# Patient Record
Sex: MALE | Race: WHITE | NOT HISPANIC OR LATINO | Employment: OTHER | URBAN - METROPOLITAN AREA
[De-identification: names, ages, dates, MRNs, and addresses within clinical notes are randomized per-mention and may not be internally consistent; named-entity substitution may affect disease eponyms.]

---

## 2017-05-30 RX ORDER — QUETIAPINE FUMARATE 100 MG/1
TABLET, FILM COATED ORAL
Qty: 90 TABLET | Refills: 0 | Status: SHIPPED | OUTPATIENT
Start: 2017-05-30 | End: 2017-07-18 | Stop reason: SDUPTHER

## 2017-06-06 RX ORDER — LITHIUM CARBONATE 150 MG/1
CAPSULE ORAL
Qty: 270 CAPSULE | Refills: 0 | Status: SHIPPED | OUTPATIENT
Start: 2017-06-06 | End: 2017-07-18 | Stop reason: SDUPTHER

## 2017-06-27 ENCOUNTER — OFFICE VISIT (OUTPATIENT)
Dept: FAMILY MEDICINE CLINIC | Facility: CLINIC | Age: 67
End: 2017-06-27

## 2017-06-27 VITALS
BODY MASS INDEX: 26.34 KG/M2 | HEIGHT: 70 IN | SYSTOLIC BLOOD PRESSURE: 170 MMHG | TEMPERATURE: 98.6 F | RESPIRATION RATE: 18 BRPM | WEIGHT: 184 LBS | OXYGEN SATURATION: 94 % | HEART RATE: 70 BPM | DIASTOLIC BLOOD PRESSURE: 80 MMHG

## 2017-06-27 DIAGNOSIS — I10 ESSENTIAL HYPERTENSION: ICD-10-CM

## 2017-06-27 DIAGNOSIS — R06.00 DYSPNEA, UNSPECIFIED TYPE: Primary | ICD-10-CM

## 2017-06-27 DIAGNOSIS — R05.8 PRODUCTIVE COUGH: ICD-10-CM

## 2017-06-27 DIAGNOSIS — R07.9 CHEST PAIN, UNSPECIFIED TYPE: ICD-10-CM

## 2017-06-27 PROCEDURE — 93000 ELECTROCARDIOGRAM COMPLETE: CPT | Performed by: NURSE PRACTITIONER

## 2017-06-27 PROCEDURE — 99214 OFFICE O/P EST MOD 30 MIN: CPT | Performed by: NURSE PRACTITIONER

## 2017-06-27 PROCEDURE — 71020 XR CHEST PA AND LATERAL: CPT | Performed by: NURSE PRACTITIONER

## 2017-06-27 RX ORDER — AMLODIPINE BESYLATE 5 MG/1
5 TABLET ORAL DAILY
Qty: 30 TABLET | Refills: 0 | Status: SHIPPED | OUTPATIENT
Start: 2017-06-27 | End: 2017-07-07 | Stop reason: SDUPTHER

## 2017-06-27 RX ORDER — LEVOFLOXACIN 500 MG/1
500 TABLET, FILM COATED ORAL DAILY
Qty: 10 TABLET | Refills: 0 | Status: SHIPPED | OUTPATIENT
Start: 2017-06-27 | End: 2017-06-28 | Stop reason: ALTCHOICE

## 2017-06-27 NOTE — PROGRESS NOTES
Subjective   Felicita Mike is a 66 y.o. male.     History of Present Illness   Patient presents to office with CC of elevated blood pressure. States he has been having headaches lately. He checked his blood pressure before coming to [facility] and states it was 210/100.  His initial blood pressure in office today was 200/88.  Repeat blood pressure after sitting for several minutes was 170/80.  Patient has COPD and is a current smoker. He reports he is always short of breath, but he has recently started to have a productive cough.  Patient reports some intermittent chest discomfort to both right and left side that is worse when supine.  He denies swelling to lower extremities.  He is UTD with his cardiologist Dr. Anne.    The following portions of the patient's history were reviewed and updated as appropriate: allergies, current medications, past family history, past medical history, past social history, past surgical history and problem list.    Review of Systems   Constitutional: Negative for chills and fever.   HENT: Negative for congestion, sinus pressure and sore throat.    Eyes: Negative for visual disturbance.   Respiratory: Positive for cough, chest tightness and shortness of breath.    Cardiovascular: Positive for chest pain. Negative for palpitations and leg swelling.   Neurological: Positive for headaches. Negative for dizziness and light-headedness.       Objective   Physical Exam   Constitutional: He is oriented to person, place, and time. He appears well-developed and well-nourished.   Neck: Carotid bruit is not present.   Cardiovascular: Normal rate and regular rhythm.    Pulmonary/Chest: Effort normal. He has rhonchi in the right upper field and the left upper field. He has rales in the right lower field and the left lower field.   Neurological: He is oriented to person, place, and time.   Skin: Skin is warm and dry.   Psychiatric: He has a normal mood and affect. His behavior is normal. Judgment and  thought content normal.   Nursing note and vitals reviewed.      Assessment/Plan   Felicita was seen today for headache and medication reaction.    Diagnoses and all orders for this visit:    Dyspnea, unspecified type  -     XR Chest PA & Lateral (In Office)  -     levoFLOXacin (LEVAQUIN) 500 MG tablet; Take 1 tablet by mouth Daily.    Chest pain, unspecified type  -     XR Chest PA & Lateral (In Office)    Essential hypertension  -     amLODIPine (NORVASC) 5 MG tablet; Take 1 tablet by mouth Daily.    Productive cough  -     levoFLOXacin (LEVAQUIN) 500 MG tablet; Take 1 tablet by mouth Daily.             EKG showed NSR.     Chest xray showed cardiomegaly and some possible infiltrate to RLL.  No comparison on file.     Patient given 10 days of levaquin.  Added amlodipine 5 mg. Patient to f/u in 1 week for recheck.

## 2017-06-27 NOTE — PROGRESS NOTES
Procedure     ECG 12 Lead  Date/Time: 6/27/2017 6:15 PM  Performed by: KENYETTA LISA  Authorized by: KENYETTA LISA   Comparison: not compared with previous ECG   Rhythm: sinus rhythm  Rate: normal  Conduction: conduction normal  ST Segments: ST segments normal  T Waves: T waves normal  QRS axis: normal  Other: no other findings  Clinical impression: normal ECG  Comments: P//190 ms   ms  QT/QTc 480/495 ms  HR 65

## 2017-06-28 ENCOUNTER — TELEPHONE (OUTPATIENT)
Dept: FAMILY MEDICINE CLINIC | Facility: CLINIC | Age: 67
End: 2017-06-28

## 2017-06-28 RX ORDER — AMOXICILLIN AND CLAVULANATE POTASSIUM 875; 125 MG/1; MG/1
1 TABLET, FILM COATED ORAL 2 TIMES DAILY
Qty: 20 TABLET | Refills: 0
Start: 2017-06-28 | End: 2017-07-08

## 2017-06-28 NOTE — TELEPHONE ENCOUNTER
[facility] pharmacy called and there is drug interaction with seroquel and fluoxetine and Levaquin. I changed rx to augmentin. they will contact patient.

## 2017-07-07 ENCOUNTER — OFFICE VISIT (OUTPATIENT)
Dept: FAMILY MEDICINE CLINIC | Facility: CLINIC | Age: 67
End: 2017-07-07

## 2017-07-07 VITALS
HEIGHT: 70 IN | SYSTOLIC BLOOD PRESSURE: 158 MMHG | RESPIRATION RATE: 16 BRPM | DIASTOLIC BLOOD PRESSURE: 70 MMHG | HEART RATE: 65 BPM | WEIGHT: 184.5 LBS | OXYGEN SATURATION: 97 % | BODY MASS INDEX: 26.41 KG/M2 | TEMPERATURE: 97.9 F

## 2017-07-07 DIAGNOSIS — I10 ESSENTIAL HYPERTENSION: ICD-10-CM

## 2017-07-07 DIAGNOSIS — E78.2 MIXED HYPERLIPIDEMIA: ICD-10-CM

## 2017-07-07 DIAGNOSIS — I25.10 CORONARY ARTERIOSCLEROSIS IN NATIVE ARTERY: ICD-10-CM

## 2017-07-07 DIAGNOSIS — F17.210 SMOKING GREATER THAN 40 PACK YEARS: Primary | ICD-10-CM

## 2017-07-07 PROCEDURE — 99214 OFFICE O/P EST MOD 30 MIN: CPT | Performed by: NURSE PRACTITIONER

## 2017-07-07 RX ORDER — AMLODIPINE BESYLATE 10 MG/1
10 TABLET ORAL DAILY
Qty: 30 TABLET | Refills: 0 | Status: SHIPPED | OUTPATIENT
Start: 2017-07-07 | End: 2017-07-18 | Stop reason: SDUPTHER

## 2017-07-07 NOTE — PROGRESS NOTES
Subjective   Felicita Mike is a 66 y.o. male.     History of Present Illness   Patient follows up for elevated blood pressure.   He was started on amlodipine at last visit. States he has been taking as prescribed. Blood pressure only mildly improved today. Patient is still smoking.  He is not interested in quitting.  Patient reports he still has headache.  Denies chest pain or shortness of breath.   He finished antibiotic and states his cough has improved.    The following portions of the patient's history were reviewed and updated as appropriate: allergies, current medications, past family history, past medical history, past social history, past surgical history and problem list.    Review of Systems   Constitutional: Negative for fatigue.   Respiratory: Negative for cough and shortness of breath.    Cardiovascular: Negative for chest pain and palpitations.   Skin: Negative for rash.   Neurological: Positive for headaches.   Psychiatric/Behavioral: Negative for dysphoric mood and sleep disturbance. The patient is not nervous/anxious.        Objective   Physical Exam   Constitutional: He is oriented to person, place, and time. He appears well-developed and well-nourished.   Cardiovascular: Normal rate and regular rhythm.    Pulmonary/Chest: Effort normal and breath sounds normal.   Neurological: He is oriented to person, place, and time.   Skin: Skin is warm and dry.   Psychiatric: He has a normal mood and affect. His behavior is normal. Judgment and thought content normal.   Nursing note and vitals reviewed.      Assessment/Plan   Felicita was seen today for headache, uri and hypertension.    Diagnoses and all orders for this visit:    Smoking greater than 40 pack years  -     Duplex Carotid Ultrasound CAR    Essential hypertension  -     amLODIPine (NORVASC) 10 MG tablet; Take 1 tablet by mouth Daily.    Coronary arteriosclerosis in native artery  -     Duplex Carotid Ultrasound CAR    Mixed hyperlipidemia  -      Duplex Carotid Ultrasound CAR

## 2017-07-12 ENCOUNTER — TELEPHONE (OUTPATIENT)
Dept: FAMILY MEDICINE CLINIC | Facility: CLINIC | Age: 67
End: 2017-07-12

## 2017-07-12 ENCOUNTER — TRANSCRIBE ORDERS (OUTPATIENT)
Dept: ADMINISTRATIVE | Facility: HOSPITAL | Age: 67
End: 2017-07-12

## 2017-07-12 DIAGNOSIS — Z13.9 SCREENING: Primary | ICD-10-CM

## 2017-07-12 NOTE — TELEPHONE ENCOUNTER
Pt made aware that he will have to pay for the carotid artery testing himself. He was given the number and the info.

## 2017-07-13 ENCOUNTER — HOSPITAL ENCOUNTER (OUTPATIENT)
Dept: CARDIOLOGY | Facility: HOSPITAL | Age: 67
Discharge: HOME | End: 2017-07-13
Admitting: NURSE PRACTITIONER

## 2017-07-13 ENCOUNTER — TELEPHONE (OUTPATIENT)
Dept: FAMILY MEDICINE CLINIC | Facility: CLINIC | Age: 67
End: 2017-07-13

## 2017-07-13 VITALS
HEART RATE: 79 BPM | HEIGHT: 67 IN | DIASTOLIC BLOOD PRESSURE: 67 MMHG | SYSTOLIC BLOOD PRESSURE: 142 MMHG | WEIGHT: 178 LBS | BODY MASS INDEX: 27.94 KG/M2

## 2017-07-13 DIAGNOSIS — Z13.9 SCREENING: ICD-10-CM

## 2017-07-13 LAB
BH CV VAS BP LEFT ARM: NORMAL MMHG
BH CV VAS BP RIGHT ARM: NORMAL MMHG
BH CV XLRA MEAS LEFT ICA/CCA RATIO: 1.25
BH CV XLRA MEAS LEFT MID CCA PSV: NORMAL CM/SEC
BH CV XLRA MEAS LEFT MID ICA PSV: NORMAL CM/SEC
BH CV XLRA MEAS LEFT PROX ECA PSV: NORMAL CM/SEC
BH CV XLRA MEAS RIGHT ICA/CCA RATIO: 1.44
BH CV XLRA MEAS RIGHT MID CCA PSV: NORMAL CM/SEC
BH CV XLRA MEAS RIGHT MID ICA PSV: NORMAL CM/SEC
BH CV XLRA MEAS RIGHT PROX ECA PSV: NORMAL CM/SEC

## 2017-07-13 PROCEDURE — 93799 UNLISTED CV SVC/PROCEDURE: CPT

## 2017-07-13 NOTE — TELEPHONE ENCOUNTER
Pt was seen on 6/27/17 and 7/7/17 for his BP and headaches. His BP is better but he still has a headache. He is wanting to know what he needs to do.

## 2017-07-17 DIAGNOSIS — E11.9 TYPE 2 DIABETES MELLITUS WITHOUT COMPLICATION (HCC): ICD-10-CM

## 2017-07-17 DIAGNOSIS — D72.829 LEUKOCYTOSIS: ICD-10-CM

## 2017-07-17 DIAGNOSIS — E78.5 HYPERLIPIDEMIA: ICD-10-CM

## 2017-07-17 RX ORDER — ATORVASTATIN CALCIUM 40 MG/1
TABLET, FILM COATED ORAL
Qty: 90 TABLET | Refills: 2 | Status: SHIPPED | OUTPATIENT
Start: 2017-07-17 | End: 2018-01-19 | Stop reason: SDUPTHER

## 2017-07-18 ENCOUNTER — APPOINTMENT (OUTPATIENT)
Dept: CARDIOLOGY | Facility: HOSPITAL | Age: 67
End: 2017-07-18

## 2017-07-18 ENCOUNTER — OFFICE VISIT (OUTPATIENT)
Dept: FAMILY MEDICINE CLINIC | Facility: CLINIC | Age: 67
End: 2017-07-18

## 2017-07-18 VITALS
WEIGHT: 181 LBS | SYSTOLIC BLOOD PRESSURE: 124 MMHG | TEMPERATURE: 97.6 F | BODY MASS INDEX: 28.41 KG/M2 | HEIGHT: 67 IN | OXYGEN SATURATION: 93 % | HEART RATE: 75 BPM | RESPIRATION RATE: 16 BRPM | DIASTOLIC BLOOD PRESSURE: 74 MMHG

## 2017-07-18 DIAGNOSIS — N40.1 BENIGN PROSTATIC HYPERPLASIA WITH LOWER URINARY TRACT SYMPTOMS, UNSPECIFIED MORPHOLOGY: ICD-10-CM

## 2017-07-18 DIAGNOSIS — F31.70 BIPOLAR DISORDER IN PARTIAL REMISSION, MOST RECENT EPISODE UNSPECIFIED TYPE (HCC): ICD-10-CM

## 2017-07-18 DIAGNOSIS — I73.9 PERIPHERAL VASCULAR DISEASE (HCC): ICD-10-CM

## 2017-07-18 DIAGNOSIS — I10 ESSENTIAL HYPERTENSION: Primary | ICD-10-CM

## 2017-07-18 DIAGNOSIS — F41.9 ANXIETY: ICD-10-CM

## 2017-07-18 DIAGNOSIS — E83.52 SERUM CALCIUM ELEVATED: ICD-10-CM

## 2017-07-18 DIAGNOSIS — E55.9 VITAMIN D DEFICIENCY: ICD-10-CM

## 2017-07-18 DIAGNOSIS — K21.9 GASTROESOPHAGEAL REFLUX DISEASE WITHOUT ESOPHAGITIS: ICD-10-CM

## 2017-07-18 DIAGNOSIS — E11.9 TYPE 2 DIABETES MELLITUS WITHOUT COMPLICATION, WITHOUT LONG-TERM CURRENT USE OF INSULIN (HCC): ICD-10-CM

## 2017-07-18 DIAGNOSIS — J43.9 PULMONARY EMPHYSEMA, UNSPECIFIED EMPHYSEMA TYPE (HCC): ICD-10-CM

## 2017-07-18 DIAGNOSIS — G44.52 NEW DAILY PERSISTENT HEADACHE: ICD-10-CM

## 2017-07-18 DIAGNOSIS — I25.10 CORONARY ARTERIOSCLEROSIS IN NATIVE ARTERY: ICD-10-CM

## 2017-07-18 DIAGNOSIS — E78.2 MIXED HYPERLIPIDEMIA: ICD-10-CM

## 2017-07-18 LAB
25(OH)D3+25(OH)D2 SERPL-MCNC: 39.6 NG/ML (ref 30–100)
ALBUMIN SERPL-MCNC: 4.8 G/DL (ref 3.5–5.2)
ALBUMIN/GLOB SERPL: 2 G/DL
ALP SERPL-CCNC: 82 U/L (ref 39–117)
ALT SERPL-CCNC: 14 U/L (ref 1–41)
AST SERPL-CCNC: 12 U/L (ref 1–40)
BASOPHILS # BLD AUTO: 0.04 10*3/MM3 (ref 0–0.2)
BASOPHILS NFR BLD AUTO: 0.3 % (ref 0–1.5)
BILIRUB SERPL-MCNC: 0.7 MG/DL (ref 0.1–1.2)
BUN SERPL-MCNC: 20 MG/DL (ref 8–23)
BUN/CREAT SERPL: 17.9 (ref 7–25)
CALCIUM SERPL-MCNC: 10.7 MG/DL (ref 8.6–10.5)
CHLORIDE SERPL-SCNC: 97 MMOL/L (ref 98–107)
CHOLEST SERPL-MCNC: 137 MG/DL (ref 0–200)
CO2 SERPL-SCNC: 26.1 MMOL/L (ref 22–29)
CREAT SERPL-MCNC: 1.12 MG/DL (ref 0.76–1.27)
EOSINOPHIL # BLD AUTO: 0.55 10*3/MM3 (ref 0–0.7)
EOSINOPHIL NFR BLD AUTO: 4.1 % (ref 0.3–6.2)
ERYTHROCYTE [DISTWIDTH] IN BLOOD BY AUTOMATED COUNT: 13 % (ref 11.5–14.5)
FOLATE SERPL-MCNC: >20 NG/ML (ref 4.78–24.2)
GLOBULIN SER CALC-MCNC: 2.4 GM/DL
GLUCOSE SERPL-MCNC: 201 MG/DL (ref 65–99)
HBA1C MFR BLD: 6.7 % (ref 4.8–5.6)
HCT VFR BLD AUTO: 42.2 % (ref 40.4–52.2)
HDLC SERPL-MCNC: 31 MG/DL (ref 40–60)
HGB BLD-MCNC: 13.2 G/DL (ref 13.7–17.6)
IMM GRANULOCYTES # BLD: 0.04 10*3/MM3 (ref 0–0.03)
IMM GRANULOCYTES NFR BLD: 0.3 % (ref 0–0.5)
LDLC SERPL CALC-MCNC: 65 MG/DL (ref 0–100)
LITHIUM SERPL-SCNC: 0.9 MMOL/L (ref 0.6–1.2)
LYMPHOCYTES # BLD AUTO: 1.67 10*3/MM3 (ref 0.9–4.8)
LYMPHOCYTES NFR BLD AUTO: 12.4 % (ref 19.6–45.3)
MCH RBC QN AUTO: 27.8 PG (ref 27–32.7)
MCHC RBC AUTO-ENTMCNC: 31.3 G/DL (ref 32.6–36.4)
MCV RBC AUTO: 88.8 FL (ref 79.8–96.2)
MONOCYTES # BLD AUTO: 1.34 10*3/MM3 (ref 0.2–1.2)
MONOCYTES NFR BLD AUTO: 9.9 % (ref 5–12)
NEUTROPHILS # BLD AUTO: 9.88 10*3/MM3 (ref 1.9–8.1)
NEUTROPHILS NFR BLD AUTO: 73 % (ref 42.7–76)
PLATELET # BLD AUTO: 201 10*3/MM3 (ref 140–500)
POTASSIUM SERPL-SCNC: 5 MMOL/L (ref 3.5–5.2)
PROT SERPL-MCNC: 7.2 G/DL (ref 6–8.5)
RBC # BLD AUTO: 4.75 10*6/MM3 (ref 4.6–6)
SODIUM SERPL-SCNC: 138 MMOL/L (ref 136–145)
T4 FREE SERPL-MCNC: 1.13 NG/DL (ref 0.93–1.7)
TRIGL SERPL-MCNC: 204 MG/DL (ref 0–150)
TSH SERPL DL<=0.005 MIU/L-ACNC: 2.03 MIU/ML (ref 0.27–4.2)
VIT B12 SERPL-MCNC: >2000 PG/ML (ref 211–946)
VLDLC SERPL CALC-MCNC: 40.8 MG/DL (ref 5–40)
WBC # BLD AUTO: 13.52 10*3/MM3 (ref 4.5–10.7)

## 2017-07-18 PROCEDURE — 99214 OFFICE O/P EST MOD 30 MIN: CPT | Performed by: PHYSICIAN ASSISTANT

## 2017-07-18 RX ORDER — AMLODIPINE BESYLATE 10 MG/1
10 TABLET ORAL DAILY
Qty: 90 TABLET | Refills: 1 | Status: SHIPPED | OUTPATIENT
Start: 2017-07-18 | End: 2018-01-19 | Stop reason: SDUPTHER

## 2017-07-18 RX ORDER — LITHIUM CARBONATE 150 MG/1
CAPSULE ORAL
Qty: 270 CAPSULE | Refills: 3 | Status: SHIPPED | OUTPATIENT
Start: 2017-07-18 | End: 2018-07-20 | Stop reason: SDUPTHER

## 2017-07-18 RX ORDER — QUETIAPINE FUMARATE 100 MG/1
100 TABLET, FILM COATED ORAL NIGHTLY
Qty: 90 TABLET | Refills: 1 | Status: SHIPPED | OUTPATIENT
Start: 2017-07-18 | End: 2018-01-19 | Stop reason: SDUPTHER

## 2017-07-18 NOTE — PATIENT INSTRUCTIONS
Set up CT head and to call you  See neurologist  Low glycemic index diet  Exercise 30 minutes most days of the week  Make sure you get results on any labs or tests we ordered today  We discussed medications and how to take them as prescribed  Sleep 6-8 hours each night if possible  If you have not signed up for [employer], please activate your code ASAP from your After Visit Summary today    LDL goal <100  LDL goal if heart disease <70  HDL goal >60  Triglyceride goal <150  BP goal =<130/80  Fasting glucose <100    Stop smoking  See me every 6 mos  Labs today

## 2017-07-18 NOTE — PROGRESS NOTES
"Subjective   Felicita Mike is a 66 y.o. male.     History of Present Illness   Felicita Mike 66 y.o. male who presents today for routine follow up check and medication refills.  he has a history of   Patient Active Problem List   Diagnosis   • Elevated prostate specific antigen (PSA)   • Coronary arteriosclerosis in native artery   • Bipolar affective disorder   • Chronic obstructive pulmonary disease   • Colon polyp   • Constipation   • Diabetes mellitus   • Gastroesophageal reflux disease   • Fatigue   • Hearing loss   • Hyperlipidemia   • Peripheral vascular disease   • Proteinuria   • Muscle pain   • Low back pain   • Leukocytosis   • Seborrheic eczema   • Sleep apnea   • Tinea corporis   • Tremor   • Hypertension   .  Since the last visit, he has overall felt tired.  He has Hypertenision and is well controlled on medication, DMII and is well controlled on medication, GERD and is well controlled on PPI medication, Hyperlipidemia and is well controlled on medication and CAD and is under care of their Cardiologist for medical management.  he has been compliant with current medications have reviewed them.  The patient denies medication side effects.    Lab Results   Component Value Date    BUN 14 12/28/2016    CREATININE 0.95 12/28/2016    EGFRIFNONA 80 12/28/2016    EGFRIFAFRI 97 12/28/2016    BCR 14.7 12/28/2016    K 5.2 12/28/2016    CO2 25.6 12/28/2016    CALCIUM 10.3 12/28/2016    PROTENTOTREF 6.7 12/06/2016    ALBUMIN 4.70 12/06/2016    LABIL2 2.4 12/06/2016    AST 9 12/06/2016    ALT 13 12/06/2016     Lab Results   Component Value Date    HGBA1C 6.36 (H) 12/06/2016     Just had carotid doppler  Study Impression   Tech Notes: Height 5' 7\" Weight 178 lb. BMI 27.9.    Plaque present on right and left carotid artery (mild plaque).    Carotid Artery Disease and Stroke Screening:   The right vessel has no significant carotid stenosis (less than50%).  The left vessel has no significant carotid stenosis (less " than50%).   Carotid screening recommendations:  annual screening.     Sees urologist 1-2 times a year and has BPH; Rx helps  See DR Anne heart disease    Lab Results   Component Value Date    TSH 0.894 12/06/2016     Need to watch thyroid d/t Lithium  Also watch A1C d/t seroquel  Felicita Mike male 66 y.o. who presents today for follow up of Bipolar Disorder and Anxiety.  He reports medication is working well, patient desires to continue on Rx, and needs refill. Onset of symptoms was approximately several years ago.  He denies current suicidal and homicidal ideation. Risk factors are family history of anxiety and or depression and lifestyle of multiple roles.  Previous treatment includes current Rx.  He complains of the following medication side effects: none.  The patient has previously been in counseling..    Felicita Mike 66 y.o. male presents for evaluation of new daily h/a tension like. Symptoms began about 6 weeks ago. Generally, the headaches last about continous hours and occur continously every day. The headaches are usually poorly described and pounding. The location of the headache pain is bilateral, temporal, parietal and upper face. Recently, the headaches have been increasing in severity. Work attendance or other daily activities are affected by the headaches. Precipitating factors include: BP was up for a while and ? from this also. The headaches are usually not preceded by an aura. Associated symptoms include sometimes dizzy to stand. The patient denies speech difficulties and vision problems. Home treatment has included Excedrin with some improvement. Other history includes: no prior hx . Family history includes headaches of unknown type in sister.  Prior therapies tried include OTC analgesics with relief some help, but still symptoms.    No prior Dx and bp has been great in last few weeks and home readings 120/80    Last MRI brain was 12-2-15 and neg    The following portions of the patient's  history were reviewed and updated as appropriate: allergies, current medications, past family history, past medical history, past social history, past surgical history and problem list.    Review of Systems   Constitutional: Negative for activity change, appetite change and unexpected weight change.   HENT: Negative for nosebleeds and trouble swallowing.    Eyes: Negative for pain and visual disturbance.   Respiratory: Negative for chest tightness, shortness of breath and wheezing.    Cardiovascular: Negative for chest pain and palpitations.   Gastrointestinal: Negative for abdominal pain and blood in stool.   Endocrine: Negative.    Genitourinary: Negative for difficulty urinating and hematuria.   Musculoskeletal: Negative for joint swelling.   Skin: Negative for color change and rash.   Allergic/Immunologic: Negative.    Neurological: Positive for dizziness, tremors and headaches. Negative for syncope and speech difficulty.   Hematological: Negative for adenopathy.   Psychiatric/Behavioral: Negative for agitation and confusion.   All other systems reviewed and are negative.      Objective   Physical Exam   Constitutional: He is oriented to person, place, and time. He appears well-developed and well-nourished. No distress.   HENT:   Head: Normocephalic and atraumatic.   Right Ear: External ear normal.   Left Ear: External ear normal.   Nose: Nose normal.   Mouth/Throat: Oropharynx is clear and moist.   Left pupil stays dilated   Eyes: Conjunctivae and EOM are normal. Right eye exhibits no discharge. Left eye exhibits no discharge. No scleral icterus.   Left pupil damage is chronic   Neck: Normal range of motion. Neck supple. No tracheal deviation present. No thyromegaly present.   Cardiovascular: Normal rate, regular rhythm, normal heart sounds, intact distal pulses and normal pulses.  Exam reveals no gallop and no friction rub.    No murmur heard.  Pulmonary/Chest: Effort normal and breath sounds normal. No  respiratory distress. He has no wheezes. He has no rales.   decreased   Abdominal: Soft.   Musculoskeletal: Normal range of motion.   Tremor in hands    Felicita had a diabetic foot exam performed today.   During the foot exam he had a monofilament test performed.    Neurological Sensory Findings - Unaltered hot/cold right ankle/foot discrimination and unaltered hot/cold left ankle/foot discrimination. Unaltered sharp/dull right ankle/foot discrimination and unaltered sharp/dull left ankle/foot discrimination. No right ankle/foot altered proprioception and no left ankle/foot altered proprioception    Vascular Status -  His exam exhibits right foot vasculature abnormal and no right foot edema. His exam exhibits left foot vasculature abnormal and no left foot edema.   Skin Integrity  -  His right foot skin is not intact.  He hasno right foot ulcer, non-callous right foot, no right foot warmth and no right foot blister.   Felicita's left foot skin is not intact.  He has no left foot ulcer, non-callous left foot, no left foot warmth and no left foot blister..     Lymphadenopathy:     He has no cervical adenopathy.   Neurological: He is alert and oriented to person, place, and time. He has normal reflexes. No cranial nerve deficit (see note on pupil left). He exhibits normal muscle tone. Coordination normal.   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 No rash noted. No erythema. No pallor.   Clubbing toenails and fingernails   Psychiatric: He has a normal mood and affect. His behavior is normal. Judgment and thought content normal.   Nursing note and vitals reviewed.      Assessment/Plan   Felicita was seen today for headache.    Diagnoses and all orders for this visit:    Essential hypertension    Mixed hyperlipidemia    Type 2 diabetes mellitus without complication, without long-term current use of insulin    Gastroesophageal reflux disease without esophagitis    Bipolar disorder in partial remission, most recent  episode unspecified type    Peripheral vascular disease    Pulmonary emphysema, unspecified emphysema type    Coronary arteriosclerosis in native artery    Anxiety    Benign prostatic hyperplasia with lower urinary tract symptoms, unspecified morphology

## 2017-07-24 ENCOUNTER — HOSPITAL ENCOUNTER (OUTPATIENT)
Dept: CT IMAGING | Facility: HOSPITAL | Age: 67
Discharge: HOME | End: 2017-07-24
Admitting: PHYSICIAN ASSISTANT

## 2017-07-24 DIAGNOSIS — G44.52 NEW DAILY PERSISTENT HEADACHE: ICD-10-CM

## 2017-07-24 PROCEDURE — 70450 CT HEAD/BRAIN W/O DYE: CPT

## 2017-07-28 ENCOUNTER — TELEPHONE (OUTPATIENT)
Dept: FAMILY MEDICINE CLINIC | Facility: CLINIC | Age: 67
End: 2017-07-28

## 2017-08-28 ENCOUNTER — OFFICE VISIT (OUTPATIENT)
Dept: NEUROLOGY | Facility: CLINIC | Age: 67
End: 2017-08-28

## 2017-08-28 VITALS
HEIGHT: 67 IN | SYSTOLIC BLOOD PRESSURE: 125 MMHG | BODY MASS INDEX: 29.19 KG/M2 | DIASTOLIC BLOOD PRESSURE: 60 MMHG | WEIGHT: 186 LBS

## 2017-08-28 DIAGNOSIS — R25.1 TREMOR: Primary | ICD-10-CM

## 2017-08-28 PROBLEM — G44.209 TENSION HEADACHE: Status: ACTIVE | Noted: 2017-08-28

## 2017-08-28 PROCEDURE — 99203 OFFICE O/P NEW LOW 30 MIN: CPT | Performed by: PSYCHIATRY & NEUROLOGY

## 2017-08-28 NOTE — PROGRESS NOTES
Subjective:     Patient ID: Felicita Mike is a 66 y.o. male.    History of Present Illness   The patient is a 66-year-old right-handed gentleman who was seen for further evaluation of headache. The patient was seen today in consultation per the request of Alexandrea Jimenez.  Patient began having a headache in June.  Had insidious onset.  Multi location.  Resolved about 2 weeks ago.  He is not had headaches before.  He did have a normal MRI the brain in December 2015.  CAT scan head without contrast was normal 7/24/17.  He states that his blood pressure was highly elevated during the headaches and has now improved.  He is on a statin drug.  The following portions of the patient's history were reviewed and updated as appropriate: allergies, current medications, past family history, past medical history, past social history, past surgical history and problem list.    Family History   Problem Relation Age of Onset   • Cancer Mother    • Diabetes Mother    • Hypertension Mother    • Heart disease Father    • Cancer Brother    • Stroke Brother      Active Ambulatory Problems     Diagnosis Date Noted   • Elevated prostate specific antigen (PSA) 02/25/2016   • Coronary arteriosclerosis in native artery 02/25/2016   • Bipolar affective disorder 02/25/2016   • Chronic obstructive pulmonary disease 02/25/2016   • Colon polyp 02/25/2016   • Constipation 02/25/2016   • Diabetes mellitus 02/25/2016   • Gastroesophageal reflux disease 02/25/2016   • Fatigue 02/25/2016   • Hearing loss 02/25/2016   • Hyperlipidemia 02/25/2016   • Peripheral vascular disease 02/25/2016   • Proteinuria 02/25/2016   • Muscle pain 02/25/2016   • Low back pain 02/25/2016   • Leukocytosis 02/25/2016   • Seborrheic eczema 02/25/2016   • Sleep apnea 02/25/2016   • Tinea corporis 02/25/2016   • Tremor 02/25/2016   • Hypertension 02/25/2016   • Anxiety 07/18/2017   • Benign prostatic hyperplasia with lower urinary tract symptoms 07/18/2017   • Tension headache  08/28/2017     Resolved Ambulatory Problems     Diagnosis Date Noted   • Benign essential hypertension 02/25/2016     Past Medical History:   Diagnosis Date   • Abnormal PSA    • Acute myocardial infarction    • Apnea, sleep    • Atherosclerotic heart disease    • Chronic pain    • Colon polyp    • Constipation    • COPD (chronic obstructive pulmonary disease)    • Coronary artery disease    • Depressed bipolar II disorder    • Diabetes mellitus    • Fatigue    • GERD (gastroesophageal reflux disease)    • Health care maintenance    • Hearing loss    • Hyperlipidemia    • Hypertension    • Leukocytosis    • Proteinuria    • PVD (peripheral vascular disease)    • Sebaceous cyst    • Seborrheic dermatitis    • Sleep apnea    • Tinea corporis    • Tobacco use    • Tremor      Social History     Social History   • Marital status:      Spouse name: N/A   • Number of children: N/A   • Years of education: N/A     Occupational History   • Not on file.     Social History Main Topics   • Smoking status: Current Every Day Smoker     Types: Cigarettes   • Smokeless tobacco: Never Used      Comment: NO CAFFINE USE   • Alcohol use No   • Drug use: No   • Sexual activity: Not on file     Other Topics Concern   • Not on file     Social History Narrative       Current Outpatient Prescriptions:   •  amLODIPine (NORVASC) 10 MG tablet, Take 1 tablet by mouth Daily. For  BP, Disp: 90 tablet, Rfl: 1  •  aspirin 81 MG tablet, Take 1 tablet by mouth 2 (two) times a day., Disp: , Rfl:   •  atorvastatin (LIPITOR) 40 MG tablet, TAKE ONE TABLET BY MOUTH DAILY FOR CHOLESTEROL *STOP PRAVASTATIN, Disp: 90 tablet, Rfl: 2  •  carvedilol (COREG) 25 MG tablet, Take 1 tablet by mouth 2 (Two) Times a Day With Meals. For heart, Disp: 180 tablet, Rfl: 3  •  cholecalciferol (VITAMIN D3) 1000 UNITS tablet, Take 1 tablet by mouth 2 (two) times a day., Disp: , Rfl:   •  Cyanocobalamin (B-12) 1000 MCG capsule, Take by mouth., Disp: , Rfl:   •   FLUoxetine (PROzac) 20 MG capsule, Take 3 capsules by mouth Daily. For stress, Disp: 270 capsule, Rfl: 3  •  lisinopril-hydrochlorothiazide (PRINZIDE,ZESTORETIC) 20-12.5 MG per tablet, Take 1 tablet by mouth Daily. For bp and kidney protection, Disp: 90 tablet, Rfl: 3  •  lithium carbonate 150 MG capsule, One PO in AM and 2 PO daily with food for mood, Disp: 270 capsule, Rfl: 3  •  metFORMIN (GLUCOPHAGE) 1000 MG tablet, One PO BID For DMII, Disp: 180 tablet, Rfl: 3  •  Multiple Vitamins-Minerals (MULTIVITAMIN ADULT PO), Take  by mouth., Disp: , Rfl:   •  omeprazole (priLOSEC) 20 MG capsule, Take 1 capsule by mouth Daily. For GERD, Disp: 90 capsule, Rfl: 3  •  QUEtiapine (SEROquel) 100 MG tablet, Take 1 tablet by mouth Every Night. For sleep, Disp: 90 tablet, Rfl: 1  •  tamsulosin (FLOMAX) 0.4 MG capsule 24 hr capsule, , Disp: , Rfl:     Review of Systems   Constitutional: Negative.    Eyes: Negative.    Respiratory: Negative.    Cardiovascular: Negative.    Gastrointestinal: Negative.    Endocrine: Negative.    Musculoskeletal: Positive for back pain. Negative for arthralgias, gait problem, joint swelling, myalgias, neck pain and neck stiffness.   Skin: Negative.    Allergic/Immunologic: Negative.    Neurological: Positive for headaches. Negative for dizziness, tremors, seizures, syncope, facial asymmetry, speech difficulty, weakness, light-headedness and numbness.   Hematological: Negative.    Psychiatric/Behavioral: Negative.         Objective:    Neurologic Exam  Mental status examination showed normal orientation, memory, and speech.  Attention span and concentration were normal.  Fund of knowledge was normal.  Funduscopic showed no abnormality.  Visual fields were full.  Pupillary reflexes were 5 mm, symmetric, and equally reactive to light.  Eye movements were full and conjugate.  Gag reflex was normal.  Hearing was normal.  Muscles of mastication were normal.  No facial weakness was noted.  Shoulder shrug  strength was normal bilaterally.  Tongue protrudes midline.  There is no drift of outstretched arms.  Deep tendon reflexes are 2+ and symmetric.  No focal weakness or atrophy was noted.  Tone was normal in all extremities.  No cerebellar signs were noted.  He has a mild generalized tremulousness The patient's gait was normal.  No other pathologic reflexes such as Babinski's sign were noted.  No sensory abnormalities were noted.  Physical Exam    Assessment/Plan:     Felicita was seen today for headache.    Diagnoses and all orders for this visit:    Tremor       His headache has resolved.  Etiology is not clear.  Neurological examination is unremarkable.  No reason for further testing.  Reason to place him on a new medication.  I plan to see him as needed in the office. Thank you for allowing me to share in the care of this patient.  Storm Ramírez M.D.

## 2017-10-03 RX ORDER — WHEAT DEXTRIN 3 G/4 G
POWDER IN PACKET (EA) ORAL
Qty: 28 EACH | Refills: 10 | Status: SHIPPED | OUTPATIENT
Start: 2017-10-03 | End: 2019-08-08

## 2017-10-16 RX ORDER — POLYETHYLENE GLYCOL 3350 17 G/17G
POWDER, FOR SOLUTION ORAL
Qty: 90 EACH | Refills: 4 | Status: SHIPPED | OUTPATIENT
Start: 2017-10-16 | End: 2019-08-08

## 2017-11-21 ENCOUNTER — OFFICE VISIT (OUTPATIENT)
Dept: CARDIOLOGY | Facility: CLINIC | Age: 67
End: 2017-11-21

## 2017-11-21 VITALS
DIASTOLIC BLOOD PRESSURE: 62 MMHG | BODY MASS INDEX: 26.74 KG/M2 | HEART RATE: 68 BPM | HEIGHT: 70 IN | SYSTOLIC BLOOD PRESSURE: 120 MMHG | WEIGHT: 186.8 LBS

## 2017-11-21 DIAGNOSIS — J43.9 PULMONARY EMPHYSEMA, UNSPECIFIED EMPHYSEMA TYPE (HCC): ICD-10-CM

## 2017-11-21 DIAGNOSIS — I25.10 CORONARY ARTERIOSCLEROSIS IN NATIVE ARTERY: Primary | ICD-10-CM

## 2017-11-21 DIAGNOSIS — F31.70 BIPOLAR DISORDER IN PARTIAL REMISSION, MOST RECENT EPISODE UNSPECIFIED TYPE (HCC): ICD-10-CM

## 2017-11-21 DIAGNOSIS — Z72.0 TOBACCO ABUSE: ICD-10-CM

## 2017-11-21 DIAGNOSIS — I10 ESSENTIAL HYPERTENSION: ICD-10-CM

## 2017-11-21 DIAGNOSIS — E78.49 OTHER HYPERLIPIDEMIA: ICD-10-CM

## 2017-11-21 PROCEDURE — 93000 ELECTROCARDIOGRAM COMPLETE: CPT | Performed by: INTERNAL MEDICINE

## 2017-11-21 PROCEDURE — 99214 OFFICE O/P EST MOD 30 MIN: CPT | Performed by: INTERNAL MEDICINE

## 2017-11-21 NOTE — PROGRESS NOTES
Date of Office Visit: 2017  Encounter Provider: Suleiman Anne MD  Place of Service: [facility] CARDIOLOGY  Patient Name: Felicita Mike  :1950  [id number]    Chief Complaint   Patient presents with   • Coronary Artery Disease   :     HPI: Felicita Mike is a 67 y.o. male  He is here for followup.  He is a gentleman who 13 years ago we stented his circumflex and RCA.  Sadly, he has continued to smoke and will not stop.  He has pretty advanced COPD.  He has atypical sharp chest pain, but there has been no change.  He has pronounced clubbing on his hands.          Past Medical History:   Diagnosis Date   • Abnormal PSA    • Acute myocardial infarction    • Apnea, sleep    • Atherosclerotic heart disease    • Chronic pain     Chronic low back paoin, MRI ,  L3 compression deformity, L2 degenerative disc disease and L5-S1 degenerative disc disease.   • Colon polyp    • Constipation    • COPD (chronic obstructive pulmonary disease)    • Coronary artery disease    • Depressed bipolar II disorder    • Diabetes mellitus    • Fatigue    • GERD (gastroesophageal reflux disease)    • Health care maintenance    • Hearing loss    • Hyperlipidemia    • Hypertension    • Leukocytosis    • Proteinuria    • PVD (peripheral vascular disease)    • Sebaceous cyst    • Seborrheic dermatitis    • Sleep apnea    • Tinea corporis    • Tobacco use    • Tremor        Past Surgical History:   Procedure Laterality Date   • ANGIOPLASTY      Cath Stent Placement   • COLONOSCOPY  2014    ta polyps   • COLONOSCOPY N/A 2016    Procedure: COLONOSCOPY TO CECUM AND TERMINAL ILEUM WITH HOT POLYPECTOMIES;  Surgeon: Ivette Franco MD;  Location: [facility] ENDOSCOPY;  Service:    • CORONARY STENT PLACEMENT     • EYE SURGERY      Cataract Surgery   • HAND SURGERY         Social History     Social History   • Marital status:      Spouse name: N/A   • Number of children: N/A   • Years of  education: N/A     Occupational History   • Not on file.     Social History Main Topics   • Smoking status: Current Every Day Smoker     Types: Cigarettes   • Smokeless tobacco: Never Used      Comment: NO CAFFINE USE   • Alcohol use No   • Drug use: No   • Sexual activity: Not on file     Other Topics Concern   • Not on file     Social History Narrative       Family History   Problem Relation Age of Onset   • Cancer Mother    • Diabetes Mother    • Hypertension Mother    • Heart disease Father    • Cancer Brother    • Stroke Brother        Review of Systems   Constitution: Negative for decreased appetite, fever, malaise/fatigue and weight loss.   HENT: Negative for nosebleeds.    Eyes: Negative for double vision.   Cardiovascular: Negative for chest pain, claudication, cyanosis, dyspnea on exertion, irregular heartbeat, leg swelling, near-syncope, orthopnea, palpitations, paroxysmal nocturnal dyspnea and syncope.   Respiratory: Negative for cough, hemoptysis and shortness of breath.    Hematologic/Lymphatic: Negative for bleeding problem.   Skin: Negative for rash.   Musculoskeletal: Negative for falls and myalgias.   Gastrointestinal: Negative for hematochezia, jaundice, melena, nausea and vomiting.   Genitourinary: Negative for hematuria.   Neurological: Negative for dizziness and seizures.   Psychiatric/Behavioral: Negative for altered mental status and memory loss.       Allergies   Allergen Reactions   • Clopidogrel          Current Outpatient Prescriptions:   •  amLODIPine (NORVASC) 10 MG tablet, Take 1 tablet by mouth Daily. For  BP, Disp: 90 tablet, Rfl: 1  •  aspirin 81 MG tablet, Take 1 tablet by mouth 2 (two) times a day., Disp: , Rfl:   •  atorvastatin (LIPITOR) 40 MG tablet, TAKE ONE TABLET BY MOUTH DAILY FOR CHOLESTEROL *STOP PRAVASTATIN, Disp: 90 tablet, Rfl: 2  •  carvedilol (COREG) 25 MG tablet, Take 1 tablet by mouth 2 (Two) Times a Day With Meals. For heart, Disp: 180 tablet, Rfl: 3  •   "cholecalciferol (VITAMIN D3) 1000 UNITS tablet, Take 1 tablet by mouth 2 (two) times a day., Disp: , Rfl:   •  Cyanocobalamin (B-12) 1000 MCG capsule, Take by mouth., Disp: , Rfl:   •  FLUoxetine (PROzac) 20 MG capsule, Take 3 capsules by mouth Daily. For stress, Disp: 270 capsule, Rfl: 3  •  lisinopril-hydrochlorothiazide (PRINZIDE,ZESTORETIC) 20-12.5 MG per tablet, Take 1 tablet by mouth Daily. For bp and kidney protection, Disp: 90 tablet, Rfl: 3  •  lithium carbonate 150 MG capsule, One PO in AM and 2 PO daily with food for mood, Disp: 270 capsule, Rfl: 3  •  metFORMIN (GLUCOPHAGE) 1000 MG tablet, One PO BID For DMII, Disp: 180 tablet, Rfl: 3  •  Multiple Vitamins-Minerals (MULTIVITAMIN ADULT PO), Take  by mouth., Disp: , Rfl:   •  omeprazole (priLOSEC) 20 MG capsule, Take 1 capsule by mouth Daily. For GERD, Disp: 90 capsule, Rfl: 3  •  polyethylene glycol (MIRALAX) packet, MIX 1 PACKET (17 GRAMS ) AS DIRECTED AND TAKE BY MOUTH DAILY *CAN REDUCE TO EVERY OTHER DAY OR EVERY 3 DAYS IF STOOLS TOO LOOSE, Disp: 90 each, Rfl: 4  •  QUEtiapine (SEROquel) 100 MG tablet, Take 1 tablet by mouth Every Night. For sleep, Disp: 90 tablet, Rfl: 1  •  tamsulosin (FLOMAX) 0.4 MG capsule 24 hr capsule, , Disp: , Rfl:   •  wheat dextrin (BENEFIBER ON THE GO) powder powder, TAKE PER PACKAGE DIRECTIONS, Disp: 28 each, Rfl: 10     Objective:     Vitals:    11/21/17 1315   Weight: 186 lb 12.8 oz (84.7 kg)   Height: 70\" (177.8 cm)     Body mass index is 26.8 kg/(m^2).    Physical Exam   Constitutional: He is oriented to person, place, and time. He appears well-developed and well-nourished.   HENT:   Head: Normocephalic.   Eyes: No scleral icterus.   Neck: No JVD present. No thyromegaly present.   Cardiovascular: Normal rate, regular rhythm and normal heart sounds.  Exam reveals no gallop and no friction rub.    No murmur heard.  Pulmonary/Chest: Effort normal and breath sounds normal. He has no wheezes. He has no rales.   Abdominal: " Soft. There is no hepatosplenomegaly. There is no tenderness.   Musculoskeletal: Normal range of motion. He exhibits no edema.   Lymphadenopathy:     He has no cervical adenopathy.   Neurological: He is alert and oriented to person, place, and time.   Skin: Skin is warm and dry. No rash noted.   + clubbing   Psychiatric: He has a normal mood and affect.         ECG 12 Lead  Date/Time: 11/21/2017 1:24 PM  Performed by: AGGIE ANNE  Authorized by: AGGIE ANNE   Comparison: compared with previous ECG   Similar to previous ECG  Rhythm: sinus rhythm             Assessment:       Diagnosis Plan   1. Coronary arteriosclerosis in native artery     2. Other hyperlipidemia     3. Essential hypertension     4. Pulmonary emphysema, unspecified emphysema type     5. Tobacco abuse            Plan:       He is a particularly sad case is super addicted to tobacco.  He doesn't seem to have any changes or have any manifest cardiac issues now but something will happen in with him.  He is on good medical therapy M come see Na in a year see me in 2    Coronary Artery Disease  Assessment  • The patient has no angina    Plan  • Lifestyle modifications discussed include adhering to a heart healthy diet and avoidance of tobacco products    Subjective - Objective  • There has been a previous stent procedure using IBAN  • Current antiplatelet therapy includes aspirin 81 mg        As always, it has been a pleasure to participate in your patient's care.      Sincerely,       Aggie Anne MD

## 2017-12-12 ENCOUNTER — TELEPHONE (OUTPATIENT)
Dept: FAMILY MEDICINE CLINIC | Facility: CLINIC | Age: 67
End: 2017-12-12

## 2017-12-12 DIAGNOSIS — I10 ESSENTIAL HYPERTENSION: ICD-10-CM

## 2017-12-12 RX ORDER — CARVEDILOL 25 MG/1
25 TABLET ORAL 2 TIMES DAILY WITH MEALS
Qty: 60 TABLET | Refills: 0 | Status: SHIPPED | OUTPATIENT
Start: 2017-12-12 | End: 2018-01-08 | Stop reason: SDUPTHER

## 2017-12-12 NOTE — TELEPHONE ENCOUNTER
Pt is out of this medication and has changed pharmacies.  He has an appointment scheduled in January

## 2017-12-27 RX ORDER — FLUOXETINE HYDROCHLORIDE 20 MG/1
CAPSULE ORAL
Qty: 270 CAPSULE | Refills: 0 | Status: SHIPPED | OUTPATIENT
Start: 2017-12-27 | End: 2018-01-19 | Stop reason: SDUPTHER

## 2017-12-27 RX ORDER — LISINOPRIL AND HYDROCHLOROTHIAZIDE 20; 12.5 MG/1; MG/1
TABLET ORAL
Qty: 30 TABLET | Refills: 0 | Status: SHIPPED | OUTPATIENT
Start: 2017-12-27 | End: 2018-01-19 | Stop reason: SDUPTHER

## 2018-01-08 DIAGNOSIS — I10 ESSENTIAL HYPERTENSION: ICD-10-CM

## 2018-01-08 RX ORDER — CARVEDILOL 25 MG/1
TABLET ORAL
Qty: 60 TABLET | Refills: 0 | Status: SHIPPED | OUTPATIENT
Start: 2018-01-08 | End: 2018-01-19 | Stop reason: SDUPTHER

## 2018-01-19 ENCOUNTER — OFFICE VISIT (OUTPATIENT)
Dept: FAMILY MEDICINE CLINIC | Facility: CLINIC | Age: 68
End: 2018-01-19

## 2018-01-19 VITALS
TEMPERATURE: 98.4 F | HEART RATE: 89 BPM | DIASTOLIC BLOOD PRESSURE: 60 MMHG | BODY MASS INDEX: 26.05 KG/M2 | WEIGHT: 182 LBS | HEIGHT: 70 IN | SYSTOLIC BLOOD PRESSURE: 114 MMHG | OXYGEN SATURATION: 96 % | RESPIRATION RATE: 16 BRPM

## 2018-01-19 DIAGNOSIS — D64.9 LOW HEMOGLOBIN: ICD-10-CM

## 2018-01-19 DIAGNOSIS — K21.9 GASTROESOPHAGEAL REFLUX DISEASE WITHOUT ESOPHAGITIS: ICD-10-CM

## 2018-01-19 DIAGNOSIS — I10 ESSENTIAL HYPERTENSION: ICD-10-CM

## 2018-01-19 DIAGNOSIS — F31.70 BIPOLAR DISORDER IN PARTIAL REMISSION, MOST RECENT EPISODE UNSPECIFIED TYPE (HCC): ICD-10-CM

## 2018-01-19 DIAGNOSIS — E83.52 SERUM CALCIUM ELEVATED: ICD-10-CM

## 2018-01-19 DIAGNOSIS — Z72.0 TOBACCO ABUSE: ICD-10-CM

## 2018-01-19 DIAGNOSIS — J43.9 PULMONARY EMPHYSEMA, UNSPECIFIED EMPHYSEMA TYPE (HCC): ICD-10-CM

## 2018-01-19 DIAGNOSIS — D72.829 LEUKOCYTOSIS, UNSPECIFIED TYPE: ICD-10-CM

## 2018-01-19 DIAGNOSIS — E11.9 TYPE 2 DIABETES MELLITUS WITHOUT COMPLICATION, WITHOUT LONG-TERM CURRENT USE OF INSULIN (HCC): Primary | ICD-10-CM

## 2018-01-19 DIAGNOSIS — E78.49 OTHER HYPERLIPIDEMIA: ICD-10-CM

## 2018-01-19 DIAGNOSIS — F41.9 ANXIETY: ICD-10-CM

## 2018-01-19 DIAGNOSIS — E78.2 MIXED HYPERLIPIDEMIA: ICD-10-CM

## 2018-01-19 PROCEDURE — G0009 ADMIN PNEUMOCOCCAL VACCINE: HCPCS | Performed by: PHYSICIAN ASSISTANT

## 2018-01-19 PROCEDURE — 90670 PCV13 VACCINE IM: CPT | Performed by: PHYSICIAN ASSISTANT

## 2018-01-19 PROCEDURE — 99214 OFFICE O/P EST MOD 30 MIN: CPT | Performed by: PHYSICIAN ASSISTANT

## 2018-01-19 RX ORDER — ALBUTEROL SULFATE 90 UG/1
2 AEROSOL, METERED RESPIRATORY (INHALATION) EVERY 4 HOURS PRN
Qty: 3 INHALER | Refills: 3 | Status: SHIPPED | OUTPATIENT
Start: 2018-01-19 | End: 2019-01-23 | Stop reason: SDUPTHER

## 2018-01-19 RX ORDER — ATORVASTATIN CALCIUM 40 MG/1
40 TABLET, FILM COATED ORAL DAILY
Qty: 90 TABLET | Refills: 3 | Status: SHIPPED | OUTPATIENT
Start: 2018-01-19 | End: 2019-01-21 | Stop reason: SDUPTHER

## 2018-01-19 RX ORDER — CARVEDILOL 25 MG/1
25 TABLET ORAL 2 TIMES DAILY WITH MEALS
Qty: 180 TABLET | Refills: 3 | Status: SHIPPED | OUTPATIENT
Start: 2018-01-19 | End: 2018-07-20

## 2018-01-19 RX ORDER — FLUOXETINE HYDROCHLORIDE 20 MG/1
CAPSULE ORAL
Qty: 270 CAPSULE | Refills: 1 | Status: SHIPPED | OUTPATIENT
Start: 2018-01-19 | End: 2018-07-17 | Stop reason: SDUPTHER

## 2018-01-19 RX ORDER — QUETIAPINE FUMARATE 100 MG/1
100 TABLET, FILM COATED ORAL NIGHTLY
Qty: 90 TABLET | Refills: 1 | Status: SHIPPED | OUTPATIENT
Start: 2018-01-19 | End: 2018-07-20 | Stop reason: SDUPTHER

## 2018-01-19 RX ORDER — OMEPRAZOLE 20 MG/1
20 CAPSULE, DELAYED RELEASE ORAL DAILY
Qty: 90 CAPSULE | Refills: 3 | Status: SHIPPED | OUTPATIENT
Start: 2018-01-19 | End: 2018-07-20

## 2018-01-19 RX ORDER — AMLODIPINE BESYLATE 10 MG/1
10 TABLET ORAL DAILY
Qty: 90 TABLET | Refills: 1 | Status: SHIPPED | OUTPATIENT
Start: 2018-01-19 | End: 2018-07-17 | Stop reason: SDUPTHER

## 2018-01-19 RX ORDER — LISINOPRIL AND HYDROCHLOROTHIAZIDE 20; 12.5 MG/1; MG/1
1 TABLET ORAL DAILY
Qty: 90 TABLET | Refills: 1 | Status: SHIPPED | OUTPATIENT
Start: 2018-01-19 | End: 2018-02-25

## 2018-01-19 NOTE — PROGRESS NOTES
Subjective   Felicita Mike is a 67 y.o. male.     History of Present Illness   Felicita Mike 67 y.o. male who presents today for routine follow up check and medication refills.  he has a history of   Patient Active Problem List   Diagnosis   • Elevated prostate specific antigen (PSA)   • Coronary arteriosclerosis in native artery   • Bipolar affective disorder   • Chronic obstructive pulmonary disease   • Colon polyp   • Constipation   • Diabetes mellitus   • Gastroesophageal reflux disease   • Fatigue   • Hearing loss   • Hyperlipidemia   • Peripheral vascular disease   • Proteinuria   • Muscle pain   • Low back pain   • Leukocytosis   • Seborrheic eczema   • Sleep apnea   • Tinea corporis   • Tremor   • Hypertension   • Anxiety   • Benign prostatic hyperplasia with lower urinary tract symptoms   • Tension headache   • Tobacco abuse   .  Since the last visit, he has overall felt tired.  He has Hypertenision and is well controlled on medication, DMII and is well controlled on medication, GERD and is well controlled on PPI medication, Hyperlipidemia and is well controlled on medication and CAD and is under care of their Cardiologist for medical management.  he has been compliant with current medications have reviewed them.  The patient denies medication side effects.  I will also update Ca+ and get PTH and ionized Ca+  Sees Urologist  Sees GI    Results for orders placed or performed in visit on 07/18/17   Comprehensive metabolic panel   Result Value Ref Range    Glucose 201 (H) 65 - 99 mg/dL    BUN 20 8 - 23 mg/dL    Creatinine 1.12 0.76 - 1.27 mg/dL    eGFR Non African Am 66 >60 mL/min/1.73    eGFR African Am 80 >60 mL/min/1.73    BUN/Creatinine Ratio 17.9 7.0 - 25.0    Sodium 138 136 - 145 mmol/L    Potassium 5.0 3.5 - 5.2 mmol/L    Chloride 97 (L) 98 - 107 mmol/L    Total CO2 26.1 22.0 - 29.0 mmol/L    Calcium 10.7 (H) 8.6 - 10.5 mg/dL    Total Protein 7.2 6.0 - 8.5 g/dL    Albumin 4.80 3.50 - 5.20 g/dL     Globulin 2.4 gm/dL    A/G Ratio 2.0 g/dL    Total Bilirubin 0.7 0.1 - 1.2 mg/dL    Alkaline Phosphatase 82 39 - 117 U/L    AST (SGOT) 12 1 - 40 U/L    ALT (SGPT) 14 1 - 41 U/L   Lipid panel   Result Value Ref Range    Total Cholesterol 137 0 - 200 mg/dL    Triglycerides 204 (H) 0 - 150 mg/dL    HDL Cholesterol 31 (L) 40 - 60 mg/dL    VLDL Cholesterol 40.8 (H) 5 - 40 mg/dL    LDL Cholesterol  65 0 - 100 mg/dL   TSH   Result Value Ref Range    TSH 2.030 0.270 - 4.200 mIU/mL   T4, Free   Result Value Ref Range    Free T4 1.13 0.93 - 1.70 ng/dL   Vitamin B12   Result Value Ref Range    Vitamin B-12 >2000 (H) 211 - 946 pg/mL   Folate   Result Value Ref Range    Folate >20.00 4.78 - 24.20 ng/mL   Vitamin D 25 Hydroxy   Result Value Ref Range    25 Hydroxy, Vitamin D 39.6 30.0 - 100.0 ng/mL   Lithium Level   Result Value Ref Range    Lithium 0.9 0.6 - 1.2 mmol/L   Hemoglobin A1c   Result Value Ref Range    Hemoglobin A1C 6.70 (H) 4.80 - 5.60 %   CBC and Differential   Result Value Ref Range    WBC 13.52 (H) 4.50 - 10.70 10*3/mm3    RBC 4.75 4.60 - 6.00 10*6/mm3    Hemoglobin 13.2 (L) 13.7 - 17.6 g/dL    Hematocrit 42.2 40.4 - 52.2 %    MCV 88.8 79.8 - 96.2 fL    MCH 27.8 27.0 - 32.7 pg    MCHC 31.3 (L) 32.6 - 36.4 g/dL    RDW 13.0 11.5 - 14.5 %    Platelets 201 140 - 500 10*3/mm3    Neutrophil Rel % 73.0 42.7 - 76.0 %    Lymphocyte Rel % 12.4 (L) 19.6 - 45.3 %    Monocyte Rel % 9.9 5.0 - 12.0 %    Eosinophil Rel % 4.1 0.3 - 6.2 %    Basophil Rel % 0.3 0.0 - 1.5 %    Neutrophils Absolute 9.88 (H) 1.90 - 8.10 10*3/mm3    Lymphocytes Absolute 1.67 0.90 - 4.80 10*3/mm3    Monocytes Absolute 1.34 (H) 0.20 - 1.20 10*3/mm3    Eosinophils Absolute 0.55 0.00 - 0.70 10*3/mm3    Basophils Absolute 0.04 0.00 - 0.20 10*3/mm3    Immature Granulocyte Rel % 0.3 0.0 - 0.5 %    Immature Grans Absolute 0.04 (H) 0.00 - 0.03 10*3/mm3       Felicita Mike male 66 y.o. who presents today for follow up of Bipolar Disorder and Anxiety.  He reports  medication is working well, patient desires to continue on Rx, and needs refill. Onset of symptoms was approximately several years ago.  He denies current suicidal and homicidal ideation. Risk factors are family history of anxiety and or depression and lifestyle of multiple roles.  Previous treatment includes current Rx.  He complains of the following medication side effects: none.  The patient has previously been in counseling..     The following portions of the patient's history were reviewed and updated as appropriate: allergies, current medications, past family history, past medical history, past social history, past surgical history and problem list.    Review of Systems   Constitutional: Negative for activity change, appetite change and unexpected weight change.   HENT: Negative for nosebleeds and trouble swallowing.    Eyes: Negative for pain and visual disturbance.   Respiratory: Negative for chest tightness, shortness of breath and wheezing.    Cardiovascular: Negative for chest pain and palpitations.   Gastrointestinal: Negative for abdominal pain and blood in stool.   Endocrine: Negative.    Genitourinary: Negative for difficulty urinating and hematuria.   Musculoskeletal: Positive for back pain. Negative for joint swelling.   Skin: Negative for color change and rash.   Allergic/Immunologic: Negative.    Neurological: Negative for syncope and speech difficulty.   Hematological: Negative for adenopathy.   Psychiatric/Behavioral: Negative for agitation and confusion.   All other systems reviewed and are negative.      Objective   Physical Exam   Constitutional: He is oriented to person, place, and time. He appears well-developed and well-nourished. No distress.   HENT:   Head: Normocephalic and atraumatic.   Right Ear: External ear normal.   Left Ear: External ear normal.   Nose: Nose normal.   Mouth/Throat: Oropharynx is clear and moist.   Left pupil stays dilated   Eyes: Conjunctivae and EOM are normal.  Right eye exhibits no discharge. Left eye exhibits no discharge. No scleral icterus.   Left pupil damage is chronic   Neck: Normal range of motion. Neck supple. No tracheal deviation present. No thyromegaly present.   Cardiovascular: Normal rate, regular rhythm, normal heart sounds, intact distal pulses and normal pulses.  Exam reveals no gallop and no friction rub.    No murmur heard.  Fingernails clubbing   Pulmonary/Chest: No respiratory distress. He has no wheezes. He has no rales.   decreased   Abdominal: Soft.   Musculoskeletal: Normal range of motion.   Tremor in hands      During the foot exam he had a monofilament test performed.    Neurological Sensory Findings - Unaltered hot/cold right ankle/foot discrimination and unaltered hot/cold left ankle/foot discrimination. Unaltered sharp/dull right ankle/foot discrimination and unaltered sharp/dull left ankle/foot discrimination. No right ankle/foot altered proprioception and no left ankle/foot altered proprioception    Vascular Status -  His exam exhibits right foot vasculature abnormal and no right foot edema. His exam exhibits left foot vasculature abnormal and no left foot edema.   Skin Integrity  -  His right foot skin is not intact.  He hasno right foot ulcer, non-callous right foot, no right foot warmth and no right foot blister.   Felicita's left foot skin is not intact.  He has no left foot ulcer, non-callous left foot, no left foot warmth and no left foot blister..  Lymphadenopathy:     He has no cervical adenopathy.   Neurological: He is alert and oriented to person, place, and time. He has normal reflexes. No cranial nerve deficit (see note on pupil left). He exhibits normal muscle tone. Coordination normal.   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 No rash noted. No erythema. No pallor.   Clubbing toenails and fingernails   Psychiatric: He has a normal mood and affect. His behavior is normal. Judgment and thought content normal.    Nursing note and vitals reviewed.      Assessment/Plan   Felicita was seen today for hypertension.    Diagnoses and all orders for this visit:    Type 2 diabetes mellitus without complication, without long-term current use of insulin  -     atorvastatin (LIPITOR) 40 MG tablet; Take 1 tablet by mouth Daily. For cholesterol    Essential hypertension  -     carvedilol (COREG) 25 MG tablet; Take 1 tablet by mouth 2 (Two) Times a Day With Meals. For heart  -     amLODIPine (NORVASC) 10 MG tablet; Take 1 tablet by mouth Daily. For  BP    Other hyperlipidemia    Pulmonary emphysema, unspecified emphysema type    Anxiety    Tobacco abuse    Gastroesophageal reflux disease without esophagitis    Leukocytosis, unspecified type  -     atorvastatin (LIPITOR) 40 MG tablet; Take 1 tablet by mouth Daily. For cholesterol    Mixed hyperlipidemia  -     atorvastatin (LIPITOR) 40 MG tablet; Take 1 tablet by mouth Daily. For cholesterol    Bipolar disorder in partial remission, most recent episode unspecified type    Other orders  -     QUEtiapine (SEROquel) 100 MG tablet; Take 1 tablet by mouth Every Night. For sleep  -     omeprazole (priLOSEC) 20 MG capsule; Take 1 capsule by mouth Daily. For GERD  -     metFORMIN (GLUCOPHAGE) 1000 MG tablet; One PO BID For DMII  -     lisinopril-hydrochlorothiazide (PRINZIDE,ZESTORETIC) 20-12.5 MG per tablet; Take 1 tablet by mouth Daily. For BP  -     FLUoxetine (PROzac) 20 MG capsule; 3 PO daily anxiety and depression

## 2018-01-19 NOTE — PATIENT INSTRUCTIONS
Low glycemic index diet  Exercise 30 minutes most days of the week  Make sure you get results on any labs or tests we ordered today  We discussed medications and how to take them as prescribed  Sleep 6-8 hours each night if possible  If you have not signed up for [employer], please activate your code ASAP from your After Visit Summary today    LDL goal <100  LDL goal if heart disease <70  HDL goal >60  Triglyceride goal <150  BP goal =<130/80  Fasting glucose <100

## 2018-01-20 LAB
ALBUMIN SERPL-MCNC: 4.8 G/DL (ref 3.5–5.2)
ALBUMIN/GLOB SERPL: 1.8 G/DL
ALP SERPL-CCNC: 86 U/L (ref 39–117)
ALT SERPL-CCNC: 13 U/L (ref 1–41)
AST SERPL-CCNC: 12 U/L (ref 1–40)
BASOPHILS # BLD AUTO: 0.01 10*3/MM3 (ref 0–0.2)
BASOPHILS NFR BLD AUTO: 0.1 % (ref 0–1.5)
BILIRUB SERPL-MCNC: 0.7 MG/DL (ref 0.1–1.2)
BUN SERPL-MCNC: 15 MG/DL (ref 8–23)
BUN/CREAT SERPL: 14.7 (ref 7–25)
CA-I SERPL ISE-MCNC: 5.4 MG/DL (ref 4.5–5.6)
CALCIUM SERPL-MCNC: 10.1 MG/DL (ref 8.6–10.5)
CHLORIDE SERPL-SCNC: 102 MMOL/L (ref 98–107)
CO2 SERPL-SCNC: 26.5 MMOL/L (ref 22–29)
CREAT SERPL-MCNC: 1.02 MG/DL (ref 0.76–1.27)
EOSINOPHIL # BLD AUTO: 0.26 10*3/MM3 (ref 0–0.7)
EOSINOPHIL NFR BLD AUTO: 2 % (ref 0.3–6.2)
ERYTHROCYTE [DISTWIDTH] IN BLOOD BY AUTOMATED COUNT: 13.3 % (ref 11.5–14.5)
FERRITIN SERPL-MCNC: 30.39 NG/ML (ref 30–400)
GLOBULIN SER CALC-MCNC: 2.6 GM/DL
GLUCOSE SERPL-MCNC: 153 MG/DL (ref 65–99)
HBA1C MFR BLD: 7 % (ref 4.8–5.6)
HCT VFR BLD AUTO: 39.1 % (ref 40.4–52.2)
HGB BLD-MCNC: 12.4 G/DL (ref 13.7–17.6)
IMM GRANULOCYTES # BLD: 0.04 10*3/MM3 (ref 0–0.03)
IMM GRANULOCYTES NFR BLD: 0.3 % (ref 0–0.5)
LITHIUM SERPL-SCNC: 0.8 MMOL/L (ref 0.6–1.2)
LYMPHOCYTES # BLD AUTO: 1.87 10*3/MM3 (ref 0.9–4.8)
LYMPHOCYTES NFR BLD AUTO: 14.1 % (ref 19.6–45.3)
MCH RBC QN AUTO: 27.7 PG (ref 27–32.7)
MCHC RBC AUTO-ENTMCNC: 31.7 G/DL (ref 32.6–36.4)
MCV RBC AUTO: 87.5 FL (ref 79.8–96.2)
MICROALBUMIN UR-MCNC: 47.7 UG/ML
MONOCYTES # BLD AUTO: 0.95 10*3/MM3 (ref 0.2–1.2)
MONOCYTES NFR BLD AUTO: 7.1 % (ref 5–12)
NEUTROPHILS # BLD AUTO: 10.16 10*3/MM3 (ref 1.9–8.1)
NEUTROPHILS NFR BLD AUTO: 76.4 % (ref 42.7–76)
PLATELET # BLD AUTO: 242 10*3/MM3 (ref 140–500)
POTASSIUM SERPL-SCNC: 4.7 MMOL/L (ref 3.5–5.2)
PROT SERPL-MCNC: 7.4 G/DL (ref 6–8.5)
PTH-INTACT SERPL-MCNC: 29 PG/ML (ref 15–65)
RBC # BLD AUTO: 4.47 10*6/MM3 (ref 4.6–6)
SODIUM SERPL-SCNC: 142 MMOL/L (ref 136–145)
T4 FREE SERPL-MCNC: 1.15 NG/DL (ref 0.93–1.7)
TSH SERPL DL<=0.005 MIU/L-ACNC: 1.21 MIU/ML (ref 0.27–4.2)
WBC # BLD AUTO: 13.29 10*3/MM3 (ref 4.5–10.7)

## 2018-02-25 DIAGNOSIS — I10 ESSENTIAL HYPERTENSION: ICD-10-CM

## 2018-02-25 RX ORDER — CARVEDILOL 25 MG/1
TABLET ORAL
Qty: 60 TABLET | Refills: 5 | Status: SHIPPED | OUTPATIENT
Start: 2018-02-25 | End: 2018-07-20 | Stop reason: SDUPTHER

## 2018-02-25 RX ORDER — LISINOPRIL AND HYDROCHLOROTHIAZIDE 20; 12.5 MG/1; MG/1
TABLET ORAL
Qty: 30 TABLET | Refills: 5 | Status: SHIPPED | OUTPATIENT
Start: 2018-02-25 | End: 2018-07-20 | Stop reason: SDUPTHER

## 2018-02-28 ENCOUNTER — TELEPHONE (OUTPATIENT)
Dept: FAMILY MEDICINE CLINIC | Facility: CLINIC | Age: 68
End: 2018-02-28

## 2018-02-28 NOTE — TELEPHONE ENCOUNTER
I just did level and no change in Rx;  Yes, it is fine       Lithium level   Order: [id number]   Status:  Final result   Visible to patient:  No (Not Released) Dx:  Type 2 diabetes mellitus without comp...   Notes Recorded by Alexandrea Sellers LPN on 1/30/2018 at 4:26 PM  I talked with pt  ------    Notes Recorded by Alexandrea Brink MA on 1/29/2018 at 9:01 AM  Left message for pt to call back regarding lab results  ------    Notes Recorded by Alexandrea Jimenez PA-C on 1/21/2018 at 11:59 AM  Right at goal for A1C and did go up;  WBC and neutrophils up and he had recent boil;  Ordering CBC for one month----may need to print order and send to him;    Still microalbumin +; Other labs are in range.'      Ref Range & Units 1mo ago     Lithium 0.6 - 1.2 mmol/L 0.8   Resulting Agency  [facility]   Narrative   Performed at:  01 -

## 2018-02-28 NOTE — TELEPHONE ENCOUNTER
Pt wants to make sure it is okay to take lisinopril-hctz with the lithium. The pharm let him know that it could increase the lithium levels. Please advise

## 2018-07-17 DIAGNOSIS — I10 ESSENTIAL HYPERTENSION: ICD-10-CM

## 2018-07-17 RX ORDER — AMLODIPINE BESYLATE 10 MG/1
TABLET ORAL
Qty: 90 TABLET | Refills: 0 | Status: SHIPPED | OUTPATIENT
Start: 2018-07-17 | End: 2018-07-20 | Stop reason: SDUPTHER

## 2018-07-17 RX ORDER — FLUOXETINE HYDROCHLORIDE 20 MG/1
CAPSULE ORAL
Qty: 270 CAPSULE | Refills: 0 | Status: SHIPPED | OUTPATIENT
Start: 2018-07-17 | End: 2018-07-20 | Stop reason: SDUPTHER

## 2018-07-20 ENCOUNTER — OFFICE VISIT (OUTPATIENT)
Dept: FAMILY MEDICINE CLINIC | Facility: CLINIC | Age: 68
End: 2018-07-20

## 2018-07-20 VITALS
BODY MASS INDEX: 26.05 KG/M2 | SYSTOLIC BLOOD PRESSURE: 110 MMHG | HEIGHT: 70 IN | WEIGHT: 182 LBS | HEART RATE: 67 BPM | DIASTOLIC BLOOD PRESSURE: 60 MMHG | TEMPERATURE: 98.1 F | OXYGEN SATURATION: 96 % | RESPIRATION RATE: 16 BRPM

## 2018-07-20 DIAGNOSIS — E83.52 SERUM CALCIUM ELEVATED: ICD-10-CM

## 2018-07-20 DIAGNOSIS — K21.9 GASTROESOPHAGEAL REFLUX DISEASE WITHOUT ESOPHAGITIS: ICD-10-CM

## 2018-07-20 DIAGNOSIS — E11.9 TYPE 2 DIABETES MELLITUS WITHOUT COMPLICATION, WITHOUT LONG-TERM CURRENT USE OF INSULIN (HCC): Primary | ICD-10-CM

## 2018-07-20 DIAGNOSIS — F31.70 BIPOLAR DISORDER IN PARTIAL REMISSION, MOST RECENT EPISODE UNSPECIFIED TYPE (HCC): ICD-10-CM

## 2018-07-20 DIAGNOSIS — F41.9 ANXIETY: ICD-10-CM

## 2018-07-20 DIAGNOSIS — I25.10 CORONARY ARTERIOSCLEROSIS IN NATIVE ARTERY: ICD-10-CM

## 2018-07-20 DIAGNOSIS — D72.829 LEUKOCYTOSIS, UNSPECIFIED TYPE: ICD-10-CM

## 2018-07-20 DIAGNOSIS — D72.828 OTHER ELEVATED WHITE BLOOD CELL (WBC) COUNT: ICD-10-CM

## 2018-07-20 DIAGNOSIS — Z72.0 TOBACCO ABUSE: ICD-10-CM

## 2018-07-20 DIAGNOSIS — I10 ESSENTIAL HYPERTENSION: ICD-10-CM

## 2018-07-20 DIAGNOSIS — E78.49 OTHER HYPERLIPIDEMIA: ICD-10-CM

## 2018-07-20 DIAGNOSIS — J43.9 PULMONARY EMPHYSEMA, UNSPECIFIED EMPHYSEMA TYPE (HCC): ICD-10-CM

## 2018-07-20 PROCEDURE — 99214 OFFICE O/P EST MOD 30 MIN: CPT | Performed by: PHYSICIAN ASSISTANT

## 2018-07-20 RX ORDER — CARVEDILOL 25 MG/1
25 TABLET ORAL 2 TIMES DAILY WITH MEALS
Qty: 180 TABLET | Refills: 3 | Status: SHIPPED | OUTPATIENT
Start: 2018-07-20 | End: 2019-07-23 | Stop reason: SDUPTHER

## 2018-07-20 RX ORDER — QUETIAPINE FUMARATE 100 MG/1
100 TABLET, FILM COATED ORAL NIGHTLY
Qty: 90 TABLET | Refills: 1 | Status: SHIPPED | OUTPATIENT
Start: 2018-07-20 | End: 2019-01-23 | Stop reason: SDUPTHER

## 2018-07-20 RX ORDER — ESOMEPRAZOLE MAGNESIUM 40 MG/1
40 CAPSULE, DELAYED RELEASE ORAL DAILY
Qty: 90 CAPSULE | Refills: 3 | Status: SHIPPED | OUTPATIENT
Start: 2018-07-20 | End: 2019-01-23

## 2018-07-20 RX ORDER — LITHIUM CARBONATE 150 MG/1
CAPSULE ORAL
Qty: 270 CAPSULE | Refills: 3 | Status: SHIPPED | OUTPATIENT
Start: 2018-07-20 | End: 2019-07-23 | Stop reason: SDUPTHER

## 2018-07-20 RX ORDER — FLUOXETINE HYDROCHLORIDE 20 MG/1
CAPSULE ORAL
Qty: 270 CAPSULE | Refills: 3 | Status: SHIPPED | OUTPATIENT
Start: 2018-07-20 | End: 2019-07-23 | Stop reason: SDUPTHER

## 2018-07-20 RX ORDER — LISINOPRIL AND HYDROCHLOROTHIAZIDE 20; 12.5 MG/1; MG/1
1 TABLET ORAL DAILY
Qty: 90 TABLET | Refills: 3 | Status: SHIPPED | OUTPATIENT
Start: 2018-07-20 | End: 2019-07-23 | Stop reason: SDUPTHER

## 2018-07-20 RX ORDER — AMLODIPINE BESYLATE 10 MG/1
10 TABLET ORAL DAILY
Qty: 90 TABLET | Refills: 1 | Status: SHIPPED | OUTPATIENT
Start: 2018-07-20 | End: 2019-01-23 | Stop reason: SDUPTHER

## 2018-07-20 NOTE — PATIENT INSTRUCTIONS
Food Choices for Gastroesophageal Reflux Disease, Adult  When you have gastroesophageal reflux disease (GERD), the foods you eat and your eating habits are very important. Choosing the right foods can help ease the discomfort of GERD. Consider working with a diet and nutrition specialist (dietitian) to help you make healthy food choices.  What general guidelines should I follow?  Eating plan  · Choose healthy foods low in fat, such as fruits, vegetables, whole grains, low-fat dairy products, and lean meat, fish, and poultry.  · Eat frequent, small meals instead of three large meals each day. Eat your meals slowly, in a relaxed setting. Avoid bending over or lying down until 2-3 hours after eating.  · Limit high-fat foods such as fatty meats or fried foods.  · Limit your intake of oils, butter, and shortening to less than 8 teaspoons each day.  · Avoid the following:  ? Foods that cause symptoms. These may be different for different people. Keep a food diary to keep track of foods that cause symptoms.  ? Alcohol.  ? Drinking large amounts of liquid with meals.  ? Eating meals during the 2-3 hours before bed.  · Cook foods using methods other than frying. This may include baking, grilling, or broiling.  Lifestyle    · Maintain a healthy weight. Ask your health care provider what weight is healthy for you. If you need to lose weight, work with your health care provider to do so safely.  · Exercise for at least 30 minutes on 5 or more days each week, or as told by your health care provider.  · Avoid wearing clothes that fit tightly around your waist and chest.  · Do not use any products that contain nicotine or tobacco, such as cigarettes and e-cigarettes. If you need help quitting, ask your health care provider.  · Sleep with the head of your bed raised. Use a wedge under the mattress or blocks under the bed frame to raise the head of the bed.  What foods are not recommended?  The items listed may not be a complete  list. Talk with your dietitian about what dietary choices are best for you.  Grains  Pastries or quick breads with added fat. French toast.  Vegetables  Deep fried vegetables. French fries. Any vegetables prepared with added fat. Any vegetables that cause symptoms. For some people this may include tomatoes and tomato products, chili peppers, onions and garlic, and horseradish.  Fruits  Any fruits prepared with added fat. Any fruits that cause symptoms. For some people this may include citrus fruits, such as oranges, grapefruit, pineapple, and kendall.  Meats and other protein foods  High-fat meats, such as fatty beef or pork, hot dogs, ribs, ham, sausage, salami and chaidez. Fried meat or protein, including fried fish and fried chicken. Nuts and nut butters.  Dairy  Whole milk and chocolate milk. Sour cream. Cream. Ice cream. Cream cheese. Milk shakes.  Beverages  Coffee and tea, with or without caffeine. Carbonated beverages. Sodas. Energy drinks. Fruit juice made with acidic fruits (such as orange or grapefruit). Tomato juice. Alcoholic drinks.  Fats and oils  Butter. Margarine. Shortening. Ghee.  Sweets and desserts  Chocolate and cocoa. Donuts.  Seasoning and other foods  Pepper. Peppermint and spearmint. Any condiments, herbs, or seasonings that cause symptoms. For some people, this may include cardoso, hot sauce, or vinegar-based salad dressings.  Summary  · When you have gastroesophageal reflux disease (GERD), food and lifestyle choices are very important to help ease the discomfort of GERD.  · Eat frequent, small meals instead of three large meals each day. Eat your meals slowly, in a relaxed setting. Avoid bending over or lying down until 2-3 hours after eating.  · Limit high-fat foods such as fatty meat or fried foods.  This information is not intended to replace advice given to you by your health care provider. Make sure you discuss any questions you have with your health care provider.  Document Released:  12/18/2006 Document Revised: 12/19/2017 Document Reviewed: 12/19/2017  [employer]vier Interactive Patient Education © 2018 [employer]

## 2018-07-20 NOTE — PROGRESS NOTES
Subjective   Felicita Mike is a 67 y.o. male.     History of Present Illness     Felicita Mike 67 y.o. male who presents today for routine follow up check and medication refills.  he has a history of   Patient Active Problem List   Diagnosis   • Elevated prostate specific antigen (PSA)   • Coronary arteriosclerosis in native artery   • Bipolar affective disorder (CMS/HCC)   • Chronic obstructive pulmonary disease (CMS/HCC)   • Colon polyp   • Constipation   • Diabetes mellitus (CMS/HCC)   • Gastroesophageal reflux disease   • Fatigue   • Hearing loss   • Hyperlipidemia   • Peripheral vascular disease (CMS/HCC)   • Proteinuria   • Muscle pain   • Low back pain   • Leukocytosis   • Seborrheic eczema   • Sleep apnea   • Tinea corporis   • Tremor   • Hypertension   • Anxiety   • Benign prostatic hyperplasia with lower urinary tract symptoms   • Tension headache   • Tobacco abuse   .  Since the last visit, he has overall felt fairly well.  He has Hypertenision and is well controlled on medication, DMII and is well controlled on medication, Hyperlipidemia and here to discuss treatment, CAD and is under care of their Cardiologist for medical management and he is having GERD a few times a week on his Omeprazole; I will change Rx and refer GI if not help.  he has been compliant with current medications have reviewed them.  The patient denies medication side effects.    Only inhaler is Albuterol and declines any other Rx and tried them    Consider Hep A vaccine and Shingrix  Just saw urologist and on Flomax    I still need to get f/u CBC and was up last labs from a boil  Still smoking    Results for orders placed or performed in visit on 01/19/18   Comprehensive Metabolic Panel   Result Value Ref Range    Glucose 153 (H) 65 - 99 mg/dL    BUN 15 8 - 23 mg/dL    Creatinine 1.02 0.76 - 1.27 mg/dL    eGFR Non African Am 73 >60 mL/min/1.73    eGFR African Am 89 >60 mL/min/1.73    BUN/Creatinine Ratio 14.7 7.0 - 25.0    Sodium 142  136 - 145 mmol/L    Potassium 4.7 3.5 - 5.2 mmol/L    Chloride 102 98 - 107 mmol/L    Total CO2 26.5 22.0 - 29.0 mmol/L    Calcium 10.1 8.6 - 10.5 mg/dL    Total Protein 7.4 6.0 - 8.5 g/dL    Albumin 4.80 3.50 - 5.20 g/dL    Globulin 2.6 gm/dL    A/G Ratio 1.8 g/dL    Total Bilirubin 0.7 0.1 - 1.2 mg/dL    Alkaline Phosphatase 86 39 - 117 U/L    AST (SGOT) 12 1 - 40 U/L    ALT (SGPT) 13 1 - 41 U/L   Hemoglobin A1c   Result Value Ref Range    Hemoglobin A1C 7.00 (H) 4.80 - 5.60 %   T4, Free   Result Value Ref Range    Free T4 1.15 0.93 - 1.70 ng/dL   TSH   Result Value Ref Range    TSH 1.210 0.270 - 4.200 mIU/mL   MicroAlbumin, Urine, Random - Urine, Clean Catch   Result Value Ref Range    Microalbumin, Urine 47.7 Not Estab. ug/mL   Calcium, Ionized   Result Value Ref Range    Ionized Calcium 5.4 4.5 - 5.6 mg/dL   PTH, Intact   Result Value Ref Range    PTH, Intact 29 15 - 65 pg/mL   Lithium level   Result Value Ref Range    Lithium 0.8 0.6 - 1.2 mmol/L   Ferritin   Result Value Ref Range    Ferritin 30.39 30.00 - 400.00 ng/mL   CBC & Differential   Result Value Ref Range    WBC 13.29 (H) 4.50 - 10.70 10*3/mm3    RBC 4.47 (L) 4.60 - 6.00 10*6/mm3    Hemoglobin 12.4 (L) 13.7 - 17.6 g/dL    Hematocrit 39.1 (L) 40.4 - 52.2 %    MCV 87.5 79.8 - 96.2 fL    MCH 27.7 27.0 - 32.7 pg    MCHC 31.7 (L) 32.6 - 36.4 g/dL    RDW 13.3 11.5 - 14.5 %    Platelets 242 140 - 500 10*3/mm3    Neutrophil Rel % 76.4 (H) 42.7 - 76.0 %    Lymphocyte Rel % 14.1 (L) 19.6 - 45.3 %    Monocyte Rel % 7.1 5.0 - 12.0 %    Eosinophil Rel % 2.0 0.3 - 6.2 %    Basophil Rel % 0.1 0.0 - 1.5 %    Neutrophils Absolute 10.16 (H) 1.90 - 8.10 10*3/mm3    Lymphocytes Absolute 1.87 0.90 - 4.80 10*3/mm3    Monocytes Absolute 0.95 0.20 - 1.20 10*3/mm3    Eosinophils Absolute 0.26 0.00 - 0.70 10*3/mm3    Basophils Absolute 0.01 0.00 - 0.20 10*3/mm3    Immature Granulocyte Rel % 0.3 0.0 - 0.5 %    Immature Grans Absolute 0.04 (H) 0.00 - 0.03 10*3/mm3   I will get  labs today to f/u on CBC and update CMP with A1C; needs lipids        He sees urologist for prostate  Has been to pulm for COPD; hx sleep apnea  Has been to Dr Franco for GI  Sees Dr Lew once a year for CAD  Has been to neuro for h/a's and MRI 12-3-15 brain  Less h/a;s    Felicita Mike male 66 y.o. who presents today for follow up of Bipolar Disorder and Anxiety.  He reports medication is working well, patient desires to continue on Rx, and needs refill. Onset of symptoms was approximately several years ago.  He denies current suicidal and homicidal ideation. Risk factors are family history of anxiety and or depression and lifestyle of multiple roles.  Previous treatment includes current Rx.  He complains of the following medication side effects: none.  The patient has previously been in counseling..     The following portions of the patient's history were reviewed and updated as appropriate: allergies, current medications, past family history, past medical history, past social history, past surgical history and problem list.    Review of Systems   Constitutional: Negative for activity change, appetite change and unexpected weight change.   HENT: Negative for nosebleeds and trouble swallowing.    Eyes: Negative for pain and visual disturbance.   Respiratory: Negative for chest tightness, shortness of breath and wheezing.    Cardiovascular: Negative for chest pain and palpitations.   Gastrointestinal: Negative for abdominal pain and blood in stool.   Endocrine: Negative.    Genitourinary: Negative for difficulty urinating and hematuria.   Musculoskeletal: Negative for joint swelling.   Skin: Negative for color change and rash.   Allergic/Immunologic: Negative.    Neurological: Positive for dizziness, tremors and headaches. Negative for syncope and speech difficulty.   Hematological: Negative for adenopathy.   Psychiatric/Behavioral: Negative for agitation and confusion.   All other systems reviewed and are  negative.      Objective   Physical Exam   Constitutional: He is oriented to person, place, and time. He appears well-developed and well-nourished. No distress.   HENT:   Head: Normocephalic and atraumatic.   Right Ear: External ear normal.   Left Ear: External ear normal.   Nose: Nose normal.   Mouth/Throat: Oropharynx is clear and moist.   Left pupil stays dilated   Eyes: Conjunctivae and EOM are normal. Right eye exhibits no discharge. Left eye exhibits no discharge. No scleral icterus.   Left pupil damage is chronic   Neck: Normal range of motion. Neck supple. No tracheal deviation present. No thyromegaly present.   Cardiovascular: Normal rate, regular rhythm, normal heart sounds, intact distal pulses and normal pulses.  Exam reveals no gallop and no friction rub.    No murmur heard.  Fingernails clubbing   Pulmonary/Chest: Effort normal. No respiratory distress. He has wheezes. He has no rales.   decreased   Abdominal: Soft. There is no tenderness.   Musculoskeletal: Normal range of motion.   Tremor in hands      During the foot exam he had a monofilament test performed.    Neurological Sensory Findings - Unaltered hot/cold right ankle/foot discrimination and unaltered hot/cold left ankle/foot discrimination. Unaltered sharp/dull right ankle/foot discrimination and unaltered sharp/dull left ankle/foot discrimination. No right ankle/foot altered proprioception and no left ankle/foot altered proprioception  Vascular Status -  His right foot exhibits normal foot vasculature  and no edema. His left foot exhibits normal foot vasculature  and no edema.  Skin Integrity  -  His right foot skin is intact.  He has no right foot ulcer, non-callous right foot, no right foot warmth and no right foot blister.His left foot skin is intact. He has no left foot ulcer, non-callous left foot, no left foot warmth and no left foot blister..  Lymphadenopathy:     He has no cervical adenopathy.   Neurological: He is alert and oriented  to person, place, and time. He has normal reflexes. No cranial nerve deficit (see note on pupil left). He exhibits normal muscle tone. Coordination normal.   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 No rash noted. No erythema. No pallor.   Clubbing toenails and fingernails   Psychiatric: He has a normal mood and affect. His behavior is normal. Judgment and thought content normal.   Nursing note and vitals reviewed.      Assessment/Plan   Felicita was seen today for hypertension.    Diagnoses and all orders for this visit:    Type 2 diabetes mellitus without complication, without long-term current use of insulin (CMS/HCC)    Other hyperlipidemia    Other elevated white blood cell (WBC) count    Tobacco abuse    Anxiety    Bipolar disorder in partial remission, most recent episode unspecified type (CMS/HCC)    Pulmonary emphysema, unspecified emphysema type (CMS/HCC)    Coronary arteriosclerosis in native artery    Gastroesophageal reflux disease without esophagitis    Essential hypertension

## 2018-07-21 LAB
ALBUMIN SERPL-MCNC: 4.9 G/DL (ref 3.5–5.2)
ALBUMIN/GLOB SERPL: 2.6 G/DL
ALP SERPL-CCNC: 82 U/L (ref 39–117)
ALT SERPL-CCNC: 15 U/L (ref 1–41)
AST SERPL-CCNC: 12 U/L (ref 1–40)
BASOPHILS # BLD AUTO: 0.04 10*3/MM3 (ref 0–0.2)
BASOPHILS NFR BLD AUTO: 0.3 % (ref 0–1.5)
BILIRUB SERPL-MCNC: 0.5 MG/DL (ref 0.1–1.2)
BUN SERPL-MCNC: 18 MG/DL (ref 8–23)
BUN/CREAT SERPL: 17.8 (ref 7–25)
CALCIUM SERPL-MCNC: 10.8 MG/DL (ref 8.6–10.5)
CHLORIDE SERPL-SCNC: 102 MMOL/L (ref 98–107)
CHOLEST SERPL-MCNC: 130 MG/DL (ref 0–200)
CO2 SERPL-SCNC: 24 MMOL/L (ref 22–29)
CREAT SERPL-MCNC: 1.01 MG/DL (ref 0.76–1.27)
EOSINOPHIL # BLD AUTO: 0.29 10*3/MM3 (ref 0–0.7)
EOSINOPHIL NFR BLD AUTO: 2.1 % (ref 0.3–6.2)
ERYTHROCYTE [DISTWIDTH] IN BLOOD BY AUTOMATED COUNT: 13.2 % (ref 11.5–14.5)
GLOBULIN SER CALC-MCNC: 1.9 GM/DL
GLUCOSE SERPL-MCNC: 168 MG/DL (ref 65–99)
HBA1C MFR BLD: 6.97 % (ref 4.8–5.6)
HCT VFR BLD AUTO: 40.3 % (ref 40.4–52.2)
HDLC SERPL-MCNC: 33 MG/DL (ref 40–60)
HGB BLD-MCNC: 12.8 G/DL (ref 13.7–17.6)
IMM GRANULOCYTES # BLD: 0.05 10*3/MM3 (ref 0–0.03)
IMM GRANULOCYTES NFR BLD: 0.4 % (ref 0–0.5)
LDLC SERPL CALC-MCNC: 66 MG/DL (ref 0–100)
LYMPHOCYTES # BLD AUTO: 1.87 10*3/MM3 (ref 0.9–4.8)
LYMPHOCYTES NFR BLD AUTO: 13.8 % (ref 19.6–45.3)
MCH RBC QN AUTO: 28.3 PG (ref 27–32.7)
MCHC RBC AUTO-ENTMCNC: 31.8 G/DL (ref 32.6–36.4)
MCV RBC AUTO: 89.2 FL (ref 79.8–96.2)
MONOCYTES # BLD AUTO: 1.08 10*3/MM3 (ref 0.2–1.2)
MONOCYTES NFR BLD AUTO: 8 % (ref 5–12)
NEUTROPHILS # BLD AUTO: 10.23 10*3/MM3 (ref 1.9–8.1)
NEUTROPHILS NFR BLD AUTO: 75.8 % (ref 42.7–76)
NRBC BLD AUTO-RTO: 0 /100 WBC (ref 0–0)
PLATELET # BLD AUTO: 217 10*3/MM3 (ref 140–500)
POTASSIUM SERPL-SCNC: 5.2 MMOL/L (ref 3.5–5.2)
PROT SERPL-MCNC: 6.8 G/DL (ref 6–8.5)
RBC # BLD AUTO: 4.52 10*6/MM3 (ref 4.6–6)
SODIUM SERPL-SCNC: 139 MMOL/L (ref 136–145)
TRIGL SERPL-MCNC: 157 MG/DL (ref 0–150)
VLDLC SERPL CALC-MCNC: 31.4 MG/DL (ref 5–40)
WBC # BLD AUTO: 13.51 10*3/MM3 (ref 4.5–10.7)

## 2018-07-24 LAB
FERRITIN SERPL-MCNC: 37.86 NG/ML (ref 30–400)
Lab: NORMAL
SPECIMEN STATUS: NORMAL
WRITTEN AUTHORIZATION: NORMAL

## 2018-08-21 ENCOUNTER — LAB (OUTPATIENT)
Dept: LAB | Facility: HOSPITAL | Age: 68
End: 2018-08-21

## 2018-08-21 ENCOUNTER — CONSULT (OUTPATIENT)
Dept: ONCOLOGY | Facility: CLINIC | Age: 68
End: 2018-08-21

## 2018-08-21 VITALS
TEMPERATURE: 98 F | DIASTOLIC BLOOD PRESSURE: 60 MMHG | RESPIRATION RATE: 20 BRPM | WEIGHT: 183 LBS | OXYGEN SATURATION: 95 % | SYSTOLIC BLOOD PRESSURE: 114 MMHG | BODY MASS INDEX: 27.74 KG/M2 | HEART RATE: 75 BPM | HEIGHT: 68 IN

## 2018-08-21 DIAGNOSIS — D64.9 NORMOCYTIC ANEMIA: ICD-10-CM

## 2018-08-21 DIAGNOSIS — D72.820 LYMPHOCYTOSIS: Primary | ICD-10-CM

## 2018-08-21 DIAGNOSIS — D72.828 OTHER ELEVATED WHITE BLOOD CELL (WBC) COUNT: Primary | ICD-10-CM

## 2018-08-21 LAB
BASOPHILS # BLD AUTO: 0.08 10*3/MM3 (ref 0–0.1)
BASOPHILS NFR BLD AUTO: 0.5 % (ref 0–1.1)
CRP SERPL-MCNC: 0.41 MG/DL (ref 0–0.5)
DEPRECATED RDW RBC AUTO: 42.1 FL (ref 37–49)
EOSINOPHIL # BLD AUTO: 0.94 10*3/MM3 (ref 0–0.36)
EOSINOPHIL NFR BLD AUTO: 6.4 % (ref 1–5)
ERYTHROCYTE [DISTWIDTH] IN BLOOD BY AUTOMATED COUNT: 13.1 % (ref 11.7–14.5)
ERYTHROCYTE [SEDIMENTATION RATE] IN BLOOD: 12 MM/HR (ref 0–20)
FOLATE SERPL-MCNC: >20 NG/ML (ref 4.78–24.2)
HCT VFR BLD AUTO: 37.3 % (ref 40–49)
HGB BLD-MCNC: 12 G/DL (ref 13.5–16.5)
IMM GRANULOCYTES # BLD: 0.12 10*3/MM3 (ref 0–0.03)
IMM GRANULOCYTES NFR BLD: 0.8 % (ref 0–0.5)
LYMPHOCYTES # BLD AUTO: 2.07 10*3/MM3 (ref 1–3.5)
LYMPHOCYTES NFR BLD AUTO: 14.1 % (ref 20–49)
MCH RBC QN AUTO: 28.3 PG (ref 27–33)
MCHC RBC AUTO-ENTMCNC: 32.2 G/DL (ref 32–35)
MCV RBC AUTO: 88 FL (ref 83–97)
MONOCYTES # BLD AUTO: 1.02 10*3/MM3 (ref 0.25–0.8)
MONOCYTES NFR BLD AUTO: 6.9 % (ref 4–12)
NEUTROPHILS # BLD AUTO: 10.49 10*3/MM3 (ref 1.5–7)
NEUTROPHILS NFR BLD AUTO: 71.3 % (ref 39–75)
NRBC BLD MANUAL-RTO: 0 /100 WBC (ref 0–0)
PLATELET # BLD AUTO: 196 10*3/MM3 (ref 150–375)
PMV BLD AUTO: 11.6 FL (ref 8.9–12.1)
RBC # BLD AUTO: 4.24 10*6/MM3 (ref 4.3–5.5)
VIT B12 BLD-MCNC: >2000 PG/ML (ref 211–946)
WBC NRBC COR # BLD: 14.72 10*3/MM3 (ref 4–10)

## 2018-08-21 PROCEDURE — 83540 ASSAY OF IRON: CPT | Performed by: INTERNAL MEDICINE

## 2018-08-21 PROCEDURE — 99204 OFFICE O/P NEW MOD 45 MIN: CPT | Performed by: INTERNAL MEDICINE

## 2018-08-21 PROCEDURE — 36415 COLL VENOUS BLD VENIPUNCTURE: CPT | Performed by: INTERNAL MEDICINE

## 2018-08-21 PROCEDURE — 80053 COMPREHEN METABOLIC PANEL: CPT | Performed by: INTERNAL MEDICINE

## 2018-08-21 PROCEDURE — 82728 ASSAY OF FERRITIN: CPT | Performed by: INTERNAL MEDICINE

## 2018-08-21 PROCEDURE — 84466 ASSAY OF TRANSFERRIN: CPT | Performed by: INTERNAL MEDICINE

## 2018-08-21 PROCEDURE — 82607 VITAMIN B-12: CPT | Performed by: INTERNAL MEDICINE

## 2018-08-21 PROCEDURE — 86140 C-REACTIVE PROTEIN: CPT | Performed by: INTERNAL MEDICINE

## 2018-08-21 PROCEDURE — 85652 RBC SED RATE AUTOMATED: CPT | Performed by: INTERNAL MEDICINE

## 2018-08-21 PROCEDURE — 83615 LACTATE (LD) (LDH) ENZYME: CPT | Performed by: INTERNAL MEDICINE

## 2018-08-21 PROCEDURE — 36416 COLLJ CAPILLARY BLOOD SPEC: CPT | Performed by: INTERNAL MEDICINE

## 2018-08-21 PROCEDURE — 85025 COMPLETE CBC W/AUTO DIFF WBC: CPT | Performed by: INTERNAL MEDICINE

## 2018-08-21 PROCEDURE — 82746 ASSAY OF FOLIC ACID SERUM: CPT | Performed by: INTERNAL MEDICINE

## 2018-08-21 NOTE — PROGRESS NOTES
.     REASON FOR CONSULTATION:  Leukocytosis and normocytic anemia  Provide an opinion on any further workup or treatment                             REQUESTING PHYSICIAN: Alexandrea Jimenez PA-C    RECORDS OBTAINED:  Records of the patients history including those obtained from the referring provider were reviewed and summarized in detail.    HISTORY OF PRESENT ILLNESS:  The patient is a 67 y.o. year old male who is here for an initial visit with the above-mentioned history.  The patient has past medical history again for coronary artery disease status post stent placement in 2004 diabetes, hypertension, and COPD.  He has a long-standing history of smoking and continues to smoke 1 pack a day for the past 50 years.  He notes that he has been told in the past that he has had an elevated white count.  He has not experienced any recent significant infections.  In reviewing laboratory studies available in the [facility] system dating back to 5/4/16, the patient at that time had a WBC of 14.67 with differential of 75 segs, 14 lymphs with hemoglobin of 14.2, platelet count 272,000.  In the interval, the patient's white count has fluctuated between the 11,000 and 15,000 range.  Hemoglobin has been in the 12-13 range with normal MCV.  Platelet count has been normal all along.  Additional labs on 7/18/17 showed a folate greater than 20, B12 greater than 2000.  On 1/19/18, ferritin was 30.39 and on 7/20/18 ferritin was 37.86.  He has had a borderline hypercalcemia with a calcium level of 10.7-10.8 range with normal albumin at 4.8 with your thyroid hormone 1/19/18 at 29.  Due to his persistent leukocytosis and borderline normocytic anemia he was referred to our office for further evaluation.    Today, the patient has no new complaints.  He has multiple chronic issues, mainly related to his underlying COPD area he has significant dyspnea on exertion which limits his level of activity severely.  He has baseline wheezing.  He has  tried multiple inhalers with no benefit.  He does note some chronic back pain.  He does have some intermittent chronic bilateral lower extremity edema.  He does note some nocturia.  He has some mild chronic arthritic symptoms.  He reports a normal appetite, denies any weight loss.  He does have some reflux symptoms.  Again, he has not experienced any recent infectious issues.        Past Medical History:   Diagnosis Date   • Abnormal PSA    • Acute myocardial infarction    • Apnea, sleep    • Atherosclerotic heart disease    • Chronic pain     Chronic low back paoin, MRI 12/11,  L3 compression deformity, L2 degenerative disc disease and L5-S1 degenerative disc disease.   • Colon polyp    • Constipation    • COPD (chronic obstructive pulmonary disease) (CMS/[facility])    • Coronary artery disease    • Depressed bipolar II disorder (CMS/[facility])    • Diabetes mellitus (CMS/[facility])    • Fatigue    • GERD (gastroesophageal reflux disease)    • Health care maintenance    • Hearing loss    • History of back pain    • Hypercholesterolemia    • Hyperlipidemia    • Hypertension    • Leukocytosis    • Osteoarthritis    • Proteinuria    • PVD (peripheral vascular disease) (CMS/[facility])    • Sebaceous cyst    • Seborrheic dermatitis    • Sleep apnea    • Tinea corporis    • Tobacco use    • Tremor      Past Surgical History:   Procedure Laterality Date   • ANGIOPLASTY      Cath Stent Placement   • COLONOSCOPY  01/22/2014    ta polyps   • COLONOSCOPY N/A 11/29/2016    Procedure: COLONOSCOPY TO CECUM AND TERMINAL ILEUM WITH HOT POLYPECTOMIES;  Surgeon: Ivette Franco MD;  Location: [facility] ENDOSCOPY;  Service:    • CORONARY STENT PLACEMENT     • EYE SURGERY      Cataract Surgery   • HAND SURGERY           MEDICATIONS    Current Outpatient Prescriptions:   •  albuterol (PROVENTIL HFA;VENTOLIN HFA) 108 (90 Base) MCG/ACT inhaler, Inhale 2 puffs Every 4 (Four) Hours As Needed for Wheezing or Shortness of Air., Disp: 3 inhaler, Rfl: 3  •  amLODIPine  (NORVASC) 10 MG tablet, Take 1 tablet by mouth Daily. for blood pressure, Disp: 90 tablet, Rfl: 1  •  aspirin 81 MG tablet, Take 1 tablet by mouth 2 (two) times a day., Disp: , Rfl:   •  atorvastatin (LIPITOR) 40 MG tablet, Take 1 tablet by mouth Daily. For cholesterol, Disp: 90 tablet, Rfl: 3  •  carvedilol (COREG) 25 MG tablet, Take 1 tablet by mouth 2 (Two) Times a Day With Meals. For heart, Disp: 180 tablet, Rfl: 3  •  cholecalciferol (VITAMIN D3) 1000 UNITS tablet, Take 1 tablet by mouth 2 (two) times a day., Disp: , Rfl:   •  Cyanocobalamin (B-12) 1000 MCG capsule, Take by mouth., Disp: , Rfl:   •  esomeprazole (nexIUM) 40 MG capsule, Take 1 capsule by mouth Daily. For GERD; this replaces Omeprazole, Disp: 90 capsule, Rfl: 3  •  FLUoxetine (PROzac) 20 MG capsule, 3 PO daily for anxiety and depression, Disp: 270 capsule, Rfl: 3  •  lisinopril-hydrochlorothiazide (PRINZIDE,ZESTORETIC) 20-12.5 MG per tablet, Take 1 tablet by mouth Daily. For BP and renal protection, Disp: 90 tablet, Rfl: 3  •  lithium carbonate 150 MG capsule, One PO in AM and 2 PO daily with food for mood, Disp: 270 capsule, Rfl: 3  •  metFORMIN (GLUCOPHAGE) 1000 MG tablet, One PO BID For DMII, Disp: 180 tablet, Rfl: 3  •  Multiple Vitamins-Minerals (MULTIVITAMIN ADULT PO), Take  by mouth., Disp: , Rfl:   •  polyethylene glycol (MIRALAX) packet, MIX 1 PACKET (17 GRAMS ) AS DIRECTED AND TAKE BY MOUTH DAILY *CAN REDUCE TO EVERY OTHER DAY OR EVERY 3 DAYS IF STOOLS TOO LOOSE, Disp: 90 each, Rfl: 4  •  QUEtiapine (SEROquel) 100 MG tablet, Take 1 tablet by mouth Every Night. For sleep, Disp: 90 tablet, Rfl: 1  •  tamsulosin (FLOMAX) 0.4 MG capsule 24 hr capsule, , Disp: , Rfl:   •  wheat dextrin (BENEFIBER ON THE GO) powder powder, TAKE PER PACKAGE DIRECTIONS, Disp: 28 each, Rfl: 10    ALLERGIES:     Allergies   Allergen Reactions   • Clopidogrel Itching       SOCIAL HISTORY:       Social History     Social History   • Marital status:       "Spouse name: N/A   • Number of children: N/A   • Years of education: N/A     Occupational History   •  Disabled     Social History Main Topics   • Smoking status: Current Every Day Smoker     Packs/day: 1.00     Years: 47.00     Types: Cigarettes   • Smokeless tobacco: Never Used      Comment: NO CAFFEINE USE   50 years 1 ppd    • Alcohol use No   • Drug use: No   • Sexual activity: Not on file     Other Topics Concern   • Not on file     Social History Narrative   • No narrative on file         FAMILY HISTORY:  Family History   Problem Relation Age of Onset   • Cancer Mother    • Diabetes Mother    • Hypertension Mother    • Colon cancer Mother    • Heart disease Father    • Cancer Brother    • Stroke Brother        REVIEW OF SYSTEMS:  A comprehensive review of systems was performed and was negative except as mentioned above in the HPI.         Vitals:    08/21/18 1551   BP: 114/60   Pulse: 75   Resp: 20   Temp: 98 °F (36.7 °C)   TempSrc: Oral   SpO2: 95%   Weight: 83 kg (183 lb)   Height: 173 cm (68.11\")   PainSc:   6   PainLoc: Back     Current Status 8/21/2018   ECOG score 0       Physical Exam   Constitutional: He is oriented to person, place, and time. He appears well-developed and well-nourished.   HENT:   Mouth/Throat: Oropharynx is clear and moist.   Eyes: Conjunctivae are normal.   Neck: No thyromegaly present.   Cardiovascular: Normal rate and regular rhythm.  Exam reveals no gallop and no friction rub.    No murmur heard.  Pulmonary/Chest: No respiratory distress. He has wheezes.   Decreased breath sounds bilaterally with scattered wheezes   Abdominal: Soft. Bowel sounds are normal. He exhibits no distension. There is no tenderness.   Musculoskeletal: He exhibits edema.   Trace bilateral lower extremity edema.   Lymphadenopathy:        Head (right side): No submandibular adenopathy present.     He has no cervical adenopathy.     He has no axillary adenopathy.        Right: No inguinal and no " supraclavicular adenopathy present.        Left: No inguinal and no supraclavicular adenopathy present.   Neurological: He is alert and oriented to person, place, and time. He displays normal reflexes. No cranial nerve deficit. He exhibits normal muscle tone.   Skin: Skin is warm and dry. No rash noted.   Psychiatric: He has a normal mood and affect. His behavior is normal.       RECENT LABS:        WBC   Date Value Ref Range Status   08/21/2018 14.72 (H) 4.00 - 10.00 10*3/mm3 Final     Hemoglobin   Date Value Ref Range Status   08/21/2018 12.0 (L) 13.5 - 16.5 g/dL Final     Platelets   Date Value Ref Range Status   08/21/2018 196 150 - 375 10*3/mm3 Final     Peripheral blood smear was reviewed today.  White cells appear normal with a predominance of mature segmented neutrophils.  There were no immature forms identified.  Red cells were normochromic and normocytic with the exception of a few stomatocytes.  Platelets were normal in number with scattered large platelets noted.      Assessment/Plan      1. Chronic mild leukocytosis:  · This is long-standing with a WBC in the 11-75675 range normal differential dating at least back to 5/4/16 if not before.  · There does not appear to be any obvious underlying infectious issue.  There does not appear to be a chronic inflammatory state.  · Review of peripheral blood smear identified predominantly normal segmented neutrophils no immature forms identified.  · Possibly could be related to underlying malignancy, at risk for lung cancer given smoking history and will obtain CT chest  · May be related to smoking, continues to smoke 1 pack per day with 50-pack-year history  · Will obtain labs today with ESR, CRP, peripheral blood flow cytometry, LDH.  Consider additional evaluation for underlying myeloproliferative disorder pending the above studies.  2. Normocytic anemia:  · Recent decline in hemoglobin to current value of 12.0 with normal MCV 88.  · Previous normal B12 and  folate in July 2017  · Low normal ferritin of 37.8 on 7/20/18.  · Previous colonoscopy 11/29/16 with Dr. Franco with 2 polyps removed (tubular adenoma with low-grade dysplasia)  · We will check iron panel, ferritin, B12, folate today.  Also check as above ESR, CRP regarding possible anemia of chronic disease.    Plan:  1. Additional labs today with CMP, iron panel, ferritin, B12, folate, ESR, CRP, peripheral blood flow cytometry, LDH  2. CT chest  3. M.D. visit in 2-3 weeks with CBC

## 2018-08-22 LAB
ALBUMIN SERPL-MCNC: 4.6 G/DL (ref 3.5–5.2)
ALBUMIN/GLOB SERPL: 1.8 G/DL (ref 1.1–2.4)
ALP SERPL-CCNC: 114 U/L (ref 38–116)
ALT SERPL W P-5'-P-CCNC: 12 U/L (ref 0–41)
ANION GAP SERPL CALCULATED.3IONS-SCNC: 13.7 MMOL/L
AST SERPL-CCNC: 11 U/L (ref 0–40)
BILIRUB SERPL-MCNC: 0.9 MG/DL (ref 0.1–1.2)
BUN BLD-MCNC: 14 MG/DL (ref 6–20)
BUN/CREAT SERPL: 14.3 (ref 7.3–30)
CALCIUM SPEC-SCNC: 9.8 MG/DL (ref 8.5–10.2)
CHLORIDE SERPL-SCNC: 102 MMOL/L (ref 98–107)
CO2 SERPL-SCNC: 25.3 MMOL/L (ref 22–29)
CREAT BLD-MCNC: 0.98 MG/DL (ref 0.7–1.3)
FERRITIN SERPL-MCNC: 32.7 NG/ML (ref 30–400)
GFR SERPL CREATININE-BSD FRML MDRD: 77 ML/MIN/1.73
GLOBULIN UR ELPH-MCNC: 2.5 GM/DL (ref 1.8–3.5)
GLUCOSE BLD-MCNC: 150 MG/DL (ref 74–124)
IRON 24H UR-MRATE: 62 MCG/DL (ref 59–158)
IRON SATN MFR SERPL: 14 % (ref 14–48)
LDH SERPL-CCNC: 134 U/L (ref 99–259)
POTASSIUM BLD-SCNC: 4.3 MMOL/L (ref 3.5–4.7)
PROT SERPL-MCNC: 7.1 G/DL (ref 6.3–8)
SODIUM BLD-SCNC: 141 MMOL/L (ref 134–145)
TIBC SERPL-MCNC: 435 MCG/DL (ref 249–505)
TRANSFERRIN SERPL-MCNC: 311 MG/DL (ref 200–360)

## 2018-08-23 LAB — REF LAB TEST METHOD: NORMAL

## 2018-08-28 ENCOUNTER — HOSPITAL ENCOUNTER (OUTPATIENT)
Dept: PET IMAGING | Facility: HOSPITAL | Age: 68
Discharge: HOME | End: 2018-08-28
Attending: INTERNAL MEDICINE | Admitting: INTERNAL MEDICINE

## 2018-08-28 DIAGNOSIS — D72.828 OTHER ELEVATED WHITE BLOOD CELL (WBC) COUNT: ICD-10-CM

## 2018-08-28 DIAGNOSIS — D64.9 NORMOCYTIC ANEMIA: ICD-10-CM

## 2018-08-28 LAB — CREAT BLDA-MCNC: 1 MG/DL (ref 0.6–1.3)

## 2018-08-28 PROCEDURE — 25010000002 IOPAMIDOL 61 % SOLUTION: Performed by: INTERNAL MEDICINE

## 2018-08-28 PROCEDURE — 71260 CT THORAX DX C+: CPT

## 2018-08-28 PROCEDURE — 82565 ASSAY OF CREATININE: CPT

## 2018-08-28 RX ADMIN — IOPAMIDOL 75 ML: 612 INJECTION, SOLUTION INTRAVENOUS at 14:26

## 2018-09-11 ENCOUNTER — OFFICE VISIT (OUTPATIENT)
Dept: ONCOLOGY | Facility: CLINIC | Age: 68
End: 2018-09-11

## 2018-09-11 ENCOUNTER — LAB (OUTPATIENT)
Dept: LAB | Facility: HOSPITAL | Age: 68
End: 2018-09-11

## 2018-09-11 VITALS
OXYGEN SATURATION: 97 % | BODY MASS INDEX: 27.52 KG/M2 | RESPIRATION RATE: 18 BRPM | DIASTOLIC BLOOD PRESSURE: 58 MMHG | HEIGHT: 68 IN | HEART RATE: 67 BPM | WEIGHT: 181.6 LBS | TEMPERATURE: 97.9 F | SYSTOLIC BLOOD PRESSURE: 110 MMHG

## 2018-09-11 DIAGNOSIS — D72.828 OTHER ELEVATED WHITE BLOOD CELL (WBC) COUNT: ICD-10-CM

## 2018-09-11 DIAGNOSIS — D64.9 NORMOCYTIC ANEMIA: ICD-10-CM

## 2018-09-11 DIAGNOSIS — E04.1 THYROID NODULE: Primary | ICD-10-CM

## 2018-09-11 LAB
BASOPHILS # BLD AUTO: 0.04 10*3/MM3 (ref 0–0.1)
BASOPHILS NFR BLD AUTO: 0.3 % (ref 0–1.1)
DEPRECATED RDW RBC AUTO: 41.3 FL (ref 37–49)
EOSINOPHIL # BLD AUTO: 0.49 10*3/MM3 (ref 0–0.36)
EOSINOPHIL NFR BLD AUTO: 3.7 % (ref 1–5)
ERYTHROCYTE [DISTWIDTH] IN BLOOD BY AUTOMATED COUNT: 12.9 % (ref 11.7–14.5)
HCT VFR BLD AUTO: 38.9 % (ref 40–49)
HGB BLD-MCNC: 12.8 G/DL (ref 13.5–16.5)
IMM GRANULOCYTES # BLD: 0.08 10*3/MM3 (ref 0–0.03)
IMM GRANULOCYTES NFR BLD: 0.6 % (ref 0–0.5)
LYMPHOCYTES # BLD AUTO: 1.8 10*3/MM3 (ref 1–3.5)
LYMPHOCYTES NFR BLD AUTO: 13.8 % (ref 20–49)
MCH RBC QN AUTO: 28.8 PG (ref 27–33)
MCHC RBC AUTO-ENTMCNC: 32.9 G/DL (ref 32–35)
MCV RBC AUTO: 87.4 FL (ref 83–97)
MONOCYTES # BLD AUTO: 1.15 10*3/MM3 (ref 0.25–0.8)
MONOCYTES NFR BLD AUTO: 8.8 % (ref 4–12)
NEUTROPHILS # BLD AUTO: 9.51 10*3/MM3 (ref 1.5–7)
NEUTROPHILS NFR BLD AUTO: 72.8 % (ref 39–75)
NRBC BLD MANUAL-RTO: 0 /100 WBC (ref 0–0)
PLATELET # BLD AUTO: 212 10*3/MM3 (ref 150–375)
PMV BLD AUTO: 12.1 FL (ref 8.9–12.1)
RBC # BLD AUTO: 4.45 10*6/MM3 (ref 4.3–5.5)
WBC NRBC COR # BLD: 13.07 10*3/MM3 (ref 4–10)

## 2018-09-11 PROCEDURE — 99214 OFFICE O/P EST MOD 30 MIN: CPT | Performed by: INTERNAL MEDICINE

## 2018-09-11 PROCEDURE — 36415 COLL VENOUS BLD VENIPUNCTURE: CPT | Performed by: INTERNAL MEDICINE

## 2018-09-11 PROCEDURE — 85025 COMPLETE CBC W/AUTO DIFF WBC: CPT | Performed by: INTERNAL MEDICINE

## 2018-09-11 RX ORDER — OMEPRAZOLE 20 MG/1
20 CAPSULE, DELAYED RELEASE ORAL DAILY
COMMUNITY
End: 2018-11-28

## 2018-09-12 NOTE — PROGRESS NOTES
Subjective .     REASONS FOR FOLLOWUP:    1. Chronic leukocytosis suspected secondary to smoking  2. Borderline iron deficiency anemia    HISTORY OF PRESENT ILLNESS:  The patient is a 67 y.o. year old male who is here for follow-up with the above-mentioned history.    History of Present Illness   The patient returns in followup today from his initial consultation with laboratory studies and CT scan to review.  In the interval since the last visit, the patient has no new complaints.  He does have chronic dyspnea on exertion and limits his mobility significantly.  He has some mild chronic back pain.  He has chronic arthritic symptoms.    Past Medical History:   Diagnosis Date   • Abnormal PSA    • Acute myocardial infarction 2004    Stents   • Apnea, sleep    • Atherosclerotic heart disease    • Chronic pain     Chronic low back paoin, MRI 12/11,  L3 compression deformity, L2 degenerative disc disease and L5-S1 degenerative disc disease.   • Colon polyp    • Constipation    • COPD (chronic obstructive pulmonary disease) (CMS/HCC)    • Coronary artery disease    • Depressed bipolar II disorder (CMS/HCC)    • Diabetes mellitus (CMS/HCC)    • Fatigue    • GERD (gastroesophageal reflux disease)    • H/O transfusion of packed red blood cells    • Health care maintenance    • Hearing loss    • History of back pain    • Hypercholesterolemia    • Hyperlipidemia    • Hypertension    • Leukocytosis    • Osteoarthritis    • Proteinuria    • PVD (peripheral vascular disease) (CMS/HCC)    • Sebaceous cyst    • Seborrheic dermatitis    • Sleep apnea    • Tinea corporis    • Tobacco use    • Tremor      Past Surgical History:   Procedure Laterality Date   • ANGIOPLASTY      Cath Stent Placement   • COLONOSCOPY  01/22/2014    ta polyps   • COLONOSCOPY N/A 11/29/2016    Procedure: COLONOSCOPY TO CECUM AND TERMINAL ILEUM WITH HOT POLYPECTOMIES;  Surgeon: Ivette Franco MD;  Location: [facility] ENDOSCOPY;  Service:    • CORONARY STENT  PLACEMENT  2004   • EYE SURGERY      Cataract Surgery   • HAND SURGERY             Current Outpatient Prescriptions on File Prior to Visit   Medication Sig Dispense Refill   • albuterol (PROVENTIL HFA;VENTOLIN HFA) 108 (90 Base) MCG/ACT inhaler Inhale 2 puffs Every 4 (Four) Hours As Needed for Wheezing or Shortness of Air. 3 inhaler 3   • amLODIPine (NORVASC) 10 MG tablet Take 1 tablet by mouth Daily. for blood pressure 90 tablet 1   • aspirin 81 MG tablet Take 1 tablet by mouth 2 (two) times a day.     • atorvastatin (LIPITOR) 40 MG tablet Take 1 tablet by mouth Daily. For cholesterol 90 tablet 3   • carvedilol (COREG) 25 MG tablet Take 1 tablet by mouth 2 (Two) Times a Day With Meals. For heart 180 tablet 3   • cholecalciferol (VITAMIN D3) 1000 UNITS tablet Take 1 tablet by mouth 2 (two) times a day.     • Cyanocobalamin (B-12) 1000 MCG capsule Take by mouth.     • FLUoxetine (PROzac) 20 MG capsule 3 PO daily for anxiety and depression 270 capsule 3   • lisinopril-hydrochlorothiazide (PRINZIDE,ZESTORETIC) 20-12.5 MG per tablet Take 1 tablet by mouth Daily. For BP and renal protection 90 tablet 3   • lithium carbonate 150 MG capsule One PO in AM and 2 PO daily with food for mood 270 capsule 3   • metFORMIN (GLUCOPHAGE) 1000 MG tablet One PO BID For DMII 180 tablet 3   • Multiple Vitamins-Minerals (MULTIVITAMIN ADULT PO) Take  by mouth.     • polyethylene glycol (MIRALAX) packet MIX 1 PACKET (17 GRAMS ) AS DIRECTED AND TAKE BY MOUTH DAILY *CAN REDUCE TO EVERY OTHER DAY OR EVERY 3 DAYS IF STOOLS TOO LOOSE 90 each 4   • QUEtiapine (SEROquel) 100 MG tablet Take 1 tablet by mouth Every Night. For sleep 90 tablet 1   • tamsulosin (FLOMAX) 0.4 MG capsule 24 hr capsule      • wheat dextrin (BENEFIBER ON THE GO) powder powder TAKE PER PACKAGE DIRECTIONS 28 each 10   • esomeprazole (nexIUM) 40 MG capsule Take 1 capsule by mouth Daily. For GERD; this replaces Omeprazole 90 capsule 3     No current facility-administered  medications on file prior to visit.        ALLERGIES:     Allergies   Allergen Reactions   • Clopidogrel Itching       Social History     Social History   • Marital status:      Spouse name: N/A   • Number of children: N/A   • Years of education: N/A     Occupational History   •  Disabled     Social History Main Topics   • Smoking status: Current Every Day Smoker     Packs/day: 1.00     Years: 47.00     Types: Cigarettes   • Smokeless tobacco: Never Used      Comment: NO CAFFEINE USE   50 years 1 ppd    • Alcohol use No   • Drug use: No   • Sexual activity: Not on file     Other Topics Concern   • Not on file     Social History Narrative   • No narrative on file     Family History   Problem Relation Age of Onset   • Cancer Mother    • Diabetes Mother    • Hypertension Mother    • Colon cancer Mother    • Heart disease Father    • Cancer Brother    • Stroke Brother               Review of Systems   Constitutional: Negative for activity change, appetite change, fatigue, fever and unexpected weight change.   HENT: Negative for congestion, mouth sores, nosebleeds, sore throat and voice change.    Respiratory: Positive for shortness of breath and wheezing. Negative for cough.    Cardiovascular: Negative for chest pain, palpitations and leg swelling.   Gastrointestinal: Negative for abdominal distention, abdominal pain, blood in stool, constipation, diarrhea, nausea and vomiting.   Endocrine: Negative for cold intolerance and heat intolerance.   Genitourinary: Negative for difficulty urinating, dysuria, frequency and hematuria.   Musculoskeletal: Positive for arthralgias. Negative for back pain, joint swelling and myalgias.   Skin: Negative for rash.   Neurological: Negative for dizziness, syncope, weakness, light-headedness, numbness and headaches.   Hematological: Negative for adenopathy. Does not bruise/bleed easily.   Psychiatric/Behavioral: Negative for confusion and sleep disturbance. The patient is not  nervous/anxious.          Objective      Vitals:    09/11/18 1502   BP: 110/58   Pulse: 67   Resp: 18   Temp: 97.9 °F (36.6 °C)   SpO2: 97%      Current Status 9/11/2018   ECOG score 0       Physical Exam   Constitutional: He is oriented to person, place, and time. He appears well-developed and well-nourished.   HENT:   Mouth/Throat: Oropharynx is clear and moist.   Eyes: Conjunctivae are normal.   Neck: No thyromegaly present.   Cardiovascular: Normal rate and regular rhythm.  Exam reveals no gallop and no friction rub.    No murmur heard.  Pulmonary/Chest: No respiratory distress. He has wheezes.   Abdominal: Soft. Bowel sounds are normal. He exhibits no distension. There is no tenderness.   Musculoskeletal: He exhibits edema.   Lymphadenopathy:        Head (right side): No submandibular adenopathy present.     He has no cervical adenopathy.     He has no axillary adenopathy.        Right: No inguinal and no supraclavicular adenopathy present.        Left: No inguinal and no supraclavicular adenopathy present.   Neurological: He is alert and oriented to person, place, and time. He displays normal reflexes. No cranial nerve deficit. He exhibits normal muscle tone.   Skin: Skin is warm and dry. No rash noted.   Psychiatric: He has a normal mood and affect. His behavior is normal.       RECENT LABS:  Hematology WBC   Date Value Ref Range Status   09/11/2018 13.07 (H) 4.00 - 10.00 10*3/mm3 Final     RBC   Date Value Ref Range Status   09/11/2018 4.45 4.30 - 5.50 10*6/mm3 Final   07/20/2018 4.52 (L) 4.60 - 6.00 10*6/mm3 Final     Hemoglobin   Date Value Ref Range Status   09/11/2018 12.8 (L) 13.5 - 16.5 g/dL Final     Hematocrit   Date Value Ref Range Status   09/11/2018 38.9 (L) 40.0 - 49.0 % Final     Platelets   Date Value Ref Range Status   09/11/2018 212 150 - 375 10*3/mm3 Final        Laboratory studies 8/21/18: Iron 62, ferritin 32.7, iron saturation 14%, TIBC 435, B12 greater than 2000, folate greater than 20,  ESR 12, CRP 0.41, , peripheral blood flow cytometry negative    CT chest 8/28/18:  CONCLUSION:  1. Bullae formation compatible with emphysema, no acute airspace disease  or suspicious pulmonary lesion.  2. Thyroid nodules as described above.    Assessment/Plan     1. Chronic mild leukocytosis:  · This is long-standing with a WBC in the 11-48086 range normal differential dating at least back to 5/4/16 if not before.  · There does not appear to be any obvious underlying infectious issue.  There does not appear to be a chronic inflammatory state.  · Review of peripheral blood smear identified predominantly normal segmented neutrophils no immature forms identified.  · No evidence of malignancy on CT chest 8/28/18  · Laboratory evaluation 8/21/18 with ESR 12, CRP 0.41, peripheral blood flow cytometry negative.  · May be related to smoking, continues to smoke 1 pack per day with 50-pack-year history  · Will continue to monitor WBC, consider additional peripheral blood evaluation for myeloproliferative disorder if white count escalates further.  2. Normocytic anemia with evidence of borderline iron deficiency:  · Decline in hemoglobin 8/21/18 to 12.0 with normal MCV 88.  · Previous normal B12 and folate in July 2017  · Low normal ferritin of 37.8 on 7/20/18.  · Previous colonoscopy 11/29/16 with Dr. Franco with 2 polyps removed (tubular adenoma with low-grade dysplasia)  · Laboratory evaluation 8/21/18 with iron 62, ferritin 32.7, iron saturation 14%, TIBC 435, B12 with 2000, folate greater than 20, ESR 12, CRP 0.41.  · Discussed borderline iron studies with patient today including low normal ferritin and iron saturation.  We will initiate oral iron daily and ask patient to return stool cards for occult blood ×3.  If stool cards positive will need to refer back to Dr. Guerin to consider need for repeat endoscopy.  Plan to repeat iron studies in 3 months.  3. Thyroid nodules:  · CT chest 8/28/18 with 1.5 cm  solid-appearing nodule left thyroid gland, 7 mm nodule in the isthmus, incompletely visualized.  Recommended ultrasound  · We will obtain a thyroid ultrasound in the next 1-2 weeks and the patient will call for the report.     Plan:  1. Thyroid ultrasound in the next 1-2 weeks.  The patient will call for the report  2. Begin oral iron daily  3. Return stool cards for occult blood ×3.  If positive we will need to refer back to Dr. Guerin in GI to consider need for repeat endoscopic evaluation.  4. M.D. visit in 3 months.  One week prior we will draw CBC, iron panel, ferritin

## 2018-09-14 ENCOUNTER — HOSPITAL ENCOUNTER (OUTPATIENT)
Dept: ULTRASOUND IMAGING | Facility: HOSPITAL | Age: 68
Discharge: HOME | End: 2018-09-14
Attending: INTERNAL MEDICINE | Admitting: INTERNAL MEDICINE

## 2018-09-14 DIAGNOSIS — D72.828 OTHER ELEVATED WHITE BLOOD CELL (WBC) COUNT: ICD-10-CM

## 2018-09-14 DIAGNOSIS — D64.9 NORMOCYTIC ANEMIA: ICD-10-CM

## 2018-09-14 DIAGNOSIS — E04.1 THYROID NODULE: ICD-10-CM

## 2018-09-14 PROCEDURE — 76536 US EXAM OF HEAD AND NECK: CPT

## 2018-09-27 ENCOUNTER — TELEPHONE (OUTPATIENT)
Dept: ONCOLOGY | Facility: HOSPITAL | Age: 68
End: 2018-09-27

## 2018-09-27 NOTE — TELEPHONE ENCOUNTER
Attempted to call pt, no answer. LVM to return call.     ----- Message from Juanito Perdue Jr., MD sent at 9/27/2018 12:25 PM EDT -----  Please notify patient of ultrasound result showing multinodular goiter, nothing more needed at this time. I tried to call patient but could not reach him.  ----- Message -----  From: Interface, Rad Results [place] In  Sent: 9/18/2018   8:11 AM  To: Juanito Perdue Jr., MD

## 2018-09-28 ENCOUNTER — TELEPHONE (OUTPATIENT)
Dept: ONCOLOGY | Facility: HOSPITAL | Age: 68
End: 2018-09-28

## 2018-09-28 NOTE — TELEPHONE ENCOUNTER
----- Message from Juanito Perdue Jr., MD sent at 9/27/2018 12:25 PM EDT -----  Please notify patient of ultrasound result showing multinodular goiter, nothing more needed at this time. I tried to call patient but could not reach him.  ----- Message -----  From: Interface, Rad Results [place] In  Sent: 9/18/2018   8:11 AM  To: Juanito Perdue Jr., MD        Attempted to call pt again today. No answer. Left message informing pt of Dr. Perdue's message. Advised him to call with any questions.

## 2018-09-28 NOTE — PROGRESS NOTES
I have previously attempted to contact the patient unsuccessfully and nursing triage attempted to contact the patient as well regarding his thyroid ultrasound results from 9/14/18.  A message was left for the patient regarding the findings.  Ultrasound showed a multinodular goiter and we will not plan any further evaluation at this time.

## 2018-11-28 ENCOUNTER — OFFICE VISIT (OUTPATIENT)
Dept: CARDIOLOGY | Facility: CLINIC | Age: 68
End: 2018-11-28

## 2018-11-28 VITALS
DIASTOLIC BLOOD PRESSURE: 60 MMHG | BODY MASS INDEX: 26.83 KG/M2 | WEIGHT: 177 LBS | HEART RATE: 75 BPM | HEIGHT: 68 IN | OXYGEN SATURATION: 98 % | SYSTOLIC BLOOD PRESSURE: 110 MMHG

## 2018-11-28 DIAGNOSIS — Z72.0 TOBACCO ABUSE: ICD-10-CM

## 2018-11-28 DIAGNOSIS — I25.10 CORONARY ARTERIOSCLEROSIS IN NATIVE ARTERY: Primary | ICD-10-CM

## 2018-11-28 PROCEDURE — 99214 OFFICE O/P EST MOD 30 MIN: CPT | Performed by: PHYSICIAN ASSISTANT

## 2018-11-28 PROCEDURE — 93000 ELECTROCARDIOGRAM COMPLETE: CPT | Performed by: PHYSICIAN ASSISTANT

## 2018-11-28 NOTE — PROGRESS NOTES
Date of Office Visit: 2018  Encounter Provider: MAXIM Marquez  Place of Service: [facility] CARDIOLOGY  Patient Name: Felicita Mike  :1950    Chief Complaint   Patient presents with   • Coronary Artery Disease     1 year follow up   :     HPI: Felicita Mike is a 68 y.o. male, new to me, who presents today for follow-up.  Old records have been obtained and reviewed by me.  He is a patient of Dr. Anne's with a past cardiac history significant for coronary artery disease.  He had stents placed to his circumflex and RCA 13 years ago.  He has continued to smoke despite this and his advanced COPD.  He was last in our office to see Dr. Anne on 2017.  At that visit he was doing well without complaints of angina or heart failure.  No changes were made and he's here today for yearly visit.   Over the past year he's been doing well from a cardiac standpoint.  He denies any chest pain, palpitations, edema, dizziness, or syncope.  He has chronic shortness of breath from his COPD, this is stable.  He states that he can walk about 200 yards before he gets out of breath and has to rest.  He is still smoking.  He has been smoking since he was 15 years old and he does not think that he can quit.    Past Medical History:   Diagnosis Date   • Abnormal PSA    • Acute myocardial infarction (CMS/HCC) 2004    Stents   • Apnea, sleep    • Atherosclerotic heart disease    • Chronic pain     Chronic low back pamichael, MRI ,  L3 compression deformity, L2 degenerative disc disease and L5-S1 degenerative disc disease.   • Colon polyp    • Constipation    • COPD (chronic obstructive pulmonary disease) (CMS/HCC)    • Coronary artery disease    • Depressed bipolar II disorder (CMS/HCC)    • Diabetes mellitus (CMS/HCC)    • Fatigue    • GERD (gastroesophageal reflux disease)    • H/O transfusion of packed red blood cells    • Health care maintenance    • Hearing loss    • History of back  pain    • Hypercholesterolemia    • Hyperlipidemia    • Hypertension    • Leukocytosis    • Osteoarthritis    • Proteinuria    • PVD (peripheral vascular disease) (CMS/HCC)    • Sebaceous cyst    • Seborrheic dermatitis    • Sleep apnea    • Tinea corporis    • Tobacco use    • Tremor        Past Surgical History:   Procedure Laterality Date   • ANGIOPLASTY      Cath Stent Placement   • COLONOSCOPY  01/22/2014    ta polyps   • CORONARY STENT PLACEMENT  2004   • EYE SURGERY      Cataract Surgery   • HAND SURGERY         Social History     Socioeconomic History   • Marital status:      Spouse name: Not on file   • Number of children: Not on file   • Years of education: Not on file   • Highest education level: Not on file   Social Needs   • Financial resource strain: Not on file   • Food insecurity - worry: Not on file   • Food insecurity - inability: Not on file   • Transportation needs - medical: Not on file   • Transportation needs - non-medical: Not on file   Occupational History     Employer: DISABLED   Tobacco Use   • Smoking status: Current Every Day Smoker     Packs/day: 1.00     Years: 47.00     Pack years: 47.00     Types: Cigarettes   • Smokeless tobacco: Never Used   • Tobacco comment: NO CAFFEINE USE   50 years 1 ppd    Substance and Sexual Activity   • Alcohol use: No   • Drug use: No   • Sexual activity: Defer   Other Topics Concern   • Not on file   Social History Narrative   • Not on file       Family History   Problem Relation Age of Onset   • Cancer Mother    • Diabetes Mother    • Hypertension Mother    • Colon cancer Mother    • Heart disease Father    • Cancer Brother    • Stroke Brother        Review of Systems   Constitution: Negative for chills, fever and malaise/fatigue.   Cardiovascular: Positive for dyspnea on exertion. Negative for chest pain, leg swelling, near-syncope, orthopnea, palpitations, paroxysmal nocturnal dyspnea and syncope.   Respiratory: Negative for cough and  shortness of breath.    Musculoskeletal: Negative for joint pain, joint swelling and myalgias.   Gastrointestinal: Negative for abdominal pain, diarrhea, melena, nausea and vomiting.   Genitourinary: Negative for frequency and hematuria.   Neurological: Negative for light-headedness, numbness, paresthesias and seizures.   Allergic/Immunologic: Negative.    All other systems reviewed and are negative.      Allergies   Allergen Reactions   • Clopidogrel Itching         Current Outpatient Medications:   •  albuterol (PROVENTIL HFA;VENTOLIN HFA) 108 (90 Base) MCG/ACT inhaler, Inhale 2 puffs Every 4 (Four) Hours As Needed for Wheezing or Shortness of Air., Disp: 3 inhaler, Rfl: 3  •  amLODIPine (NORVASC) 10 MG tablet, Take 1 tablet by mouth Daily. for blood pressure, Disp: 90 tablet, Rfl: 1  •  aspirin 81 MG tablet, Take 1 tablet by mouth 2 (two) times a day., Disp: , Rfl:   •  atorvastatin (LIPITOR) 40 MG tablet, Take 1 tablet by mouth Daily. For cholesterol, Disp: 90 tablet, Rfl: 3  •  carvedilol (COREG) 25 MG tablet, Take 1 tablet by mouth 2 (Two) Times a Day With Meals. For heart, Disp: 180 tablet, Rfl: 3  •  cholecalciferol (VITAMIN D3) 1000 UNITS tablet, Take 1 tablet by mouth 2 (two) times a day., Disp: , Rfl:   •  Cyanocobalamin (B-12) 1000 MCG capsule, Take 1 tablet by mouth Daily., Disp: , Rfl:   •  esomeprazole (nexIUM) 40 MG capsule, Take 1 capsule by mouth Daily. For GERD; this replaces Omeprazole, Disp: 90 capsule, Rfl: 3  •  FLUoxetine (PROzac) 20 MG capsule, 3 PO daily for anxiety and depression, Disp: 270 capsule, Rfl: 3  •  lisinopril-hydrochlorothiazide (PRINZIDE,ZESTORETIC) 20-12.5 MG per tablet, Take 1 tablet by mouth Daily. For BP and renal protection, Disp: 90 tablet, Rfl: 3  •  lithium carbonate 150 MG capsule, One PO in AM and 2 PO daily with food for mood, Disp: 270 capsule, Rfl: 3  •  metFORMIN (GLUCOPHAGE) 1000 MG tablet, One PO BID For DMII (Patient taking differently: Take 1,000 mg by mouth  "2 (Two) Times a Day With Meals. One PO BID For DMII), Disp: 180 tablet, Rfl: 3  •  polyethylene glycol (MIRALAX) packet, MIX 1 PACKET (17 GRAMS ) AS DIRECTED AND TAKE BY MOUTH DAILY *CAN REDUCE TO EVERY OTHER DAY OR EVERY 3 DAYS IF STOOLS TOO LOOSE, Disp: 90 each, Rfl: 4  •  QUEtiapine (SEROquel) 100 MG tablet, Take 1 tablet by mouth Every Night. For sleep, Disp: 90 tablet, Rfl: 1  •  tamsulosin (FLOMAX) 0.4 MG capsule 24 hr capsule, Take 1 capsule by mouth Daily., Disp: , Rfl:   •  wheat dextrin (BENEFIBER ON THE GO) powder powder, TAKE PER PACKAGE DIRECTIONS, Disp: 28 each, Rfl: 10      Objective:     Vitals:    11/28/18 1324 11/28/18 1337   BP: 110/58 110/60   BP Location: Right arm Left arm   Pulse: 75    SpO2: 98%    Weight: 80.3 kg (177 lb)    Height: 172.7 cm (68\")      Body mass index is 26.91 kg/m².    PHYSICAL EXAM:    Physical Exam   Constitutional: He is oriented to person, place, and time. He appears well-developed and well-nourished. No distress.   HENT:   Head: Normocephalic and atraumatic.   Eyes: Pupils are equal, round, and reactive to light.   Neck: No JVD present. No thyromegaly present.   Cardiovascular: Normal rate, regular rhythm, normal heart sounds and intact distal pulses.   No murmur heard.  Pulmonary/Chest: Effort normal and breath sounds normal. No respiratory distress.   Abdominal: Soft. Bowel sounds are normal. He exhibits no distension. There is no splenomegaly or hepatomegaly. There is no tenderness.   Musculoskeletal: Normal range of motion. He exhibits no edema.   Clubbing of fingers   Neurological: He is alert and oriented to person, place, and time.   Skin: Skin is warm and dry. He is not diaphoretic. No erythema.   Psychiatric: He has a normal mood and affect. His behavior is normal. Judgment normal.         ECG 12 Lead  Date/Time: 11/28/2018 1:48 PM  Performed by: Na Staley PA  Authorized by: Na Staley PA   Comparison: compared with previous ECG from " 11/21/2017  Similar to previous ECG  Rhythm: sinus rhythm  BPM: 68  Clinical impression: normal ECG  Comments: Indication:  CAD              Assessment:       Diagnosis Plan   1. Coronary arteriosclerosis in native artery  ECG 12 Lead   2. Tobacco abuse       Orders Placed This Encounter   Procedures   • ECG 12 Lead     This order was created via procedure documentation          Plan:       1.  Coronary Artery Disease  Assessment  • The patient has no angina    Plan  • Lifestyle modifications discussed include avoidance of tobacco products and regular exercise    Subjective - Objective  • There has been a previous stent procedure using IBAN  • Current antiplatelet therapy includes aspirin 81 mg  • Overall he's stable.  He has no complaints of angina or heart failure.  He is active despite his significant COPD.  Continue current medical regimen.    2.  Tobacco abuse.  He is still smoking and it is unlikely that he will ever quit.  I did discuss with him that if he were to quit, it would help to prevent further worsening of his lungs and coronary disease.    I'm not going to make any changes to his medical regimen, and he will follow-up with Dr. Anne in 1 year or sooner if needed.    As always, it has been a pleasure to participate in your patient's care.      Sincerely,         Na Staley PA-C

## 2018-12-18 ENCOUNTER — APPOINTMENT (OUTPATIENT)
Dept: LAB | Facility: HOSPITAL | Age: 68
End: 2018-12-18

## 2018-12-18 ENCOUNTER — OFFICE VISIT (OUTPATIENT)
Dept: ONCOLOGY | Facility: CLINIC | Age: 68
End: 2018-12-18

## 2018-12-18 VITALS
WEIGHT: 176.8 LBS | OXYGEN SATURATION: 93 % | TEMPERATURE: 99 F | RESPIRATION RATE: 18 BRPM | SYSTOLIC BLOOD PRESSURE: 108 MMHG | BODY MASS INDEX: 26.8 KG/M2 | HEART RATE: 77 BPM | DIASTOLIC BLOOD PRESSURE: 62 MMHG | HEIGHT: 68 IN

## 2018-12-18 DIAGNOSIS — D64.9 NORMOCYTIC ANEMIA: ICD-10-CM

## 2018-12-18 DIAGNOSIS — D72.828 OTHER ELEVATED WHITE BLOOD CELL (WBC) COUNT: ICD-10-CM

## 2018-12-18 DIAGNOSIS — D50.8 OTHER IRON DEFICIENCY ANEMIA: Primary | ICD-10-CM

## 2018-12-18 PROBLEM — D50.9 IRON DEFICIENCY ANEMIA: Status: ACTIVE | Noted: 2018-12-18

## 2018-12-18 LAB
BASOPHILS # BLD AUTO: 0.06 10*3/MM3 (ref 0–0.1)
BASOPHILS NFR BLD AUTO: 0.4 % (ref 0–1.1)
DEPRECATED RDW RBC AUTO: 38.3 FL (ref 37–49)
EOSINOPHIL # BLD AUTO: 0.22 10*3/MM3 (ref 0–0.36)
EOSINOPHIL NFR BLD AUTO: 1.6 % (ref 1–5)
ERYTHROCYTE [DISTWIDTH] IN BLOOD BY AUTOMATED COUNT: 12.6 % (ref 11.7–14.5)
HCT VFR BLD AUTO: 40.9 % (ref 40–49)
HGB BLD-MCNC: 13.8 G/DL (ref 13.5–16.5)
IMM GRANULOCYTES # BLD: 0.11 10*3/MM3 (ref 0–0.03)
IMM GRANULOCYTES NFR BLD: 0.8 % (ref 0–0.5)
IRON 24H UR-MRATE: 75 MCG/DL (ref 59–158)
IRON SATN MFR SERPL: 19 % (ref 14–48)
LYMPHOCYTES # BLD AUTO: 1.74 10*3/MM3 (ref 1–3.5)
LYMPHOCYTES NFR BLD AUTO: 12.3 % (ref 20–49)
MCH RBC QN AUTO: 28.4 PG (ref 27–33)
MCHC RBC AUTO-ENTMCNC: 33.7 G/DL (ref 32–35)
MCV RBC AUTO: 84.2 FL (ref 83–97)
MONOCYTES # BLD AUTO: 1.24 10*3/MM3 (ref 0.25–0.8)
MONOCYTES NFR BLD AUTO: 8.8 % (ref 4–12)
NEUTROPHILS # BLD AUTO: 10.78 10*3/MM3 (ref 1.5–7)
NEUTROPHILS NFR BLD AUTO: 76.1 % (ref 39–75)
NRBC BLD MANUAL-RTO: 0 /100 WBC (ref 0–0)
PLATELET # BLD AUTO: 214 10*3/MM3 (ref 150–375)
PMV BLD AUTO: 11.6 FL (ref 8.9–12.1)
RBC # BLD AUTO: 4.86 10*6/MM3 (ref 4.3–5.5)
TIBC SERPL-MCNC: 400 MCG/DL (ref 249–505)
TRANSFERRIN SERPL-MCNC: 286 MG/DL (ref 200–360)
WBC NRBC COR # BLD: 14.15 10*3/MM3 (ref 4–10)

## 2018-12-18 PROCEDURE — 84466 ASSAY OF TRANSFERRIN: CPT | Performed by: INTERNAL MEDICINE

## 2018-12-18 PROCEDURE — 83540 ASSAY OF IRON: CPT | Performed by: INTERNAL MEDICINE

## 2018-12-18 PROCEDURE — 85025 COMPLETE CBC W/AUTO DIFF WBC: CPT | Performed by: INTERNAL MEDICINE

## 2018-12-18 PROCEDURE — 99214 OFFICE O/P EST MOD 30 MIN: CPT | Performed by: INTERNAL MEDICINE

## 2018-12-18 PROCEDURE — 36415 COLL VENOUS BLD VENIPUNCTURE: CPT | Performed by: INTERNAL MEDICINE

## 2018-12-18 NOTE — PROGRESS NOTES
Subjective .     REASONS FOR FOLLOWUP:    1. Chronic leukocytosis suspected secondary to smoking  2. Borderline iron deficiency anemia and tingling on oral iron daily    HISTORY OF PRESENT ILLNESS:  The patient is a 68 y.o. year old male who is here for follow-up with the above-mentioned history.    History of Present Illness   the patient returns today in follow-up with laboratory studies to review.  In the interval, he did not return stool cards as requested.  He has no visible blood in his stool.  He has tolerated oral iron well with no significant constipation.  He reports that he has also been taking oral B12.  He does continue with significant fatigue.  He has some continued dyspnea on exertion and wheezing chronically.  He continues to smoke.    Past Medical History:   Diagnosis Date   • Abnormal PSA    • Acute myocardial infarction (CMS/HCC) 2004    Stents   • Apnea, sleep    • Atherosclerotic heart disease    • Chronic pain     Chronic low back paoin, MRI 12/11,  L3 compression deformity, L2 degenerative disc disease and L5-S1 degenerative disc disease.   • Colon polyp    • Constipation    • COPD (chronic obstructive pulmonary disease) (CMS/HCC)    • Coronary artery disease    • Depressed bipolar II disorder (CMS/HCC)    • Diabetes mellitus (CMS/HCC)    • Fatigue    • GERD (gastroesophageal reflux disease)    • H/O transfusion of packed red blood cells    • Health care maintenance    • Hearing loss    • History of back pain    • Hypercholesterolemia    • Hyperlipidemia    • Hypertension    • Leukocytosis    • Osteoarthritis    • Proteinuria    • PVD (peripheral vascular disease) (CMS/HCC)    • Sebaceous cyst    • Seborrheic dermatitis    • Sleep apnea    • Tinea corporis    • Tobacco use    • Tremor      Past Surgical History:   Procedure Laterality Date   • ANGIOPLASTY      Cath Stent Placement   • COLONOSCOPY  01/22/2014    ta polyps   • CORONARY STENT PLACEMENT  2004   • EYE SURGERY      Cataract  Surgery   • HAND SURGERY             Current Outpatient Medications on File Prior to Visit   Medication Sig Dispense Refill   • albuterol (PROVENTIL HFA;VENTOLIN HFA) 108 (90 Base) MCG/ACT inhaler Inhale 2 puffs Every 4 (Four) Hours As Needed for Wheezing or Shortness of Air. 3 inhaler 3   • amLODIPine (NORVASC) 10 MG tablet Take 1 tablet by mouth Daily. for blood pressure 90 tablet 1   • aspirin 81 MG tablet Take 1 tablet by mouth 2 (two) times a day.     • atorvastatin (LIPITOR) 40 MG tablet Take 1 tablet by mouth Daily. For cholesterol 90 tablet 3   • carvedilol (COREG) 25 MG tablet Take 1 tablet by mouth 2 (Two) Times a Day With Meals. For heart 180 tablet 3   • cholecalciferol (VITAMIN D3) 1000 UNITS tablet Take 1 tablet by mouth 2 (two) times a day.     • Cyanocobalamin (B-12) 1000 MCG capsule Take 1 tablet by mouth Daily.     • esomeprazole (nexIUM) 40 MG capsule Take 1 capsule by mouth Daily. For GERD; this replaces Omeprazole 90 capsule 3   • FLUoxetine (PROzac) 20 MG capsule 3 PO daily for anxiety and depression 270 capsule 3   • metFORMIN (GLUCOPHAGE) 1000 MG tablet One PO BID For DMII (Patient taking differently: Take 1,000 mg by mouth 2 (Two) Times a Day With Meals. One PO BID For DMII) 180 tablet 3   • polyethylene glycol (MIRALAX) packet MIX 1 PACKET (17 GRAMS ) AS DIRECTED AND TAKE BY MOUTH DAILY *CAN REDUCE TO EVERY OTHER DAY OR EVERY 3 DAYS IF STOOLS TOO LOOSE 90 each 4   • QUEtiapine (SEROquel) 100 MG tablet Take 1 tablet by mouth Every Night. For sleep 90 tablet 1   • tamsulosin (FLOMAX) 0.4 MG capsule 24 hr capsule Take 1 capsule by mouth Daily.     • wheat dextrin (BENEFIBER ON THE GO) powder powder TAKE PER PACKAGE DIRECTIONS 28 each 10   • lisinopril-hydrochlorothiazide (PRINZIDE,ZESTORETIC) 20-12.5 MG per tablet Take 1 tablet by mouth Daily. For BP and renal protection 90 tablet 3   • lithium carbonate 150 MG capsule One PO in AM and 2 PO daily with food for mood 270 capsule 3     No current  facility-administered medications on file prior to visit.        ALLERGIES:     Allergies   Allergen Reactions   • Clopidogrel Itching       Social History     Socioeconomic History   • Marital status:      Spouse name: Not on file   • Number of children: Not on file   • Years of education: Not on file   • Highest education level: Not on file   Social Needs   • Financial resource strain: Not on file   • Food insecurity - worry: Not on file   • Food insecurity - inability: Not on file   • Transportation needs - medical: Not on file   • Transportation needs - non-medical: Not on file   Occupational History     Employer: DISABLED   Tobacco Use   • Smoking status: Current Every Day Smoker     Packs/day: 1.00     Years: 47.00     Pack years: 47.00     Types: Cigarettes   • Smokeless tobacco: Never Used   • Tobacco comment: NO CAFFEINE USE   50 years 1 ppd    Substance and Sexual Activity   • Alcohol use: No   • Drug use: No   • Sexual activity: Defer   Other Topics Concern   • Not on file   Social History Narrative   • Not on file     Family History   Problem Relation Age of Onset   • Cancer Mother    • Diabetes Mother    • Hypertension Mother    • Colon cancer Mother    • Heart disease Father    • Cancer Brother    • Stroke Brother               Review of Systems   Constitutional: Positive for fatigue. Negative for activity change, appetite change, fever and unexpected weight change.   HENT: Negative for congestion, mouth sores, nosebleeds, sore throat and voice change.    Respiratory: Positive for shortness of breath and wheezing. Negative for cough.    Cardiovascular: Negative for chest pain, palpitations and leg swelling.   Gastrointestinal: Negative for abdominal distention, abdominal pain, blood in stool, constipation, diarrhea, nausea and vomiting.   Endocrine: Negative for cold intolerance and heat intolerance.   Genitourinary: Negative for difficulty urinating, dysuria, frequency and hematuria.    Musculoskeletal: Positive for arthralgias. Negative for back pain, joint swelling and myalgias.   Skin: Negative for rash.   Neurological: Negative for dizziness, syncope, weakness, light-headedness, numbness and headaches.   Hematological: Negative for adenopathy. Does not bruise/bleed easily.   Psychiatric/Behavioral: Negative for confusion and sleep disturbance. The patient is not nervous/anxious.          Objective      Vitals:    12/18/18 1557   BP: 108/62   Pulse: 77   Resp: 18   Temp: 99 °F (37.2 °C)   SpO2: 93%      Current Status 12/18/2018   ECOG score 0   Pain 0/10    Physical Exam   Constitutional: He is oriented to person, place, and time. He appears well-developed and well-nourished.   HENT:   Mouth/Throat: Oropharynx is clear and moist.   Eyes: Conjunctivae are normal.   Neck: No thyromegaly present.   Cardiovascular: Normal rate and regular rhythm. Exam reveals no gallop and no friction rub.   No murmur heard.  Pulmonary/Chest: No respiratory distress. He has wheezes.   Abdominal: Soft. Bowel sounds are normal. He exhibits no distension. There is no tenderness.   Musculoskeletal: He exhibits edema.   Lymphadenopathy:        Head (right side): No submandibular adenopathy present.     He has no cervical adenopathy.     He has no axillary adenopathy.        Right: No inguinal and no supraclavicular adenopathy present.        Left: No inguinal and no supraclavicular adenopathy present.   Neurological: He is alert and oriented to person, place, and time. He displays normal reflexes. No cranial nerve deficit. He exhibits normal muscle tone.   Skin: Skin is warm and dry. No rash noted.   Psychiatric: He has a normal mood and affect. His behavior is normal.       RECENT LABS:  Hematology WBC   Date Value Ref Range Status   12/18/2018 14.15 (H) 4.00 - 10.00 10*3/mm3 Final   07/20/2018 13.51 (H) 4.50 - 10.70 10*3/mm3 Final     RBC   Date Value Ref Range Status   12/18/2018 4.86 4.30 - 5.50 10*6/mm3 Final    07/20/2018 4.52 (L) 4.60 - 6.00 10*6/mm3 Final     Hemoglobin   Date Value Ref Range Status   12/18/2018 13.8 13.5 - 16.5 g/dL Final     Hematocrit   Date Value Ref Range Status   12/18/2018 40.9 40.0 - 49.0 % Final     Platelets   Date Value Ref Range Status   12/18/2018 214 150 - 375 10*3/mm3 Final        Thyroid ultrasound 9/11/18:  IMPRESSION:  Multinodular thyroid as described with the largest nodule  measuring on the order of 1.4 cm in greatest dimension.    Additional labs today: Iron 75, iron saturation 19%, TIBC 400, ferritin pending    Assessment/Plan     1. Chronic mild leukocytosis:  · This is long-standing with a WBC in the 11-89242 range normal differential dating at least back to 5/4/16 if not before.  · There does not appear to be any obvious underlying infectious issue.  There does not appear to be a chronic inflammatory state.  · Review of peripheral blood smear identified predominantly normal segmented neutrophils no immature forms identified.  · No evidence of malignancy on CT chest 8/28/18  · Laboratory evaluation 8/21/18 with ESR 12, CRP 0.41, peripheral blood flow cytometry negative.  · Likely related to smoking, continues to smoke 1 pack per day with 50-pack-year history  · Today, WBC is stable at 14.15, differential 76 segs, 12 lymphs, 8 monocytes.  Recheck in 6 months and return visit.  2. Normocytic anemia with evidence of borderline iron deficiency:  · Decline in hemoglobin 8/21/18 to 12.0 with normal MCV 88.  · Previous normal B12 and folate in July 2017  · Low normal ferritin of 37.8 on 7/20/18.  · Previous colonoscopy 11/29/16 with Dr. Franco with 2 polyps removed (tubular adenoma with low-grade dysplasia)  · Laboratory evaluation 8/21/18 with iron 62, ferritin 32.7, iron saturation 14%, TIBC 435, B12 with 2000, folate greater than 20, ESR 12, CRP 0.41.  · With borderline iron studies, initiated oral iron daily 9/11/18 and requested return stool cards for occult blood ×3 with  potential referral back to GI positive.  · Today, the patient returns continuing on oral iron daily.  He is tolerating this well with no significant constipation.  He has not returned the stool cards.  I have asked him to return these as requested previously and we discussed rationale for potential referral back to GI if they are positive.  He has not noted any blood in stool.  Hemoglobin has normalized at 13.8 today.  Patient will return in 6 months and we will repeat iron studies at that time.  3. Thyroid nodules:  · CT chest 8/28/18 with 1.5 cm solid-appearing nodule left thyroid gland, 7 mm nodule in the isthmus, incompletely visualized.  Recommended ultrasound  · Thyroid ultrasound 9/14/18 showed multinodular goiter.  We had attempted to contact the patient with this result but were unsuccessful previously.  I did discuss the result with him today.  This does not require any further evaluation.     Plan:  1. Continue oral iron daily  2. Return stool cards for occult blood ×3.  If positive we will need to refer back to Dr. Guerin in GI to consider need for repeat endoscopic evaluation.  3. M.D. visit in 6 months with CBC, stat iron panel and ferritin on day of visit.

## 2019-01-21 DIAGNOSIS — E11.9 TYPE 2 DIABETES MELLITUS WITHOUT COMPLICATION, WITHOUT LONG-TERM CURRENT USE OF INSULIN (HCC): ICD-10-CM

## 2019-01-21 DIAGNOSIS — D72.829 LEUKOCYTOSIS, UNSPECIFIED TYPE: ICD-10-CM

## 2019-01-21 DIAGNOSIS — E78.2 MIXED HYPERLIPIDEMIA: ICD-10-CM

## 2019-01-21 RX ORDER — ATORVASTATIN CALCIUM 40 MG/1
40 TABLET, FILM COATED ORAL DAILY
Qty: 90 TABLET | Refills: 0 | Status: SHIPPED | OUTPATIENT
Start: 2019-01-21 | End: 2019-01-23 | Stop reason: SDUPTHER

## 2019-01-23 ENCOUNTER — OFFICE VISIT (OUTPATIENT)
Dept: FAMILY MEDICINE CLINIC | Facility: CLINIC | Age: 69
End: 2019-01-23

## 2019-01-23 VITALS
SYSTOLIC BLOOD PRESSURE: 120 MMHG | BODY MASS INDEX: 26.67 KG/M2 | HEART RATE: 78 BPM | HEIGHT: 68 IN | OXYGEN SATURATION: 92 % | WEIGHT: 176 LBS | TEMPERATURE: 98.8 F | DIASTOLIC BLOOD PRESSURE: 70 MMHG

## 2019-01-23 DIAGNOSIS — E78.2 MIXED HYPERLIPIDEMIA: ICD-10-CM

## 2019-01-23 DIAGNOSIS — E11.9 TYPE 2 DIABETES MELLITUS WITHOUT COMPLICATION, WITHOUT LONG-TERM CURRENT USE OF INSULIN (HCC): ICD-10-CM

## 2019-01-23 DIAGNOSIS — E11.9 TYPE 2 DIABETES MELLITUS WITHOUT COMPLICATION, WITH LONG-TERM CURRENT USE OF INSULIN (HCC): ICD-10-CM

## 2019-01-23 DIAGNOSIS — L72.3 SEBACEOUS CYST: ICD-10-CM

## 2019-01-23 DIAGNOSIS — E04.1 THYROID NODULE: ICD-10-CM

## 2019-01-23 DIAGNOSIS — D72.829 LEUKOCYTOSIS, UNSPECIFIED TYPE: ICD-10-CM

## 2019-01-23 DIAGNOSIS — E55.9 VITAMIN D DEFICIENCY: ICD-10-CM

## 2019-01-23 DIAGNOSIS — F41.9 ANXIETY: ICD-10-CM

## 2019-01-23 DIAGNOSIS — F31.70 BIPOLAR DISORDER IN FULL REMISSION, MOST RECENT EPISODE UNSPECIFIED TYPE (HCC): ICD-10-CM

## 2019-01-23 DIAGNOSIS — R25.1 TREMOR: ICD-10-CM

## 2019-01-23 DIAGNOSIS — I10 ESSENTIAL HYPERTENSION: Primary | ICD-10-CM

## 2019-01-23 DIAGNOSIS — Z72.0 TOBACCO ABUSE: ICD-10-CM

## 2019-01-23 DIAGNOSIS — Z79.4 TYPE 2 DIABETES MELLITUS WITHOUT COMPLICATION, WITH LONG-TERM CURRENT USE OF INSULIN (HCC): ICD-10-CM

## 2019-01-23 DIAGNOSIS — K21.9 GASTROESOPHAGEAL REFLUX DISEASE WITHOUT ESOPHAGITIS: ICD-10-CM

## 2019-01-23 DIAGNOSIS — J43.9 PULMONARY EMPHYSEMA, UNSPECIFIED EMPHYSEMA TYPE (HCC): ICD-10-CM

## 2019-01-23 PROBLEM — G44.209 TENSION HEADACHE: Status: RESOLVED | Noted: 2017-08-28 | Resolved: 2019-01-23

## 2019-01-23 PROCEDURE — 99214 OFFICE O/P EST MOD 30 MIN: CPT | Performed by: PHYSICIAN ASSISTANT

## 2019-01-23 RX ORDER — ALBUTEROL SULFATE 90 UG/1
2 AEROSOL, METERED RESPIRATORY (INHALATION) EVERY 4 HOURS PRN
Qty: 3 INHALER | Refills: 3 | Status: SHIPPED | OUTPATIENT
Start: 2019-01-23 | End: 2019-08-08

## 2019-01-23 RX ORDER — ATORVASTATIN CALCIUM 40 MG/1
40 TABLET, FILM COATED ORAL DAILY
Qty: 90 TABLET | Refills: 3 | Status: SHIPPED | OUTPATIENT
Start: 2019-01-23 | End: 2019-01-24

## 2019-01-23 RX ORDER — OMEPRAZOLE 40 MG/1
40 CAPSULE, DELAYED RELEASE ORAL DAILY
Qty: 90 CAPSULE | Refills: 3 | Status: SHIPPED | OUTPATIENT
Start: 2019-01-23 | End: 2019-12-05 | Stop reason: SDUPTHER

## 2019-01-23 RX ORDER — AMLODIPINE BESYLATE 10 MG/1
10 TABLET ORAL DAILY
Qty: 90 TABLET | Refills: 1 | Status: SHIPPED | OUTPATIENT
Start: 2019-01-23 | End: 2019-07-23 | Stop reason: SDUPTHER

## 2019-01-23 RX ORDER — QUETIAPINE FUMARATE 100 MG/1
100 TABLET, FILM COATED ORAL NIGHTLY
Qty: 90 TABLET | Refills: 1 | Status: SHIPPED | OUTPATIENT
Start: 2019-01-23 | End: 2019-08-30 | Stop reason: SDUPTHER

## 2019-01-23 NOTE — PATIENT INSTRUCTIONS
Low glycemic index diet  Exercise 30 minutes most days of the week  Make sure you get results on any labs or tests we ordered today  We discussed medications and how to take them as prescribed  Sleep 6-8 hours each night if possible  If you have not signed up for [employer], please activate your code ASAP from your After Visit Summary today    LDL goal <100  LDL goal if heart disease <70  HDL goal >60  Triglyceride goal <150  BP goal =<130/80  Fasting glucose <100

## 2019-01-23 NOTE — PROGRESS NOTES
"Subjective   Felicita Mike is a 68 y.o. male.     History of Present Illness   Felicita Mike 68 y.o. male who presents today for routine follow up check and medication refills.  he has a history of   Patient Active Problem List   Diagnosis   • Elevated prostate specific antigen (PSA)   • Coronary arteriosclerosis in native artery   • Bipolar affective disorder (CMS/HCC)   • Chronic obstructive pulmonary disease (CMS/HCC)   • Colon polyp   • Constipation   • Type 2 diabetes mellitus without complication, with long-term current use of insulin (CMS/HCC)   • Gastroesophageal reflux disease   • Fatigue   • Hearing loss   • Hyperlipidemia   • Peripheral vascular disease (CMS/HCC)   • Proteinuria   • Muscle pain   • Low back pain   • Leukocytosis   • Seborrheic eczema   • Sleep apnea   • Tinea corporis   • Tremor   • Hypertension   • Anxiety   • Benign prostatic hyperplasia with lower urinary tract symptoms   • Tobacco abuse   • Normocytic anemia   • Thyroid nodule   • Iron deficiency anemia   .  Since the last visit, he has overall felt fairly well.  He has Hypertenision and is well controlled on medication, GERD and is well controlled on PPI medication, Hyperlipidemia and is well controlled on medication, CAD and is under care of their Cardiologist for medical management and Vitamin D deficiency and will update labs to confirm level is at goal >30.  he has been compliant with current medications have reviewed them.  The patient denies medication side effects.  Wants to go back on Omeprazole d/t cost generic Nexium; I will change to Omeprazole 40mg  NOTE Dr Perdue said in his note for thyroid nodule; \"no further work up\".  Results for orders placed or performed in visit on 12/18/18   Iron Profile   Result Value Ref Range    Iron 75 59 - 158 mcg/dL    Iron Saturation 19 14 - 48 %    Transferrin 286 200 - 360 mg/dL    TIBC 400 249 - 505 mcg/dL   CBC Auto Differential   Result Value Ref Range    WBC 14.15 (H) 4.00 - 10.00 " 10*3/mm3    RBC 4.86 4.30 - 5.50 10*6/mm3    Hemoglobin 13.8 13.5 - 16.5 g/dL    Hematocrit 40.9 40.0 - 49.0 %    MCV 84.2 83.0 - 97.0 fL    MCH 28.4 27.0 - 33.0 pg    MCHC 33.7 32.0 - 35.0 g/dL    RDW 12.6 11.7 - 14.5 %    RDW-SD 38.3 37.0 - 49.0 fl    MPV 11.6 8.9 - 12.1 fL    Platelets 214 150 - 375 10*3/mm3    Neutrophil % 76.1 (H) 39.0 - 75.0 %    Lymphocyte % 12.3 (L) 20.0 - 49.0 %    Monocyte % 8.8 4.0 - 12.0 %    Eosinophil % 1.6 1.0 - 5.0 %    Basophil % 0.4 0.0 - 1.1 %    Immature Grans % 0.8 (H) 0.0 - 0.5 %    Neutrophils, Absolute 10.78 (H) 1.50 - 7.00 10*3/mm3    Lymphocytes, Absolute 1.74 1.00 - 3.50 10*3/mm3    Monocytes, Absolute 1.24 (H) 0.25 - 0.80 10*3/mm3    Eosinophils, Absolute 0.22 0.00 - 0.36 10*3/mm3    Basophils, Absolute 0.06 0.00 - 0.10 10*3/mm3    Immature Grans, Absolute 0.11 (H) 0.00 - 0.03 10*3/mm3    nRBC 0.0 0.0 - 0.0 /100 WBC       I did have him see Dr Perdue about the abn CBC  1. Chronic mild leukocytosis:  · This is long-standing with a WBC in the 11-26161 range normal differential dating at least back to 5/4/16 if not before.  · There does not appear to be any obvious underlying infectious issue.  There does not appear to be a chronic inflammatory state.  · Review of peripheral blood smear identified predominantly normal segmented neutrophils no immature forms identified.  · No evidence of malignancy on CT chest 8/28/18  · Laboratory evaluation 8/21/18 with ESR 12, CRP 0.41, peripheral blood flow cytometry negative.  · Likely related to smoking, continues to smoke 1 pack per day with 50-pack-year history  · Today, WBC is stable at 14.15, differential 76 segs, 12 lymphs, 8 monocytes.  Recheck in 6 months and return visit.  2. Normocytic anemia with evidence of borderline iron deficiency:  · Decline in hemoglobin 8/21/18 to 12.0 with normal MCV 88.  · Previous normal B12 and folate in July 2017  · Low normal ferritin of 37.8 on 7/20/18.  · Previous colonoscopy 11/29/16 with   Joan with 2 polyps removed (tubular adenoma with low-grade dysplasia)  · Laboratory evaluation 8/21/18 with iron 62, ferritin 32.7, iron saturation 14%, TIBC 435, B12 with 2000, folate greater than 20, ESR 12, CRP 0.41.  · With borderline iron studies, initiated oral iron daily 9/11/18 and requested return stool cards for occult blood ×3 with potential referral back to GI positive.  · Today, the patient returns continuing on oral iron daily.  He is tolerating this well with no significant constipation.  He has not returned the stool cards.  I have asked him to return these as requested previously and we discussed rationale for potential referral back to GI if they are positive.  He has not noted any blood in stool.  Hemoglobin has normalized at 13.8 today.  Patient will return in 6 months and we will repeat iron studies at that time.  3. Thyroid nodules:  · CT chest 8/28/18 with 1.5 cm solid-appearing nodule left thyroid gland, 7 mm nodule in the isthmus, incompletely visualized.  Recommended ultrasound  · Thyroid ultrasound 9/14/18 showed multinodular goiter.  We had attempted to contact the patient with this result but were unsuccessful previously.  I did discuss the result with him today.  This does not require any further evaluation.     Plan:  1. Continue oral iron daily  2. Return stool cards for occult blood ×3.  If positive we will need to refer back to Dr. Guerin in GI to consider need for repeat endoscopic evaluation.  3. M.D. visit in 6 months with CBC, stat iron panel and ferritin on day of visit.   He has been to pulm. And does not want to go back    Sees urologist and on Flomax  He sees urologist for prostate  Has been to pulm for COPD; hx sleep apnea  Has been to Dr Franco for GI  Sees Dr Lew once a year for CAD  Has been to neuro for h/a's and MRI 12-3-15 brain---last visit 8-28-17 and no further work up rec    Felicita Mike male 66 y.o. who presents today for follow up of Bipolar Disorder and  Anxiety.  He reports medication is working well, patient desires to continue on Rx, and needs refill. Onset of symptoms was approximately several years ago.  He denies current suicidal and homicidal ideation. Risk factors are family history of anxiety and or depression and lifestyle of multiple roles.  Previous treatment includes current Rx.  He complains of the following medication side effects: none.  The patient has previously been in counseling    Will get labs today    The following portions of the patient's history were reviewed and updated as appropriate: allergies, current medications, past family history, past medical history, past social history, past surgical history and problem list.    Review of Systems   Constitutional: Negative for activity change, appetite change and unexpected weight change.   HENT: Negative for nosebleeds and trouble swallowing.    Eyes: Negative for pain and visual disturbance.   Respiratory: Positive for cough, shortness of breath and wheezing. Negative for chest tightness.    Cardiovascular: Negative for chest pain and palpitations.   Gastrointestinal: Negative for abdominal pain and blood in stool.   Endocrine: Negative.    Genitourinary: Negative for difficulty urinating and hematuria.   Musculoskeletal: Negative for joint swelling.   Skin: Positive for color change and wound. Negative for rash.   Allergic/Immunologic: Negative.    Neurological: Positive for dizziness, tremors and headaches. Negative for syncope and speech difficulty.   Hematological: Negative for adenopathy.   Psychiatric/Behavioral: Negative for agitation and confusion.   All other systems reviewed and are negative.      Objective   Physical Exam   Constitutional: He is oriented to person, place, and time. He appears well-developed and well-nourished. No distress.   HENT:   Head: Normocephalic and atraumatic.   Right Ear: External ear normal.   Left Ear: External ear normal.   Nose: Nose normal.    Mouth/Throat: Oropharynx is clear and moist.   Left pupil stays dilated   Eyes: Conjunctivae and EOM are normal. Right eye exhibits no discharge. Left eye exhibits no discharge. No scleral icterus.   Left pupil damage is chronic   Neck: Normal range of motion. Neck supple. No tracheal deviation present. No thyromegaly present.   Cardiovascular: Normal rate, regular rhythm, normal heart sounds, intact distal pulses and normal pulses. Exam reveals no gallop and no friction rub.   No murmur heard.  Fingernails clubbing   Pulmonary/Chest: Effort normal. No respiratory distress. He has wheezes. He has no rales.   decreased   Abdominal: Soft. There is no tenderness.   Musculoskeletal: Normal range of motion.   Tremor in hands      During the foot exam he had a monofilament test performed.    Neurological Sensory Findings - Unaltered hot/cold right ankle/foot discrimination and unaltered hot/cold left ankle/foot discrimination. Unaltered sharp/dull right ankle/foot discrimination and unaltered sharp/dull left ankle/foot discrimination. No right ankle/foot altered proprioception and no left ankle/foot altered proprioception  Vascular Status -  His right foot exhibits normal foot vasculature  and no edema. His left foot exhibits normal foot vasculature  and no edema.  Skin Integrity  -  His right foot skin is intact.  He has no right foot ulcer, non-callous right foot, no right foot warmth and no right foot blister.His left foot skin is intact. He has no left foot ulcer, non-callous left foot, no left foot warmth and no left foot blister..  Lymphadenopathy:     He has no cervical adenopathy.   Neurological: He is alert and oriented to person, place, and time. He has normal reflexes. No cranial nerve deficit (see note on pupil left). He exhibits normal muscle tone. Coordination normal.   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 No rash noted. There is erythema. No pallor.   Clubbing toenails and  fingernails  Right side chest wall tiny pustule; pink  seb cyst behind left ear cluster   Psychiatric: He has a normal mood and affect. His behavior is normal. Judgment and thought content normal.   Nursing note and vitals reviewed.      Assessment/Plan   Problems Addressed this Visit        Cardiovascular and Mediastinum    Hyperlipidemia    Relevant Medications    atorvastatin (LIPITOR) 40 MG tablet    Other Relevant Orders    Comprehensive metabolic panel    Lipid panel    Hemoglobin A1c    T3, Free    T4, Free    Vitamin D 25 Hydroxy    Hypertension - Primary    Relevant Medications    amLODIPine (NORVASC) 10 MG tablet    Other Relevant Orders    Comprehensive metabolic panel    Lipid panel    Hemoglobin A1c    T3, Free    T4, Free    Vitamin D 25 Hydroxy       Respiratory    Chronic obstructive pulmonary disease (CMS/HCC)    Relevant Medications    umeclidinium-vilanterol (ANORO ELLIPTA) 62.5-25 MCG/INH aerosol powder  inhaler    albuterol sulfate  (90 Base) MCG/ACT inhaler    Other Relevant Orders    Comprehensive metabolic panel    Lipid panel    Hemoglobin A1c    T3, Free    T4, Free    Vitamin D 25 Hydroxy       Digestive    Gastroesophageal reflux disease    Relevant Medications    omeprazole (priLOSEC) 40 MG capsule    Other Relevant Orders    Comprehensive metabolic panel    Lipid panel    Hemoglobin A1c    T3, Free    T4, Free    Vitamin D 25 Hydroxy       Endocrine    Type 2 diabetes mellitus without complication, with long-term current use of insulin (CMS/[facility])    Relevant Medications    atorvastatin (LIPITOR) 40 MG tablet    metFORMIN (GLUCOPHAGE) 1000 MG tablet    Other Relevant Orders    Comprehensive metabolic panel    Lipid panel    Hemoglobin A1c    T3, Free    T4, Free    Vitamin D 25 Hydroxy    Thyroid nodule       Immune and Lymphatic    Leukocytosis    Relevant Medications    atorvastatin (LIPITOR) 40 MG tablet       Other    Bipolar affective disorder (CMS/[facility])    Relevant Medications     QUEtiapine (SEROquel) 100 MG tablet    Other Relevant Orders    Comprehensive metabolic panel    Lipid panel    Hemoglobin A1c    T3, Free    T4, Free    Vitamin D 25 Hydroxy    Tremor    Relevant Orders    Comprehensive metabolic panel    Lipid panel    Hemoglobin A1c    T3, Free    T4, Free    Vitamin D 25 Hydroxy    Anxiety    Relevant Orders    Comprehensive metabolic panel    Lipid panel    Hemoglobin A1c    T3, Free    T4, Free    Vitamin D 25 Hydroxy    Tobacco abuse    Relevant Orders    Comprehensive metabolic panel    Lipid panel    Hemoglobin A1c    T3, Free    T4, Free    Vitamin D 25 Hydroxy      Other Visit Diagnoses     Vitamin D deficiency        Relevant Orders    Comprehensive metabolic panel    Lipid panel    Hemoglobin A1c    T3, Free    T4, Free    Vitamin D 25 Hydroxy    Type 2 diabetes mellitus without complication, without long-term current use of insulin (CMS/[facility])        Relevant Medications    atorvastatin (LIPITOR) 40 MG tablet    metFORMIN (GLUCOPHAGE) 1000 MG tablet    Sebaceous cyst        Relevant Medications    mupirocin (BACTROBAN) 2 % ointment

## 2019-01-24 DIAGNOSIS — I25.10 CORONARY ARTERIOSCLEROSIS IN NATIVE ARTERY: Primary | ICD-10-CM

## 2019-01-24 DIAGNOSIS — E78.2 MIXED HYPERLIPIDEMIA: ICD-10-CM

## 2019-01-24 DIAGNOSIS — E11.9 TYPE 2 DIABETES MELLITUS WITHOUT COMPLICATION, WITH LONG-TERM CURRENT USE OF INSULIN (HCC): ICD-10-CM

## 2019-01-24 DIAGNOSIS — Z79.4 TYPE 2 DIABETES MELLITUS WITHOUT COMPLICATION, WITH LONG-TERM CURRENT USE OF INSULIN (HCC): ICD-10-CM

## 2019-01-24 DIAGNOSIS — Z79.899 HIGH RISK MEDICATION USE: ICD-10-CM

## 2019-01-24 DIAGNOSIS — E83.52 SERUM CALCIUM ELEVATED: ICD-10-CM

## 2019-01-24 LAB
25(OH)D3+25(OH)D2 SERPL-MCNC: 38.1 NG/ML (ref 30–100)
ALBUMIN SERPL-MCNC: 4.5 G/DL (ref 3.5–5.2)
ALBUMIN/GLOB SERPL: 2.1 G/DL
ALP SERPL-CCNC: 71 U/L (ref 39–117)
ALT SERPL-CCNC: 17 U/L (ref 1–41)
AST SERPL-CCNC: 13 U/L (ref 1–40)
BILIRUB SERPL-MCNC: 0.7 MG/DL (ref 0.1–1.2)
BUN SERPL-MCNC: 15 MG/DL (ref 8–23)
BUN/CREAT SERPL: 17.4 (ref 7–25)
CALCIUM SERPL-MCNC: 10.8 MG/DL (ref 8.6–10.5)
CHLORIDE SERPL-SCNC: 99 MMOL/L (ref 98–107)
CHOLEST SERPL-MCNC: 160 MG/DL (ref 0–200)
CO2 SERPL-SCNC: 24.2 MMOL/L (ref 22–29)
CREAT SERPL-MCNC: 0.86 MG/DL (ref 0.76–1.27)
GLOBULIN SER CALC-MCNC: 2.1 GM/DL
GLUCOSE SERPL-MCNC: 139 MG/DL (ref 65–99)
HBA1C MFR BLD: 6 % (ref 4.8–5.6)
HDLC SERPL-MCNC: 40 MG/DL (ref 40–60)
LDLC SERPL CALC-MCNC: 86 MG/DL (ref 0–100)
POTASSIUM SERPL-SCNC: 5.3 MMOL/L (ref 3.5–5.2)
PROT SERPL-MCNC: 6.6 G/DL (ref 6–8.5)
SODIUM SERPL-SCNC: 139 MMOL/L (ref 136–145)
T3FREE SERPL-MCNC: 2.8 PG/ML (ref 2–4.4)
T4 FREE SERPL-MCNC: 1.2 NG/DL (ref 0.93–1.7)
TRIGL SERPL-MCNC: 171 MG/DL (ref 0–150)
VLDLC SERPL CALC-MCNC: 34.2 MG/DL (ref 5–40)

## 2019-01-24 RX ORDER — ATORVASTATIN CALCIUM 80 MG/1
80 TABLET, FILM COATED ORAL DAILY
Qty: 90 TABLET | Refills: 3 | Status: SHIPPED | OUTPATIENT
Start: 2019-01-24 | End: 2020-04-22 | Stop reason: SDUPTHER

## 2019-04-18 ENCOUNTER — RESULTS ENCOUNTER (OUTPATIENT)
Dept: FAMILY MEDICINE CLINIC | Facility: CLINIC | Age: 69
End: 2019-04-18

## 2019-04-18 DIAGNOSIS — Z79.899 HIGH RISK MEDICATION USE: ICD-10-CM

## 2019-04-18 DIAGNOSIS — Z79.4 TYPE 2 DIABETES MELLITUS WITHOUT COMPLICATION, WITH LONG-TERM CURRENT USE OF INSULIN (HCC): ICD-10-CM

## 2019-04-18 DIAGNOSIS — E78.2 MIXED HYPERLIPIDEMIA: ICD-10-CM

## 2019-04-18 DIAGNOSIS — E83.52 SERUM CALCIUM ELEVATED: ICD-10-CM

## 2019-04-18 DIAGNOSIS — I25.10 CORONARY ARTERIOSCLEROSIS IN NATIVE ARTERY: ICD-10-CM

## 2019-04-18 DIAGNOSIS — E11.9 TYPE 2 DIABETES MELLITUS WITHOUT COMPLICATION, WITH LONG-TERM CURRENT USE OF INSULIN (HCC): ICD-10-CM

## 2019-04-30 LAB
ALBUMIN SERPL-MCNC: 4.3 G/DL (ref 3.5–5.2)
ALBUMIN/GLOB SERPL: 1.6 G/DL
ALP SERPL-CCNC: 79 U/L (ref 39–117)
ALT SERPL-CCNC: 13 U/L (ref 1–41)
AST SERPL-CCNC: 12 U/L (ref 1–40)
BILIRUB SERPL-MCNC: 0.7 MG/DL (ref 0.2–1.2)
BUN SERPL-MCNC: 10 MG/DL (ref 8–23)
BUN/CREAT SERPL: 9.9 (ref 7–25)
CA-I SERPL ISE-MCNC: 5.2 MG/DL (ref 4.5–5.6)
CALCIUM SERPL-MCNC: 10.1 MG/DL (ref 8.6–10.5)
CHLORIDE SERPL-SCNC: 102 MMOL/L (ref 98–107)
CHOLEST SERPL-MCNC: 119 MG/DL (ref 0–200)
CO2 SERPL-SCNC: 26.8 MMOL/L (ref 22–29)
CREAT SERPL-MCNC: 1.01 MG/DL (ref 0.76–1.27)
GLOBULIN SER CALC-MCNC: 2.7 GM/DL
GLUCOSE SERPL-MCNC: 123 MG/DL (ref 65–99)
HDLC SERPL-MCNC: 41 MG/DL (ref 40–60)
LDLC SERPL CALC-MCNC: 53 MG/DL (ref 0–100)
LITHIUM SERPL-SCNC: 0.9 MMOL/L (ref 0.6–1.2)
POTASSIUM SERPL-SCNC: 5.1 MMOL/L (ref 3.5–5.2)
PROT SERPL-MCNC: 7 G/DL (ref 6–8.5)
PTH-INTACT SERPL-MCNC: 33 PG/ML (ref 15–65)
SODIUM SERPL-SCNC: 141 MMOL/L (ref 136–145)
TRIGL SERPL-MCNC: 127 MG/DL (ref 0–150)
VLDLC SERPL CALC-MCNC: 25.4 MG/DL

## 2019-06-12 ENCOUNTER — OFFICE VISIT (OUTPATIENT)
Dept: ONCOLOGY | Facility: CLINIC | Age: 69
End: 2019-06-12

## 2019-06-12 ENCOUNTER — LAB (OUTPATIENT)
Dept: LAB | Facility: HOSPITAL | Age: 69
End: 2019-06-12

## 2019-06-12 VITALS
HEIGHT: 68 IN | RESPIRATION RATE: 18 BRPM | DIASTOLIC BLOOD PRESSURE: 65 MMHG | BODY MASS INDEX: 26.64 KG/M2 | OXYGEN SATURATION: 93 % | TEMPERATURE: 98.6 F | SYSTOLIC BLOOD PRESSURE: 126 MMHG | WEIGHT: 175.8 LBS | HEART RATE: 72 BPM

## 2019-06-12 DIAGNOSIS — D50.8 OTHER IRON DEFICIENCY ANEMIA: ICD-10-CM

## 2019-06-12 DIAGNOSIS — D72.828 OTHER ELEVATED WHITE BLOOD CELL (WBC) COUNT: ICD-10-CM

## 2019-06-12 DIAGNOSIS — D50.8 OTHER IRON DEFICIENCY ANEMIA: Primary | ICD-10-CM

## 2019-06-12 DIAGNOSIS — D64.9 NORMOCYTIC ANEMIA: ICD-10-CM

## 2019-06-12 LAB
BASOPHILS # BLD AUTO: 0.04 10*3/MM3 (ref 0–0.2)
BASOPHILS NFR BLD AUTO: 0.3 % (ref 0–1.5)
DEPRECATED RDW RBC AUTO: 41.8 FL (ref 37–54)
EOSINOPHIL # BLD AUTO: 0.17 10*3/MM3 (ref 0–0.4)
EOSINOPHIL NFR BLD AUTO: 1.4 % (ref 0.3–6.2)
ERYTHROCYTE [DISTWIDTH] IN BLOOD BY AUTOMATED COUNT: 12.7 % (ref 12.3–15.4)
FERRITIN SERPL-MCNC: 84.5 NG/ML (ref 30–400)
HCT VFR BLD AUTO: 38.4 % (ref 37.5–51)
HGB BLD-MCNC: 12.3 G/DL (ref 13–17.7)
IMM GRANULOCYTES # BLD AUTO: 0.07 10*3/MM3 (ref 0–0.05)
IMM GRANULOCYTES NFR BLD AUTO: 0.6 % (ref 0–0.5)
IRON 24H UR-MRATE: 71 MCG/DL (ref 59–158)
IRON SATN MFR SERPL: 18 % (ref 14–48)
LYMPHOCYTES # BLD AUTO: 1.64 10*3/MM3 (ref 0.7–3.1)
LYMPHOCYTES NFR BLD AUTO: 13.9 % (ref 19.6–45.3)
MCH RBC QN AUTO: 29.2 PG (ref 26.6–33)
MCHC RBC AUTO-ENTMCNC: 32 G/DL (ref 31.5–35.7)
MCV RBC AUTO: 91.2 FL (ref 79–97)
MONOCYTES # BLD AUTO: 0.87 10*3/MM3 (ref 0.1–0.9)
MONOCYTES NFR BLD AUTO: 7.4 % (ref 5–12)
NEUTROPHILS # BLD AUTO: 9.02 10*3/MM3 (ref 1.7–7)
NEUTROPHILS NFR BLD AUTO: 76.4 % (ref 42.7–76)
NRBC BLD AUTO-RTO: 0 /100 WBC (ref 0–0.2)
PLATELET # BLD AUTO: 250 10*3/MM3 (ref 140–450)
PMV BLD AUTO: 11.4 FL (ref 6–12)
RBC # BLD AUTO: 4.21 10*6/MM3 (ref 4.14–5.8)
TIBC SERPL-MCNC: 392 MCG/DL (ref 249–505)
TRANSFERRIN SERPL-MCNC: 280 MG/DL (ref 200–360)
WBC NRBC COR # BLD: 11.81 10*3/MM3 (ref 3.4–10.8)

## 2019-06-12 PROCEDURE — 84466 ASSAY OF TRANSFERRIN: CPT | Performed by: INTERNAL MEDICINE

## 2019-06-12 PROCEDURE — 82728 ASSAY OF FERRITIN: CPT | Performed by: INTERNAL MEDICINE

## 2019-06-12 PROCEDURE — 99213 OFFICE O/P EST LOW 20 MIN: CPT | Performed by: INTERNAL MEDICINE

## 2019-06-12 PROCEDURE — 85025 COMPLETE CBC W/AUTO DIFF WBC: CPT | Performed by: INTERNAL MEDICINE

## 2019-06-12 PROCEDURE — 83540 ASSAY OF IRON: CPT | Performed by: INTERNAL MEDICINE

## 2019-06-12 PROCEDURE — 36415 COLL VENOUS BLD VENIPUNCTURE: CPT | Performed by: INTERNAL MEDICINE

## 2019-07-23 ENCOUNTER — OFFICE VISIT (OUTPATIENT)
Dept: FAMILY MEDICINE CLINIC | Facility: CLINIC | Age: 69
End: 2019-07-23

## 2019-07-23 VITALS
RESPIRATION RATE: 16 BRPM | HEART RATE: 69 BPM | SYSTOLIC BLOOD PRESSURE: 110 MMHG | OXYGEN SATURATION: 95 % | DIASTOLIC BLOOD PRESSURE: 62 MMHG | WEIGHT: 167 LBS | HEIGHT: 68 IN | BODY MASS INDEX: 25.31 KG/M2 | TEMPERATURE: 97.7 F

## 2019-07-23 DIAGNOSIS — D50.8 OTHER IRON DEFICIENCY ANEMIA: ICD-10-CM

## 2019-07-23 DIAGNOSIS — J43.9 PULMONARY EMPHYSEMA, UNSPECIFIED EMPHYSEMA TYPE (HCC): ICD-10-CM

## 2019-07-23 DIAGNOSIS — Z79.4 TYPE 2 DIABETES MELLITUS WITHOUT COMPLICATION, WITH LONG-TERM CURRENT USE OF INSULIN (HCC): Primary | ICD-10-CM

## 2019-07-23 DIAGNOSIS — G47.33 OBSTRUCTIVE SLEEP APNEA SYNDROME: ICD-10-CM

## 2019-07-23 DIAGNOSIS — R25.1 TREMOR: ICD-10-CM

## 2019-07-23 DIAGNOSIS — I10 ESSENTIAL HYPERTENSION: ICD-10-CM

## 2019-07-23 DIAGNOSIS — F31.70 BIPOLAR DISORDER IN FULL REMISSION, MOST RECENT EPISODE UNSPECIFIED TYPE (HCC): ICD-10-CM

## 2019-07-23 DIAGNOSIS — Z72.0 TOBACCO ABUSE: ICD-10-CM

## 2019-07-23 DIAGNOSIS — E11.9 TYPE 2 DIABETES MELLITUS WITHOUT COMPLICATION, WITH LONG-TERM CURRENT USE OF INSULIN (HCC): Primary | ICD-10-CM

## 2019-07-23 DIAGNOSIS — D72.828 OTHER ELEVATED WHITE BLOOD CELL (WBC) COUNT: ICD-10-CM

## 2019-07-23 DIAGNOSIS — E78.2 MIXED HYPERLIPIDEMIA: ICD-10-CM

## 2019-07-23 DIAGNOSIS — R97.20 ELEVATED PROSTATE SPECIFIC ANTIGEN (PSA): ICD-10-CM

## 2019-07-23 DIAGNOSIS — L60.0 INGROWING TOENAIL: ICD-10-CM

## 2019-07-23 DIAGNOSIS — F41.9 ANXIETY: ICD-10-CM

## 2019-07-23 DIAGNOSIS — I73.9 PERIPHERAL VASCULAR DISEASE (HCC): ICD-10-CM

## 2019-07-23 DIAGNOSIS — K21.9 GASTROESOPHAGEAL REFLUX DISEASE WITHOUT ESOPHAGITIS: ICD-10-CM

## 2019-07-23 PROCEDURE — 99214 OFFICE O/P EST MOD 30 MIN: CPT | Performed by: PHYSICIAN ASSISTANT

## 2019-07-23 PROCEDURE — 90732 PPSV23 VACC 2 YRS+ SUBQ/IM: CPT | Performed by: PHYSICIAN ASSISTANT

## 2019-07-23 PROCEDURE — G0009 ADMIN PNEUMOCOCCAL VACCINE: HCPCS | Performed by: PHYSICIAN ASSISTANT

## 2019-07-23 RX ORDER — AMLODIPINE BESYLATE 10 MG/1
10 TABLET ORAL DAILY
Qty: 90 TABLET | Refills: 1 | Status: SHIPPED | OUTPATIENT
Start: 2019-07-23 | End: 2019-12-25 | Stop reason: HOSPADM

## 2019-07-23 RX ORDER — LITHIUM CARBONATE 150 MG/1
CAPSULE ORAL
Qty: 270 CAPSULE | Refills: 3 | Status: SHIPPED | OUTPATIENT
Start: 2019-07-23 | End: 2019-08-08

## 2019-07-23 RX ORDER — CARVEDILOL 25 MG/1
25 TABLET ORAL 2 TIMES DAILY WITH MEALS
Qty: 180 TABLET | Refills: 3 | Status: ON HOLD | OUTPATIENT
Start: 2019-07-23 | End: 2019-12-25 | Stop reason: SDUPTHER

## 2019-07-23 RX ORDER — LISINOPRIL AND HYDROCHLOROTHIAZIDE 20; 12.5 MG/1; MG/1
1 TABLET ORAL DAILY
Qty: 90 TABLET | Refills: 3 | Status: SHIPPED | OUTPATIENT
Start: 2019-07-23 | End: 2019-12-25 | Stop reason: HOSPADM

## 2019-07-23 RX ORDER — FLUOXETINE HYDROCHLORIDE 20 MG/1
CAPSULE ORAL
Qty: 270 CAPSULE | Refills: 3 | Status: SHIPPED | OUTPATIENT
Start: 2019-07-23 | End: 2019-08-08

## 2019-07-23 NOTE — PROGRESS NOTES
Subjective   Felicita Mike is a 68 y.o. male.     History of Present Illness   Felicita Mike 68 y.o. male who presents today for routine follow up check and medication refills.  he has a history of   Patient Active Problem List   Diagnosis   • Elevated prostate specific antigen (PSA)   • Coronary arteriosclerosis in native artery   • Bipolar affective disorder (CMS/HCC)   • Chronic obstructive pulmonary disease (CMS/HCC)   • Colon polyp   • Constipation   • Type 2 diabetes mellitus without complication, with long-term current use of insulin (CMS/HCC)   • Gastroesophageal reflux disease   • Fatigue   • Hearing loss   • Hyperlipidemia   • Peripheral vascular disease (CMS/HCC)   • Proteinuria   • Muscle pain   • Low back pain   • Leukocytosis   • Seborrheic eczema   • Sleep apnea   • Tinea corporis   • Tremor   • Hypertension   • Anxiety   • Benign prostatic hyperplasia with lower urinary tract symptoms   • Tobacco abuse   • Normocytic anemia   • Thyroid nodule   • Iron deficiency anemia   .  Since the last visit, he has overall felt anxious.  He has Hypertenision and is well controlled on medication, DMII and is well controlled on medication, GERD and is well controlled on PPI medication, Hyperlipidemia and is well controlled on medication, CAD and is under care of their Cardiologist for medical management and Vitamin D deficiency and well controlled on medication and labs at goal >30.  he has been compliant with current medications have reviewed them.  The patient denies medication side effects.    Results for orders placed or performed in visit on 06/12/19   Ferritin   Result Value Ref Range    Ferritin 84.50 30.00 - 400.00 ng/mL   Iron Profile   Result Value Ref Range    Iron 71 59 - 158 mcg/dL    Iron Saturation 18 14 - 48 %    Transferrin 280 200 - 360 mg/dL    TIBC 392 249 - 505 mcg/dL   CBC Auto Differential   Result Value Ref Range    WBC 11.81 (H) 3.40 - 10.80 10*3/mm3    RBC 4.21 4.14 - 5.80 10*6/mm3     Hemoglobin 12.3 (L) 13.0 - 17.7 g/dL    Hematocrit 38.4 37.5 - 51.0 %    MCV 91.2 79.0 - 97.0 fL    MCH 29.2 26.6 - 33.0 pg    MCHC 32.0 31.5 - 35.7 g/dL    RDW 12.7 12.3 - 15.4 %    RDW-SD 41.8 37.0 - 54.0 fl    MPV 11.4 6.0 - 12.0 fL    Platelets 250 140 - 450 10*3/mm3    Neutrophil % 76.4 (H) 42.7 - 76.0 %    Lymphocyte % 13.9 (L) 19.6 - 45.3 %    Monocyte % 7.4 5.0 - 12.0 %    Eosinophil % 1.4 0.3 - 6.2 %    Basophil % 0.3 0.0 - 1.5 %    Immature Grans % 0.6 (H) 0.0 - 0.5 %    Neutrophils, Absolute 9.02 (H) 1.70 - 7.00 10*3/mm3    Lymphocytes, Absolute 1.64 0.70 - 3.10 10*3/mm3    Monocytes, Absolute 0.87 0.10 - 0.90 10*3/mm3    Eosinophils, Absolute 0.17 0.00 - 0.40 10*3/mm3    Basophils, Absolute 0.04 0.00 - 0.20 10*3/mm3    Immature Grans, Absolute 0.07 (H) 0.00 - 0.05 10*3/mm3    nRBC 0.0 0.0 - 0.2 /100 WBC         Felicita Mike male 66 y.o. who presents today for follow up of Bipolar Disorder and Anxiety.  He reports medication is working well, patient desires to continue on Rx, and needs refill. Onset of symptoms was approximately several years ago.  He denies current suicidal and homicidal ideation. Risk factors are family history of anxiety and or depression and lifestyle of multiple roles.  His brother  and has grief; not sleeping well.  I know he still has anxiety.  I would have him see psychiatrist to adjust meds and he wants to wait.  He can call to let me know if changes mind.  Previous treatment includes current Rx.  He complains of the following medication side effects: none.  The patient has previously been in counseling     Will get labs today  He did see DR Perdue last month f/u leukocytosis and borderline iron def anemia.  Noted WBC stays high from smoking and med r/t; still need stool cards X 3; his ferritin was better this time.  F/u with him 6 mos  Has 6 mos cardio appt and f/u GI  Sees urologist and d/c Flomax  He sees urologist for prostate  Has been to pulm for COPD; hx sleep  apnea---I do want him to use cpap and he declines f/u pulm  Has been to Dr Franco for GI---has appt---concern about that borderline iron def anemia and has GERD, hx colon polyps and mom had colon cancer  Sees Dr Lew once a year for CAD;   Has been to neuro for h/a's and MRI 12-3-15 brain---last visit 8-28-17 and no further work up rec   still need to do stool cards    Keeps runny nose--try Flonase    The following portions of the patient's history were reviewed and updated as appropriate: allergies, current medications, past family history, past medical history, past social history, past surgical history and problem list.    Review of Systems   Constitutional: Negative for activity change, appetite change and unexpected weight change.   HENT: Positive for rhinorrhea. Negative for nosebleeds and trouble swallowing.    Eyes: Negative for pain and visual disturbance.   Respiratory: Negative for chest tightness, shortness of breath and wheezing.    Cardiovascular: Negative for chest pain and palpitations.   Gastrointestinal: Negative for abdominal pain and blood in stool.   Endocrine: Negative.    Genitourinary: Negative for difficulty urinating and hematuria.   Musculoskeletal: Negative for joint swelling.   Skin: Negative for color change and rash.   Allergic/Immunologic: Negative.    Neurological: Negative for syncope and speech difficulty.   Hematological: Negative for adenopathy.   Psychiatric/Behavioral: Positive for sleep disturbance. Negative for agitation and confusion.   All other systems reviewed and are negative.      Objective   Physical Exam   Constitutional: He is oriented to person, place, and time. He appears well-developed and well-nourished. No distress.   HENT:   Head: Normocephalic and atraumatic.   Right Ear: External ear normal.   Left Ear: External ear normal.   Nose: Nose normal.   Mouth/Throat: Oropharynx is clear and moist.   Left pupil stays dilated   Eyes: Conjunctivae and EOM are  normal. Right eye exhibits no discharge. Left eye exhibits no discharge. No scleral icterus.   Left pupil damage is chronic   Neck: Normal range of motion. Neck supple. No tracheal deviation present. No thyromegaly present.   Cardiovascular: Normal rate, regular rhythm, normal heart sounds, intact distal pulses and normal pulses. Exam reveals no gallop and no friction rub.   No murmur heard.  Fingernails and toenails clubbing   Pulmonary/Chest: Effort normal. No respiratory distress. He has wheezes. He has no rales.   decreased   Abdominal: Soft. There is no tenderness.   Musculoskeletal: Normal range of motion.   Tremor in hands      During the foot exam he had a monofilament test performed.    Neurological Sensory Findings - Unaltered hot/cold right ankle/foot discrimination and unaltered hot/cold left ankle/foot discrimination. Unaltered sharp/dull right ankle/foot discrimination and unaltered sharp/dull left ankle/foot discrimination. No right ankle/foot altered proprioception and no left ankle/foot altered proprioception  Vascular Status -  His right foot exhibits abnormal foot vasculature . His right foot exhibits no edema. His left foot exhibits abnormal foot vasculature . His left foot exhibits no edema.  Skin Integrity  -  His right foot skin is intact.  He has no right foot ulcer, non-callous right foot, no right foot warmth and no right foot blister.His left foot skin is intact. He has no left foot ulcer, non-callous left foot, no left foot warmth and no left foot blister..     Lymphadenopathy:     He has no cervical adenopathy.   Neurological: He is alert and oriented to person, place, and time. He has normal reflexes. No cranial nerve deficit (see note on pupil left). He exhibits normal muscle tone. Coordination normal.   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 No rash noted. No erythema. No pallor.   Clubbing toenails and fingernails    Long toenails and growing over and into toe  pads---refer podiatry   Psychiatric: He has a normal mood and affect. His behavior is normal. Judgment and thought content normal.   Nursing note and vitals reviewed.      Assessment/Plan   Felicita was seen today for hypertension.    Diagnoses and all orders for this visit:    Type 2 diabetes mellitus without complication, with long-term current use of insulin (CMS/HCC)    Essential hypertension  -     amLODIPine (NORVASC) 10 MG tablet; Take 1 tablet by mouth Daily. for blood pressure  -     carvedilol (COREG) 25 MG tablet; Take 1 tablet by mouth 2 (Two) Times a Day With Meals. For heart    Gastroesophageal reflux disease without esophagitis    Pulmonary emphysema, unspecified emphysema type (CMS/HCC)    Bipolar disorder in full remission, most recent episode unspecified type (CMS/HCC)    Elevated prostate specific antigen (PSA)    Other iron deficiency anemia    Mixed hyperlipidemia    Peripheral vascular disease (CMS/HCC)    Other elevated white blood cell (WBC) count    Anxiety    Tremor    Obstructive sleep apnea syndrome    Other orders  -     FLUoxetine (PROzac) 20 MG capsule; 3 PO daily for anxiety and depression  -     lisinopril-hydrochlorothiazide (PRINZIDE,ZESTORETIC) 20-12.5 MG per tablet; Take 1 tablet by mouth Daily. For BP and renal protection  -     lithium carbonate 150 MG capsule; One PO in AM and 2 PO daily with food for mood  -     metFORMIN (GLUCOPHAGE) 1000 MG tablet; TAKE 1 TABLET BY MOUTH TWICE DAILY    refill psych meds and help  Can see neuro for tremor if decides  Refer podiatry to help with foot care  F/u all specialists  Labs today  In summary, Felicita Mike, was seen today.  he was seen for  Hypertenision and is well controlled on medication, DMII and is well controlled on medication, GERD and is well controlled on PPI medication, Hyperlipidemia and is well controlled on medication, CAD and is under care of their Cardiologist for medical management and Vitamin D deficiency and well  controlled on medication and labs at goal >30,  Stop smoking

## 2019-07-24 LAB
ALBUMIN SERPL-MCNC: 4.8 G/DL (ref 3.5–5.2)
ALBUMIN/GLOB SERPL: 2.5 G/DL
ALP SERPL-CCNC: 87 U/L (ref 39–117)
ALT SERPL-CCNC: 14 U/L (ref 1–41)
AST SERPL-CCNC: 11 U/L (ref 1–40)
BILIRUB SERPL-MCNC: 0.7 MG/DL (ref 0.2–1.2)
BUN SERPL-MCNC: 23 MG/DL (ref 8–23)
BUN/CREAT SERPL: 21.3 (ref 7–25)
CALCIUM SERPL-MCNC: 10.5 MG/DL (ref 8.6–10.5)
CHLORIDE SERPL-SCNC: 102 MMOL/L (ref 98–107)
CO2 SERPL-SCNC: 21.3 MMOL/L (ref 22–29)
CREAT SERPL-MCNC: 1.08 MG/DL (ref 0.76–1.27)
GLOBULIN SER CALC-MCNC: 1.9 GM/DL
GLUCOSE SERPL-MCNC: 123 MG/DL (ref 65–99)
HBA1C MFR BLD: 6.2 % (ref 4.8–5.6)
MICROALBUMIN UR-MCNC: 22.2 UG/ML
POTASSIUM SERPL-SCNC: 4.8 MMOL/L (ref 3.5–5.2)
PROT SERPL-MCNC: 6.7 G/DL (ref 6–8.5)
SODIUM SERPL-SCNC: 137 MMOL/L (ref 136–145)

## 2019-08-08 ENCOUNTER — APPOINTMENT (OUTPATIENT)
Dept: GENERAL RADIOLOGY | Facility: HOSPITAL | Age: 69
End: 2019-08-08

## 2019-08-08 ENCOUNTER — HOSPITAL ENCOUNTER (INPATIENT)
Facility: HOSPITAL | Age: 69
LOS: 4 days | Discharge: HOME HEALTH | End: 2019-08-12
Attending: EMERGENCY MEDICINE | Admitting: INTERNAL MEDICINE

## 2019-08-08 DIAGNOSIS — J44.1 COPD EXACERBATION (HCC): ICD-10-CM

## 2019-08-08 DIAGNOSIS — R79.89 ELEVATED LACTIC ACID LEVEL: ICD-10-CM

## 2019-08-08 DIAGNOSIS — J96.01 ACUTE RESPIRATORY FAILURE WITH HYPOXIA (HCC): Primary | ICD-10-CM

## 2019-08-08 LAB
ALBUMIN SERPL-MCNC: 5.1 G/DL (ref 3.5–5.2)
ALBUMIN/GLOB SERPL: 1.8 G/DL
ALP SERPL-CCNC: 153 U/L (ref 39–117)
ALT SERPL W P-5'-P-CCNC: 36 U/L (ref 1–41)
ANION GAP SERPL CALCULATED.3IONS-SCNC: 16.9 MMOL/L (ref 5–15)
ARTERIAL PATENCY WRIST A: POSITIVE
ARTERIAL PATENCY WRIST A: POSITIVE
AST SERPL-CCNC: 21 U/L (ref 1–40)
ATMOSPHERIC PRESS: 747.3 MMHG
ATMOSPHERIC PRESS: 753.8 MMHG
B PARAPERT DNA SPEC QL NAA+PROBE: NOT DETECTED
B PERT DNA SPEC QL NAA+PROBE: NOT DETECTED
BASE EXCESS BLDA CALC-SCNC: -12.2 MMOL/L (ref 0–2)
BASE EXCESS BLDA CALC-SCNC: -8.4 MMOL/L (ref 0–2)
BASOPHILS # BLD AUTO: 0.1 10*3/MM3 (ref 0–0.2)
BASOPHILS NFR BLD AUTO: 0.4 % (ref 0–1.5)
BDY SITE: ABNORMAL
BDY SITE: ABNORMAL
BILIRUB SERPL-MCNC: 0.8 MG/DL (ref 0.2–1.2)
BUN BLD-MCNC: 31 MG/DL (ref 8–23)
BUN/CREAT SERPL: 20 (ref 7–25)
C PNEUM DNA NPH QL NAA+NON-PROBE: NOT DETECTED
CALCIUM SPEC-SCNC: 11.6 MG/DL (ref 8.6–10.5)
CHLORIDE SERPL-SCNC: 105 MMOL/L (ref 98–107)
CO2 SERPL-SCNC: 19.1 MMOL/L (ref 22–29)
CREAT BLD-MCNC: 1.55 MG/DL (ref 0.76–1.27)
D-LACTATE SERPL-SCNC: 1.9 MMOL/L (ref 0.5–2)
D-LACTATE SERPL-SCNC: 3.4 MMOL/L (ref 0.5–2)
DEPRECATED RDW RBC AUTO: 43.9 FL (ref 37–54)
EOSINOPHIL # BLD AUTO: 0.04 10*3/MM3 (ref 0–0.4)
EOSINOPHIL NFR BLD AUTO: 0.1 % (ref 0.3–6.2)
ERYTHROCYTE [DISTWIDTH] IN BLOOD BY AUTOMATED COUNT: 13 % (ref 12.3–15.4)
FLUAV H1 2009 PAND RNA NPH QL NAA+PROBE: NOT DETECTED
FLUAV H1 HA GENE NPH QL NAA+PROBE: NOT DETECTED
FLUAV H3 RNA NPH QL NAA+PROBE: NOT DETECTED
FLUAV SUBTYP SPEC NAA+PROBE: NOT DETECTED
FLUBV RNA ISLT QL NAA+PROBE: NOT DETECTED
GFR SERPL CREATININE-BSD FRML MDRD: 45 ML/MIN/1.73
GLOBULIN UR ELPH-MCNC: 2.9 GM/DL
GLUCOSE BLD-MCNC: 182 MG/DL (ref 65–99)
GLUCOSE BLDC GLUCOMTR-MCNC: 163 MG/DL (ref 70–130)
GLUCOSE BLDC GLUCOMTR-MCNC: 237 MG/DL (ref 70–130)
GLUCOSE BLDC GLUCOMTR-MCNC: 71 MG/DL (ref 70–130)
GLUCOSE BLDC GLUCOMTR-MCNC: 76 MG/DL (ref 70–130)
HADV DNA SPEC NAA+PROBE: NOT DETECTED
HCO3 BLDA-SCNC: 13.5 MMOL/L (ref 22–28)
HCO3 BLDA-SCNC: 15.3 MMOL/L (ref 22–28)
HCOV 229E RNA SPEC QL NAA+PROBE: NOT DETECTED
HCOV HKU1 RNA SPEC QL NAA+PROBE: NOT DETECTED
HCOV NL63 RNA SPEC QL NAA+PROBE: NOT DETECTED
HCOV OC43 RNA SPEC QL NAA+PROBE: NOT DETECTED
HCT VFR BLD AUTO: 41.9 % (ref 37.5–51)
HGB BLD-MCNC: 13.3 G/DL (ref 13–17.7)
HMPV RNA NPH QL NAA+NON-PROBE: NOT DETECTED
HOLD SPECIMEN: NORMAL
HOROWITZ INDEX BLD+IHG-RTO: 30 %
HOROWITZ INDEX BLD+IHG-RTO: 40 %
HPIV1 RNA SPEC QL NAA+PROBE: NOT DETECTED
HPIV2 RNA SPEC QL NAA+PROBE: NOT DETECTED
HPIV3 RNA NPH QL NAA+PROBE: NOT DETECTED
HPIV4 P GENE NPH QL NAA+PROBE: NOT DETECTED
IMM GRANULOCYTES # BLD AUTO: 0.23 10*3/MM3 (ref 0–0.05)
IMM GRANULOCYTES NFR BLD AUTO: 0.8 % (ref 0–0.5)
INR PPP: 0.95 (ref 0.9–1.1)
LITHIUM SERPL-SCNC: 1.7 MMOL/L (ref 0.6–1.2)
LYMPHOCYTES # BLD AUTO: 1.28 10*3/MM3 (ref 0.7–3.1)
LYMPHOCYTES NFR BLD AUTO: 4.7 % (ref 19.6–45.3)
M PNEUMO IGG SER IA-ACNC: NOT DETECTED
MAGNESIUM SERPL-MCNC: 1.4 MG/DL (ref 1.6–2.4)
MCH RBC QN AUTO: 29 PG (ref 26.6–33)
MCHC RBC AUTO-ENTMCNC: 31.7 G/DL (ref 31.5–35.7)
MCV RBC AUTO: 91.3 FL (ref 79–97)
MODALITY: ABNORMAL
MODALITY: ABNORMAL
MONOCYTES # BLD AUTO: 1.59 10*3/MM3 (ref 0.1–0.9)
MONOCYTES NFR BLD AUTO: 5.8 % (ref 5–12)
NEUTROPHILS # BLD AUTO: 24 10*3/MM3 (ref 1.7–7)
NEUTROPHILS NFR BLD AUTO: 88.2 % (ref 42.7–76)
NRBC BLD AUTO-RTO: 0 /100 WBC (ref 0–0.2)
NT-PROBNP SERPL-MCNC: 471 PG/ML (ref 5–900)
O2 A-A PPRESDIFF RESPIRATORY: 0.6 MMHG
O2 A-A PPRESDIFF RESPIRATORY: 0.7 MMHG
PCO2 BLDA: 26.7 MM HG (ref 35–45)
PCO2 BLDA: 29.3 MM HG (ref 35–45)
PEEP RESPIRATORY: 6 CM[H2O]
PH BLDA: 7.27 PH UNITS (ref 7.35–7.45)
PH BLDA: 7.37 PH UNITS (ref 7.35–7.45)
PLATELET # BLD AUTO: 354 10*3/MM3 (ref 140–450)
PMV BLD AUTO: 11.9 FL (ref 6–12)
PO2 BLDA: 132 MM HG (ref 80–100)
PO2 BLDA: 168 MM HG (ref 80–100)
POTASSIUM BLD-SCNC: 5.4 MMOL/L (ref 3.5–5.2)
PROCALCITONIN SERPL-MCNC: 0.1 NG/ML (ref 0.1–0.25)
PROT SERPL-MCNC: 8 G/DL (ref 6–8.5)
PROTHROMBIN TIME: 12.4 SECONDS (ref 11.7–14.2)
RBC # BLD AUTO: 4.59 10*6/MM3 (ref 4.14–5.8)
RHINOVIRUS RNA SPEC NAA+PROBE: NOT DETECTED
RSV RNA NPH QL NAA+NON-PROBE: NOT DETECTED
SAO2 % BLDCOA: 98.6 % (ref 92–99)
SAO2 % BLDCOA: 99.5 % (ref 92–99)
SET MECH RESP RATE: 20
SET MECH RESP RATE: 20
SODIUM BLD-SCNC: 141 MMOL/L (ref 136–145)
TOTAL RATE: 25 BREATHS/MINUTE
TOTAL RATE: 30 BREATHS/MINUTE
TROPONIN T SERPL-MCNC: <0.01 NG/ML (ref 0–0.03)
VENTILATOR MODE: ABNORMAL
VENTILATOR MODE: ABNORMAL
VT ON VENT VENT: 750 ML
WBC NRBC COR # BLD: 27.24 10*3/MM3 (ref 3.4–10.8)

## 2019-08-08 PROCEDURE — 82962 GLUCOSE BLOOD TEST: CPT

## 2019-08-08 PROCEDURE — 94799 UNLISTED PULMONARY SVC/PX: CPT

## 2019-08-08 PROCEDURE — 25010000002 AZITHROMYCIN PER 500 MG: Performed by: INTERNAL MEDICINE

## 2019-08-08 PROCEDURE — 85025 COMPLETE CBC W/AUTO DIFF WBC: CPT | Performed by: EMERGENCY MEDICINE

## 2019-08-08 PROCEDURE — 84145 PROCALCITONIN (PCT): CPT | Performed by: EMERGENCY MEDICINE

## 2019-08-08 PROCEDURE — 83605 ASSAY OF LACTIC ACID: CPT | Performed by: EMERGENCY MEDICINE

## 2019-08-08 PROCEDURE — 87040 BLOOD CULTURE FOR BACTERIA: CPT | Performed by: EMERGENCY MEDICINE

## 2019-08-08 PROCEDURE — 82803 BLOOD GASES ANY COMBINATION: CPT

## 2019-08-08 PROCEDURE — 25010000002 PIPERACILLIN SOD-TAZOBACTAM PER 1 G: Performed by: EMERGENCY MEDICINE

## 2019-08-08 PROCEDURE — 93005 ELECTROCARDIOGRAM TRACING: CPT | Performed by: EMERGENCY MEDICINE

## 2019-08-08 PROCEDURE — 80178 ASSAY OF LITHIUM: CPT | Performed by: EMERGENCY MEDICINE

## 2019-08-08 PROCEDURE — 83880 ASSAY OF NATRIURETIC PEPTIDE: CPT | Performed by: EMERGENCY MEDICINE

## 2019-08-08 PROCEDURE — 99291 CRITICAL CARE FIRST HOUR: CPT

## 2019-08-08 PROCEDURE — 84484 ASSAY OF TROPONIN QUANT: CPT | Performed by: EMERGENCY MEDICINE

## 2019-08-08 PROCEDURE — 36600 WITHDRAWAL OF ARTERIAL BLOOD: CPT

## 2019-08-08 PROCEDURE — 25010000002 VANCOMYCIN 10 G RECONSTITUTED SOLUTION: Performed by: EMERGENCY MEDICINE

## 2019-08-08 PROCEDURE — 93010 ELECTROCARDIOGRAM REPORT: CPT | Performed by: INTERNAL MEDICINE

## 2019-08-08 PROCEDURE — 94660 CPAP INITIATION&MGMT: CPT

## 2019-08-08 PROCEDURE — 83735 ASSAY OF MAGNESIUM: CPT | Performed by: EMERGENCY MEDICINE

## 2019-08-08 PROCEDURE — 25010000002 ENOXAPARIN PER 10 MG: Performed by: INTERNAL MEDICINE

## 2019-08-08 PROCEDURE — 80053 COMPREHEN METABOLIC PANEL: CPT | Performed by: EMERGENCY MEDICINE

## 2019-08-08 PROCEDURE — 71045 X-RAY EXAM CHEST 1 VIEW: CPT

## 2019-08-08 PROCEDURE — 63710000001 INSULIN LISPRO (HUMAN) PER 5 UNITS: Performed by: INTERNAL MEDICINE

## 2019-08-08 PROCEDURE — 83605 ASSAY OF LACTIC ACID: CPT | Performed by: INTERNAL MEDICINE

## 2019-08-08 PROCEDURE — 25010000002 METHYLPREDNISOLONE PER 40 MG: Performed by: INTERNAL MEDICINE

## 2019-08-08 PROCEDURE — 0100U HC BIOFIRE FILMARRAY RESP PANEL 2: CPT | Performed by: INTERNAL MEDICINE

## 2019-08-08 PROCEDURE — 94640 AIRWAY INHALATION TREATMENT: CPT

## 2019-08-08 PROCEDURE — 85610 PROTHROMBIN TIME: CPT | Performed by: EMERGENCY MEDICINE

## 2019-08-08 RX ORDER — ALBUTEROL SULFATE 2.5 MG/3ML
SOLUTION RESPIRATORY (INHALATION)
Status: COMPLETED
Start: 2019-08-08 | End: 2019-08-08

## 2019-08-08 RX ORDER — ALBUTEROL SULFATE 2.5 MG/3ML
2.5 SOLUTION RESPIRATORY (INHALATION) ONCE
Status: COMPLETED | OUTPATIENT
Start: 2019-08-08 | End: 2019-08-08

## 2019-08-08 RX ORDER — WHEAT DEXTRIN 3 G/4 G
1 POWDER IN PACKET (EA) ORAL DAILY PRN
Status: ON HOLD | COMMUNITY
End: 2021-02-23

## 2019-08-08 RX ORDER — DEXTROSE MONOHYDRATE 25 G/50ML
25 INJECTION, SOLUTION INTRAVENOUS
Status: DISCONTINUED | OUTPATIENT
Start: 2019-08-08 | End: 2019-08-12 | Stop reason: HOSPADM

## 2019-08-08 RX ORDER — BISACODYL 10 MG
10 SUPPOSITORY, RECTAL RECTAL DAILY PRN
Status: DISCONTINUED | OUTPATIENT
Start: 2019-08-08 | End: 2019-08-12 | Stop reason: HOSPADM

## 2019-08-08 RX ORDER — SODIUM CHLORIDE 9 MG/ML
100 INJECTION, SOLUTION INTRAVENOUS CONTINUOUS
Status: ACTIVE | OUTPATIENT
Start: 2019-08-08 | End: 2019-08-10

## 2019-08-08 RX ORDER — CARVEDILOL 25 MG/1
25 TABLET ORAL 2 TIMES DAILY WITH MEALS
Status: DISCONTINUED | OUTPATIENT
Start: 2019-08-08 | End: 2019-08-12 | Stop reason: HOSPADM

## 2019-08-08 RX ORDER — SODIUM CHLORIDE 0.9 % (FLUSH) 0.9 %
10 SYRINGE (ML) INJECTION AS NEEDED
Status: DISCONTINUED | OUTPATIENT
Start: 2019-08-08 | End: 2019-08-12 | Stop reason: HOSPADM

## 2019-08-08 RX ORDER — IPRATROPIUM BROMIDE AND ALBUTEROL SULFATE 2.5; .5 MG/3ML; MG/3ML
SOLUTION RESPIRATORY (INHALATION)
Status: COMPLETED
Start: 2019-08-08 | End: 2019-08-08

## 2019-08-08 RX ORDER — ATORVASTATIN CALCIUM 80 MG/1
80 TABLET, FILM COATED ORAL NIGHTLY
Status: DISCONTINUED | OUTPATIENT
Start: 2019-08-08 | End: 2019-08-10

## 2019-08-08 RX ORDER — NICOTINE 21 MG/24HR
1 PATCH, TRANSDERMAL 24 HOURS TRANSDERMAL EVERY 24 HOURS
Status: DISCONTINUED | OUTPATIENT
Start: 2019-08-08 | End: 2019-08-12 | Stop reason: HOSPADM

## 2019-08-08 RX ORDER — ACETAMINOPHEN 325 MG/1
650 TABLET ORAL EVERY 4 HOURS PRN
Status: DISCONTINUED | OUTPATIENT
Start: 2019-08-08 | End: 2019-08-12 | Stop reason: HOSPADM

## 2019-08-08 RX ORDER — FLUOXETINE HYDROCHLORIDE 20 MG/1
60 CAPSULE ORAL DAILY
COMMUNITY
End: 2020-04-22 | Stop reason: SDUPTHER

## 2019-08-08 RX ORDER — PROMETHAZINE HYDROCHLORIDE 12.5 MG/1
12.5 TABLET ORAL EVERY 6 HOURS PRN
Status: DISCONTINUED | OUTPATIENT
Start: 2019-08-08 | End: 2019-08-12 | Stop reason: HOSPADM

## 2019-08-08 RX ORDER — AMLODIPINE BESYLATE 10 MG/1
10 TABLET ORAL DAILY
Status: DISCONTINUED | OUTPATIENT
Start: 2019-08-08 | End: 2019-08-12 | Stop reason: HOSPADM

## 2019-08-08 RX ORDER — HYDROCODONE BITARTRATE AND ACETAMINOPHEN 5; 325 MG/1; MG/1
1 TABLET ORAL EVERY 4 HOURS PRN
Status: DISCONTINUED | OUTPATIENT
Start: 2019-08-08 | End: 2019-08-12 | Stop reason: HOSPADM

## 2019-08-08 RX ORDER — METHYLPREDNISOLONE SODIUM SUCCINATE 40 MG/ML
40 INJECTION, POWDER, LYOPHILIZED, FOR SOLUTION INTRAMUSCULAR; INTRAVENOUS EVERY 8 HOURS
Status: DISCONTINUED | OUTPATIENT
Start: 2019-08-08 | End: 2019-08-10

## 2019-08-08 RX ORDER — NICOTINE POLACRILEX 4 MG
15 LOZENGE BUCCAL
Status: DISCONTINUED | OUTPATIENT
Start: 2019-08-08 | End: 2019-08-12 | Stop reason: HOSPADM

## 2019-08-08 RX ORDER — MAGNESIUM SULFATE HEPTAHYDRATE 40 MG/ML
2 INJECTION, SOLUTION INTRAVENOUS AS NEEDED
Status: DISCONTINUED | OUTPATIENT
Start: 2019-08-08 | End: 2019-08-12 | Stop reason: HOSPADM

## 2019-08-08 RX ORDER — ASPIRIN 81 MG/1
81 TABLET ORAL DAILY
Status: DISCONTINUED | OUTPATIENT
Start: 2019-08-08 | End: 2019-08-12 | Stop reason: HOSPADM

## 2019-08-08 RX ORDER — SODIUM CHLORIDE 0.9 % (FLUSH) 0.9 %
3-10 SYRINGE (ML) INJECTION AS NEEDED
Status: DISCONTINUED | OUTPATIENT
Start: 2019-08-08 | End: 2019-08-12 | Stop reason: HOSPADM

## 2019-08-08 RX ORDER — POLYETHYLENE GLYCOL 3350 17 G/17G
17 POWDER, FOR SOLUTION ORAL DAILY PRN
COMMUNITY

## 2019-08-08 RX ORDER — QUETIAPINE FUMARATE 100 MG/1
100 TABLET, FILM COATED ORAL NIGHTLY
Status: DISCONTINUED | OUTPATIENT
Start: 2019-08-08 | End: 2019-08-12 | Stop reason: HOSPADM

## 2019-08-08 RX ORDER — MAGNESIUM SULFATE HEPTAHYDRATE 40 MG/ML
4 INJECTION, SOLUTION INTRAVENOUS AS NEEDED
Status: DISCONTINUED | OUTPATIENT
Start: 2019-08-08 | End: 2019-08-12 | Stop reason: HOSPADM

## 2019-08-08 RX ORDER — TAMSULOSIN HYDROCHLORIDE 0.4 MG/1
0.4 CAPSULE ORAL DAILY
Status: DISCONTINUED | OUTPATIENT
Start: 2019-08-08 | End: 2019-08-12 | Stop reason: HOSPADM

## 2019-08-08 RX ORDER — IPRATROPIUM BROMIDE AND ALBUTEROL SULFATE 2.5; .5 MG/3ML; MG/3ML
3 SOLUTION RESPIRATORY (INHALATION)
Status: DISCONTINUED | OUTPATIENT
Start: 2019-08-08 | End: 2019-08-12 | Stop reason: HOSPADM

## 2019-08-08 RX ORDER — FLUOXETINE HYDROCHLORIDE 20 MG/1
20 CAPSULE ORAL DAILY
Status: DISCONTINUED | OUTPATIENT
Start: 2019-08-08 | End: 2019-08-12 | Stop reason: HOSPADM

## 2019-08-08 RX ORDER — LITHIUM CARBONATE 150 MG/1
300 CAPSULE ORAL EVERY EVENING
COMMUNITY
End: 2020-04-22

## 2019-08-08 RX ORDER — BISACODYL 5 MG/1
5 TABLET, DELAYED RELEASE ORAL DAILY PRN
Status: DISCONTINUED | OUTPATIENT
Start: 2019-08-08 | End: 2019-08-12 | Stop reason: HOSPADM

## 2019-08-08 RX ORDER — LITHIUM CARBONATE 150 MG/1
150 CAPSULE ORAL EVERY MORNING
COMMUNITY
End: 2020-04-22 | Stop reason: SDUPTHER

## 2019-08-08 RX ORDER — CHOLECALCIFEROL (VITAMIN D3) 125 MCG
5 CAPSULE ORAL NIGHTLY PRN
Status: DISCONTINUED | OUTPATIENT
Start: 2019-08-08 | End: 2019-08-12 | Stop reason: HOSPADM

## 2019-08-08 RX ORDER — SODIUM CHLORIDE 0.9 % (FLUSH) 0.9 %
3 SYRINGE (ML) INJECTION EVERY 12 HOURS SCHEDULED
Status: DISCONTINUED | OUTPATIENT
Start: 2019-08-08 | End: 2019-08-12 | Stop reason: HOSPADM

## 2019-08-08 RX ORDER — PANTOPRAZOLE SODIUM 40 MG/1
40 TABLET, DELAYED RELEASE ORAL EVERY MORNING
Status: DISCONTINUED | OUTPATIENT
Start: 2019-08-09 | End: 2019-08-12 | Stop reason: HOSPADM

## 2019-08-08 RX ADMIN — SODIUM CHLORIDE 1000 ML: 9 INJECTION, SOLUTION INTRAVENOUS at 13:18

## 2019-08-08 RX ADMIN — IPRATROPIUM BROMIDE AND ALBUTEROL SULFATE 3 ML: 2.5; .5 SOLUTION RESPIRATORY (INHALATION) at 15:54

## 2019-08-08 RX ADMIN — FLUOXETINE HYDROCHLORIDE 20 MG: 20 CAPSULE ORAL at 20:29

## 2019-08-08 RX ADMIN — AZITHROMYCIN 500 MG: 500 INJECTION, POWDER, LYOPHILIZED, FOR SOLUTION INTRAVENOUS at 18:40

## 2019-08-08 RX ADMIN — NICOTINE 1 PATCH: 21 PATCH, EXTENDED RELEASE TRANSDERMAL at 18:40

## 2019-08-08 RX ADMIN — IPRATROPIUM BROMIDE AND ALBUTEROL SULFATE 3 ML: 2.5; .5 SOLUTION RESPIRATORY (INHALATION) at 20:08

## 2019-08-08 RX ADMIN — METHYLPREDNISOLONE SODIUM SUCCINATE 40 MG: 40 INJECTION, POWDER, FOR SOLUTION INTRAMUSCULAR; INTRAVENOUS at 17:33

## 2019-08-08 RX ADMIN — ALBUTEROL SULFATE 2.5 MG: 2.5 SOLUTION RESPIRATORY (INHALATION) at 15:38

## 2019-08-08 RX ADMIN — ATORVASTATIN CALCIUM 80 MG: 80 TABLET, FILM COATED ORAL at 20:29

## 2019-08-08 RX ADMIN — ENOXAPARIN SODIUM 40 MG: 40 INJECTION SUBCUTANEOUS at 17:33

## 2019-08-08 RX ADMIN — QUETIAPINE FUMARATE 100 MG: 100 TABLET ORAL at 20:29

## 2019-08-08 RX ADMIN — VANCOMYCIN HYDROCHLORIDE 1500 MG: 10 INJECTION, POWDER, LYOPHILIZED, FOR SOLUTION INTRAVENOUS at 13:18

## 2019-08-08 RX ADMIN — TAZOBACTAM SODIUM AND PIPERACILLIN SODIUM 3.38 G: 375; 3 INJECTION, SOLUTION INTRAVENOUS at 13:18

## 2019-08-08 RX ADMIN — INSULIN LISPRO 4 UNITS: 100 INJECTION, SOLUTION INTRAVENOUS; SUBCUTANEOUS at 17:33

## 2019-08-08 RX ADMIN — SODIUM CHLORIDE 1700 ML: 9 INJECTION, SOLUTION INTRAVENOUS at 16:46

## 2019-08-08 RX ADMIN — SODIUM CHLORIDE, PRESERVATIVE FREE 3 ML: 5 INJECTION INTRAVENOUS at 21:00

## 2019-08-08 NOTE — PROGRESS NOTES
Clinical Pharmacy Services: Medication History    Felicita Mike is a 68 y.o. male presenting to [facility] for   Chief Complaint   Patient presents with   • Shortness of Breath       He  has a past medical history of Abnormal PSA, Acute myocardial infarction (CMS/HCC) (2004), Apnea, sleep, Atherosclerotic heart disease, Chronic pain, Colon polyp, Constipation, COPD (chronic obstructive pulmonary disease) (CMS/[facility]), Coronary artery disease, Depressed bipolar II disorder (CMS/[facility]), Diabetes mellitus (CMS/[facility]), Fatigue, GERD (gastroesophageal reflux disease), H/O transfusion of packed red blood cells, Health care maintenance, Hearing loss, History of back pain, Hypercholesterolemia, Hyperlipidemia, Hypertension, Leukocytosis, Osteoarthritis, Proteinuria, PVD (peripheral vascular disease) (CMS/[facility]), Sebaceous cyst, Seborrheic dermatitis, Sleep apnea, Tinea corporis, Tobacco use, and Tremor.    Allergies as of 08/08/2019 - Reviewed 08/08/2019   Allergen Reaction Noted   • Clopidogrel Itching 12/15/2015       Medication information was obtained from: Pharmacy, patient  Pharmacy and Phone Number: [facility] [phone]    Prior to Admission Medications     Prescriptions Last Dose Informant Patient Reported? Taking?    amLODIPine (NORVASC) 10 MG tablet  Pharmacy No Yes    Take 1 tablet by mouth Daily. for blood pressure    aspirin 81 MG tablet  Self Yes Yes    Take 1 tablet by mouth Daily.    atorvastatin (LIPITOR) 80 MG tablet  Pharmacy No Yes    Take 1 tablet by mouth Daily. For cholesterol (new dose)    carvedilol (COREG) 25 MG tablet  Pharmacy No Yes    Take 1 tablet by mouth 2 (Two) Times a Day With Meals. For heart    cholecalciferol (VITAMIN D3) 1000 UNITS tablet  Self Yes Yes    Take 1 tablet by mouth 2 (two) times a day.    Cyanocobalamin (B-12) 1000 MCG capsule  Self Yes Yes    Take 1 tablet by mouth Daily.    FLUoxetine (PROzac) 20 MG capsule  Pharmacy Yes Yes    Take 60 mg by mouth Daily.     lisinopril-hydrochlorothiazide (PRINZIDE,ZESTORETIC) 20-12.5 MG per tablet  Pharmacy No Yes    Take 1 tablet by mouth Daily. For BP and renal protection    lithium carbonate 150 MG capsule  Pharmacy Yes Yes    Take 150 mg by mouth Every Morning.    lithium carbonate 150 MG capsule  Pharmacy Yes Yes    Take 300 mg by mouth Every Evening.    metFORMIN (GLUCOPHAGE) 1000 MG tablet  Pharmacy Yes Yes    Take 1,000 mg by mouth 2 (Two) Times a Day With Meals.    omeprazole (priLOSEC) 40 MG capsule  Pharmacy No Yes    Take 1 capsule by mouth Daily. For GERD (stop Nexium)    polyethylene glycol (MIRALAX) packet  Self Yes Yes    Take 17 g by mouth Daily As Needed.    QUEtiapine (SEROquel) 100 MG tablet  Pharmacy No Yes    Take 1 tablet by mouth Every Night. For sleep    wheat dextrin (BENEFIBER ON THE GO) powder powder  Self Yes Yes    Take 1 each by mouth Daily As Needed.            Medication notes: Flomax removed, therapy complete. Ventolin and Anoro were prescribed in January but never picked up from the pharmacy. Removed per pharmacy records.    This medication list is complete to the best of my knowledge as of 8/8/2019    Please call if questions.    Ssuhma Mancilla, Medication History Technician  8/8/2019 3:33 PM

## 2019-08-08 NOTE — ED PROVIDER NOTES
EMERGENCY DEPARTMENT ENCOUNTER    CHIEF COMPLAINT  Chief Complaint: SOA  History given by: Patient and EMS  History limited by: Pt is a poor historian  Room Number: 37/37  PMD: Alexandrea Jimenez PA-C      HPI:  Pt is a 68 y.o. male with a hx of COPD who presents complaining of SOA. Pt is also complaining of cough and fever. Pt denies chest pain. Pt reports he is unsure when his usual SOA worsened. Per EMS, they gave pt a breathing treatment and CPAP en route to ED which improved SOA. EMS reports tremors are baseline for pt. Pt reports he has had stents placed in the past. Pt reports he is unsure if he has CHF. Pt reports he is a smoker.     Duration:  Unknown  Onset: Gradual  Timing: Constant  Location: Respiratory  Radiation: N/a  Quality: SOA  Intensity/Severity: Moderate  Progression: Unchanged  Associated Symptoms: Cough, fever  Aggravating Factors: None  Alleviating Factors: Breathing treatment, CPAP  Previous Episodes: Pt has a hx of COPD  Treatment before arrival: EMS reports they have pt a breathing treatment and CPAP which improved SOA    PAST MEDICAL HISTORY  Active Ambulatory Problems     Diagnosis Date Noted   • Elevated prostate specific antigen (PSA) 02/25/2016   • Coronary arteriosclerosis in native artery 02/25/2016   • Bipolar affective disorder (CMS/[facility]) 02/25/2016   • Chronic obstructive pulmonary disease (CMS/[facility]) 02/25/2016   • Colon polyp 02/25/2016   • Constipation 02/25/2016   • Type 2 diabetes mellitus without complication, with long-term current use of insulin (CMS/[facility]) 02/25/2016   • Gastroesophageal reflux disease 02/25/2016   • Fatigue 02/25/2016   • Hearing loss 02/25/2016   • Hyperlipidemia 02/25/2016   • Peripheral vascular disease (CMS/[facility]) 02/25/2016   • Proteinuria 02/25/2016   • Muscle pain 02/25/2016   • Low back pain 02/25/2016   • Leukocytosis 02/25/2016   • Seborrheic eczema 02/25/2016   • Sleep apnea 02/25/2016   • Tinea corporis 02/25/2016   • Tremor 02/25/2016   •  Hypertension 02/25/2016   • Anxiety 07/18/2017   • Benign prostatic hyperplasia with lower urinary tract symptoms 07/18/2017   • Tobacco abuse 11/21/2017   • Normocytic anemia 08/21/2018   • Thyroid nodule 09/11/2018   • Iron deficiency anemia 12/18/2018     Resolved Ambulatory Problems     Diagnosis Date Noted   • Benign essential hypertension 02/25/2016   • Tension headache 08/28/2017     Past Medical History:   Diagnosis Date   • Abnormal PSA    • Acute myocardial infarction (CMS/HCC) 2004   • Apnea, sleep    • Atherosclerotic heart disease    • Chronic pain    • Colon polyp    • Constipation    • COPD (chronic obstructive pulmonary disease) (CMS/[facility])    • Coronary artery disease    • Depressed bipolar II disorder (CMS/HCC)    • Diabetes mellitus (CMS/HCC)    • Fatigue    • GERD (gastroesophageal reflux disease)    • H/O transfusion of packed red blood cells    • Health care maintenance    • Hearing loss    • History of back pain    • Hypercholesterolemia    • Hyperlipidemia    • Hypertension    • Leukocytosis    • Osteoarthritis    • Proteinuria    • PVD (peripheral vascular disease) (CMS/[facility])    • Sebaceous cyst    • Seborrheic dermatitis    • Sleep apnea    • Tinea corporis    • Tobacco use    • Tremor        PAST SURGICAL HISTORY  Past Surgical History:   Procedure Laterality Date   • ANGIOPLASTY      Cath Stent Placement   • COLONOSCOPY  01/22/2014    ta polyps   • COLONOSCOPY N/A 11/29/2016    Procedure: COLONOSCOPY TO CECUM AND TERMINAL ILEUM WITH HOT POLYPECTOMIES;  Surgeon: Ivette Franco MD;  Location: [facility] ENDOSCOPY;  Service:    • CORONARY STENT PLACEMENT  2004   • EYE SURGERY      Cataract Surgery   • HAND SURGERY         FAMILY HISTORY  Family History   Problem Relation Age of Onset   • Cancer Mother    • Diabetes Mother    • Hypertension Mother    • Colon cancer Mother    • Heart disease Father    • Cancer Brother    • Stroke Brother        SOCIAL HISTORY  Social History     Socioeconomic  History   • Marital status:      Spouse name: Not on file   • Number of children: Not on file   • Years of education: Not on file   • Highest education level: Not on file   Occupational History     Employer: DISABLED   Tobacco Use   • Smoking status: Current Every Day Smoker     Packs/day: 1.00     Years: 47.00     Pack years: 47.00     Types: Cigarettes   • Smokeless tobacco: Never Used   • Tobacco comment: NO CAFFEINE USE   50 years 1 ppd    Substance and Sexual Activity   • Alcohol use: No   • Drug use: No   • Sexual activity: Defer       ALLERGIES  Clopidogrel    REVIEW OF SYSTEMS  Review of Systems   Constitutional: Positive for fever. Negative for activity change and appetite change.   HENT: Negative for congestion and sore throat.    Eyes: Negative.    Respiratory: Positive for cough and shortness of breath.    Cardiovascular: Negative for chest pain and leg swelling.   Gastrointestinal: Negative for abdominal pain, diarrhea and vomiting.   Endocrine: Negative.    Genitourinary: Negative for decreased urine volume and dysuria.   Musculoskeletal: Negative for neck pain.   Skin: Negative for rash and wound.   Allergic/Immunologic: Negative.    Neurological: Negative for weakness, numbness and headaches.   Hematological: Negative.    Psychiatric/Behavioral: Negative.    All other systems reviewed and are negative.      PHYSICAL EXAM  ED Triage Vitals   Temp Pulse Resp BP SpO2   -- -- -- -- --      Temp src Heart Rate Source Patient Position BP Location FiO2 (%)   -- -- -- -- --       Physical Exam   Constitutional: He is oriented to person, place, and time. No distress.   Able to communicate but poor historian, not well kept   HENT:   Head: Normocephalic and atraumatic.   Eyes: EOM are normal. Pupils are equal, round, and reactive to light.   Neck: Normal range of motion. Neck supple.   Cardiovascular: Normal rate, regular rhythm and normal heart sounds.   Pulmonary/Chest: Tachypnea noted. He is in  respiratory distress (Moderate). He has wheezes (Mild expiratory wheezes). He has rales (In bases).   Obvious upper airway congestion   Abdominal: Soft. There is no tenderness. There is no rebound and no guarding.   Musculoskeletal: Normal range of motion. He exhibits no edema.   Neurological: He is alert and oriented to person, place, and time. He has normal sensation and normal strength.   Skin: Skin is warm. He is diaphoretic.   Psychiatric: Mood and affect normal.   Nursing note and vitals reviewed.      LAB RESULTS  Lab Results (last 24 hours)     Procedure Component Value Units Date/Time    CBC & Differential [[id number]] Collected:  08/08/19 1213    Specimen:  Blood Updated:  08/08/19 1235    Narrative:       The following orders were created for panel order CBC & Differential.  Procedure                               Abnormality         Status                     ---------                               -----------         ------                     CBC Auto Differential[[id number]]        Abnormal            Final result                 Please view results for these tests on the individual orders.    Comprehensive Metabolic Panel [[id number]]  (Abnormal) Collected:  08/08/19 1213    Specimen:  Blood Updated:  08/08/19 1258     Glucose 182 mg/dL      BUN 31 mg/dL      Creatinine 1.55 mg/dL      Sodium 141 mmol/L      Potassium 5.4 mmol/L      Chloride 105 mmol/L      CO2 19.1 mmol/L      Calcium 11.6 mg/dL      Total Protein 8.0 g/dL      Albumin 5.10 g/dL      ALT (SGPT) 36 U/L      AST (SGOT) 21 U/L      Alkaline Phosphatase 153 U/L      Total Bilirubin 0.8 mg/dL      eGFR Non African Amer 45 mL/min/1.73      Globulin 2.9 gm/dL      A/G Ratio 1.8 g/dL      BUN/Creatinine Ratio 20.0     Anion Gap 16.9 mmol/L     Narrative:       GFR Normal >60  Chronic Kidney Disease <60  Kidney Failure <15    Protime-INR [[id number]]  (Normal) Collected:  08/08/19 1213    Specimen:  Blood Updated:  08/08/19 1250     Protime  "12.4 Seconds      INR 0.95    BNP [[id number]]  (Normal) Collected:  08/08/19 1213    Specimen:  Blood Updated:  08/08/19 1303     proBNP 471.0 pg/mL     Narrative:       Among patients with dyspnea, NT-proBNP is highly sensitive for the detection of acute congestive heart failure. In addition NT-proBNP of <300 pg/ml effectively rules out acute congestive heart failure with 99% negative predictive value.    Troponin [[id number]]  (Normal) Collected:  08/08/19 1213    Specimen:  Blood Updated:  08/08/19 1303     Troponin T <0.010 ng/mL     Narrative:       Troponin T Reference Range:  <= 0.03 ng/mL-   Negative for AMI  >0.03 ng/mL-     Abnormal for myocardial necrosis.  Clinicians would have to utilize clinical acumen, EKG, Troponin and serial changes to determine if it is an Acute Myocardial Infarction or myocardial injury due to an underlying chronic condition.     Procalcitonin [[id number]]  (Normal) Collected:  08/08/19 1213    Specimen:  Blood Updated:  08/08/19 1303     Procalcitonin 0.10 ng/mL     Narrative:       As a Marker for Sepsis (Non-Neonates):   1. <0.5 ng/mL represents a low risk of severe sepsis and/or septic shock.  1. >2 ng/mL represents a high risk of severe sepsis and/or septic shock.    As a Marker for Lower Respiratory Tract Infections that require antibiotic therapy:  PCT on Admission     Antibiotic Therapy             6-12 Hrs later  > 0.5                Strongly Recommended            >0.25 - <0.5         Recommended  0.1 - 0.25           Discouraged                   Remeasure/reassess PCT  <0.1                 Strongly Discouraged          Remeasure/reassess PCT      As 28 day mortality risk marker: \"Change in Procalcitonin Result\" (> 80 % or <=80 %) if Day 0 (or Day 1) and Day 4 values are available. Refer to http://www.[employer]-pct-calculator.com/   Change in PCT <=80 %   A decrease of PCT levels below or equal to 80 % defines a positive change in PCT test result representing a higher risk " for 28-day all-cause mortality of patients diagnosed with severe sepsis or septic shock.  Change in PCT > 80 %   A decrease of PCT levels of more than 80 % defines a negative change in PCT result representing a lower risk for 28-day all-cause mortality of patients diagnosed with severe sepsis or septic shock.                Lactic Acid, Plasma [[id number]]  (Abnormal) Collected:  08/08/19 1213    Specimen:  Blood Updated:  08/08/19 1243     Lactate 3.4 mmol/L     Blood Culture - Blood, Arm, Left [[id number]] Collected:  08/08/19 1213    Specimen:  Blood from Arm, Left Updated:  08/08/19 1219    Magnesium [[id number]]  (Abnormal) Collected:  08/08/19 1213    Specimen:  Blood Updated:  08/08/19 1258     Magnesium 1.4 mg/dL     CBC Auto Differential [[id number]]  (Abnormal) Collected:  08/08/19 1213    Specimen:  Blood Updated:  08/08/19 1235     WBC 27.24 10*3/mm3      RBC 4.59 10*6/mm3      Hemoglobin 13.3 g/dL      Hematocrit 41.9 %      MCV 91.3 fL      MCH 29.0 pg      MCHC 31.7 g/dL      RDW 13.0 %      RDW-SD 43.9 fl      MPV 11.9 fL      Platelets 354 10*3/mm3      Neutrophil % 88.2 %      Lymphocyte % 4.7 %      Monocyte % 5.8 %      Eosinophil % 0.1 %      Basophil % 0.4 %      Immature Grans % 0.8 %      Neutrophils, Absolute 24.00 10*3/mm3      Lymphocytes, Absolute 1.28 10*3/mm3      Monocytes, Absolute 1.59 10*3/mm3      Eosinophils, Absolute 0.04 10*3/mm3      Basophils, Absolute 0.10 10*3/mm3      Immature Grans, Absolute 0.23 10*3/mm3      nRBC 0.0 /100 WBC     Lithium Level [[id number]]  (Abnormal) Collected:  08/08/19 1213    Specimen:  Blood Updated:  08/08/19 1258     Lithium 1.7 mmol/L     Lactic Acid, Reflex Timer (This will reflex a repeat order 3-3:15 hours after ordered.) [[id number]] Collected:  08/08/19 1213    Specimen:  Blood Updated:  08/08/19 1243    Blood Culture - Blood, Arm, Left [[id number]] Collected:  08/08/19 1227    Specimen:  Blood from Arm, Left Updated:  08/08/19 1232    Blood Gas,  Arterial [[id number]]  (Abnormal) Collected:  08/08/19 1254    Specimen:  Arterial Blood Updated:  08/08/19 1300     Site Arterial: right radial     David's Test Positive     pH, Arterial 7.367 pH units      pCO2, Arterial 26.7 mm Hg      pO2, Arterial 168.0 mm Hg      HCO3, Arterial 15.3 mmol/L      Base Excess, Arterial -8.4 mmol/L      O2 Saturation Calculated 99.5 %      A-a Gradiant 0.6 mmHg      Barometric Pressure for Blood Gas 753.8 mmHg      Modality BiPap     FIO2 40 %      Ventilator Mode NIV     Set Tidal Volume 750     Set Mech Resp Rate 20     Rate 30 Breaths/minute      PEEP 6          I ordered the above labs and reviewed the results    RADIOLOGY  XR Chest 1 View   Final Result   No acute process.       This report was finalized on 8/8/2019 12:58 PM by Dr. Sajan Golden M.D.               I ordered the above noted radiological studies. Interpreted by radiologist.  Reviewed by me in PACS.       PROCEDURES  Critical Care  Performed by: Dontae Hawkins MD  Authorized by: Dontae Hawkins MD     Critical care provider statement:     Critical care time (minutes):  45    Critical care start time:  8/8/2019 12:04 PM    Critical care end time:  8/8/2019 2:15 PM    Critical care time was exclusive of:  Separately billable procedures and treating other patients    Critical care was necessary to treat or prevent imminent or life-threatening deterioration of the following conditions:  Respiratory failure    Critical care was time spent personally by me on the following activities:  Discussions with consultants, development of treatment plan with patient or surrogate, evaluation of patient's response to treatment, examination of patient, obtaining history from patient or surrogate, interpretation of cardiac output measurements, ordering and review of laboratory studies, ordering and performing treatments and interventions, ordering and review of radiographic studies, re-evaluation of patient's condition and  review of old charts           EKG          EKG time: 1212  Rhythm/Rate:   P waves and NJ: Narrow complex  QRS, axis: Nml axis   ST and T waves: Diffuse, nonspecific st and t wave changes, tremendous amount of artifact, qt nml    Interpreted Contemporaneously by me, independently viewed  No prior EKG for comparison        PROGRESS AND CONSULTS  ED Course as of Aug 08 1420   Thu Aug 08, 2019   1301 Patient has some acute renal injury.  Previous creatinine was normal Creatinine: (!) 1.55 [MM]   1301 Looking at old records patient does have a history of mild leukocytosis.  This leukocytosis is much more prominent and elevated than his previous ones. WBC: (!) 27.24 [MM]   1301 Patient does have COPD and received treatment prior to arrival here and here in the emergency department.  Patient does report of fever.  I am concerned about a possible infectious etiology with his leukocytosis and his elevation of lactic acid.  I am waiting on a procalcitonin level.  I am to go ahead and treat with IV antibiotics. Lactate: (!!) 3.4 [MM]      ED Course User Index  [MM] Dontae Hawkins MD     1250: Rechecked pt who looks considerably better than first check after being started on BIPAP. O2 in 90's, HR 90's, tremor still present. Pt reports SOA is improved since arriving in the ED.     1413: Spoke with  (pulmonology) who agreed to admit     1419:  at bedside. Transitioning pt to nasal canula. Pt SOA significantly improved since arriving in ED.     1533: Called to room by nursing because pt is in respiratory distress. Pt reports he is having increased SOA. Nursing put pt back on BIPAP. Ordered albuterol breathing treatment which respiratory started.Called  (pulmonology) to update him. On re-exam, pt was off oxygen and in increased mild to moderate respiratory distress. Pt lungs sound tight.O2 stat back up to mid 90's on BIPAP.     1556: Rechecked pt whose breathing is improved after receiving 1  duo-neb and 1 albuterol treatment. Pt is still in respiratory distress but much improved from last check.     MEDICAL DECISION MAKING  Results were reviewed/discussed with the patient and they were also made aware of online access. Pt also made aware that some labs, such as cultures, will not be resulted during ER visit and follow up with PMD is necessary.     MDM  Number of Diagnoses or Management Options  Acute respiratory failure with hypoxia (CMS/HCC):   COPD exacerbation (CMS/HCC):   Elevated lactic acid level:      Amount and/or Complexity of Data Reviewed  Clinical lab tests: ordered and reviewed  Tests in the radiology section of CPT®: ordered and reviewed (XR chest no acute findings)  Tests in the medicine section of CPT®: ordered and reviewed (See procedure notes)  Decide to obtain previous medical records or to obtain history from someone other than the patient: yes (Epic)  Review and summarize past medical records: yes ( (oncology) 6/12/19:  1. Chronic mild leukocytosis:  · This is long-standing with a WBC in the 11-71593 range normal differential dating at least back to 5/4/16 if not before.  · There does not appear to be any obvious underlying infectious issue.  There does not appear to be a chronic inflammatory state.  · Review of peripheral blood smear identified predominantly normal segmented neutrophils no immature forms identified.  · No evidence of malignancy on CT chest 8/28/18  · Laboratory evaluation 8/21/18 with ESR 12, CRP 0.41, peripheral blood flow cytometry negative.  · The patient has been on lithium for quite some time and this is likely a contributing factor to his leukocytosis as this is a described side effect of the drug.  · Likely in part also related to smoking, continues to smoke 1 pack per day with 50-pack-year history  · Today, WBC is borderline elevated at 11.81 with differential of 76 segs, 14 lymphs, 7 monocytes.  2. Normocytic anemia with evidence of borderline iron  deficiency:  · Decline in hemoglobin 8/21/18 to 12.0 with normal MCV 88.  · Previous normal B12 and folate in July 2017  · Low normal ferritin of 37.8 on 7/20/18.  · Previous colonoscopy 11/29/16 with Dr. Franco with 2 polyps removed (tubular adenoma with low-grade dysplasia)  · Laboratory evaluation 8/21/18 with iron 62, ferritin 32.7, iron saturation 14%, TIBC 435, B12 with 2000, folate greater than 20, ESR 12, CRP 0.41.  · With borderline iron studies, initiated oral iron daily 9/11/18 and requested return stool cards for occult blood ×3 with potential referral back to GI positive.  · Patient did not return stool cards as requested.  · The patient returns today with iron studies to review from 6/12/2019 showing improvement with ferritin 84.5 and iron saturation 19% with hemoglobin 12.3.  He is experiencing some mild constipation from iron and I have asked him to decrease this to every other day dosing.  We will recheck iron studies in 6 months.  As noted above, he did not return stool cards as requested for occult blood.  His last colonoscopy was in November 2016.  We will go ahead and refer him back to GI to discuss timing of follow-up endoscopic evaluation.       (cardiology) 11/21/16 diagnosis:   1. Coronary arteriosclerosis in native artery     2. Essential hypertension      )  Discuss the patient with other providers: yes (Dr.Scott Fuller (pulmonology))    Patient Progress  Patient progress: stable         DIAGNOSIS  Final diagnoses:   Acute respiratory failure with hypoxia (CMS/HCC)   COPD exacerbation (CMS/HCC)   Elevated lactic acid level       DISPOSITION  ADMISSION TO ICU    Discussed treatment plan and reason for admission with pt/family and admitting physician.  Pt/family voiced understanding of the plan for admission for further testing/treatment as needed.         Latest Documented Vital Signs:  As of 2:20 PM  BP- 97/46 HR- 86 Temp- 97.6 °F (36.4 °C) (Tympanic) O2 sat-  96%    --  Documentation assistance provided by shira Martinez for .  Information recorded by the shira was done at my direction and has been verified and validated by me.                              Vanesa Martinez  08/08/19 3341       Dontae Hawkins MD  08/08/19 [id number]

## 2019-08-08 NOTE — ED NOTES
1530-Pt o2 saturation dropped to 70% on 2L upon this RN assessment prior to ICU transport. pt very diaphoretic, unable to speak in full sentences, labored breathing. MASOUD Hawkins MD notified and to pt room to reeval. Pt back on bipap at this time.     1545- pt receiving 2nd breathing treatment. RT in room. Pt still on bipap. Pt is tachycardic 135 and is still labored     Verena Christine, DIRK  08/08/19 1148

## 2019-08-08 NOTE — H&P
[facility]    CC: shortness of breath    HPI: Mr. Mike is a 67yo WM with a history of COPD and ongoing tobacco abuse.  He has karen as well.  He presents to the er with about a week history of increasing shortness of breath, it became severe and did improve with nebs and use of pap therapy.  He notices it is worse with exertion.  He has had increased cough but hasn't noticed any increased sputum production.  He has had recently increased rhinitis.  He is unaware of any sick contacts.  When he is doing well he reports he gets short of breath after walking about a block and with stairs.  He doesn't normally require oxygen therapy.  He has a severe resting tremor which he says he has had for sometime.    Past Medical History:   Diagnosis Date   • Abnormal PSA    • Acute myocardial infarction (CMS/HCC) 2004    Stents   • Apnea, sleep    • Atherosclerotic heart disease    • Chronic pain     Chronic low back paoin, MRI 12/11,  L3 compression deformity, L2 degenerative disc disease and L5-S1 degenerative disc disease.   • Colon polyp    • Constipation    • COPD (chronic obstructive pulmonary disease) (CMS/HCC)    • Coronary artery disease    • Depressed bipolar II disorder (CMS/HCC)    • Diabetes mellitus (CMS/HCC)    • Fatigue    • GERD (gastroesophageal reflux disease)    • H/O transfusion of packed red blood cells    • Health care maintenance    • Hearing loss    • History of back pain    • Hypercholesterolemia    • Hyperlipidemia    • Hypertension    • Leukocytosis    • Osteoarthritis    • Proteinuria    • PVD (peripheral vascular disease) (CMS/HCC)    • Sebaceous cyst    • Seborrheic dermatitis    • Sleep apnea    • Tinea corporis    • Tobacco use    • Tremor      Social History     Socioeconomic History   • Marital status:      Spouse name: Not on file   • Number of children: Not on file   • Years of education: Not on file   • Highest education level: Not on file   Occupational History      Employer: DISABLED   Tobacco Use   • Smoking status: Current Every Day Smoker     Packs/day: 1.00     Years: 47.00     Pack years: 47.00     Types: Cigarettes   • Smokeless tobacco: Never Used   • Tobacco comment: NO CAFFEINE USE   50 years 1 ppd    Substance and Sexual Activity   • Alcohol use: No   • Drug use: No   • Sexual activity: Defer     Family History   Problem Relation Age of Onset   • Cancer Mother    • Diabetes Mother    • Hypertension Mother    • Colon cancer Mother    • Heart disease Father    • Cancer Brother    • Stroke Brother      MEDS: reviewed as per chart  ALL: clopidogrel  ROS: reviewed below  Constitutional: Positive for fever. Negative for activity change and appetite change.   HENT: Negative for congestion and sore throat.    Eyes: Negative.    Respiratory: Positive for cough and shortness of breath.    Cardiovascular: Negative for chest pain and leg swelling.   Gastrointestinal: Negative for abdominal pain, diarrhea and vomiting.   Endocrine: Negative.    Genitourinary: Negative for decreased urine volume and dysuria.   Musculoskeletal: Negative for neck pain.   Skin: Negative for rash and wound.   Allergic/Immunologic: Negative.    Neurological: Negative for weakness, numbness and headaches.   Hematological: Negative.    Psychiatric/Behavioral: Negative.    All other systems reviewed and are negative.    Vital Sign Min/Max for last 24 hours  Temp  Min: 97.6 °F (36.4 °C)  Max: 97.6 °F (36.4 °C)   BP  Min: 97/46  Max: 162/98   Pulse  Min: 86  Max: 109   Resp  Min: 26  Max: 32   SpO2  Min: 95 %  Max: 100 %   Flow (L/min)  Min: 2  Max: 15   Weight  Min: 77.1 kg (170 lb)  Max: 77.1 kg (170 lb)     GEN:   appears ill,, AxOx3, generalized tremors, quite severe talks in broken sentences  HEENT: PERRL, EOMI, no icterus, mmm, no jvd, trachea midline, neck supple, normal conjunctiva, no palpable thyroid nodules  CHEST: decreased and coarse bilat, + wheezes, no crackles, + use of accessory  muscles  CV: RRR, no m/g/r  ABD: soft, nt, nd +bs, no hepatosplenomegaly  EXT: no cyanosis or edema _+clubbing  SKIN: no rashes, no xanthomas, nl turgor  LYMPH: no palpable cervical or supraclavicular lymphadenopathy  NEURO: CN 2-12 intact and symmetric bilaterally  PSYCH: odd affect, nl orientation, questionable judgement, nl mood  MSK: no kyphoscoliosis, 5/5  Strength ue and le bilaterally    Labs: 8/8: reviewed:  7.36/26/168  Glucose 182  Bun 31  Cr 1.55  Na 141  k 5.4  Bicarb 19  Calcium 11.6  inr 0.95  probnp 471  Lactate 3.4  Mg 1.4  Wbc 27 (88% neutrophils)  hgb 13  plts 354    Cxr: 8/8 reviewed  Reviewed note from neurology 8/28/2017  Reviewed note from DR. Perdue 6/12/19    Reviewed ct chest from august 2018    A/P:  1. Copd with ae -- check viral respiratory panel, treat with iv steroids, antibiotics and bronchodilators. Continue NIPPV. He is high risk for deterioration and requires admission to icu  2. MARTINE -- pap therapy   3. Sepsis -- doubt underlying bacterial infection -- got antibiotic in er, continue zithromax, and will go ahead and give fluid bolus, trend lactic acid.    4. Chronic leukocytosis -- he has seen heme for this in the past  5. Essential tremor -- quite severe, I think I will have neurology see him  6. SWAPNIL -- will give IV fluids, avoid nephrotoxins  7. DMII with hyperglycemia -- ssi  8. Hyperkalemia -- monitor  9. Decreased mg -- replace  10. Hypercalcemia -- repeat level in morning after iv fluids  11. Digital clubbing -- would repeat ct chest at some point its been about a year.     I was called to see him urgently upon arrival to ICU.    He is back on pap therapy.  He says he feels better, he is getting good volumes he looks as expect aside from his severe resting tremor. I will check a gas, but I dont' feel he needs intubation at this moment.     CC 60 mins.

## 2019-08-09 LAB
ANION GAP SERPL CALCULATED.3IONS-SCNC: 15.5 MMOL/L (ref 5–15)
BUN BLD-MCNC: 25 MG/DL (ref 8–23)
BUN/CREAT SERPL: 23.1 (ref 7–25)
CA-I BLD-MCNC: 6.5 MG/DL (ref 4.6–5.4)
CA-I SERPL ISE-MCNC: 1.62 MMOL/L (ref 1.15–1.35)
CALCIUM SPEC-SCNC: 9.8 MG/DL (ref 8.6–10.5)
CHLORIDE SERPL-SCNC: 113 MMOL/L (ref 98–107)
CO2 SERPL-SCNC: 16.5 MMOL/L (ref 22–29)
CREAT BLD-MCNC: 1.08 MG/DL (ref 0.76–1.27)
D-LACTATE SERPL-SCNC: 0.6 MMOL/L (ref 0.5–2)
GFR SERPL CREATININE-BSD FRML MDRD: 68 ML/MIN/1.73
GLUCOSE BLD-MCNC: 136 MG/DL (ref 65–99)
GLUCOSE BLDC GLUCOMTR-MCNC: 128 MG/DL (ref 70–130)
GLUCOSE BLDC GLUCOMTR-MCNC: 178 MG/DL (ref 70–130)
GLUCOSE BLDC GLUCOMTR-MCNC: 209 MG/DL (ref 70–130)
GLUCOSE BLDC GLUCOMTR-MCNC: 292 MG/DL (ref 70–130)
MAGNESIUM SERPL-MCNC: 1.5 MG/DL (ref 1.6–2.4)
PHOSPHATE SERPL-MCNC: 3.8 MG/DL (ref 2.5–4.5)
POTASSIUM BLD-SCNC: 4.6 MMOL/L (ref 3.5–5.2)
SODIUM BLD-SCNC: 145 MMOL/L (ref 136–145)

## 2019-08-09 PROCEDURE — 80048 BASIC METABOLIC PNL TOTAL CA: CPT | Performed by: INTERNAL MEDICINE

## 2019-08-09 PROCEDURE — 82962 GLUCOSE BLOOD TEST: CPT

## 2019-08-09 PROCEDURE — 84100 ASSAY OF PHOSPHORUS: CPT | Performed by: INTERNAL MEDICINE

## 2019-08-09 PROCEDURE — 25010000002 ENOXAPARIN PER 10 MG: Performed by: INTERNAL MEDICINE

## 2019-08-09 PROCEDURE — 82330 ASSAY OF CALCIUM: CPT | Performed by: INTERNAL MEDICINE

## 2019-08-09 PROCEDURE — 25010000002 MAGNESIUM SULFATE 2 GM/50ML SOLUTION: Performed by: INTERNAL MEDICINE

## 2019-08-09 PROCEDURE — 94799 UNLISTED PULMONARY SVC/PX: CPT

## 2019-08-09 PROCEDURE — 83735 ASSAY OF MAGNESIUM: CPT | Performed by: INTERNAL MEDICINE

## 2019-08-09 PROCEDURE — 63710000001 INSULIN LISPRO (HUMAN) PER 5 UNITS: Performed by: INTERNAL MEDICINE

## 2019-08-09 PROCEDURE — 83605 ASSAY OF LACTIC ACID: CPT | Performed by: INTERNAL MEDICINE

## 2019-08-09 PROCEDURE — 99221 1ST HOSP IP/OBS SF/LOW 40: CPT | Performed by: PSYCHIATRY & NEUROLOGY

## 2019-08-09 PROCEDURE — 25010000002 METHYLPREDNISOLONE PER 40 MG: Performed by: INTERNAL MEDICINE

## 2019-08-09 RX ADMIN — QUETIAPINE FUMARATE 100 MG: 100 TABLET ORAL at 21:11

## 2019-08-09 RX ADMIN — IPRATROPIUM BROMIDE AND ALBUTEROL SULFATE 3 ML: 2.5; .5 SOLUTION RESPIRATORY (INHALATION) at 11:32

## 2019-08-09 RX ADMIN — SODIUM CHLORIDE, PRESERVATIVE FREE 3 ML: 5 INJECTION INTRAVENOUS at 21:11

## 2019-08-09 RX ADMIN — MAGNESIUM SULFATE HEPTAHYDRATE 2 G: 40 INJECTION, SOLUTION INTRAVENOUS at 11:23

## 2019-08-09 RX ADMIN — METHYLPREDNISOLONE SODIUM SUCCINATE 40 MG: 40 INJECTION, POWDER, FOR SOLUTION INTRAMUSCULAR; INTRAVENOUS at 17:27

## 2019-08-09 RX ADMIN — NICOTINE 1 PATCH: 21 PATCH, EXTENDED RELEASE TRANSDERMAL at 17:28

## 2019-08-09 RX ADMIN — IPRATROPIUM BROMIDE AND ALBUTEROL SULFATE 3 ML: 2.5; .5 SOLUTION RESPIRATORY (INHALATION) at 19:35

## 2019-08-09 RX ADMIN — ATORVASTATIN CALCIUM 80 MG: 80 TABLET, FILM COATED ORAL at 21:11

## 2019-08-09 RX ADMIN — SODIUM CHLORIDE, PRESERVATIVE FREE 3 ML: 5 INJECTION INTRAVENOUS at 09:09

## 2019-08-09 RX ADMIN — IPRATROPIUM BROMIDE AND ALBUTEROL SULFATE 3 ML: 2.5; .5 SOLUTION RESPIRATORY (INHALATION) at 07:45

## 2019-08-09 RX ADMIN — MAGNESIUM SULFATE HEPTAHYDRATE 2 G: 40 INJECTION, SOLUTION INTRAVENOUS at 13:27

## 2019-08-09 RX ADMIN — PANTOPRAZOLE SODIUM 40 MG: 40 TABLET, DELAYED RELEASE ORAL at 06:32

## 2019-08-09 RX ADMIN — IPRATROPIUM BROMIDE AND ALBUTEROL SULFATE 3 ML: 2.5; .5 SOLUTION RESPIRATORY (INHALATION) at 15:58

## 2019-08-09 RX ADMIN — FLUOXETINE HYDROCHLORIDE 20 MG: 20 CAPSULE ORAL at 09:08

## 2019-08-09 RX ADMIN — INSULIN LISPRO 6 UNITS: 100 INJECTION, SOLUTION INTRAVENOUS; SUBCUTANEOUS at 17:26

## 2019-08-09 RX ADMIN — INSULIN LISPRO 4 UNITS: 100 INJECTION, SOLUTION INTRAVENOUS; SUBCUTANEOUS at 21:10

## 2019-08-09 RX ADMIN — TAMSULOSIN HYDROCHLORIDE 0.4 MG: 0.4 CAPSULE ORAL at 09:08

## 2019-08-09 RX ADMIN — SODIUM CHLORIDE 100 ML/HR: 9 INJECTION, SOLUTION INTRAVENOUS at 05:37

## 2019-08-09 RX ADMIN — CARVEDILOL 25 MG: 25 TABLET, FILM COATED ORAL at 17:27

## 2019-08-09 RX ADMIN — CARVEDILOL 25 MG: 25 TABLET, FILM COATED ORAL at 09:08

## 2019-08-09 RX ADMIN — ASPIRIN 81 MG: 81 TABLET, COATED ORAL at 09:08

## 2019-08-09 RX ADMIN — ENOXAPARIN SODIUM 40 MG: 40 INJECTION SUBCUTANEOUS at 17:26

## 2019-08-09 RX ADMIN — AMLODIPINE BESYLATE 10 MG: 10 TABLET ORAL at 09:08

## 2019-08-09 RX ADMIN — MAGNESIUM SULFATE HEPTAHYDRATE 2 G: 40 INJECTION, SOLUTION INTRAVENOUS at 17:27

## 2019-08-09 RX ADMIN — METHYLPREDNISOLONE SODIUM SUCCINATE 40 MG: 40 INJECTION, POWDER, FOR SOLUTION INTRAMUSCULAR; INTRAVENOUS at 09:09

## 2019-08-09 RX ADMIN — METHYLPREDNISOLONE SODIUM SUCCINATE 40 MG: 40 INJECTION, POWDER, FOR SOLUTION INTRAMUSCULAR; INTRAVENOUS at 02:07

## 2019-08-09 RX ADMIN — INSULIN LISPRO 2 UNITS: 100 INJECTION, SOLUTION INTRAVENOUS; SUBCUTANEOUS at 12:07

## 2019-08-09 NOTE — PROGRESS NOTES
Dr. KEELY Hernadez    [facility] INTENSIVE CARE    8/9/2019    Patient ID:  Name:  Felicita Mike  MRN:  [id number]  1950  68 y.o.  male            CC/Reason for visit: COPD exacerbation, acute hypoxic respiratory failure.    HPI: He is still short of breath.  We are going to try him off of bilevel noninvasive ventilation.       ROS: No fever, no chills    Vitals:  Vitals:    08/09/19 1133 08/09/19 1205 08/09/19 1305 08/09/19 1405   BP:  131/62 119/67 133/51   Pulse:  64 61 68   Resp: 24      Temp: 98 °F (36.7 °C)      TempSrc: Oral      SpO2:  91% 95% 92%   Weight:       Height:               Body mass index is 22.39 kg/m².    Intake/Output Summary (Last 24 hours) at 8/9/2019 1510  Last data filed at 8/9/2019 0537  Gross per 24 hour   Intake 2824 ml   Output --   Net 2824 ml       Exam:  GEN:  Some respiratory distress is noted  Alert, oriented x 3.   EYES:   Pupils equally round reactive bilaterally, conjunctiva moist and pink  ENT:    External ears/nose normal, OP clear  NECK:  No JVD, supple, midline trachea  LUNGS: Distant breath sounds, bilateral wheezing, prolonged expiratory time, using some accessory muscles to breathe  CV:  Normal S1S2, without murmur, no edema  ABD:  Non tender, no enlarged liver or masses  EXT:  No cyanosis or clubbing    Scheduled meds:    amLODIPine 10 mg Oral Daily   aspirin 81 mg Oral Daily   atorvastatin 80 mg Oral Nightly   carvedilol 25 mg Oral BID With Meals   enoxaparin 40 mg Subcutaneous Q24H   FLUoxetine 20 mg Oral Daily   insulin lispro 0-9 Units Subcutaneous 4x Daily With Meals & Nightly   ipratropium-albuterol 3 mL Nebulization 4x Daily - RT   methylPREDNISolone sodium succinate 40 mg Intravenous Q8H   nicotine 1 patch Transdermal Q24H   pantoprazole 40 mg Oral QAM   QUEtiapine 100 mg Oral Nightly   sodium chloride 3 mL Intravenous Q12H   tamsulosin 0.4 mg Oral Daily     IV meds:                        sodium chloride 100 mL/hr Last Rate: 100 mL/hr (08/09/19  0949)       Data Review:   I reviewed the patient's medications and new clinical results.  Lab Results   Component Value Date    CALCIUM 9.8 08/09/2019    PHOS 3.8 08/09/2019    MG 1.5 (L) 08/09/2019    MG 1.4 (L) 08/08/2019     Results from last 7 days   Lab Units 08/09/19  0426 08/08/19  1213   SODIUM mmol/L 145 141   POTASSIUM mmol/L 4.6 5.4*   CHLORIDE mmol/L 113* 105   CO2 mmol/L 16.5* 19.1*   BUN mg/dL 25* 31*   CREATININE mg/dL 1.08 1.55*   CALCIUM mg/dL 9.8 11.6*   BILIRUBIN mg/dL  --  0.8   ALK PHOS U/L  --  153*   ALT (SGPT) U/L  --  36   AST (SGOT) U/L  --  21   GLUCOSE mg/dL 136* 182*   WBC 10*3/mm3  --  27.24*   HEMOGLOBIN g/dL  --  13.3   PLATELETS 10*3/mm3  --  354   INR   --  0.95   PROBNP pg/mL  --  471.0   PROCALCITONIN ng/mL  --  0.10     Results from last 7 days   Lab Units 08/08/19  1227 08/08/19  1213   BLOODCX  No growth at 24 hours No growth at 24 hours     Results from last 7 days   Lab Units 08/08/19  1653   ADENOVIRUS DETECTION BY PCR  Not Detected   CORONAVIRUS 229E  Not Detected   CORONAVIRUS HKU1  Not Detected   CORONAVIRUS NL63  Not Detected   CORONAVIRUS OC43  Not Detected   HUMAN METAPNEUMOVIRUS  Not Detected   HUMAN RHINOVIRUS/ENTEROVIRUS  Not Detected   INFLUENZA B PCR  Not Detected   PARAINFLUENZA 1  Not Detected   PARAINFLUENZA VIRUS 2  Not Detected   PARAINFLUENZA VIRUS 3  Not Detected   PARAINFLUENZA VIRUS 4  Not Detected   BORDETELLA PERTUSSIS PCR  Not Detected   CHLAMYDOPHILA PNEUMONIAE PCR  Not Detected   MYCOPLAMA PNEUMO PCR  Not Detected   INFLUENZA A PCR  Not Detected   INFLUENZA A H3  Not Detected   INFLUENZA A H1  Not Detected   RSV, PCR  Not Detected       Results from last 7 days   Lab Units 08/08/19  1213   TROPONIN T ng/mL <0.010     Results from last 7 days   Lab Units 08/08/19  1629 08/08/19  1254   PH, ARTERIAL pH units 7.269* 7.367   PCO2, ARTERIAL mm Hg 29.3* 26.7*   PO2 ART mm Hg 132.0* 168.0*   MODALITY  BiPap BiPap   O2 SATURATION CALC % 98.6 99.5*        Estimated Creatinine Clearance: 71.3 mL/min (by C-G formula based on SCr of 1.08 mg/dL).      ASSESSMENT:     Acute respiratory failure with hypoxia (CMS/HCC)  COPD exacerbation  Sepsis  Essential tremors  Acute kidney injury  Diabetes mellitus type 2 with hyperglycemia  Hyperkalemia  Low magnesium  Hypercalcemia      PLAN:  Try weaning him off of noninvasive ventilation.  Continue intravenous steroids.  Start sliding scale insulin to control diabetes type 2 and hyperglycemia.  Check phosphorus.  Replace magnesium and potassium.  Stop azithromycin due to prolonged QT interval.  Repeat EKG and continue telemetry monitoring.        Shay Hernadez MD  8/9/2019

## 2019-08-09 NOTE — PROGRESS NOTES
Discharge Planning Assessment  [facility]     Patient Name: Felicita Mike  MRN: [id number]  Today's Date: 2019    Admit Date: 2019    Discharge Needs Assessment     Row Name 19 1400       Living Environment    Lives With  alone    Current Living Arrangements  home/apartment/condo    Primary Care Provided by  self    Provides Primary Care For  no one    Family Caregiver if Needed  child(jonnathan), adult    Able to Return to Prior Arrangements  yes       Resource/Environmental Concerns    Resource/Environmental Concerns  none    Transportation Concerns  car, none       Transition Planning    Patient/Family Anticipates Transition to  home    Patient/Family Anticipated Services at Transition  none       Discharge Needs Assessment    Concerns to be Addressed  denies needs/concerns at this time;discharge planning    Equipment Currently Used at Home  bipap/cpap    Anticipated Changes Related to Illness  none    Equipment Needed After Discharge  none        Discharge Plan     Row Name 19 1403       Plan    Plan  Undetermined    Row Name 19 1401       Plan    Plan Comments  CCP spoke to patient at bedside to discuss discharge planning.  CCP role explained.  Face sheet verified.  His daughter is his emergency contact.   He does not have a phone number for her.   Pt lives in a house alone.  He did share his home with is brother who recently .  He uses no DME to ambulate.  He is independent with ALD's.  His pharmacy is [facility] in [facility].   He has no physical rehab history. He has no history of HH.   Plan is undecided. CCP following        Destination      No service coordination in this encounter.      Durable Medical Equipment      No service coordination in this encounter.      Dialysis/Infusion      No service coordination in this encounter.      Home Medical Care      No service coordination in this encounter.      Therapy      No service coordination in this  encounter.      Community Resources      No service coordination in this encounter.          Demographic Summary    No documentation.       Functional Status    No documentation.       Psychosocial    No documentation.       Abuse/Neglect    No documentation.       Legal    No documentation.       Substance Abuse    No documentation.       Patient Forms    No documentation.           Ivett Pablo RN

## 2019-08-09 NOTE — CONSULTS
Neurology Consult Note    Consult Date: 8/9/2019    Referring MD: Ronnie Fuller MD    Reason for Consult I have been asked to see the patient in neurological consultation to render advice and opinion regarding tremor    Felicita Mike is a 68 y.o. male with past medical history of chronic obstructive pulmonary disease, ongoing tobacco abuse, bipolar disorder on lithium, peripheral vascular disease.  He reports a 30-year history of bilateral tremor.  This started gradually in his 30s and progressively worsened over time.  It affects his hands and his head.  He has difficulty with action and has trouble writing, eating and drinking.  He has no difficulty with his gait.  He denies any stiffness or slowness or constipation but endorses anxiety.  His mother had an identical tremor that also started in middle-age.    Past Medical History:   Diagnosis Date   • Abnormal PSA    • Acute myocardial infarction (CMS/HCC) 2004    Stents   • Apnea, sleep    • Atherosclerotic heart disease    • Chronic pain     Chronic low back paoin, MRI 12/11,  L3 compression deformity, L2 degenerative disc disease and L5-S1 degenerative disc disease.   • Colon polyp    • Constipation    • COPD (chronic obstructive pulmonary disease) (CMS/HCC)    • Coronary artery disease    • Depressed bipolar II disorder (CMS/HCC)    • Diabetes mellitus (CMS/HCC)    • Fatigue    • GERD (gastroesophageal reflux disease)    • H/O transfusion of packed red blood cells    • Health care maintenance    • Hearing loss    • History of back pain    • Hypercholesterolemia    • Hyperlipidemia    • Hypertension    • Leukocytosis    • Osteoarthritis    • Proteinuria    • PVD (peripheral vascular disease) (CMS/HCC)    • Sebaceous cyst    • Seborrheic dermatitis    • Sleep apnea    • Tinea corporis    • Tobacco use    • Tremor        ROS:  No fevers, chills  + Weakness, no numbness  + Shortness of air, cough  + Chest pain, no palpitations    Exam    /83   Pulse 65    "Temp 98.3 °F (36.8 °C) (Axillary)   Resp 28   Ht 182.9 cm (72\")   Wt 74.9 kg (165 lb 2 oz)   SpO2 93%   BMI 22.39 kg/m²   Gen: NAD, vitals reviewed  MS: oriented x3, recent/remote memory intact, normal attention/concentration, language intact, no neglect.  CN: visual acuity grossly normal, PERRL, EOMI, no facial droop, no dysarthria  Motor: 5/5 throughout upper and lower extremities, normal tone, bilateral kinetic and postural tremor, worse with intention, abnormal spiral test, head tremor.    DATA:    Lab Results   Component Value Date    GLUCOSE 136 (H) 08/09/2019    CALCIUM 9.8 08/09/2019     08/09/2019    K 4.6 08/09/2019    CO2 16.5 (L) 08/09/2019     (H) 08/09/2019    BUN 25 (H) 08/09/2019    CREATININE 1.08 08/09/2019    EGFRIFAFRI 83 07/23/2019    EGFRIFNONA 68 08/09/2019    BCR 23.1 08/09/2019    ANIONGAP 15.5 (H) 08/09/2019     Lab Results   Component Value Date    WBC 27.24 (H) 08/08/2019    HGB 13.3 08/08/2019    HCT 41.9 08/08/2019    MCV 91.3 08/08/2019     08/08/2019       Lab Results   Component Value Date    INR 0.95 08/08/2019    PROTIME 12.4 08/08/2019       Lab review: Anion gap 15, WBC 27    Imaging review: CT head report from 7/2017 reviewed, reported as normal by Dr. Juanjose Morales.    Diagnosis:  1.  Essential tremor  2.  Bipolar disorder without acute exacerbation  3.  Chronic obstructive pulmonary disease with acute exacerbation    Comment: History and exam are consistent with benign essential tremor.  He does not have any clinical symptoms or signs of Parkinson's disease.  He does have a positive family history of essential tremor.  His tremor is quite severe.  I wonder if he might eventually need to be considered for deep brain stimulation.  I will avoid starting any medication for this in the acute setting until he gets through his COPD exacerbation.  I am going to arrange outpatient follow-up for him in our clinic.     PLAN:  1.  Normalize lithium level, this is likely " exacerbating his tremor somewhat  2.  Outpatient clinic follow-up for essential tremor.  Will arrange    We will see as needed

## 2019-08-10 LAB
ALBUMIN SERPL-MCNC: 3.7 G/DL (ref 3.5–5.2)
ALBUMIN/GLOB SERPL: 1.7 G/DL
ALP SERPL-CCNC: 98 U/L (ref 39–117)
ALT SERPL W P-5'-P-CCNC: 21 U/L (ref 1–41)
ANION GAP SERPL CALCULATED.3IONS-SCNC: 11.4 MMOL/L (ref 5–15)
AST SERPL-CCNC: 16 U/L (ref 1–40)
BASOPHILS # BLD AUTO: 0.01 10*3/MM3 (ref 0–0.2)
BASOPHILS NFR BLD AUTO: 0.1 % (ref 0–1.5)
BILIRUB SERPL-MCNC: 0.4 MG/DL (ref 0.2–1.2)
BUN BLD-MCNC: 21 MG/DL (ref 8–23)
BUN/CREAT SERPL: 24.1 (ref 7–25)
CALCIUM SPEC-SCNC: 9.4 MG/DL (ref 8.6–10.5)
CHLORIDE SERPL-SCNC: 111 MMOL/L (ref 98–107)
CO2 SERPL-SCNC: 18.6 MMOL/L (ref 22–29)
CREAT BLD-MCNC: 0.87 MG/DL (ref 0.76–1.27)
D-LACTATE SERPL-SCNC: 1.6 MMOL/L (ref 0.5–2)
DEPRECATED RDW RBC AUTO: 44.2 FL (ref 37–54)
EOSINOPHIL # BLD AUTO: 0 10*3/MM3 (ref 0–0.4)
EOSINOPHIL NFR BLD AUTO: 0 % (ref 0.3–6.2)
ERYTHROCYTE [DISTWIDTH] IN BLOOD BY AUTOMATED COUNT: 13.1 % (ref 12.3–15.4)
GFR SERPL CREATININE-BSD FRML MDRD: 88 ML/MIN/1.73
GLOBULIN UR ELPH-MCNC: 2.2 GM/DL
GLUCOSE BLD-MCNC: 180 MG/DL (ref 65–99)
GLUCOSE BLDC GLUCOMTR-MCNC: 183 MG/DL (ref 70–130)
GLUCOSE BLDC GLUCOMTR-MCNC: 184 MG/DL (ref 70–130)
GLUCOSE BLDC GLUCOMTR-MCNC: 193 MG/DL (ref 70–130)
GLUCOSE BLDC GLUCOMTR-MCNC: 215 MG/DL (ref 70–130)
GLUCOSE BLDC GLUCOMTR-MCNC: 229 MG/DL (ref 70–130)
GLUCOSE BLDC GLUCOMTR-MCNC: 246 MG/DL (ref 70–130)
HCT VFR BLD AUTO: 33.2 % (ref 37.5–51)
HGB BLD-MCNC: 10.4 G/DL (ref 13–17.7)
IMM GRANULOCYTES # BLD AUTO: 0.22 10*3/MM3 (ref 0–0.05)
IMM GRANULOCYTES NFR BLD AUTO: 1.3 % (ref 0–0.5)
LYMPHOCYTES # BLD AUTO: 0.61 10*3/MM3 (ref 0.7–3.1)
LYMPHOCYTES NFR BLD AUTO: 3.5 % (ref 19.6–45.3)
MAGNESIUM SERPL-MCNC: 2.3 MG/DL (ref 1.6–2.4)
MCH RBC QN AUTO: 28.7 PG (ref 26.6–33)
MCHC RBC AUTO-ENTMCNC: 31.3 G/DL (ref 31.5–35.7)
MCV RBC AUTO: 91.5 FL (ref 79–97)
MONOCYTES # BLD AUTO: 0.78 10*3/MM3 (ref 0.1–0.9)
MONOCYTES NFR BLD AUTO: 4.4 % (ref 5–12)
NEUTROPHILS # BLD AUTO: 15.94 10*3/MM3 (ref 1.7–7)
NEUTROPHILS NFR BLD AUTO: 90.7 % (ref 42.7–76)
NRBC BLD AUTO-RTO: 0 /100 WBC (ref 0–0.2)
PLATELET # BLD AUTO: 236 10*3/MM3 (ref 140–450)
PMV BLD AUTO: 12.4 FL (ref 6–12)
POTASSIUM BLD-SCNC: 4.4 MMOL/L (ref 3.5–5.2)
PROT SERPL-MCNC: 5.9 G/DL (ref 6–8.5)
RBC # BLD AUTO: 3.63 10*6/MM3 (ref 4.14–5.8)
SODIUM BLD-SCNC: 141 MMOL/L (ref 136–145)
WBC NRBC COR # BLD: 17.56 10*3/MM3 (ref 3.4–10.8)

## 2019-08-10 PROCEDURE — 83605 ASSAY OF LACTIC ACID: CPT | Performed by: EMERGENCY MEDICINE

## 2019-08-10 PROCEDURE — 94799 UNLISTED PULMONARY SVC/PX: CPT

## 2019-08-10 PROCEDURE — 85025 COMPLETE CBC W/AUTO DIFF WBC: CPT | Performed by: INTERNAL MEDICINE

## 2019-08-10 PROCEDURE — 63710000001 PREDNISONE PER 5 MG: Performed by: INTERNAL MEDICINE

## 2019-08-10 PROCEDURE — 80053 COMPREHEN METABOLIC PANEL: CPT | Performed by: INTERNAL MEDICINE

## 2019-08-10 PROCEDURE — 36415 COLL VENOUS BLD VENIPUNCTURE: CPT | Performed by: EMERGENCY MEDICINE

## 2019-08-10 PROCEDURE — 82962 GLUCOSE BLOOD TEST: CPT

## 2019-08-10 PROCEDURE — 63710000001 INSULIN LISPRO (HUMAN) PER 5 UNITS: Performed by: INTERNAL MEDICINE

## 2019-08-10 PROCEDURE — 25010000002 METHYLPREDNISOLONE PER 40 MG: Performed by: INTERNAL MEDICINE

## 2019-08-10 PROCEDURE — 83735 ASSAY OF MAGNESIUM: CPT | Performed by: INTERNAL MEDICINE

## 2019-08-10 PROCEDURE — 25010000002 ENOXAPARIN PER 10 MG: Performed by: INTERNAL MEDICINE

## 2019-08-10 RX ORDER — ATORVASTATIN CALCIUM 20 MG/1
40 TABLET, FILM COATED ORAL NIGHTLY
Status: DISCONTINUED | OUTPATIENT
Start: 2019-08-10 | End: 2019-08-12 | Stop reason: HOSPADM

## 2019-08-10 RX ADMIN — CARVEDILOL 25 MG: 25 TABLET, FILM COATED ORAL at 08:06

## 2019-08-10 RX ADMIN — PREDNISONE 30 MG: 20 TABLET ORAL at 15:24

## 2019-08-10 RX ADMIN — IPRATROPIUM BROMIDE AND ALBUTEROL SULFATE 3 ML: 2.5; .5 SOLUTION RESPIRATORY (INHALATION) at 14:03

## 2019-08-10 RX ADMIN — CARVEDILOL 25 MG: 25 TABLET, FILM COATED ORAL at 17:48

## 2019-08-10 RX ADMIN — METHYLPREDNISOLONE SODIUM SUCCINATE 40 MG: 40 INJECTION, POWDER, FOR SOLUTION INTRAMUSCULAR; INTRAVENOUS at 02:08

## 2019-08-10 RX ADMIN — TAMSULOSIN HYDROCHLORIDE 0.4 MG: 0.4 CAPSULE ORAL at 08:06

## 2019-08-10 RX ADMIN — PANTOPRAZOLE SODIUM 40 MG: 40 TABLET, DELAYED RELEASE ORAL at 06:45

## 2019-08-10 RX ADMIN — ATORVASTATIN CALCIUM 40 MG: 20 TABLET, FILM COATED ORAL at 20:39

## 2019-08-10 RX ADMIN — INSULIN LISPRO 4 UNITS: 100 INJECTION, SOLUTION INTRAVENOUS; SUBCUTANEOUS at 17:28

## 2019-08-10 RX ADMIN — IPRATROPIUM BROMIDE AND ALBUTEROL SULFATE 3 ML: 2.5; .5 SOLUTION RESPIRATORY (INHALATION) at 10:15

## 2019-08-10 RX ADMIN — INSULIN LISPRO 2 UNITS: 100 INJECTION, SOLUTION INTRAVENOUS; SUBCUTANEOUS at 12:19

## 2019-08-10 RX ADMIN — INSULIN LISPRO 2 UNITS: 100 INJECTION, SOLUTION INTRAVENOUS; SUBCUTANEOUS at 08:05

## 2019-08-10 RX ADMIN — INSULIN LISPRO 4 UNITS: 100 INJECTION, SOLUTION INTRAVENOUS; SUBCUTANEOUS at 20:50

## 2019-08-10 RX ADMIN — ENOXAPARIN SODIUM 40 MG: 40 INJECTION SUBCUTANEOUS at 17:28

## 2019-08-10 RX ADMIN — FLUOXETINE HYDROCHLORIDE 20 MG: 20 CAPSULE ORAL at 08:06

## 2019-08-10 RX ADMIN — SODIUM CHLORIDE, PRESERVATIVE FREE 3 ML: 5 INJECTION INTRAVENOUS at 20:39

## 2019-08-10 RX ADMIN — ASPIRIN 81 MG: 81 TABLET, COATED ORAL at 08:06

## 2019-08-10 RX ADMIN — SODIUM CHLORIDE, PRESERVATIVE FREE 3 ML: 5 INJECTION INTRAVENOUS at 08:07

## 2019-08-10 RX ADMIN — AMLODIPINE BESYLATE 10 MG: 10 TABLET ORAL at 08:06

## 2019-08-10 RX ADMIN — METHYLPREDNISOLONE SODIUM SUCCINATE 40 MG: 40 INJECTION, POWDER, FOR SOLUTION INTRAMUSCULAR; INTRAVENOUS at 10:49

## 2019-08-10 RX ADMIN — IPRATROPIUM BROMIDE AND ALBUTEROL SULFATE 3 ML: 2.5; .5 SOLUTION RESPIRATORY (INHALATION) at 06:23

## 2019-08-10 RX ADMIN — IPRATROPIUM BROMIDE AND ALBUTEROL SULFATE 3 ML: 2.5; .5 SOLUTION RESPIRATORY (INHALATION) at 19:24

## 2019-08-10 RX ADMIN — NICOTINE 1 PATCH: 21 PATCH, EXTENDED RELEASE TRANSDERMAL at 17:28

## 2019-08-10 RX ADMIN — QUETIAPINE FUMARATE 100 MG: 100 TABLET ORAL at 20:38

## 2019-08-10 NOTE — PLAN OF CARE
Problem: Fall Risk (Adult)  Goal: Absence of Fall  Outcome: Ongoing (interventions implemented as appropriate)   08/10/19 1803   Fall Risk (Adult)   Absence of Fall making progress toward outcome       Problem: Skin Injury Risk (Adult)  Goal: Skin Health and Integrity  Outcome: Ongoing (interventions implemented as appropriate)   08/10/19 1803   Skin Injury Risk (Adult)   Skin Health and Integrity making progress toward outcome       Problem: Patient Care Overview  Goal: Plan of Care Review  Outcome: Ongoing (interventions implemented as appropriate)   08/10/19 1803   Coping/Psychosocial   Plan of Care Reviewed With patient   OTHER   Outcome Summary pt sats stable on NC3.5L, no c/o of pain, bradycardic, order received to transfer and possible going home tomorrow   Plan of Care Review   Progr   08/10/19 1803   Coping/Psychosocial   Plan of Care Reviewed With patient   OTHER   Outcome Summary pt sats stable on NC3.5L, no c/o of pain, bradycardic, order received to transfer and possible going home tomorrow   Plan of Care Review   Progress improving   ess improving       Problem: Breathing Pattern Ineffective (Adult)  Goal: Effective Oxygenation/Ventilation  Outcome: Ongoing (interventions implemented as appropriate)  Pt is still activity intolerant, soa on minimal exertion, make it concerning if going home w/o O2 use   08/10/19 1803   Breathing Pattern Ineffective (Adult)   Effective Oxygenation/Ventilation other (see comments)     Goal: Anxiety/Fear Reduction  Outcome: Ongoing (interventions implemented as appropriate)  Discussed with pt about self-care after going home, empathy towards family member loss, suggested seeking for help, pt agreed with it.    08/10/19 1803   Breathing Pattern Ineffective (Adult)   Anxiety/Fear Reduction making progress toward outcome

## 2019-08-10 NOTE — PLAN OF CARE
Problem: Fall Risk (Adult)  Goal: Absence of Fall  Outcome: Ongoing (interventions implemented as appropriate)   08/10/19 0653   Fall Risk (Adult)   Absence of Fall making progress toward outcome       Problem: Skin Injury Risk (Adult)  Goal: Skin Health and Integrity  Outcome: Ongoing (interventions implemented as appropriate)   08/10/19 0653   Skin Injury Risk (Adult)   Skin Health and Integrity making progress toward outcome       Problem: Patient Care Overview  Goal: Plan of Care Review  Outcome: Ongoing (interventions implemented as appropriate)   08/10/19 0653   Coping/Psychosocial   Plan of Care Reviewed With patient   OTHER   Outcome Summary Pt tolerating bipap, no c/o of pain, bradycardic overnight. Should be able to go home.    Plan of Care Review   Progress improving       Problem: Breathing Pattern Ineffective (Adult)  Goal: Effective Oxygenation/Ventilation  Outcome: Ongoing (interventions implemented as appropriate)   08/10/19 0653   Breathing Pattern Ineffective (Adult)   Effective Oxygenation/Ventilation making progress toward outcome

## 2019-08-10 NOTE — PROGRESS NOTES
Dr. KEELY Hernadez    [facility] INTENSIVE CARE    8/10/2019    Patient ID:  Name:  Felicita Mike  MRN:  [id number]  1950  68 y.o.  male            CC/Reason for visit: COPD exacerbation, acute respiratory failure    Interval hx: The patient feels better.  He is still wheezing.  Not short of breath at rest.  On oxygen.    ROS: Negative for fever.  Has a cough but no sputum    Vitals:  Vitals:    08/10/19 1005 08/10/19 1015 08/10/19 1100 08/10/19 1205   BP: 95/82  116/78 136/93   BP Location: Left arm  Right arm Right arm   Patient Position: Sitting  Lying Sitting   Pulse: 61 51 56 56   Resp: 16 20 18 20   Temp:       TempSrc:       SpO2: 91% 93% 96% 92%   Weight:       Height:               Body mass index is 23.02 kg/m².    Intake/Output Summary (Last 24 hours) at 8/10/2019 1301  Last data filed at 8/10/2019 0545  Gross per 24 hour   Intake 2366 ml   Output --   Net 2366 ml       Exam:  GEN:  No distress  Alert, oriented x 3.   LUNGS: Bilateral wheezing today but less labored, no use of accessory muscles  CV:  Normal S1S2, without murmur, no edema  ABD:  Non tender, no enlarged liver or masses      Scheduled meds:    amLODIPine 10 mg Oral Daily   aspirin 81 mg Oral Daily   atorvastatin 80 mg Oral Nightly   carvedilol 25 mg Oral BID With Meals   enoxaparin 40 mg Subcutaneous Q24H   FLUoxetine 20 mg Oral Daily   insulin lispro 0-9 Units Subcutaneous 4x Daily With Meals & Nightly   ipratropium-albuterol 3 mL Nebulization 4x Daily - RT   methylPREDNISolone sodium succinate 40 mg Intravenous Q8H   nicotine 1 patch Transdermal Q24H   pantoprazole 40 mg Oral QAM   QUEtiapine 100 mg Oral Nightly   sodium chloride 3 mL Intravenous Q12H   tamsulosin 0.4 mg Oral Daily     IV meds:                           Data Review:   I reviewed the patient's medications and new clinical results.  Lab Results   Component Value Date    CALCIUM 9.4 08/10/2019    PHOS 3.8 08/09/2019    MG 2.3 08/10/2019    MG 1.5 (L) 08/09/2019     MG 1.4 (L) 08/08/2019     Results from last 7 days   Lab Units 08/10/19  0515 08/09/19  0426 08/08/19  1213   SODIUM mmol/L 141 145 141   POTASSIUM mmol/L 4.4 4.6 5.4*   CHLORIDE mmol/L 111* 113* 105   CO2 mmol/L 18.6* 16.5* 19.1*   BUN mg/dL 21 25* 31*   CREATININE mg/dL 0.87 1.08 1.55*   CALCIUM mg/dL 9.4 9.8 11.6*   BILIRUBIN mg/dL 0.4  --  0.8   ALK PHOS U/L 98  --  153*   ALT (SGPT) U/L 21  --  36   AST (SGOT) U/L 16  --  21   GLUCOSE mg/dL 180* 136* 182*   WBC 10*3/mm3 17.56*  --  27.24*   HEMOGLOBIN g/dL 10.4*  --  13.3   PLATELETS 10*3/mm3 236  --  354   INR   --   --  0.95   PROBNP pg/mL  --   --  471.0   PROCALCITONIN ng/mL  --   --  0.10     Results from last 7 days   Lab Units 08/08/19  1227 08/08/19  1213   BLOODCX  No growth at 2 days No growth at 2 days     Results from last 7 days   Lab Units 08/08/19  1653   ADENOVIRUS DETECTION BY PCR  Not Detected   CORONAVIRUS 229E  Not Detected   CORONAVIRUS HKU1  Not Detected   CORONAVIRUS NL63  Not Detected   CORONAVIRUS OC43  Not Detected   HUMAN METAPNEUMOVIRUS  Not Detected   HUMAN RHINOVIRUS/ENTEROVIRUS  Not Detected   INFLUENZA B PCR  Not Detected   PARAINFLUENZA 1  Not Detected   PARAINFLUENZA VIRUS 2  Not Detected   PARAINFLUENZA VIRUS 3  Not Detected   PARAINFLUENZA VIRUS 4  Not Detected   BORDETELLA PERTUSSIS PCR  Not Detected   CHLAMYDOPHILA PNEUMONIAE PCR  Not Detected   MYCOPLAMA PNEUMO PCR  Not Detected   INFLUENZA A PCR  Not Detected   INFLUENZA A H3  Not Detected   INFLUENZA A H1  Not Detected   RSV, PCR  Not Detected       Results from last 7 days   Lab Units 08/08/19  1213   TROPONIN T ng/mL <0.010     Results from last 7 days   Lab Units 08/08/19  1629 08/08/19  1254   PH, ARTERIAL pH units 7.269* 7.367   PCO2, ARTERIAL mm Hg 29.3* 26.7*   PO2 ART mm Hg 132.0* 168.0*   MODALITY  BiPap BiPap   O2 SATURATION CALC % 98.6 99.5*       Estimated Creatinine Clearance: 91 mL/min (by C-G formula based on SCr of 0.87 mg/dL).      ASSESSMENT:   Acute  respiratory failure with hypoxia (CMS/HCC)  COPD exacerbation  Sepsis  Essential tremors  Acute kidney injury  Diabetes mellitus type 2 with hyperglycemia  Hyperkalemia  Low magnesium  Hypercalcemia      PLAN:  Transfer out of ICU today.  Continue weaning oxygen as tolerated.  Switch intravenous steroids to oral steroids.  Continue insulin sliding scale for control of hyperglycemia.  Recheck magnesium and replace if needed.          Shay Hernadez MD  8/10/2019

## 2019-08-11 LAB
ANION GAP SERPL CALCULATED.3IONS-SCNC: 12.1 MMOL/L (ref 5–15)
BUN BLD-MCNC: 23 MG/DL (ref 8–23)
BUN/CREAT SERPL: 21.7 (ref 7–25)
CALCIUM SPEC-SCNC: 9.7 MG/DL (ref 8.6–10.5)
CHLORIDE SERPL-SCNC: 115 MMOL/L (ref 98–107)
CO2 SERPL-SCNC: 16.9 MMOL/L (ref 22–29)
CREAT BLD-MCNC: 1.06 MG/DL (ref 0.76–1.27)
GFR SERPL CREATININE-BSD FRML MDRD: 70 ML/MIN/1.73
GLUCOSE BLD-MCNC: 162 MG/DL (ref 65–99)
GLUCOSE BLDC GLUCOMTR-MCNC: 155 MG/DL (ref 70–130)
GLUCOSE BLDC GLUCOMTR-MCNC: 179 MG/DL (ref 70–130)
GLUCOSE BLDC GLUCOMTR-MCNC: 195 MG/DL (ref 70–130)
GLUCOSE BLDC GLUCOMTR-MCNC: 215 MG/DL (ref 70–130)
MAGNESIUM SERPL-MCNC: 1.7 MG/DL (ref 1.6–2.4)
NT-PROBNP SERPL-MCNC: 3721 PG/ML (ref 5–900)
POTASSIUM BLD-SCNC: 4.2 MMOL/L (ref 3.5–5.2)
SODIUM BLD-SCNC: 144 MMOL/L (ref 136–145)

## 2019-08-11 PROCEDURE — 25010000002 ENOXAPARIN PER 10 MG: Performed by: INTERNAL MEDICINE

## 2019-08-11 PROCEDURE — 94660 CPAP INITIATION&MGMT: CPT

## 2019-08-11 PROCEDURE — 83735 ASSAY OF MAGNESIUM: CPT | Performed by: INTERNAL MEDICINE

## 2019-08-11 PROCEDURE — 94799 UNLISTED PULMONARY SVC/PX: CPT

## 2019-08-11 PROCEDURE — 63710000001 PREDNISONE PER 5 MG: Performed by: INTERNAL MEDICINE

## 2019-08-11 PROCEDURE — 83880 ASSAY OF NATRIURETIC PEPTIDE: CPT | Performed by: INTERNAL MEDICINE

## 2019-08-11 PROCEDURE — 82962 GLUCOSE BLOOD TEST: CPT

## 2019-08-11 PROCEDURE — 63710000001 INSULIN LISPRO (HUMAN) PER 5 UNITS: Performed by: INTERNAL MEDICINE

## 2019-08-11 PROCEDURE — 63710000001 PREDNISONE PER 1 MG: Performed by: INTERNAL MEDICINE

## 2019-08-11 PROCEDURE — 80048 BASIC METABOLIC PNL TOTAL CA: CPT | Performed by: INTERNAL MEDICINE

## 2019-08-11 RX ADMIN — IPRATROPIUM BROMIDE AND ALBUTEROL SULFATE 3 ML: 2.5; .5 SOLUTION RESPIRATORY (INHALATION) at 20:45

## 2019-08-11 RX ADMIN — FLUOXETINE HYDROCHLORIDE 20 MG: 20 CAPSULE ORAL at 08:32

## 2019-08-11 RX ADMIN — QUETIAPINE FUMARATE 100 MG: 100 TABLET ORAL at 20:30

## 2019-08-11 RX ADMIN — NICOTINE 1 PATCH: 21 PATCH, EXTENDED RELEASE TRANSDERMAL at 17:35

## 2019-08-11 RX ADMIN — ATORVASTATIN CALCIUM 40 MG: 20 TABLET, FILM COATED ORAL at 20:29

## 2019-08-11 RX ADMIN — TAMSULOSIN HYDROCHLORIDE 0.4 MG: 0.4 CAPSULE ORAL at 08:32

## 2019-08-11 RX ADMIN — PREDNISONE 30 MG: 20 TABLET ORAL at 08:32

## 2019-08-11 RX ADMIN — INSULIN LISPRO 4 UNITS: 100 INJECTION, SOLUTION INTRAVENOUS; SUBCUTANEOUS at 17:34

## 2019-08-11 RX ADMIN — INSULIN LISPRO 2 UNITS: 100 INJECTION, SOLUTION INTRAVENOUS; SUBCUTANEOUS at 08:32

## 2019-08-11 RX ADMIN — AMLODIPINE BESYLATE 10 MG: 10 TABLET ORAL at 09:21

## 2019-08-11 RX ADMIN — CARVEDILOL 25 MG: 25 TABLET, FILM COATED ORAL at 17:35

## 2019-08-11 RX ADMIN — IPRATROPIUM BROMIDE AND ALBUTEROL SULFATE 3 ML: 2.5; .5 SOLUTION RESPIRATORY (INHALATION) at 12:34

## 2019-08-11 RX ADMIN — IPRATROPIUM BROMIDE AND ALBUTEROL SULFATE 3 ML: 2.5; .5 SOLUTION RESPIRATORY (INHALATION) at 08:25

## 2019-08-11 RX ADMIN — PANTOPRAZOLE SODIUM 40 MG: 40 TABLET, DELAYED RELEASE ORAL at 06:47

## 2019-08-11 RX ADMIN — SODIUM CHLORIDE, PRESERVATIVE FREE 3 ML: 5 INJECTION INTRAVENOUS at 20:30

## 2019-08-11 RX ADMIN — INSULIN LISPRO 2 UNITS: 100 INJECTION, SOLUTION INTRAVENOUS; SUBCUTANEOUS at 23:49

## 2019-08-11 RX ADMIN — ASPIRIN 81 MG: 81 TABLET, COATED ORAL at 08:32

## 2019-08-11 RX ADMIN — CARVEDILOL 25 MG: 25 TABLET, FILM COATED ORAL at 08:33

## 2019-08-11 RX ADMIN — IPRATROPIUM BROMIDE AND ALBUTEROL SULFATE 3 ML: 2.5; .5 SOLUTION RESPIRATORY (INHALATION) at 16:08

## 2019-08-11 RX ADMIN — ENOXAPARIN SODIUM 40 MG: 40 INJECTION SUBCUTANEOUS at 17:37

## 2019-08-11 RX ADMIN — INSULIN LISPRO 2 UNITS: 100 INJECTION, SOLUTION INTRAVENOUS; SUBCUTANEOUS at 12:22

## 2019-08-11 NOTE — PLAN OF CARE
Problem: Patient Care Overview  Goal: Plan of Care Review  Outcome: Ongoing (interventions implemented as appropriate)   08/11/19 6844   Coping/Psychosocial   Plan of Care Reviewed With patient   OTHER   Outcome Summary VSS. Telemetry monitor, SR/SB. Up with assist x1. Shortness of breath with exertion. Will continue to monitor.    Plan of Care Review   Progress improving

## 2019-08-11 NOTE — PLAN OF CARE
Problem: Fall Risk (Adult)  Goal: Absence of Fall  Outcome: Outcome(s) achieved Date Met: 08/11/19      Problem: Patient Care Overview  Goal: Plan of Care Review  Outcome: Ongoing (interventions implemented as appropriate)   08/11/19 0331   Coping/Psychosocial   Plan of Care Reviewed With patient   OTHER   Outcome Summary Patient resting well. MInimal shortness of breath with exertion. Denies any pain. Vital signs as charted, Fall precautions enforced. NIPPV at HS.O2 5L when awake. Monitored,       Problem: Breathing Pattern Ineffective (Adult)  Goal: Effective Oxygenation/Ventilation  Outcome: Ongoing (interventions implemented as appropriate)

## 2019-08-11 NOTE — PROGRESS NOTES
PROGRESS NOTE  Patient Name: Felicita Mike  Age/Sex: 68 y.o. male  : 1950  MRN: [id number]    Date of Admission: 2019  Date of Encounter Visit: 19   LOS: 3 days   Patient Care Team:  Alexandrea Jimenez PA-C as PCP - General (Family Medicine)  Juanito Perdue Jr., MD as PCP - Claims Attributed  Ivette Franco MD as Consulting Physician (Gastroenterology)  Suleiman Anne MD as Consulting Physician (Cardiology)  Brennan Martinez MD as Consulting Physician (Urology)  Juanito Perdue Jr., MD as Consulting Physician (Hematology and Oncology)  Alexnadrea Jimenez PA-C as Referring Physician (Family Medicine)  Suleiman Anne MD as Consulting Physician (Cardiology)    Chief ComplaInt: patient would like to go home to take care of his dog     Hospital course: Admitted with COPD exacerbation respiratory failure    Interval History: Patient is doing better, he is down to 3 L nasal cannula oxygen, is using the noninvasive ventilator mainly at night, he is still very weak however needing assistance to go to the bathroom.  Home setting is complicated given that he lives alone with his dog, the dog has been home alone since he came here 3 days ago and he is eager to go home to care for hisbe loved animal.  He denies any nausea or vomiting, he has been afebrile, he is gradually getting better.    REVIEW OF SYSTEMS:   CONSTITUTIONAL: no fever or chills, tremulous  CARDIOVASCULAR: No chest pain, chest pressure or chest discomfort. No palpitations or edema.   RESPIRATORY: Improving shortness of breath, minimal cough, no productive secretions .   GASTROINTESTINAL: No anorexia, nausea, vomiting or diarrhea. No abdominal pain or blood.   HEMATOLOGIC: No bleeding or bruising.     Ventilator/Non-Invasive Ventilation Settings (From admission, onward)    Start     Ordered    19 1208  NIPPV (CPAP or BIPAP)  Until Discontinued     Question Answer Comment   Type: BIPAP    NIPPV Mask Interface: Full Face Mask    IPAP 15   "  EPAP 5        08/08/19 1207            Vital Signs  Temp:  [97.4 °F (36.3 °C)-97.7 °F (36.5 °C)] 97.7 °F (36.5 °C)  Heart Rate:  [45-83] 67  Resp:  [16-20] 20  BP: (100-168)/(60-88) 156/86  SpO2:  [90 %-98 %] 94 %  on  Flow (L/min):  [3-5] 3 Device (Oxygen Therapy): nasal cannula    Intake/Output Summary (Last 24 hours) at 8/11/2019 1432  Last data filed at 8/11/2019 0648  Gross per 24 hour   Intake 1300 ml   Output --   Net 1300 ml     Flowsheet Rows      First Filed Value   Admission Height  182.9 cm (72\") Documented at 08/08/2019 1207   Admission Weight  77.1 kg (170 lb) Documented at 08/08/2019 1207        Body mass index is 25.53 kg/m².      08/09/19  0537 08/10/19  0545 08/10/19  1916   Weight: 74.9 kg (165 lb 2 oz) 77 kg (169 lb 12.1 oz) 76.2 kg (167 lb 14.4 oz)       Physical Exam:  GEN:  No acute distress, alert, cooperative, well developed, very weak, tremulous, on nasal cannula oxygen during the daytime and BiPAP at night  EYES:   Sclera clear. No icterus. PERRL. Normal EOM  ENT:   External ears/nose normal, no oral lesions, no thrush, mucous membranes moist  NECK:  Supple, midline trachea, no JVD  LUNGS: Normal chest on inspection, diminished breath sounds, there is no significant wheezing with minimal and expiratory wheezing that can be heard occasionally, no crackles. Respirations regular, even and unlabored.   CV:  Regular rhythm and rate. Normal S1/S2. No murmurs, gallops, or rubs noted.  ABD:  Soft, non-tender and non-distended. Normal bowel sounds. No guarding  EXT:  Moves all extremities well. No cyanosis. No redness. No edema.   Skin: dry, intact, no bleeding    Results Review:      Results from last 7 days   Lab Units 08/11/19  0531 08/10/19  0515 08/09/19  0426 08/08/19  1213   SODIUM mmol/L 144 141 145 141   POTASSIUM mmol/L 4.2 4.4 4.6 5.4*   CHLORIDE mmol/L 115* 111* 113* 105   CO2 mmol/L 16.9* 18.6* 16.5* 19.1*   BUN mg/dL 23 21 25* 31*   CREATININE mg/dL 1.06 0.87 1.08 1.55*   CALCIUM " mg/dL 9.7 9.4 9.8 11.6*   AST (SGOT) U/L  --  16  --  21   ALT (SGPT) U/L  --  21  --  36   ANION GAP mmol/L 12.1 11.4 15.5* 16.9*   ALBUMIN g/dL  --  3.70  --  5.10     Results from last 7 days   Lab Units 08/08/19  1213   TROPONIN T ng/mL <0.010         Results from last 7 days   Lab Units 08/11/19  0531 08/08/19  1213   PROBNP pg/mL 3,721.0* 471.0     Results from last 7 days   Lab Units 08/10/19  0515 08/08/19  1213   WBC 10*3/mm3 17.56* 27.24*   HEMOGLOBIN g/dL 10.4* 13.3   HEMATOCRIT % 33.2* 41.9   PLATELETS 10*3/mm3 236 354   MCV fL 91.5 91.3   NEUTROPHIL % % 90.7* 88.2*   LYMPHOCYTE % % 3.5* 4.7*   MONOCYTES % % 4.4* 5.8   EOSINOPHIL % % 0.0* 0.1*   BASOPHIL % % 0.1 0.4   IMM GRAN % % 1.3* 0.8*     Results from last 7 days   Lab Units 08/08/19  1213   INR  0.95     Results from last 7 days   Lab Units 08/11/19  0531 08/10/19  0515 08/09/19  0426 08/08/19  1213   MAGNESIUM mg/dL 1.7 2.3 1.5* 1.4*           Invalid input(s): LDLCALC  Results from last 7 days   Lab Units 08/08/19  1629 08/08/19  1254   PH, ARTERIAL pH units 7.269* 7.367   PCO2, ARTERIAL mm Hg 29.3* 26.7*   PO2 ART mm Hg 132.0* 168.0*   HCO3 ART mmol/L 13.5* 15.3*         Glucose   Date/Time Value Ref Range Status   08/11/2019 1055 179 (H) 70 - 130 mg/dL Final   08/11/2019 0614 155 (H) 70 - 130 mg/dL Final   08/10/2019 2041 215 (H) 70 - 130 mg/dL Final   08/10/2019 1920 246 (H) 70 - 130 mg/dL Final   08/10/2019 1701 229 (H) 70 - 130 mg/dL Final   08/10/2019 1212 193 (H) 70 - 130 mg/dL Final   08/10/2019 0744 183 (H) 70 - 130 mg/dL Final   08/10/2019 0002 184 (H) 70 - 130 mg/dL Final     Results from last 7 days   Lab Units 08/10/19  2136 08/09/19  0020 08/08/19  1644 08/08/19  1213   PROCALCITONIN ng/mL  --   --   --  0.10   LACTATE mmol/L 1.6 0.6 1.9 3.4*     Results from last 7 days   Lab Units 08/08/19  1227 08/08/19  1213   BLOODCX  No growth at 3 days No growth at 3 days         Results from last 7 days   Lab Units 08/08/19  1653    ADENOVIRUS DETECTION BY PCR  Not Detected   CORONAVIRUS 229E  Not Detected   CORONAVIRUS HKU1  Not Detected   CORONAVIRUS NL63  Not Detected   CORONAVIRUS OC43  Not Detected   HUMAN METAPNEUMOVIRUS  Not Detected   HUMAN RHINOVIRUS/ENTEROVIRUS  Not Detected   INFLUENZA B PCR  Not Detected   PARAINFLUENZA 1  Not Detected   PARAINFLUENZA VIRUS 2  Not Detected   PARAINFLUENZA VIRUS 3  Not Detected   PARAINFLUENZA VIRUS 4  Not Detected   BORDETELLA PERTUSSIS PCR  Not Detected   CHLAMYDOPHILA PNEUMONIAE PCR  Not Detected   MYCOPLAMA PNEUMO PCR  Not Detected   INFLUENZA A PCR  Not Detected   INFLUENZA A H3  Not Detected   INFLUENZA A H1  Not Detected   RSV, PCR  Not Detected           Imaging:   Imaging Results (all)     Procedure Component Value Units Date/Time    XR Chest 1 View [[id number]] Collected:  08/08/19 1257     Updated:  08/08/19 1301    Narrative:       XR CHEST 1 VW-  08/08/2019     HISTORY: Shortness of breath.     Heart size is within normal limits. Lungs appear free of acute  infiltrates. There are degenerative changes in the spine. There is an     Impression:       No acute process.     This report was finalized on 8/8/2019 12:58 PM by Dr. Sajan Golden M.D.             I reviewed the patient's new clinical results.  I personally viewed and interpreted the patient's imaging results:        Medication Review:     amLODIPine 10 mg Oral Daily   aspirin 81 mg Oral Daily   atorvastatin 40 mg Oral Nightly   carvedilol 25 mg Oral BID With Meals   enoxaparin 40 mg Subcutaneous Q24H   FLUoxetine 20 mg Oral Daily   insulin lispro 0-9 Units Subcutaneous 4x Daily With Meals & Nightly   ipratropium-albuterol 3 mL Nebulization 4x Daily - RT   nicotine 1 patch Transdermal Q24H   pantoprazole 40 mg Oral QAM   predniSONE 30 mg Oral Daily With Breakfast   QUEtiapine 100 mg Oral Nightly   sodium chloride 3 mL Intravenous Q12H   tamsulosin 0.4 mg Oral Daily            ASSESSMENT:   1. Acute respiratory failure with  hypoxia (CMS/HCC)  2. COPD exacerbation  3. Sepsis  4. Essential tremors  5. Acute kidney injury  6. Diabetes mellitus type 2 with hyperglycemia  7. Hyperkalemia  8. Low magnesium  9. Hypercalcemia    PLAN:  Continue with the p.o. prednisone at the same dose  Wean oxygen as tolerated  Need to assess for oxygen need at the time of discharge, will try to discharge tomorrow in the morning if no worsening of the IV steroids and depending on his oxygen requirement will arrange for the oxygen at home.  Patient has CPAP at home which she is to continue to use after discharge    Discussed with patient in details, he is agreeable to wait until tomorrow morning    Disposition:     Alexi Luna MD  08/11/19  2:32 PM           Dictated utilizing Dragon dictation

## 2019-08-12 VITALS
HEART RATE: 72 BPM | WEIGHT: 167.9 LBS | SYSTOLIC BLOOD PRESSURE: 164 MMHG | OXYGEN SATURATION: 92 % | BODY MASS INDEX: 25.45 KG/M2 | HEIGHT: 68 IN | TEMPERATURE: 97.6 F | RESPIRATION RATE: 20 BRPM | DIASTOLIC BLOOD PRESSURE: 83 MMHG

## 2019-08-12 LAB
GLUCOSE BLDC GLUCOMTR-MCNC: 116 MG/DL (ref 70–130)
GLUCOSE BLDC GLUCOMTR-MCNC: 155 MG/DL (ref 70–130)

## 2019-08-12 PROCEDURE — 82962 GLUCOSE BLOOD TEST: CPT

## 2019-08-12 PROCEDURE — 63710000001 PREDNISONE PER 5 MG: Performed by: INTERNAL MEDICINE

## 2019-08-12 PROCEDURE — 94618 PULMONARY STRESS TESTING: CPT

## 2019-08-12 PROCEDURE — 94799 UNLISTED PULMONARY SVC/PX: CPT

## 2019-08-12 PROCEDURE — 63710000001 INSULIN LISPRO (HUMAN) PER 5 UNITS: Performed by: INTERNAL MEDICINE

## 2019-08-12 PROCEDURE — 63710000001 PREDNISONE PER 1 MG: Performed by: INTERNAL MEDICINE

## 2019-08-12 RX ORDER — TAMSULOSIN HYDROCHLORIDE 0.4 MG/1
1 CAPSULE ORAL DAILY
Qty: 30 CAPSULE | Refills: 0 | Status: SHIPPED | OUTPATIENT
Start: 2019-08-12 | End: 2020-04-22 | Stop reason: SDUPTHER

## 2019-08-12 RX ORDER — NICOTINE 21 MG/24HR
1 PATCH, TRANSDERMAL 24 HOURS TRANSDERMAL EVERY 24 HOURS
Qty: 30 PATCH | Refills: 0 | Status: SHIPPED | OUTPATIENT
Start: 2019-08-12 | End: 2020-03-05 | Stop reason: HOSPADM

## 2019-08-12 RX ORDER — PREDNISONE 10 MG/1
30 TABLET ORAL
Qty: 3 TABLET | Refills: 0 | Status: SHIPPED | OUTPATIENT
Start: 2019-08-13 | End: 2019-08-16

## 2019-08-12 RX ORDER — IPRATROPIUM BROMIDE AND ALBUTEROL SULFATE 2.5; .5 MG/3ML; MG/3ML
3 SOLUTION RESPIRATORY (INHALATION)
Qty: 360 ML | Refills: 1 | Status: SHIPPED | OUTPATIENT
Start: 2019-08-12 | End: 2020-07-06

## 2019-08-12 RX ADMIN — PREDNISONE 30 MG: 20 TABLET ORAL at 09:33

## 2019-08-12 RX ADMIN — FLUOXETINE HYDROCHLORIDE 20 MG: 20 CAPSULE ORAL at 09:32

## 2019-08-12 RX ADMIN — INSULIN LISPRO 2 UNITS: 100 INJECTION, SOLUTION INTRAVENOUS; SUBCUTANEOUS at 12:22

## 2019-08-12 RX ADMIN — ASPIRIN 81 MG: 81 TABLET, COATED ORAL at 09:32

## 2019-08-12 RX ADMIN — SODIUM CHLORIDE, PRESERVATIVE FREE 3 ML: 5 INJECTION INTRAVENOUS at 09:33

## 2019-08-12 RX ADMIN — TAMSULOSIN HYDROCHLORIDE 0.4 MG: 0.4 CAPSULE ORAL at 09:35

## 2019-08-12 RX ADMIN — IPRATROPIUM BROMIDE AND ALBUTEROL SULFATE 3 ML: 2.5; .5 SOLUTION RESPIRATORY (INHALATION) at 11:23

## 2019-08-12 RX ADMIN — AMLODIPINE BESYLATE 10 MG: 10 TABLET ORAL at 09:32

## 2019-08-12 RX ADMIN — CARVEDILOL 25 MG: 25 TABLET, FILM COATED ORAL at 09:33

## 2019-08-12 RX ADMIN — PANTOPRAZOLE SODIUM 40 MG: 40 TABLET, DELAYED RELEASE ORAL at 09:32

## 2019-08-12 RX ADMIN — IPRATROPIUM BROMIDE AND ALBUTEROL SULFATE 3 ML: 2.5; .5 SOLUTION RESPIRATORY (INHALATION) at 07:45

## 2019-08-12 NOTE — PLAN OF CARE
Problem: Patient Care Overview  Goal: Plan of Care Review   08/12/19 0645   Coping/Psychosocial   Plan of Care Reviewed With patient   OTHER   Outcome Summary Has slept long intervals. Denies pain. Soa with exertion. No distress. used Bipap approx 5hr   Plan of Care Review   Progress improving

## 2019-08-12 NOTE — DISCHARGE SUMMARY
DISCHARGE SUMMARY    Patient Name: Felicita Mike  Age/Sex: 68 y.o. male  : 1950  MRN: [id number]  Patient Care Team:  Alexandrea Jimenez PA-C as PCP - General (Family Medicine)  Juanito Perdue Jr., MD as PCP - Claims Attributed  Ivette Franco MD as Consulting Physician (Gastroenterology)  Suleiman Anne MD as Consulting Physician (Cardiology)  Brennan Martinez MD as Consulting Physician (Urology)  Juanito Perdue Jr., MD as Consulting Physician (Hematology and Oncology)  Alexandrea Jimenez PA-C as Referring Physician (Family Medicine)  Suleiman Anne MD as Consulting Physician (Cardiology)       Date of Admit: 2019  Date of Discharge:  19  Discharge Condition: Good    Discharge Diagnoses:  1. Acute respiratory failure with hypoxia (CMS/[facility])  2. COPD exacerbation  3. Sepsis  4. Essential tremors  5. Acute kidney injury  6. Diabetes mellitus type 2 with hyperglycemia  7. Hyperkalemia  8. Low magnesium  9. Hypercalcemia      History of present illness from H&P from 2019: per dr lorena bruno  Mr. Mike is a 67yo WM with a history of COPD and ongoing tobacco abuse.  He has karen as well.  He presents to the er with about a week history of increasing shortness of breath, it became severe and did improve with nebs and use of pap therapy.  He notices it is worse with exertion.  He has had increased cough but hasn't noticed any increased sputum production.  He has had recently increased rhinitis.  He is unaware of any sick contacts.  When he is doing well he reports he gets short of breath after walking about a block and with stairs.  He doesn't normally require oxygen therapy.  He has a severe resting tremor which he says he has had for sometime.    Hospital Course:   Felicita Mike presented to [facility] with complaints of shortness of breath requiring frequent nebulizer treatment and oxygen supplementation and noninvasive positive pressure ventilation in order to  stabilize.  No invasive ventilation was required.  Patient uses CPAP at home but he does not have any home oxygen.  Patient was treated also with IV steroids, he did improve over the hospital course, on the day of discharge, he did have walking oximetry that showed no more hypoxemia, he was looking much better clinically, he was talking in full sentences on room air with good p.o. intake, able to walk with minimal or no need for any assistance.  Patient is on nicotine patch which he was agreeable to take after discharge in order to help abstain from smoking, he is on the prednisone and will be continued for a few more days before it can be discontinued.  Overall doing much better looking better and requesting to go home with no contraindication from the medical/pulmonary standpoint.  Proper education provided about the need to abstain from smoking given his advanced lung disease.  Patient verbalized understanding the importance of the above.    Consults:   IP CONSULT TO PULMONOLOGY  IP CONSULT TO NEUROLOGY    Significant Discharge Diagnostics   Procedures Performed:    Exercise Oximetry/ Date: 08/12/19   Patient Name:Felicita Mike   MRN: [id number]                                                                                                          ROOM AIR BASELINE   SpO2%- 92   Heart Rate- 81   Blood Pressure       EXERCISE ON ROOM AIR SpO2% EXERCISE ON O2 @  LPM SpO2%   1 MINUTE 93 1 MINUTE     2 MINUTES 94 2 MINUTES     3 MINUTES 96 3 MINUTES     4 MINUTES 96 4 MINUTES     5 MINUTES 95 5 MINUTES     6 MINUTES 96 6 MINUTES                                                                                                                   Distance Walked - 6 mins Distance Walked   Dyspnea (Keith Scale)   Dyspnea (Keith Scale)   Fatigue (Keith Scale)   Fatigue (Keith Scale)   SpO2% Post Exercise - 96 SpO2% Post Exercise   HR Post Exercise - 100 HR Post Exercise   Time to Recovery   Time to Recovery      Comments:  Patient ambulated the nursing unit for 6 mins on room air.  Lowest sat was 92%.  No oxygen required and patient was mildly exerted and SOA.  Upon return to room, patient was able to stay off oxygen cannula and maintain sats at 96%.  RICHARD Mendoza, RRT             Pertinent Lab Results:  Results from last 7 days   Lab Units 08/11/19  0531 08/10/19  0515 08/09/19  0426 08/08/19  1213   SODIUM mmol/L 144 141 145 141   POTASSIUM mmol/L 4.2 4.4 4.6 5.4*   CHLORIDE mmol/L 115* 111* 113* 105   CO2 mmol/L 16.9* 18.6* 16.5* 19.1*   BUN mg/dL 23 21 25* 31*   CREATININE mg/dL 1.06 0.87 1.08 1.55*   GLUCOSE mg/dL 162* 180* 136* 182*   CALCIUM mg/dL 9.7 9.4 9.8 11.6*   AST (SGOT) U/L  --  16  --  21   ALT (SGPT) U/L  --  21  --  36     Results from last 7 days   Lab Units 08/08/19  1213   TROPONIN T ng/mL <0.010     Results from last 7 days   Lab Units 08/10/19  0515 08/08/19  1213   WBC 10*3/mm3 17.56* 27.24*   HEMOGLOBIN g/dL 10.4* 13.3   HEMATOCRIT % 33.2* 41.9   PLATELETS 10*3/mm3 236 354   MCV fL 91.5 91.3   MCH pg 28.7 29.0   MCHC g/dL 31.3* 31.7   RDW % 13.1 13.0   RDW-SD fl 44.2 43.9   MPV fL 12.4* 11.9   NEUTROPHIL % % 90.7* 88.2*   LYMPHOCYTE % % 3.5* 4.7*   MONOCYTES % % 4.4* 5.8   EOSINOPHIL % % 0.0* 0.1*   BASOPHIL % % 0.1 0.4   IMM GRAN % % 1.3* 0.8*   NEUTROS ABS 10*3/mm3 15.94* 24.00*   LYMPHS ABS 10*3/mm3 0.61* 1.28   MONOS ABS 10*3/mm3 0.78 1.59*   EOS ABS 10*3/mm3 0.00 0.04   BASOS ABS 10*3/mm3 0.01 0.10   IMMATURE GRANS (ABS) 10*3/mm3 0.22* 0.23*   NRBC /100 WBC 0.0 0.0     Results from last 7 days   Lab Units 08/08/19  1213   INR  0.95     Results from last 7 days   Lab Units 08/11/19  0531 08/10/19  0515 08/09/19  0426 08/08/19  1213   MAGNESIUM mg/dL 1.7 2.3 1.5* 1.4*         Results from last 7 days   Lab Units 08/11/19  0531 08/08/19  1213   PROBNP pg/mL 3,721.0* 471.0         Results from last 7 days   Lab Units 08/08/19  1629 08/08/19  1254   PH, ARTERIAL pH units 7.269* 7.367   PCO2, ARTERIAL mm Hg 29.3*  26.7*   PO2 ART mm Hg 132.0* 168.0*   HCO3 ART mmol/L 13.5* 15.3*     Results from last 7 days   Lab Units 08/10/19  2136 08/09/19  0020 08/08/19  1644 08/08/19  1213   PROCALCITONIN ng/mL  --   --   --  0.10   LACTATE mmol/L 1.6 0.6 1.9 3.4*             Results from last 7 days   Lab Units 08/08/19  1227 08/08/19  1213   BLOODCX  No growth at 4 days No growth at 4 days         Results from last 7 days   Lab Units 08/08/19  1653   ADENOVIRUS DETECTION BY PCR  Not Detected   CORONAVIRUS 229E  Not Detected   CORONAVIRUS HKU1  Not Detected   CORONAVIRUS NL63  Not Detected   CORONAVIRUS OC43  Not Detected   HUMAN METAPNEUMOVIRUS  Not Detected   HUMAN RHINOVIRUS/ENTEROVIRUS  Not Detected   INFLUENZA B PCR  Not Detected   PARAINFLUENZA 1  Not Detected   PARAINFLUENZA VIRUS 2  Not Detected   PARAINFLUENZA VIRUS 3  Not Detected   PARAINFLUENZA VIRUS 4  Not Detected   BORDETELLA PERTUSSIS PCR  Not Detected   CHLAMYDOPHILA PNEUMONIAE PCR  Not Detected   MYCOPLAMA PNEUMO PCR  Not Detected   INFLUENZA A PCR  Not Detected   INFLUENZA A H3  Not Detected   INFLUENZA A H1  Not Detected   RSV, PCR  Not Detected           Imaging Results:  Imaging Results (all)     Procedure Component Value Units Date/Time    XR Chest 1 View [[id number]] Collected:  08/08/19 1257     Updated:  08/08/19 1301    Narrative:       XR CHEST 1 VW-  08/08/2019     HISTORY: Shortness of breath.     Heart size is within normal limits. Lungs appear free of acute  infiltrates. There are degenerative changes in the spine. There is an  old right clavicle fracture.       Impression:       No acute process.     This report was finalized on 8/8/2019 12:58 PM by Dr. Sajan Golden M.D.                                Objective:   Temp:  [97.4 °F (36.3 °C)-98 °F (36.7 °C)] 97.8 °F (36.6 °C)  Heart Rate:  [56-76] 58  Resp:  [18-20] 20  BP: (103-161)/(68-80) 156/80   SpO2:  [88 %-99 %] 99 %  on  Flow (L/min):  [3-5] 3 Device (Oxygen Therapy): room air    Intake/Output  Summary (Last 24 hours) at 8/12/2019 1312  Last data filed at 8/12/2019 1300  Gross per 24 hour   Intake 960 ml   Output --   Net 960 ml     Body mass index is 25.53 kg/m².      08/09/19  0537 08/10/19  0545 08/10/19  1916   Weight: 74.9 kg (165 lb 2 oz) 77 kg (169 lb 12.1 oz) 76.2 kg (167 lb 14.4 oz)     Weight change:     Physical Exam:  GEN:  No acute distress, , cooperative, well developed,  looking a lot better than earlier, much stronger, no more tremulous, talking in full sentences, awake and alert and pleasant  EYES:   Sclera clear. No icterus. PERRL. Normal EOM  ENT:   External ears/nose normal, no oral lesions, no thrush, mucous membranes moist  NECK:  Supple, midline trachea, no JVD  LUNGS: Normal chest on inspection,  improved breath sounds but still slightly diminished compared to normal no significant wheezing with minimal end expiratory wheezing that can be heard occasionally, no crackles. Respirations regular, even and unlabored.   CV:  Regular rhythm and rate. Normal S1/S2. No murmurs, gallops, or rubs noted.  ABD:  Soft, non-tender and non-distended. Normal bowel sounds. No guarding  EXT:  Moves all extremities well. No cyanosis. No redness. No edema.   Skin: dry, intact, no bleeding    .     Discharge Medications and Instructions:     Discharge Medications     Discharge Medications      New Medications      Instructions Start Date   ipratropium-albuterol 0.5-2.5 mg/3 ml nebulizer  Commonly known as:  DUO-NEB   3 mL, Nebulization, 4 Times Daily - RT      nicotine 21 MG/24HR patch  Commonly known as:  NICODERM CQ   1 patch, Transdermal, Every 24 Hours      predniSONE 10 MG tablet  Commonly known as:  DELTASONE   30 mg, Oral, Daily With Breakfast   Start Date:  8/13/2019        Continue These Medications      Instructions Start Date   amLODIPine 10 MG tablet  Commonly known as:  NORVASC   10 mg, Oral, Daily, for blood pressure      aspirin 81 MG tablet   1 tablet, Oral, Daily      atorvastatin 80 MG  tablet  Commonly known as:  LIPITOR   80 mg, Oral, Daily, For cholesterol (new dose)      B-12 1000 MCG capsule   1 tablet, Oral, Daily      carvedilol 25 MG tablet  Commonly known as:  COREG   25 mg, Oral, 2 Times Daily With Meals, For heart      cholecalciferol 1000 units tablet  Commonly known as:  VITAMIN D3   1 tablet, Oral, 2 Times Daily      FLUoxetine 20 MG capsule  Commonly known as:  PROzac   60 mg, Oral, Daily      lisinopril-hydrochlorothiazide 20-12.5 MG per tablet  Commonly known as:  PRINZIDE,ZESTORETIC   1 tablet, Oral, Daily, For BP and renal protection      lithium carbonate 150 MG capsule   150 mg, Oral, Every Morning      lithium carbonate 150 MG capsule   300 mg, Oral, Every Evening      metFORMIN 1000 MG tablet  Commonly known as:  GLUCOPHAGE   1,000 mg, Oral, 2 Times Daily With Meals      omeprazole 40 MG capsule  Commonly known as:  priLOSEC   40 mg, Oral, Daily, For GERD (stop Nexium)      polyethylene glycol packet  Commonly known as:  MIRALAX   17 g, Oral, Daily PRN      QUEtiapine 100 MG tablet  Commonly known as:  SEROquel   100 mg, Oral, Nightly, For sleep      tamsulosin 0.4 MG capsule 24 hr capsule  Commonly known as:  FLOMAX   1 capsule, Oral, Daily      wheat dextrin powder powder   1 each, Oral, Daily PRN             Discharge Diet:    Dietary Orders (From admission, onward)    Start     Ordered    08/08/19 1454  Diet Regular  Diet Effective Now     Question:  Diet Texture / Consistency  Answer:  Regular    08/08/19 1454          Activity at Discharge:       Discharge disposition: Home    Discharge Instructions and Follow ups:  He is to follow-up with his pulmonologist for 4 weeks   after discharge he is to call us or call his pulmonologist to be seen sooner in case of any decompensation until then.    Follow-up Information     Alexandrea Jimenez PA-C .    Specialty:  Family Medicine  Contact information:  [id number] [facility].[address] [place] [id number] [phone]                  Future Appointments   Date Time Provider Department Center   9/10/2019  3:15 PM Ivette Franco MD [facility] GE EA [facility] None   12/6/2019  1:30 PM LAB CHAIR 6 CBC [facility]  LAB [facility]   12/11/2019  1:40 PM Suleiman Anne MD [facility] CD [id number] None   12/13/2019  1:00 PM VITALS ONLY CBC KRE  LAB [facility] L[facility]   12/13/2019  1:20 PM Juanito Perdue Jr., MD [facility]   1/23/2020  1:15 PM Alexandrea Jimenez, PADamionC [facility] PC JTWN2 None        Medication Reconciliation: Please see electronically completed Med Rec.    Total time spent discharging patient including evaluation, medication reconciliation, arranging follow up, and post hospitalization instructions and education total time exceeds 30 minutes.     Alexi Luna MD  08/12/19  1:12 PM      Dictated utilizing Dragon dictation

## 2019-08-12 NOTE — PROGRESS NOTES
Exercise Oximetry    Patient Name:Felicita Mike   MRN: [id number]   Date: 08/12/19             ROOM AIR BASELINE   SpO2%- 92   Heart Rate- 81   Blood Pressure      EXERCISE ON ROOM AIR SpO2% EXERCISE ON O2 @  LPM SpO2%   1 MINUTE 93 1 MINUTE    2 MINUTES 94 2 MINUTES    3 MINUTES 96 3 MINUTES    4 MINUTES 96 4 MINUTES    5 MINUTES 95 5 MINUTES    6 MINUTES 96 6 MINUTES               Distance Walked - 6 mins Distance Walked   Dyspnea (Keith Scale)   Dyspnea (Keith Scale)   Fatigue (Keith Scale)   Fatigue (Keith Scale)   SpO2% Post Exercise - 96 SpO2% Post Exercise   HR Post Exercise - 100 HR Post Exercise   Time to Recovery   Time to Recovery     Comments: Patient ambulated the nursing unit for 6 mins on room air.  Lowest sat was 92%.  No oxygen required and patient was mildly exerted and SOA.  Upon return to room, patient was able to stay off oxygen cannula and maintain sats at 96%.  RICHARD Mendoza, RRT

## 2019-08-12 NOTE — DISCHARGE PLACEMENT REQUEST
"Felicita Mike (68 y.o. Male)     Date of Birth Social Security Number Address Home Phone MRN    [id number]  [id number]  [address] [facility] [address] [phone] [id number]    [employer] Mar[employer] Status          None        Admission Date Admission Type Admitting Provider Attending Provider Department, Room/Bed    8/8/19 Emergency Ronnie Fuller MD Anaya, Ervin H., MD [address], [address]    Discharge Date Discharge Disposition Discharge Destination         Home or Self Care              Attending Provider:  Shay Hernadez MD    Allergies:  Clopidogrel    Isolation:  None   Infection:  None   Code Status:  CPR    Ht:  172.7 cm (68\")   Wt:  76.2 kg (167 lb 14.4 oz)    Admission Cmt:  None   Principal Problem:  None                Active Insurance as of 8/8/2019     Primary Coverage     Payor Plan Insurance Group Employer/Plan Group    MEDICARE MEDICARE A & B      Payor Plan Address Payor Plan Phone Number Payor Plan Fax Number Effective Dates    [address] [phone]  2/1/2011 - None Entered    [facility] [id number]       Subscriber Name Subscriber Birth Date Member ID       FELICITA MIKE 1950 [id number]           Secondary Coverage     Payor Plan Insurance Group Employer/Plan Group    [employer] [place]      Payor Plan Address Payor Plan Phone Number Payor Plan Fax Number Effective Dates    [address] [phone]  11/1/2017 - None Entered    [facility] [id number]       Subscriber Name Subscriber Birth Date Member ID       FELICITA MIKE 1950 [id number]                 Emergency Contacts      (Rel.) Home Phone Work Phone Mobile Phone    Carmelo Mike (Brother) [phone] -- [phone]              "

## 2019-08-12 NOTE — PROGRESS NOTES
Continued Stay Note  [facility]     Patient Name: Felicita Mike  MRN: [id number]  Today's Date: 8/12/2019    Admit Date: 8/8/2019    Discharge Plan     Row Name 08/12/19 1354       Plan    Plan  Home with [employer]      Plan Comments  Spoke with pt at bedside. Pt plans to return home at [place]. Pt would like . List of HH agenices reviewed with the pt. Pt would like to use [facility], referral made to [facility]. Pt has a nebulizer machine at home. IM notice given. JChasteenRN/CCP         Discharge Codes    No documentation.       Expected Discharge Date and Time     Expected Discharge Date Expected Discharge Time    Aug 12, 2019             Tigist Fan RN

## 2019-08-13 ENCOUNTER — TELEPHONE (OUTPATIENT)
Dept: FAMILY MEDICINE CLINIC | Facility: CLINIC | Age: 69
End: 2019-08-13

## 2019-08-13 ENCOUNTER — READMISSION MANAGEMENT (OUTPATIENT)
Dept: CALL CENTER | Facility: HOSPITAL | Age: 69
End: 2019-08-13

## 2019-08-13 LAB
BACTERIA SPEC AEROBE CULT: NORMAL
BACTERIA SPEC AEROBE CULT: NORMAL

## 2019-08-13 NOTE — PROGRESS NOTES
Case Management Discharge Note    Final Note: Pt dc'd home with [facility]    Destination      No service has been selected for the patient.      Durable Medical Equipment      No service has been selected for the patient.      Dialysis/Infusion      No service has been selected for the patient.      Home Medical Care      No service has been selected for the patient.      Therapy      No service has been selected for the patient.      Community Resources      No service has been selected for the patient.        Transportation Services  Private: Car    Final Discharge Disposition Code: 06 - home with home health care

## 2019-08-13 NOTE — TELEPHONE ENCOUNTER
Edith with [facility] called asking for Nursing orders.  Ok 14 days - Edith will have pt call our office for hospital f/u.

## 2019-08-13 NOTE — OUTREACH NOTE
Prep Survey      Responses   Facility patient discharged from?  [facility]   Is patient eligible?  Yes   Discharge diagnosis  Acute respiratory failure with hypoxia,  COPD exacerbation,  sepsis   Does the patient have one of the following disease processes/diagnoses(primary or secondary)?  Sepsis   Does the patient have Home health ordered?  Yes   What is the Home health agency?   [facility]   Is there a DME ordered?  No   Prep survey completed?  Yes          Emily Lakhani RN

## 2019-08-14 ENCOUNTER — READMISSION MANAGEMENT (OUTPATIENT)
Dept: CALL CENTER | Facility: HOSPITAL | Age: 69
End: 2019-08-14

## 2019-08-14 NOTE — OUTREACH NOTE
Sepsis Week 1 Survey      Responses   Facility patient discharged from?  [facility]   Does the patient have one of the following disease processes/diagnoses(primary or secondary)?  Sepsis   Is there a successful TCM telephone encounter documented?  No   Week 1 attempt successful?  No   Unsuccessful attempts  Attempt 1          Brent Ruiz RN

## 2019-08-15 ENCOUNTER — EPISODE CHANGES (OUTPATIENT)
Dept: CASE MANAGEMENT | Facility: OTHER | Age: 69
End: 2019-08-15

## 2019-08-15 ENCOUNTER — READMISSION MANAGEMENT (OUTPATIENT)
Dept: CALL CENTER | Facility: HOSPITAL | Age: 69
End: 2019-08-15

## 2019-08-15 NOTE — OUTREACH NOTE
Sepsis Week 1 Survey      Responses   Facility patient discharged from?  [facility]   Does the patient have one of the following disease processes/diagnoses(primary or secondary)?  Sepsis   Is there a successful TCM telephone encounter documented?  No   Week 1 attempt successful?  Yes   Call start time  1750   Call end time  1757   Is patient permission given to speak with other caregiver?  No   Medication alerts for this patient  reviewed medication with the patient,    Meds reviewed with patient/caregiver?  Yes   Is the patient having any side effects they believe may be caused by any medication additions or changes?  No   Does the patient have all medications related to this admission filled (includes all antibiotics, inhalers, nebulizers,steroids,etc.)  Yes   Is the patient taking all medications as directed (includes completed medication regime)?  Yes   Comments regarding appointments  thinks his daughter took care of his appointments, appoinment lung and urologist   Does the patient have a primary care provider?   Yes   Does the patient have an appointment with their PCP within 7 days of discharge?  N/A   Has the patient kept scheduled appointments due by today?  Yes   What is the Home health agency?   [facility]   Has home health visited the patient within 72 hours of discharge?  Yes [home health nurse review medications with him]   What DME was ordered?  not gotten nebulizer from [facility]   Has all DME been delivered?  No   Psychosocial issues?  No   Did the patient receive a copy of their discharge instructions?  Yes   Nursing interventions  Reviewed instructions with patient [home health ]   What is the patient's perception of their health status since discharge?  Improving   Is the patient/caregiver able to teach back Sepsis?  S - Shivering,fever or very cold, E - Extreme pain or generalized discomfort (worst ever,especially abdomen), P - Pale or discolored skin, S - Sleepy, difficult to arouse,confused, I -   I  feel like I might die-a feeling of hopelessness, S - Short of breath [reviewed the s/s]   Is patient/caregiver able to teach back steps to recovery at home?  Set small, achievable goals for return to baseline health, Rest and regain strength, Eat a balanced diet [discussed with the patient]   Is the patient/caregiver able to teach back signs and symptoms of worsening condition:  Fever, Rapid heart rate (>90), Shortness of breath/rapid respiratory rate   If the patient is a current smoker, are they able to teach back resources for cessation?  -- [had not gotten the Nicotine patch will be calling  the pharmacy]   Is the patient/caregiver able to teach back the hierarchy of who to call/visit for symptoms/problems? PCP, Specialist, Home health nurse, Urgent Care, ED, 911  Yes   Week 1 call completed?  Yes   Wrap up additional comments  feels he is doing better          Amy Duron RN

## 2019-08-22 ENCOUNTER — READMISSION MANAGEMENT (OUTPATIENT)
Dept: CALL CENTER | Facility: HOSPITAL | Age: 69
End: 2019-08-22

## 2019-08-22 ENCOUNTER — TELEPHONE (OUTPATIENT)
Dept: FAMILY MEDICINE CLINIC | Facility: CLINIC | Age: 69
End: 2019-08-22

## 2019-08-22 NOTE — OUTREACH NOTE
Sepsis Week 2 Survey      Responses   Facility patient discharged from?  [facility]   Does the patient have one of the following disease processes/diagnoses(primary or secondary)?  Sepsis   Week 2 attempt successful?  Yes   Call start time  1121   Call end time  1122   Discharge diagnosis  Acute respiratory failure with hypoxia,  COPD exacerbation,  sepsis   Meds reviewed with patient/caregiver?  Yes   Is the patient taking all medications as directed (includes completed medication regime)?  Yes   Has the patient kept scheduled appointments due by today?  Yes   What is the Home health agency?   [facility]   What is the patient's perception of their health status since discharge?  Improving   Is the patient/caregiver able to teach back the hierarchy of who to call/visit for symptoms/problems? PCP, Specialist, Home health nurse, Urgent Care, ED, 911  Yes   Additional teach back comments  Pt said he was doing good, says HH nurse was just at his home and that he is doing good, no questions or concerns at this time.   Week 2 call completed?  Yes   Wrap up additional comments  rushed phone call by patient          Maria Fernanda Means RN

## 2019-08-26 ENCOUNTER — OFFICE VISIT (OUTPATIENT)
Dept: FAMILY MEDICINE CLINIC | Facility: CLINIC | Age: 69
End: 2019-08-26

## 2019-08-26 VITALS
OXYGEN SATURATION: 96 % | HEART RATE: 66 BPM | RESPIRATION RATE: 16 BRPM | WEIGHT: 167 LBS | SYSTOLIC BLOOD PRESSURE: 120 MMHG | BODY MASS INDEX: 25.31 KG/M2 | TEMPERATURE: 98 F | HEIGHT: 68 IN | DIASTOLIC BLOOD PRESSURE: 60 MMHG

## 2019-08-26 DIAGNOSIS — I10 ESSENTIAL HYPERTENSION: ICD-10-CM

## 2019-08-26 DIAGNOSIS — Z09 HOSPITAL DISCHARGE FOLLOW-UP: Primary | ICD-10-CM

## 2019-08-26 DIAGNOSIS — F41.9 ANXIETY: ICD-10-CM

## 2019-08-26 DIAGNOSIS — E11.9 TYPE 2 DIABETES MELLITUS WITHOUT COMPLICATION, WITH LONG-TERM CURRENT USE OF INSULIN (HCC): ICD-10-CM

## 2019-08-26 DIAGNOSIS — L60.8 TOENAIL DEFORMITY: ICD-10-CM

## 2019-08-26 DIAGNOSIS — Z79.4 TYPE 2 DIABETES MELLITUS WITHOUT COMPLICATION, WITH LONG-TERM CURRENT USE OF INSULIN (HCC): ICD-10-CM

## 2019-08-26 DIAGNOSIS — G47.33 OBSTRUCTIVE SLEEP APNEA SYNDROME: ICD-10-CM

## 2019-08-26 DIAGNOSIS — R79.0 LOW MAGNESIUM LEVEL: ICD-10-CM

## 2019-08-26 PROCEDURE — 99214 OFFICE O/P EST MOD 30 MIN: CPT | Performed by: PHYSICIAN ASSISTANT

## 2019-08-26 NOTE — PROGRESS NOTES
Subjective   Felicita Mike is a 68 y.o. male.     History of Present Illness   Fleicita Mike 68 y.o. male presents today for hosptial follow up.  he was treated 8-8 to 8-12-19 for SOA---COPD .  I reviewed all of the labs and diagnostic testing and noted:    Lab Results   Component Value Date    GLUCOSE 162 (H) 08/11/2019    CALCIUM 9.7 08/11/2019     08/11/2019    K 4.2 08/11/2019    CO2 16.9 (L) 08/11/2019     (H) 08/11/2019    BUN 23 08/11/2019    CREATININE 1.06 08/11/2019    EGFRIFAFRI 83 07/23/2019    EGFRIFNONA 70 08/11/2019    BCR 21.7 08/11/2019    ANIONGAP 12.1 08/11/2019     XR CHEST 1 VW-  08/08/2019     HISTORY: Shortness of breath.     Heart size is within normal limits. Lungs appear free of acute  infiltrates. There are degenerative changes in the spine. There is an  old right clavicle fracture.     IMPRESSION:  No acute process.     This report was finalized on 8/8/2019 12:58 PM by Dr. Sajan Golden M.D.          The patient's medications were not changed:  Loves Rjb---really helps; not on home O2;  I just did routine labs July and goals met  He does agree to see pulm for f/u  Want him to use cpap---needs new mask and supplies    Current outpatient and discharge medications have been reconciled for the patient.  Reviewed by: Alexandrea Jimenez PA-C    he does have a follow up appointment with a specialist:     Felicita Mike presented to [facility] with complaints of shortness of breath requiring frequent nebulizer treatment and oxygen supplementation and noninvasive positive pressure ventilation in order to stabilize.  No invasive ventilation was required.  Patient uses CPAP at home but he does not have any home oxygen.  Patient was treated also with IV steroids, he did improve over the hospital course, on the day of discharge, he did have walking oximetry that showed no more hypoxemia, he was looking much better clinically, he was talking in full sentences on  room air with good p.o. intake, able to walk with minimal or no need for any assistance.  Patient is on nicotine patch which he was agreeable to take after discharge in order to help abstain from smoking, he is on the prednisone and will be continued for a few more days before it can be discontinued.  Overall doing much better looking better and requesting to go home with no contraindication from the medical/pulmonary standpoint.  Proper education provided about the need to abstain from smoking given his advanced lung disease.  Patient verbalized understanding the importance of the above.     I will get [place] f/u labs and the Mg  I had him see DR Ramírez past for h/a--neuro----has appt 9-3-10---Dr Rhoades  Has appt Dr Omid Mancia  GI appt     Has been going to DR Perdue-----for Leukocytosis and normocytic anemia  The following portions of the patient's history were reviewed and updated as appropriate: allergies, current medications, past family history, past medical history, past social history, past surgical history and problem list.    Review of Systems   Constitutional: Negative for activity change, appetite change and unexpected weight change.   HENT: Negative for nosebleeds and trouble swallowing.    Eyes: Negative for pain and visual disturbance.   Respiratory: Positive for shortness of breath. Negative for chest tightness and wheezing.    Cardiovascular: Negative for chest pain and palpitations.   Gastrointestinal: Negative for abdominal pain and blood in stool.   Endocrine: Negative.    Genitourinary: Negative for difficulty urinating and hematuria.   Musculoskeletal: Negative for joint swelling.   Skin: Negative for color change and rash.   Allergic/Immunologic: Negative.    Neurological: Positive for tremors. Negative for syncope and speech difficulty.   Hematological: Negative for adenopathy.   Psychiatric/Behavioral: Positive for sleep disturbance. Negative for agitation and confusion.   All other  systems reviewed and are negative.      Objective   Physical Exam   Constitutional: He is oriented to person, place, and time. He appears well-developed and well-nourished. No distress.   HENT:   Head: Normocephalic and atraumatic.   Right Ear: External ear normal.   Left Ear: External ear normal.   Nose: Nose normal.   Mouth/Throat: Oropharynx is clear and moist.   Left pupil stays dilated   Eyes: Conjunctivae and EOM are normal. Right eye exhibits no discharge. Left eye exhibits no discharge. No scleral icterus.   Left pupil damage is chronic   Neck: Normal range of motion. Neck supple. No tracheal deviation present. No thyromegaly present.   Cardiovascular: Normal rate, regular rhythm, normal heart sounds, intact distal pulses and normal pulses. Exam reveals no gallop and no friction rub.   No murmur heard.  Fingernails clubbing   Pulmonary/Chest: Effort normal. No respiratory distress. He has wheezes. He has no rales.   decreased   Abdominal: Soft. There is no tenderness.   Musculoskeletal: Normal range of motion.   Tremor in hands      During the foot exam he had a monofilament test performed.    Neurological Sensory Findings - Unaltered hot/cold right ankle/foot discrimination and unaltered hot/cold left ankle/foot discrimination. Unaltered sharp/dull right ankle/foot discrimination and unaltered sharp/dull left ankle/foot discrimination. No right ankle/foot altered proprioception and no left ankle/foot altered proprioception  Vascular Status -  His right foot exhibits normal foot vasculature  and no edema. His left foot exhibits normal foot vasculature  and no edema.  Skin Integrity  -  His right foot skin is intact.  He has no right foot ulcer, non-callous right foot, no right foot warmth and no right foot blister.His left foot skin is intact. He has no left foot ulcer, non-callous left foot, no left foot warmth and no left foot blister..  Lymphadenopathy:     He has no cervical adenopathy.   Neurological:  He is alert and oriented to person, place, and time. He has normal reflexes. No cranial nerve deficit (see note on pupil left). He exhibits normal muscle tone. Coordination (tremor) abnormal.   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 No rash noted. No erythema. No pallor.   Clubbing toenails and fingernails     Psychiatric: He has a normal mood and affect. His behavior is normal. Judgment and thought content normal.   Nursing note and vitals reviewed.      Assessment/Plan   Problems Addressed this Visit        Cardiovascular and Mediastinum    Hypertension    Relevant Orders    Basic Metabolic Panel    Magnesium    Lithium level       Respiratory    Sleep apnea    Relevant Orders    Basic Metabolic Panel    Magnesium    Lithium level       Endocrine    Type 2 diabetes mellitus without complication, with long-term current use of insulin (CMS/[facility])    Relevant Orders    Basic Metabolic Panel    Magnesium    Lithium level       Other    Anxiety    Relevant Orders    Basic Metabolic Panel    Magnesium    Lithium level      Other Visit Diagnoses     Hospital discharge follow-up    -  Primary    Relevant Orders    Basic Metabolic Panel    Magnesium    Lithium level    Toenail deformity        Relevant Orders    Ambulatory Referral to Podiatry (Completed)    Basic Metabolic Panel    Magnesium    Lithium level    Low magnesium level        Relevant Orders    Basic Metabolic Panel    Magnesium    Lithium level        Sees urologist  F/u cardio--DR Anne  F/u neuro--for tremor; will see DR Rhoades  F/u pulm for COPD  Has GI appt  Mg BMP, Lithium lab  Watching bp for low

## 2019-08-27 LAB
BUN SERPL-MCNC: 11 MG/DL (ref 8–27)
BUN/CREAT SERPL: 11 (ref 10–24)
CALCIUM SERPL-MCNC: 9.9 MG/DL (ref 8.6–10.2)
CHLORIDE SERPL-SCNC: 105 MMOL/L (ref 96–106)
CO2 SERPL-SCNC: 22 MMOL/L (ref 20–29)
CREAT SERPL-MCNC: 0.98 MG/DL (ref 0.76–1.27)
GLUCOSE SERPL-MCNC: 117 MG/DL (ref 65–99)
LITHIUM SERPL-SCNC: 0.7 MMOL/L (ref 0.6–1.2)
MAGNESIUM SERPL-MCNC: 1.1 MG/DL (ref 1.6–2.3)
POTASSIUM SERPL-SCNC: 4.7 MMOL/L (ref 3.5–5.2)
SODIUM SERPL-SCNC: 143 MMOL/L (ref 134–144)

## 2019-08-27 RX ORDER — LANOLIN ALCOHOL/MO/W.PET/CERES
400 CREAM (GRAM) TOPICAL DAILY
Qty: 90 TABLET | Refills: 1 | Status: SHIPPED | OUTPATIENT
Start: 2019-08-27 | End: 2019-12-25 | Stop reason: HOSPADM

## 2019-08-29 ENCOUNTER — READMISSION MANAGEMENT (OUTPATIENT)
Dept: CALL CENTER | Facility: HOSPITAL | Age: 69
End: 2019-08-29

## 2019-08-29 NOTE — OUTREACH NOTE
Sepsis Week 3 Survey      Responses   Facility patient discharged from?  [place]   Does the patient have one of the following disease processes/diagnoses(primary or secondary)?  Sepsis   Week 3 attempt successful?  No   Unsuccessful attempts  Attempt 1          Sarah Segundo RN

## 2019-08-30 ENCOUNTER — READMISSION MANAGEMENT (OUTPATIENT)
Dept: CALL CENTER | Facility: HOSPITAL | Age: 69
End: 2019-08-30

## 2019-08-30 RX ORDER — QUETIAPINE FUMARATE 100 MG/1
100 TABLET, FILM COATED ORAL NIGHTLY
Qty: 90 TABLET | Refills: 0 | Status: SHIPPED | OUTPATIENT
Start: 2019-08-30 | End: 2019-11-27 | Stop reason: SDUPTHER

## 2019-08-30 NOTE — OUTREACH NOTE
Sepsis Week 3 Survey      Responses   Facility patient discharged from?  [facility]   Does the patient have one of the following disease processes/diagnoses(primary or secondary)?  Sepsis   Week 3 attempt successful?  Yes   Call start time  1415   Call end time  1418   Discharge diagnosis  Acute respiratory failure with hypoxia,  COPD exacerbation,  sepsis   Meds reviewed with patient/caregiver?  Yes   Is the patient taking all medications as directed (includes completed medication regime)?  Yes   Medication comments  using nebulizer and MD has put pt on another med --not sure med   Has the patient kept scheduled appointments due by today?  Yes   Comments  seeing urology next week   What is the Home health agency?   [facility]   Home health comments   has d/c'd pt   What is the patient's perception of their health status since discharge?  Improving   Is the patient/caregiver able to teach back Sepsis?  S - Shivering,fever or very cold, S - Sleepy, difficult to arouse,confused, S - Short of breath   Nursing interventions  Nurse provided reassurance to patient   Is patient/caregiver able to teach back steps to recovery at home?  Set small, achievable goals for return to baseline health   Is the patient/caregiver able to teach back signs and symptoms of worsening condition:  Fever, Rapid heart rate (>90), Shortness of breath/rapid respiratory rate, Altered mental status(confusion/coma)   If the patient is a current smoker, are they able to teach back resources for cessation?  Smoking cessation medications [Down from 1 PPD to 1/2 PPD. Enc him to quit all smoking]   Week 3 call completed?  Yes          Ese Middleton RN

## 2019-09-03 ENCOUNTER — OFFICE VISIT (OUTPATIENT)
Dept: NEUROLOGY | Facility: CLINIC | Age: 69
End: 2019-09-03

## 2019-09-03 VITALS
WEIGHT: 172 LBS | HEIGHT: 68 IN | BODY MASS INDEX: 26.07 KG/M2 | DIASTOLIC BLOOD PRESSURE: 70 MMHG | SYSTOLIC BLOOD PRESSURE: 124 MMHG | OXYGEN SATURATION: 95 % | HEART RATE: 70 BPM

## 2019-09-03 DIAGNOSIS — R25.1 TREMOR: Primary | ICD-10-CM

## 2019-09-03 PROCEDURE — 99215 OFFICE O/P EST HI 40 MIN: CPT | Performed by: PSYCHIATRY & NEUROLOGY

## 2019-09-03 NOTE — PROGRESS NOTES
Subjective:     Patient ID: Felicita Mike is a 68 y.o. male.    Mr. Mike is a 68-year-old right handed male with a history of COPD, bipolar disorder, HTN, HLD, Vit D and B12 def, BPH, CAD s/p stents, hearing loss, MARTINE on CPAP, GERD, DM, and peripheral vascular disease who presents to neurology clinic today as a new patient to me for the evaluation of tremor.  The patient was seen in the hospital on August 9, 2019 for tremor.  It apparently started in his 30s and is progressively worsened over time.  It affects his hands and his head.  It is mainly with action.  There is a family history of tremor in his mother.  The assessment was essential tremor.  No medications were given at that time.  The patient was also seen in the clinic by Dr. Ramírez on August 28, 2017.  This was for an evaluation of headache.  I reviewed the patient's labs.  The patient's free T4 and free T3 were normal.  I reviewed the patient's imaging.  He had a head CT done in 2017 that was normal.  He had an MRI of his brain in 2015 that was also normal.  Has had tremor since age 14-15.  Tremors are about the same as 5 years ago.  Reports that today is a good day.  Just has tremor in his arms.  Symptoms are symmetric.  Worse with action.  Nothing makes them better.  No past or current meds for tremor.  Fell 3-4 times in 2 days before he went into the hospital.  Has orthostasis.  Otherwise, has been years since he fell.  Has sciatic nerve problems.  No slowed movements.  No muscle stiffness.  No loss of smell.  Handwriting is sloppy.  No change in size.  3-4 years ago would wake up kicking 8-10 times, but it stopped.  Can't remember the last time it happened.  Sometimes has problems with constipation, takes medication.  Feels that he does have some memory problems.  Forgets things if he doesn't write things down.  No problems with drooling, choking or swallowing.  No hallucinations.  Mother had tremor.  Not sure of her dx.  Took some sort of  "nerve pill.  Takes him longer to do things.  Usually just deals with it.  Describes mood as \"bitchy\".   Thinks that EtOH may make the tremor better.    Has been on lithium for 5-6 years.  Is also on beta agonists for his COPD.  He is not sure how long he has been on seroquel.          The following portions of the patient's history were reviewed and updated as appropriate: allergies, current medications, past family history, past medical history, past social history, past surgical history and problem list.    Review of Systems   Constitutional: Negative.    HENT: Negative.    Eyes: Negative.    Respiratory: Negative.    Cardiovascular: Negative.    Gastrointestinal: Negative.    Endocrine: Negative.    Genitourinary: Negative.    Musculoskeletal: Negative.    Neurological: Positive for tremors. Negative for dizziness, seizures, syncope, facial asymmetry, speech difficulty, weakness, light-headedness, numbness and headaches.   Hematological: Does not bruise/bleed easily.   Psychiatric/Behavioral: Negative for agitation, behavioral problems, confusion, decreased concentration, dysphoric mood, hallucinations, self-injury, sleep disturbance and suicidal ideas. The patient is not nervous/anxious and is not hyperactive.     I reviewed the ROS documented by the MA.  All other systems negative.      Objective:    Neurologic Exam    Physical Exam   Constitutional:  Vital signs reviewed.  No apparent distress.  Well groomed.  Eyes:  No injection, no icterus.  Fundoscopic exam performed.  No papilledema appreciated bilaterally.   Respiratory:  Got out of breath easily during exam.  Course breath sounds bilaterally.  Cardiovascular:  Regular rate and rhythm.  No murmurs.  No carotid bruits. Symmetric radial pulses.  Musculoskeletal: Normal station.  Gait steady.  Normal arm swing.  Patient able to walk on heels and toes.  Trouble with tandem gait.  Romberg negative.  Muscle tone and bulk normal.  Strength is 5/5 in the " bilateral upper and lower extremities proximally and distally unless otherwise specified in the neurological exam.  Skin:  No rashes.  Warm, dry, and intact.  Psychiatric:  Good mood.  Normal affect.    Neurologic:  Mental status-  The patient is alert and oriented to person, place and time. Attention/concentration is within normal limits.  Speech is fluent without dysarthria.  The patient is able to name, repeat and follow complex commands without difficulty.  Immediate memory intact.  He recalled 2 out of 3 words after 4 minutes.  Fund of knowledge normal.  Cranial nerves- Right pupil reactive to light.  Left pupil irregularly shaped and unreactive.  Visual fields intact.  Extraocular movements intact.  Facial sensation intact.  Smile symmetric.  Hearing decreased to finger-rub bilaterally.  Palate elevates symmetrically.  SCM and trapezius are 5/5 bilaterally.  Tongue is midline.  Motor-  See musculoskeletal above.  The patient has a postural and intention tremor in his bilateral upper extremities.  Reflexes- 2+ in the bilateral biceps, brachioradialis, patellar and achilles.  Toes down-going bilaterally.  Sensation- Intact to pinprick and vibration in bilateral upper and lower extremities symmetrically.  Coordination- Intact to finger to nose and heel knee shin bilaterally.   Gait- See musculoskeletal exam above.     Assessment/Plan:  The patient is a 68-year-old right-handed male with a history of COPD, bipolar, hypertension, hyperlipidemia, vitamin D deficiency, vitamin B12 deficiency, BPH, coronary artery disease status post stents, hearing loss, MARTINE on CPAP, GERD, diabetes, peripheral vascular disease who presents to neurology clinic today for the evaluation of tremors.    1.  Tremor-The patient's clinical presentation seems most consistent with essential tremor.  Several of his medications are likely making his symptoms worse including his calcium channel blocker, his beta agonists, and his lithium.   Unfortunately, he likely needs to be on these medications.  Today, the patient did not seem very interested in further treatment with another medication.  He is already on a beta-blocker so I would not add propanolol.  Primidone may not be a bad option for him in the future if he were interested.  He may also benefit from weight utensils.  Unfortunately, he does not have internet access at home.  What we can do is reevaluate him in 6 months and see at that time if he is interested in treatment.    A total of 45 minutes of face-to-face time was spent with the patient greater than 50% that time was spent on counseling regarding his symptoms and plan of care.     Problems Addressed this Visit        Other    Tremor - Primary

## 2019-09-06 ENCOUNTER — READMISSION MANAGEMENT (OUTPATIENT)
Dept: CALL CENTER | Facility: HOSPITAL | Age: 69
End: 2019-09-06

## 2019-09-06 NOTE — OUTREACH NOTE
Sepsis Week 4 Survey      Responses   Facility patient discharged from?  [facility]   Does the patient have one of the following disease processes/diagnoses(primary or secondary)?  Sepsis   Week 4 attempt successful?  No          Elaine Ken RN

## 2019-09-10 ENCOUNTER — OFFICE VISIT (OUTPATIENT)
Dept: GASTROENTEROLOGY | Facility: CLINIC | Age: 69
End: 2019-09-10

## 2019-09-10 ENCOUNTER — RESULTS ENCOUNTER (OUTPATIENT)
Dept: FAMILY MEDICINE CLINIC | Facility: CLINIC | Age: 69
End: 2019-09-10

## 2019-09-10 VITALS
BODY MASS INDEX: 26.87 KG/M2 | SYSTOLIC BLOOD PRESSURE: 110 MMHG | WEIGHT: 171.2 LBS | DIASTOLIC BLOOD PRESSURE: 60 MMHG | TEMPERATURE: 98 F | HEIGHT: 67 IN

## 2019-09-10 DIAGNOSIS — I10 ESSENTIAL HYPERTENSION: ICD-10-CM

## 2019-09-10 DIAGNOSIS — Z79.4 TYPE 2 DIABETES MELLITUS WITHOUT COMPLICATION, WITH LONG-TERM CURRENT USE OF INSULIN (HCC): ICD-10-CM

## 2019-09-10 DIAGNOSIS — D12.6 ADENOMATOUS POLYP OF COLON, UNSPECIFIED PART OF COLON: Primary | ICD-10-CM

## 2019-09-10 DIAGNOSIS — Z80.0 FAMILY HISTORY OF MALIGNANT NEOPLASM OF COLON: ICD-10-CM

## 2019-09-10 DIAGNOSIS — R79.0 LOW MAGNESIUM LEVEL: ICD-10-CM

## 2019-09-10 DIAGNOSIS — K21.9 GASTROESOPHAGEAL REFLUX DISEASE, ESOPHAGITIS PRESENCE NOT SPECIFIED: ICD-10-CM

## 2019-09-10 DIAGNOSIS — E11.9 TYPE 2 DIABETES MELLITUS WITHOUT COMPLICATION, WITH LONG-TERM CURRENT USE OF INSULIN (HCC): ICD-10-CM

## 2019-09-10 PROCEDURE — 99213 OFFICE O/P EST LOW 20 MIN: CPT | Performed by: INTERNAL MEDICINE

## 2019-09-10 NOTE — PROGRESS NOTES
Chief Complaint   Patient presents with   • Heartburn       Felicita Mike is a  68 y.o. male here for a follow up visit for heartburn.       He was last seen in the office in 2016.  He has a history of colonic polyps.  His mother had colorectal cancer.  Tubulovillous adenoma removed in 2016.    He thinks he has had an EGD in the past but not recently.  He has long-standing acid reflux for which he takes omeprazole.  He takes this approximately at 7 PM after dinner.  He denies dysphasia.  He reports his symptoms are well controlled unless he eats Italian food with red sauce.  His weight is been stable his appetite is been good.  He had a recent hospitalization at [facility] for a COPD exacerbation.  He reports continued improvement in his breathing.  He does continue to smoke.  HPI  Past Medical History:   Diagnosis Date   • Abnormal PSA    • Acute myocardial infarction (CMS/HCC) 2004    Stents   • Apnea, sleep    • Atherosclerotic heart disease    • Chronic pain     Chronic low back paoin, MRI 12/11,  L3 compression deformity, L2 degenerative disc disease and L5-S1 degenerative disc disease.   • Colon polyp    • Constipation    • COPD (chronic obstructive pulmonary disease) (CMS/HCC)    • Coronary artery disease    • Depressed bipolar II disorder (CMS/HCC)    • Diabetes mellitus (CMS/HCC)    • Fatigue    • GERD (gastroesophageal reflux disease)    • H/O transfusion of packed red blood cells    • Health care maintenance    • Hearing loss    • History of back pain    • Hypercholesterolemia    • Hyperlipidemia    • Hypertension    • Leukocytosis    • Osteoarthritis    • Proteinuria    • PVD (peripheral vascular disease) (CMS/HCC)    • Sebaceous cyst    • Seborrheic dermatitis    • Sleep apnea    • Tinea corporis    • Tobacco use    • Tremor      Past Surgical History:   Procedure Laterality Date   • ANGIOPLASTY      Cath Stent Placement   • COLONOSCOPY  01/22/2014    ta polyps   • COLONOSCOPY N/A  11/29/2016    Procedure: COLONOSCOPY TO CECUM AND TERMINAL ILEUM WITH HOT POLYPECTOMIES;  Surgeon: Ivette Franco MD;  Location: [facility] ENDOSCOPY;  Service:    • CORONARY STENT PLACEMENT  2004   • EYE SURGERY      Cataract Surgery   • HAND SURGERY         Current Outpatient Medications:   •  albuterol (PROVENTIL) (5 MG/ML) 0.5% nebulizer solution, Take 2.5 mg by nebulization Every 6 (Six) Hours As Needed for Wheezing., Disp: , Rfl:   •  amLODIPine (NORVASC) 10 MG tablet, Take 1 tablet by mouth Daily. for blood pressure, Disp: 90 tablet, Rfl: 1  •  aspirin 81 MG tablet, Take 1 tablet by mouth Daily., Disp: , Rfl:   •  atorvastatin (LIPITOR) 80 MG tablet, Take 1 tablet by mouth Daily. For cholesterol (new dose), Disp: 90 tablet, Rfl: 3  •  carvedilol (COREG) 25 MG tablet, Take 1 tablet by mouth 2 (Two) Times a Day With Meals. For heart, Disp: 180 tablet, Rfl: 3  •  cholecalciferol (VITAMIN D3) 1000 UNITS tablet, Take 1 tablet by mouth 2 (two) times a day., Disp: , Rfl:   •  Cyanocobalamin (B-12) 1000 MCG capsule, Take 1 tablet by mouth Daily., Disp: , Rfl:   •  FLUoxetine (PROzac) 20 MG capsule, Take 60 mg by mouth Daily., Disp: , Rfl:   •  ipratropium-albuterol (DUO-NEB) 0.5-2.5 mg/3 ml nebulizer, Take 3 mL by nebulization 4 (Four) Times a Day., Disp: 360 mL, Rfl: 1  •  lisinopril-hydrochlorothiazide (PRINZIDE,ZESTORETIC) 20-12.5 MG per tablet, Take 1 tablet by mouth Daily. For BP and renal protection, Disp: 90 tablet, Rfl: 3  •  lithium carbonate 150 MG capsule, Take 150 mg by mouth Every Morning., Disp: , Rfl:   •  lithium carbonate 150 MG capsule, Take 300 mg by mouth Every Evening., Disp: , Rfl:   •  Magnesium Oxide 400 (240 Mg) MG tablet, Take 1 tablet by mouth Daily., Disp: 90 tablet, Rfl: 1  •  metFORMIN (GLUCOPHAGE) 1000 MG tablet, Take 1,000 mg by mouth 2 (Two) Times a Day With Meals., Disp: , Rfl:   •  omeprazole (priLOSEC) 40 MG capsule, Take 1 capsule by mouth Daily. For GERD (stop Nexium), Disp: 90  capsule, Rfl: 3  •  polyethylene glycol (MIRALAX) packet, Take 17 g by mouth Daily As Needed., Disp: , Rfl:   •  QUEtiapine (SEROquel) 100 MG tablet, TAKE 1 TABLET BY MOUTH EVERY NIGHT FOR SLEEP, Disp: 90 tablet, Rfl: 0  •  tamsulosin (FLOMAX) 0.4 MG capsule 24 hr capsule, Take 1 capsule by mouth Daily., Disp: 30 capsule, Rfl: 0  •  wheat dextrin (BENEFIBER ON THE GO) powder powder, Take 1 each by mouth Daily As Needed., Disp: , Rfl:   •  nicotine (NICODERM CQ) 21 MG/24HR patch, Place 1 patch on the skin as directed by provider Daily., Disp: 30 patch, Rfl: 0  PRN Meds:.  Allergies   Allergen Reactions   • Clopidogrel Itching     Social History     Socioeconomic History   • Marital status:      Spouse name: Not on file   • Number of children: Not on file   • Years of education: Not on file   • Highest education level: Not on file   Occupational History     Employer: DISABLED   Tobacco Use   • Smoking status: Current Every Day Smoker     Packs/day: 1.00     Years: 47.00     Pack years: 47.00     Types: Cigarettes   • Smokeless tobacco: Never Used   • Tobacco comment: NO CAFFEINE USE   50 years 1 ppd    Substance and Sexual Activity   • Alcohol use: No   • Drug use: No   • Sexual activity: Defer     Family History   Problem Relation Age of Onset   • Cancer Mother    • Diabetes Mother    • Hypertension Mother    • Colon cancer Mother    • Heart disease Father    • Cancer Brother    • Stroke Brother      Review of Systems   Constitutional: Negative for appetite change and unexpected weight change.   HENT: Negative for trouble swallowing.    Respiratory: Positive for cough and shortness of breath.    Gastrointestinal: Negative for abdominal pain and blood in stool.   All other systems reviewed and are negative.    Vitals:    09/10/19 1540   BP: 110/60   Temp: 98 °F (36.7 °C)         09/10/19  1540   Weight: 77.7 kg (171 lb 3.2 oz)     Physical Exam   Constitutional: He appears well-developed and well-nourished.    HENT:   Head: Normocephalic and atraumatic.   Eyes: No scleral icterus.   Pulmonary/Chest: Effort normal.   Abdominal: Soft. He exhibits no distension.   Neurological: He is alert.   Skin: Skin is warm and dry.   Psychiatric: He has a normal mood and affect.     No images are attached to the encounter.  Diagnoses and all orders for this visit:    Adenomatous polyp of colon, unspecified part of colon  -     Case Request; Standing  -     Case Request    Family history of malignant neoplasm of colon  -     Case Request; Standing  -     Case Request    Gastroesophageal reflux disease, esophagitis presence not specified  -     Case Request; Standing  -     Case Request    Other orders  -     Follow Anesthesia Guidelines / Standing Orders; Future  -     Obtain Informed Consent; Future  -     Implement Anesthesia orders day of procedure.; Standing  -     Obtain informed consent; Standing  -     Verify bowel prep was successful; Standing  -     Give tap water enema if bowel prep was insufficient; Standing    Plan-  · He has multiple risk factors for Rice's esophagus-would proceed with EGD for further evaluation  · He is due for a colonoscopy due to his history of a tubulovillous adenoma in 2016 and a family history of colorectal cancer-we will do this concomitantly  · Continue PPI-move to take this prior to his evening meal  · We will give him some additional time to recover from his recent COPD exacerbation and schedule these procedures later in the year

## 2019-09-11 ENCOUNTER — EPISODE CHANGES (OUTPATIENT)
Dept: CASE MANAGEMENT | Facility: OTHER | Age: 69
End: 2019-09-11

## 2019-10-01 ENCOUNTER — EPISODE CHANGES (OUTPATIENT)
Dept: CASE MANAGEMENT | Facility: OTHER | Age: 69
End: 2019-10-01

## 2019-10-11 ENCOUNTER — EPISODE CHANGES (OUTPATIENT)
Dept: CASE MANAGEMENT | Facility: OTHER | Age: 69
End: 2019-10-11

## 2019-10-19 LAB
BUN SERPL-MCNC: 9 MG/DL (ref 8–23)
BUN/CREAT SERPL: 9.9 (ref 7–25)
CALCIUM SERPL-MCNC: 10.3 MG/DL (ref 8.6–10.5)
CHLORIDE SERPL-SCNC: 104 MMOL/L (ref 98–107)
CO2 SERPL-SCNC: 27.5 MMOL/L (ref 22–29)
CREAT SERPL-MCNC: 0.91 MG/DL (ref 0.76–1.27)
GLUCOSE SERPL-MCNC: 154 MG/DL (ref 65–99)
MAGNESIUM SERPL-MCNC: 1.7 MG/DL (ref 1.6–2.4)
POTASSIUM SERPL-SCNC: 5.3 MMOL/L (ref 3.5–5.2)
SODIUM SERPL-SCNC: 141 MMOL/L (ref 136–145)

## 2019-10-20 DIAGNOSIS — E87.8 ABNORMAL BLOOD ELECTROLYTE LEVEL: Primary | ICD-10-CM

## 2019-11-11 ENCOUNTER — EPISODE CHANGES (OUTPATIENT)
Dept: CASE MANAGEMENT | Facility: OTHER | Age: 69
End: 2019-11-11

## 2019-11-21 ENCOUNTER — EPISODE CHANGES (OUTPATIENT)
Dept: CASE MANAGEMENT | Facility: OTHER | Age: 69
End: 2019-11-21

## 2019-11-27 RX ORDER — QUETIAPINE FUMARATE 100 MG/1
100 TABLET, FILM COATED ORAL NIGHTLY
Qty: 90 TABLET | Refills: 0 | Status: SHIPPED | OUTPATIENT
Start: 2019-11-27 | End: 2020-02-29

## 2019-12-05 ENCOUNTER — OFFICE VISIT (OUTPATIENT)
Dept: FAMILY MEDICINE CLINIC | Facility: CLINIC | Age: 69
End: 2019-12-05

## 2019-12-05 VITALS
BODY MASS INDEX: 27.78 KG/M2 | SYSTOLIC BLOOD PRESSURE: 130 MMHG | DIASTOLIC BLOOD PRESSURE: 73 MMHG | WEIGHT: 177 LBS | OXYGEN SATURATION: 94 % | HEART RATE: 80 BPM | RESPIRATION RATE: 16 BRPM | HEIGHT: 67 IN | TEMPERATURE: 98.3 F

## 2019-12-05 DIAGNOSIS — E78.2 MIXED HYPERLIPIDEMIA: ICD-10-CM

## 2019-12-05 DIAGNOSIS — E11.9 TYPE 2 DIABETES MELLITUS WITHOUT COMPLICATION, WITH LONG-TERM CURRENT USE OF INSULIN (HCC): ICD-10-CM

## 2019-12-05 DIAGNOSIS — Z79.4 TYPE 2 DIABETES MELLITUS WITHOUT COMPLICATION, WITH LONG-TERM CURRENT USE OF INSULIN (HCC): ICD-10-CM

## 2019-12-05 DIAGNOSIS — Z51.81 ENCOUNTER FOR LITHIUM MONITORING: ICD-10-CM

## 2019-12-05 DIAGNOSIS — Z79.899 ENCOUNTER FOR LITHIUM MONITORING: ICD-10-CM

## 2019-12-05 DIAGNOSIS — E55.9 VITAMIN D DEFICIENCY: ICD-10-CM

## 2019-12-05 DIAGNOSIS — R25.1 TREMOR: ICD-10-CM

## 2019-12-05 DIAGNOSIS — R10.84 GENERALIZED ABDOMINAL PAIN: ICD-10-CM

## 2019-12-05 DIAGNOSIS — R11.0 NAUSEA: ICD-10-CM

## 2019-12-05 DIAGNOSIS — K21.00 GASTROESOPHAGEAL REFLUX DISEASE WITH ESOPHAGITIS: Primary | ICD-10-CM

## 2019-12-05 PROCEDURE — 99214 OFFICE O/P EST MOD 30 MIN: CPT | Performed by: PHYSICIAN ASSISTANT

## 2019-12-05 RX ORDER — ONDANSETRON 4 MG/1
TABLET, FILM COATED ORAL
Qty: 45 TABLET | Refills: 1 | Status: SHIPPED | OUTPATIENT
Start: 2019-12-05 | End: 2019-12-13

## 2019-12-05 RX ORDER — OMEPRAZOLE 40 MG/1
40 CAPSULE, DELAYED RELEASE ORAL 2 TIMES DAILY
Qty: 180 CAPSULE | Refills: 3 | Status: SHIPPED | OUTPATIENT
Start: 2019-12-05 | End: 2020-04-22

## 2019-12-05 NOTE — PATIENT INSTRUCTIONS
Food Choices for Gastroesophageal Reflux Disease, Adult  When you have gastroesophageal reflux disease (GERD), the foods you eat and your eating habits are very important. Choosing the right foods can help ease the discomfort of GERD. Consider working with a diet and nutrition specialist (dietitian) to help you make healthy food choices.  What general guidelines should I follow?    Eating plan  · Choose healthy foods low in fat, such as fruits, vegetables, whole grains, low-fat dairy products, and lean meat, fish, and poultry.  · Eat frequent, small meals instead of three large meals each day. Eat your meals slowly, in a relaxed setting. Avoid bending over or lying down until 2-3 hours after eating.  · Limit high-fat foods such as fatty meats or fried foods.  · Limit your intake of oils, butter, and shortening to less than 8 teaspoons each day.  · Avoid the following:  ? Foods that cause symptoms. These may be different for different people. Keep a food diary to keep track of foods that cause symptoms.  ? Alcohol.  ? Drinking large amounts of liquid with meals.  ? Eating meals during the 2-3 hours before bed.  · Cook foods using methods other than frying. This may include baking, grilling, or broiling.  Lifestyle  · Maintain a healthy weight. Ask your health care provider what weight is healthy for you. If you need to lose weight, work with your health care provider to do so safely.  · Exercise for at least 30 minutes on 5 or more days each week, or as told by your health care provider.  · Avoid wearing clothes that fit tightly around your waist and chest.  · Do not use any products that contain nicotine or tobacco, such as cigarettes and e-cigarettes. If you need help quitting, ask your health care provider.  · Sleep with the head of your bed raised. Use a wedge under the mattress or blocks under the bed frame to raise the head of the bed.  What foods are not recommended?  The items listed may not be a complete  list. Talk with your dietitian about what dietary choices are best for you.  Grains  Pastries or quick breads with added fat. French toast.  Vegetables  Deep fried vegetables. French fries. Any vegetables prepared with added fat. Any vegetables that cause symptoms. For some people this may include tomatoes and tomato products, chili peppers, onions and garlic, and horseradish.  Fruits  Any fruits prepared with added fat. Any fruits that cause symptoms. For some people this may include citrus fruits, such as oranges, grapefruit, pineapple, and kendall.  Meats and other protein foods  High-fat meats, such as fatty beef or pork, hot dogs, ribs, ham, sausage, salami and chaidez. Fried meat or protein, including fried fish and fried chicken. Nuts and nut butters.  Dairy  Whole milk and chocolate milk. Sour cream. Cream. Ice cream. Cream cheese. Milk shakes.  Beverages  Coffee and tea, with or without caffeine. Carbonated beverages. Sodas. Energy drinks. Fruit juice made with acidic fruits (such as orange or grapefruit). Tomato juice. Alcoholic drinks.  Fats and oils  Butter. Margarine. Shortening. Ghee.  Sweets and desserts  Chocolate and cocoa. Donuts.  Seasoning and other foods  Pepper. Peppermint and spearmint. Any condiments, herbs, or seasonings that cause symptoms. For some people, this may include cardoso, hot sauce, or vinegar-based salad dressings.  Summary  · When you have gastroesophageal reflux disease (GERD), food and lifestyle choices are very important to help ease the discomfort of GERD.  · Eat frequent, small meals instead of three large meals each day. Eat your meals slowly, in a relaxed setting. Avoid bending over or lying down until 2-3 hours after eating.  · Limit high-fat foods such as fatty meat or fried foods.  This information is not intended to replace advice given to you by your health care provider. Make sure you discuss any questions you have with your health care provider.  Document Released:  12/18/2006 Document Revised: 12/19/2017 Document Reviewed: 12/19/2017  Elsevier Interactive Patient Education © 2019 [employer]

## 2019-12-05 NOTE — PROGRESS NOTES
Subjective   Felicita Mike is a 69 y.o. male.     History of Present Illness   Felicita Mike 69 y.o. male who presents for evaluation of nausea and abdominal pain. Symptoms have been present for 9 days .  The condition is aggravated by laying down . he is experiencing some abd cramping lower abd.  Alleviating factors are nothing with no change in symptoms . Patient denies fever, diarrhea, constipation, change in stools, melena, bright red blood in stool and vomiting. He is getting GERD in last few mo and need to let DR Franco know.  He has colonoscopy schedule; need to to make sure has EGD----he has this scheduled. He is taking Tums in addition to PPI  Has been to DR Franco in past for GI  I will increase his PPI to BID for now and see if helps GERD, nausea, and abd pain.  Also Rx for nausea PRN; watch drowsiness        The following portions of the patient's history were reviewed and updated as appropriate: allergies, current medications, past family history, past medical history, past social history, past surgical history and problem list.    Review of Systems   Constitutional: Negative for activity change, appetite change and unexpected weight change.   HENT: Negative for nosebleeds and trouble swallowing.    Eyes: Negative for pain and visual disturbance.   Respiratory: Positive for shortness of breath. Negative for chest tightness and wheezing.    Cardiovascular: Negative for chest pain and palpitations.   Gastrointestinal: Negative for abdominal pain and blood in stool.   Endocrine: Negative.    Genitourinary: Negative for difficulty urinating and hematuria.   Musculoskeletal: Negative for joint swelling.   Skin: Negative for color change and rash.   Allergic/Immunologic: Negative.    Neurological: Positive for tremors. Negative for syncope and speech difficulty.   Hematological: Negative for adenopathy.   Psychiatric/Behavioral: Positive for sleep disturbance. Negative for agitation and confusion.   All other  systems reviewed and are negative.      Objective   Physical Exam   Constitutional: He is oriented to person, place, and time. He appears well-developed and well-nourished. No distress.   HENT:   Head: Normocephalic and atraumatic.   Left pupil stays dilated   Eyes: Conjunctivae and EOM are normal. Right eye exhibits no discharge. Left eye exhibits no discharge. No scleral icterus.   Left pupil damage is chronic   Neck: Normal range of motion. Neck supple. No tracheal deviation present. No thyromegaly present.   Cardiovascular: Normal rate, regular rhythm, normal heart sounds and normal pulses. Exam reveals no gallop and no friction rub.   No murmur heard.  Fingernails clubbing   Pulmonary/Chest: Effort normal. No respiratory distress. He has wheezes. He has no rales.   decreased   Abdominal: Soft. Bowel sounds are normal. He exhibits no mass. There is no tenderness. There is no guarding.   Not tender   Musculoskeletal: Normal range of motion.   Tremor in hands      During the foot exam he had a monofilament test performed.    Neurological Sensory Findings - Unaltered hot/cold right ankle/foot discrimination and unaltered hot/cold left ankle/foot discrimination. Unaltered sharp/dull right ankle/foot discrimination and unaltered sharp/dull left ankle/foot discrimination. No right ankle/foot altered proprioception and no left ankle/foot altered proprioception  Vascular Status -  His right foot exhibits normal foot vasculature  and no edema. His left foot exhibits normal foot vasculature  and no edema.  Skin Integrity  -  His right foot skin is intact.  He has no right foot ulcer, non-callous right foot, no right foot warmth and no right foot blister.His left foot skin is intact. He has no left foot ulcer, non-callous left foot, no left foot warmth and no left foot blister..  Lymphadenopathy:     He has no cervical adenopathy.   Neurological: He is alert and oriented to person, place, and time. He has normal reflexes.  No cranial nerve deficit (see note on pupil left). He exhibits normal muscle tone. Coordination (tremor) abnormal.   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 No rash noted. No erythema. No pallor.   Clubbing toenails and fingernails     Psychiatric: He has a normal mood and affect. Judgment and thought content normal.   Nursing note and vitals reviewed.      Assessment/Plan   Felicita was seen today for gi problem.    Diagnoses and all orders for this visit:    Gastroesophageal reflux disease with esophagitis  -     Cancel: Comprehensive metabolic panel  -     Cancel: Lipid panel  -     CBC and Differential  -     TSH  -     Hemoglobin A1c  -     Cancel: Vitamin B12  -     Cancel: Folate  -     Vitamin D 25 Hydroxy  -     Cancel: Lithium level  -     Comprehensive metabolic panel  -     Lipid panel  -     CBC and Differential  -     TSH  -     Hemoglobin A1c  -     T3, Free  -     T4, Free  -     Vitamin B12  -     Folate  -     Vitamin D 25 Hydroxy  -     Lithium level  -     Amylase  -     Lipase    Type 2 diabetes mellitus without complication, with long-term current use of insulin (CMS/[facility])  -     Cancel: Comprehensive metabolic panel  -     Cancel: Lipid panel  -     CBC and Differential  -     TSH  -     Hemoglobin A1c  -     Cancel: Vitamin B12  -     Cancel: Folate  -     Vitamin D 25 Hydroxy  -     Cancel: Lithium level  -     Comprehensive metabolic panel  -     Lipid panel  -     CBC and Differential  -     TSH  -     Hemoglobin A1c  -     T3, Free  -     T4, Free  -     Vitamin B12  -     Folate  -     Vitamin D 25 Hydroxy  -     Lithium level  -     Amylase  -     Lipase    Tremor  -     Cancel: Comprehensive metabolic panel  -     Cancel: Lipid panel  -     CBC and Differential  -     TSH  -     Hemoglobin A1c  -     Cancel: Vitamin B12  -     Cancel: Folate  -     Vitamin D 25 Hydroxy  -     Cancel: Lithium level  -     Comprehensive metabolic panel  -     Lipid panel  -     CBC and  Differential  -     TSH  -     Hemoglobin A1c  -     T3, Free  -     T4, Free  -     Vitamin B12  -     Folate  -     Vitamin D 25 Hydroxy  -     Lithium level  -     Amylase  -     Lipase    Nausea  -     Cancel: Comprehensive metabolic panel  -     Cancel: Lipid panel  -     CBC and Differential  -     TSH  -     Hemoglobin A1c  -     Cancel: Vitamin B12  -     Cancel: Folate  -     Vitamin D 25 Hydroxy  -     Cancel: Lithium level  -     Comprehensive metabolic panel  -     Lipid panel  -     CBC and Differential  -     TSH  -     Hemoglobin A1c  -     T3, Free  -     T4, Free  -     Vitamin B12  -     Folate  -     Vitamin D 25 Hydroxy  -     Lithium level  -     Amylase  -     Lipase    Generalized abdominal pain  -     Cancel: Comprehensive metabolic panel  -     Cancel: Lipid panel  -     CBC and Differential  -     TSH  -     Hemoglobin A1c  -     Cancel: Vitamin B12  -     Cancel: Folate  -     Vitamin D 25 Hydroxy  -     Cancel: Lithium level  -     Comprehensive metabolic panel  -     Lipid panel  -     CBC and Differential  -     TSH  -     Hemoglobin A1c  -     T3, Free  -     T4, Free  -     Vitamin B12  -     Folate  -     Vitamin D 25 Hydroxy  -     Lithium level  -     Amylase  -     Lipase    Mixed hyperlipidemia  -     Cancel: Comprehensive metabolic panel  -     Cancel: Lipid panel  -     CBC and Differential  -     TSH  -     Hemoglobin A1c  -     Cancel: Vitamin B12  -     Cancel: Folate  -     Vitamin D 25 Hydroxy  -     Cancel: Lithium level  -     Comprehensive metabolic panel  -     Lipid panel  -     CBC and Differential  -     TSH  -     Hemoglobin A1c  -     T3, Free  -     T4, Free  -     Vitamin B12  -     Folate  -     Vitamin D 25 Hydroxy  -     Lithium level  -     Amylase  -     Lipase    Vitamin D deficiency  -     Cancel: Comprehensive metabolic panel  -     Cancel: Lipid panel  -     CBC and Differential  -     TSH  -     Hemoglobin A1c  -     Cancel: Vitamin B12  -     Cancel:  Folate  -     Vitamin D 25 Hydroxy  -     Cancel: Lithium level  -     Comprehensive metabolic panel  -     Lipid panel  -     CBC and Differential  -     TSH  -     Hemoglobin A1c  -     T3, Free  -     T4, Free  -     Vitamin B12  -     Folate  -     Vitamin D 25 Hydroxy  -     Lithium level  -     Amylase  -     Lipase    Encounter for lithium monitoring  -     Cancel: Comprehensive metabolic panel  -     Cancel: Lipid panel  -     CBC and Differential  -     TSH  -     Hemoglobin A1c  -     Cancel: Vitamin B12  -     Cancel: Folate  -     Vitamin D 25 Hydroxy  -     Cancel: Lithium level  -     Comprehensive metabolic panel  -     Lipid panel  -     CBC and Differential  -     TSH  -     Hemoglobin A1c  -     T3, Free  -     T4, Free  -     Vitamin B12  -     Folate  -     Vitamin D 25 Hydroxy  -     Lithium level  -     Amylase  -     Lipase    Other orders  -     omeprazole (priLOSEC) 40 MG capsule; Take 1 capsule by mouth 2 (Two) Times a Day. For GERD --new dose  -     ondansetron (ZOFRAN) 4 MG tablet; 1 tab PO Q 8 hours PRN nausea    I will increase PPI to BID and let DR Franco know  I will get Lithium level; if toxic--can cause nausea  CBC and CMP and need lipase; he is not on DDP 4 or GLP1 med

## 2019-12-06 ENCOUNTER — LAB (OUTPATIENT)
Dept: LAB | Facility: HOSPITAL | Age: 69
End: 2019-12-06

## 2019-12-06 DIAGNOSIS — D50.8 OTHER IRON DEFICIENCY ANEMIA: ICD-10-CM

## 2019-12-06 LAB
25(OH)D3+25(OH)D2 SERPL-MCNC: 33 NG/ML (ref 30–100)
ALBUMIN SERPL-MCNC: 4.6 G/DL (ref 3.5–5.2)
ALBUMIN/GLOB SERPL: 2.2 G/DL
ALP SERPL-CCNC: 87 U/L (ref 39–117)
ALT SERPL-CCNC: 12 U/L (ref 1–41)
AMYLASE SERPL-CCNC: 163 U/L (ref 28–100)
AST SERPL-CCNC: 9 U/L (ref 1–40)
BASOPHILS # BLD AUTO: 0.04 10*3/MM3 (ref 0–0.2)
BASOPHILS # BLD AUTO: 0.05 10*3/MM3 (ref 0–0.2)
BASOPHILS NFR BLD AUTO: 0.3 % (ref 0–1.5)
BASOPHILS NFR BLD AUTO: 0.4 % (ref 0–1.5)
BILIRUB SERPL-MCNC: 0.6 MG/DL (ref 0.2–1.2)
BUN SERPL-MCNC: 11 MG/DL (ref 8–23)
BUN/CREAT SERPL: 11.2 (ref 7–25)
CALCIUM SERPL-MCNC: 10.5 MG/DL (ref 8.6–10.5)
CHLORIDE SERPL-SCNC: 101 MMOL/L (ref 98–107)
CHOLEST SERPL-MCNC: 118 MG/DL (ref 0–200)
CO2 SERPL-SCNC: 23.3 MMOL/L (ref 22–29)
CREAT SERPL-MCNC: 0.98 MG/DL (ref 0.76–1.27)
DEPRECATED RDW RBC AUTO: 43.6 FL (ref 37–54)
EOSINOPHIL # BLD AUTO: 0.19 10*3/MM3 (ref 0–0.4)
EOSINOPHIL # BLD AUTO: 0.23 10*3/MM3 (ref 0–0.4)
EOSINOPHIL NFR BLD AUTO: 1.5 % (ref 0.3–6.2)
EOSINOPHIL NFR BLD AUTO: 1.9 % (ref 0.3–6.2)
ERYTHROCYTE [DISTWIDTH] IN BLOOD BY AUTOMATED COUNT: 12.6 % (ref 12.3–15.4)
ERYTHROCYTE [DISTWIDTH] IN BLOOD BY AUTOMATED COUNT: 12.8 % (ref 12.3–15.4)
FERRITIN SERPL-MCNC: 64 NG/ML (ref 30–400)
FOLATE SERPL-MCNC: 8.66 NG/ML (ref 4.78–24.2)
GLOBULIN SER CALC-MCNC: 2.1 GM/DL
GLUCOSE SERPL-MCNC: 131 MG/DL (ref 65–99)
HBA1C MFR BLD: 6.7 % (ref 4.8–5.6)
HCT VFR BLD AUTO: 39.4 % (ref 37.5–51)
HCT VFR BLD AUTO: 40.5 % (ref 37.5–51)
HDLC SERPL-MCNC: 38 MG/DL (ref 40–60)
HGB BLD-MCNC: 12.7 G/DL (ref 13–17.7)
HGB BLD-MCNC: 12.8 G/DL (ref 13–17.7)
IMM GRANULOCYTES # BLD AUTO: 0.05 10*3/MM3 (ref 0–0.05)
IMM GRANULOCYTES # BLD AUTO: 0.06 10*3/MM3 (ref 0–0.05)
IMM GRANULOCYTES NFR BLD AUTO: 0.4 % (ref 0–0.5)
IMM GRANULOCYTES NFR BLD AUTO: 0.5 % (ref 0–0.5)
IRON 24H UR-MRATE: 72 MCG/DL (ref 59–158)
IRON SATN MFR SERPL: 17 % (ref 14–48)
LDLC SERPL CALC-MCNC: 55 MG/DL (ref 0–100)
LIPASE SERPL-CCNC: 59 U/L (ref 13–60)
LITHIUM SERPL-SCNC: 0.7 MMOL/L (ref 0.6–1.2)
LYMPHOCYTES # BLD AUTO: 1.75 10*3/MM3 (ref 0.7–3.1)
LYMPHOCYTES # BLD AUTO: 1.83 10*3/MM3 (ref 0.7–3.1)
LYMPHOCYTES NFR BLD AUTO: 14.1 % (ref 19.6–45.3)
LYMPHOCYTES NFR BLD AUTO: 14.5 % (ref 19.6–45.3)
MCH RBC QN AUTO: 29.1 PG (ref 26.6–33)
MCH RBC QN AUTO: 29.6 PG (ref 26.6–33)
MCHC RBC AUTO-ENTMCNC: 31.6 G/DL (ref 31.5–35.7)
MCHC RBC AUTO-ENTMCNC: 32.2 G/DL (ref 31.5–35.7)
MCV RBC AUTO: 90.2 FL (ref 79–97)
MCV RBC AUTO: 93.8 FL (ref 79–97)
MONOCYTES # BLD AUTO: 0.96 10*3/MM3 (ref 0.1–0.9)
MONOCYTES # BLD AUTO: 0.99 10*3/MM3 (ref 0.1–0.9)
MONOCYTES NFR BLD AUTO: 7.6 % (ref 5–12)
MONOCYTES NFR BLD AUTO: 8 % (ref 5–12)
NEUTROPHILS # BLD AUTO: 9.35 10*3/MM3 (ref 1.7–7)
NEUTROPHILS # BLD AUTO: 9.57 10*3/MM3 (ref 1.7–7)
NEUTROPHILS NFR BLD AUTO: 75.1 % (ref 42.7–76)
NEUTROPHILS NFR BLD AUTO: 75.7 % (ref 42.7–76)
NRBC BLD AUTO-RTO: 0 /100 WBC (ref 0–0.2)
NRBC BLD AUTO-RTO: 0 /100 WBC (ref 0–0.2)
PLATELET # BLD AUTO: 224 10*3/MM3 (ref 140–450)
PLATELET # BLD AUTO: 238 10*3/MM3 (ref 140–450)
PMV BLD AUTO: 10.8 FL (ref 6–12)
POTASSIUM SERPL-SCNC: 5 MMOL/L (ref 3.5–5.2)
PROT SERPL-MCNC: 6.7 G/DL (ref 6–8.5)
RBC # BLD AUTO: 4.32 10*6/MM3 (ref 4.14–5.8)
RBC # BLD AUTO: 4.37 10*6/MM3 (ref 4.14–5.8)
SODIUM SERPL-SCNC: 139 MMOL/L (ref 136–145)
T3FREE SERPL-MCNC: 3.4 PG/ML (ref 2–4.4)
T4 FREE SERPL-MCNC: 1.26 NG/DL (ref 0.93–1.7)
TIBC SERPL-MCNC: 413 MCG/DL (ref 249–505)
TRANSFERRIN SERPL-MCNC: 295 MG/DL (ref 200–360)
TRIGL SERPL-MCNC: 123 MG/DL (ref 0–150)
TSH SERPL DL<=0.005 MIU/L-ACNC: 1.31 UIU/ML (ref 0.27–4.2)
VIT B12 SERPL-MCNC: >2000 PG/ML (ref 211–946)
VLDLC SERPL CALC-MCNC: 24.6 MG/DL
WBC # BLD AUTO: 12.64 10*3/MM3 (ref 3.4–10.8)
WBC NRBC COR # BLD: 12.43 10*3/MM3 (ref 3.4–10.8)

## 2019-12-06 PROCEDURE — 84466 ASSAY OF TRANSFERRIN: CPT

## 2019-12-06 PROCEDURE — 36415 COLL VENOUS BLD VENIPUNCTURE: CPT

## 2019-12-06 PROCEDURE — 83540 ASSAY OF IRON: CPT

## 2019-12-06 PROCEDURE — 85025 COMPLETE CBC W/AUTO DIFF WBC: CPT

## 2019-12-06 PROCEDURE — 82728 ASSAY OF FERRITIN: CPT

## 2019-12-11 ENCOUNTER — OFFICE VISIT (OUTPATIENT)
Dept: CARDIOLOGY | Facility: CLINIC | Age: 69
End: 2019-12-11

## 2019-12-11 VITALS
SYSTOLIC BLOOD PRESSURE: 120 MMHG | HEIGHT: 69 IN | WEIGHT: 175.8 LBS | DIASTOLIC BLOOD PRESSURE: 60 MMHG | BODY MASS INDEX: 26.04 KG/M2 | HEART RATE: 71 BPM

## 2019-12-11 DIAGNOSIS — E78.2 MIXED HYPERLIPIDEMIA: ICD-10-CM

## 2019-12-11 DIAGNOSIS — I25.10 CORONARY ARTERIOSCLEROSIS IN NATIVE ARTERY: Primary | ICD-10-CM

## 2019-12-11 DIAGNOSIS — I10 ESSENTIAL HYPERTENSION: ICD-10-CM

## 2019-12-11 DIAGNOSIS — Z72.0 TOBACCO ABUSE: ICD-10-CM

## 2019-12-11 PROCEDURE — 93000 ELECTROCARDIOGRAM COMPLETE: CPT | Performed by: INTERNAL MEDICINE

## 2019-12-11 PROCEDURE — 99214 OFFICE O/P EST MOD 30 MIN: CPT | Performed by: INTERNAL MEDICINE

## 2019-12-11 NOTE — PROGRESS NOTES
Date of Office Visit: 19  Encounter Provider: Suleiman Anne MD  Place of Service: [facility] CARDIOLOGY  Patient Name: Felicita Mike  :1950  [id number]    Chief Complaint   Patient presents with   • Coronary Artery Disease   :     HPI: Felicita Mike is a 69 y.o. male he had stents placed in his circumflex and RCA in  and has done really well since then.  He has not had any recurrence of chest pain he has shortness of breath but he smokes severely and has terrible COPD he has diabetes he is not any PND orthopnea edema syncope or palpitations    Past Medical History:   Diagnosis Date   • Abnormal PSA    • Acute myocardial infarction (CMS/HCC)     Stents   • Apnea, sleep    • Atherosclerotic heart disease    • Chronic pain     Chronic low back paoin, MRI ,  L3 compression deformity, L2 degenerative disc disease and L5-S1 degenerative disc disease.   • Colon polyp    • Constipation    • COPD (chronic obstructive pulmonary disease) (CMS/HCC)    • Coronary artery disease    • Depressed bipolar II disorder (CMS/HCC)    • Diabetes mellitus (CMS/HCC)    • Fatigue    • GERD (gastroesophageal reflux disease)    • H/O transfusion of packed red blood cells    • Health care maintenance    • Hearing loss    • History of back pain    • Hypercholesterolemia    • Hyperlipidemia    • Hypertension    • Leukocytosis    • Osteoarthritis    • Proteinuria    • PVD (peripheral vascular disease) (CMS/HCC)    • Sebaceous cyst    • Seborrheic dermatitis    • Sleep apnea    • Tinea corporis    • Tobacco use    • Tremor        Past Surgical History:   Procedure Laterality Date   • ANGIOPLASTY      Cath Stent Placement   • COLONOSCOPY  2014    ta polyps   • COLONOSCOPY N/A 2016    Procedure: COLONOSCOPY TO CECUM AND TERMINAL ILEUM WITH HOT POLYPECTOMIES;  Surgeon: Ivette Franco MD;  Location: [facility] ENDOSCOPY;  Service:    • CORONARY STENT PLACEMENT     • EYE SURGERY       Cataract Surgery   • HAND SURGERY         Social History     Socioeconomic History   • Marital status:      Spouse name: Not on file   • Number of children: Not on file   • Years of education: Not on file   • Highest education level: Not on file   Occupational History     Employer: DISABLED   Tobacco Use   • Smoking status: Current Every Day Smoker     Packs/day: 1.00     Years: 47.00     Pack years: 47.00     Types: Cigarettes   • Smokeless tobacco: Never Used   • Tobacco comment: NO CAFFEINE USE   50 years 1 ppd    Substance and Sexual Activity   • Alcohol use: No   • Drug use: No   • Sexual activity: Defer       Family History   Problem Relation Age of Onset   • Cancer Mother    • Diabetes Mother    • Hypertension Mother    • Colon cancer Mother    • Heart disease Father    • Cancer Brother    • Stroke Brother        Review of Systems   Constitution: Negative for decreased appetite, fever, malaise/fatigue and weight loss.   HENT: Negative for nosebleeds.    Eyes: Negative for double vision.   Cardiovascular: Negative for chest pain, claudication, cyanosis, dyspnea on exertion, irregular heartbeat, leg swelling, near-syncope, orthopnea, palpitations, paroxysmal nocturnal dyspnea and syncope.   Respiratory: Negative for cough, hemoptysis and shortness of breath.    Hematologic/Lymphatic: Negative for bleeding problem.   Skin: Negative for rash.   Musculoskeletal: Negative for falls and myalgias.   Gastrointestinal: Negative for hematochezia, jaundice, melena, nausea and vomiting.   Genitourinary: Negative for hematuria.   Neurological: Negative for dizziness and seizures.   Psychiatric/Behavioral: Negative for altered mental status and memory loss.       Allergies   Allergen Reactions   • Clopidogrel Itching         Current Outpatient Medications:   •  albuterol (PROVENTIL) (5 MG/ML) 0.5% nebulizer solution, Take 2.5 mg by nebulization Every 6 (Six) Hours As Needed for Wheezing., Disp: , Rfl:   •   amLODIPine (NORVASC) 10 MG tablet, Take 1 tablet by mouth Daily. for blood pressure, Disp: 90 tablet, Rfl: 1  •  aspirin 81 MG tablet, Take 1 tablet by mouth Daily., Disp: , Rfl:   •  atorvastatin (LIPITOR) 80 MG tablet, Take 1 tablet by mouth Daily. For cholesterol (new dose), Disp: 90 tablet, Rfl: 3  •  carvedilol (COREG) 25 MG tablet, Take 1 tablet by mouth 2 (Two) Times a Day With Meals. For heart, Disp: 180 tablet, Rfl: 3  •  cholecalciferol (VITAMIN D3) 1000 UNITS tablet, Take 1 tablet by mouth 2 (two) times a day., Disp: , Rfl:   •  Cyanocobalamin (B-12) 1000 MCG capsule, Take 1 tablet by mouth Daily., Disp: , Rfl:   •  FLUoxetine (PROzac) 20 MG capsule, Take 60 mg by mouth Daily., Disp: , Rfl:   •  ipratropium-albuterol (DUO-NEB) 0.5-2.5 mg/3 ml nebulizer, Take 3 mL by nebulization 4 (Four) Times a Day., Disp: 360 mL, Rfl: 1  •  lisinopril-hydrochlorothiazide (PRINZIDE,ZESTORETIC) 20-12.5 MG per tablet, Take 1 tablet by mouth Daily. For BP and renal protection, Disp: 90 tablet, Rfl: 3  •  lithium carbonate 150 MG capsule, Take 300 mg by mouth Every Evening., Disp: , Rfl:   •  Magnesium Oxide 400 (240 Mg) MG tablet, Take 1 tablet by mouth Daily., Disp: 90 tablet, Rfl: 1  •  metFORMIN (GLUCOPHAGE) 1000 MG tablet, Take 1,000 mg by mouth 2 (Two) Times a Day With Meals., Disp: , Rfl:   •  omeprazole (priLOSEC) 40 MG capsule, Take 1 capsule by mouth 2 (Two) Times a Day. For GERD --new dose, Disp: 180 capsule, Rfl: 3  •  ondansetron (ZOFRAN) 4 MG tablet, 1 tab PO Q 8 hours PRN nausea, Disp: 45 tablet, Rfl: 1  •  polyethylene glycol (MIRALAX) packet, Take 17 g by mouth Daily As Needed., Disp: , Rfl:   •  QUEtiapine (SEROquel) 100 MG tablet, TAKE 1 TABLET BY MOUTH EVERY NIGHT FOR SLEEP, Disp: 90 tablet, Rfl: 0  •  tamsulosin (FLOMAX) 0.4 MG capsule 24 hr capsule, Take 1 capsule by mouth Daily., Disp: 30 capsule, Rfl: 0  •  wheat dextrin (BENEFIBER ON THE GO) powder powder, Take 1 each by mouth Daily As Needed., Disp:  ", Rfl:   •  lithium carbonate 150 MG capsule, Take 150 mg by mouth Every Morning., Disp: , Rfl:   •  nicotine (NICODERM CQ) 21 MG/24HR patch, Place 1 patch on the skin as directed by provider Daily., Disp: 30 patch, Rfl: 0      Objective:     Vitals:    12/11/19 1338   BP: 120/60   Pulse: 71   Weight: 79.7 kg (175 lb 12.8 oz)   Height: 175.3 cm (69\")     Body mass index is 25.96 kg/m².    Physical Exam   Constitutional: He is oriented to person, place, and time. He appears well-developed and well-nourished.   Appears 10 years older than his age   HENT:   Head: Normocephalic.   Eyes: No scleral icterus.   Neck: No JVD present. No thyromegaly present.   Cardiovascular: Normal rate, regular rhythm and normal heart sounds. Exam reveals no gallop and no friction rub.   No murmur heard.  Pulmonary/Chest: Effort normal and breath sounds normal. He has no wheezes. He has no rales.   Abdominal: Soft. There is no hepatosplenomegaly. There is no tenderness.   Musculoskeletal: Normal range of motion. He exhibits no edema.   Lymphadenopathy:     He has no cervical adenopathy.   Neurological: He is alert and oriented to person, place, and time.   Skin: Skin is warm and dry. No rash noted.   Psychiatric: He has a normal mood and affect.         ECG 12 Lead  Date/Time: 12/11/2019 2:14 PM  Performed by: Suleiman Anne MD  Authorized by: Suleiman Anne MD   Comparison: compared with previous ECG   Rhythm: sinus rhythm    Clinical impression: normal ECG             Assessment:       Diagnosis Plan   1. Coronary arteriosclerosis in native artery     2. Mixed hyperlipidemia     3. Essential hypertension     4. Tobacco abuse            Plan:       I think in general he is doing well however this is a little bit like watching a slow motion automobile wreck.  He has severe tobacco abuse diabetes hypertension so we know how this is going to probably end up and he just cannot really make a change he is on good medical therapy but I am " afraid his lungs were smoking to skin end up badly that being said he has full be so far and I think still is doing okay from a cardiac standpoint    As always, it has been a pleasure to participate in your patient's care.      Sincerely,       Suleiman Anne MD

## 2019-12-12 ENCOUNTER — TELEPHONE (OUTPATIENT)
Dept: FAMILY MEDICINE CLINIC | Facility: CLINIC | Age: 69
End: 2019-12-12

## 2019-12-12 NOTE — TELEPHONE ENCOUNTER
Pt called stating meds. That was prescribed for his stomach is not helping he is wanting to know if you can send something else in for him.  Thanks

## 2019-12-13 PROBLEM — J96.01 ACUTE RESPIRATORY FAILURE WITH HYPOXIA (HCC): Status: RESOLVED | Noted: 2019-08-08 | Resolved: 2019-12-13

## 2019-12-13 PROBLEM — K21.9 GASTROESOPHAGEAL REFLUX DISEASE: Status: ACTIVE | Noted: 2019-09-10

## 2019-12-13 RX ORDER — PROMETHAZINE HYDROCHLORIDE 25 MG/1
25 TABLET ORAL EVERY 6 HOURS PRN
Qty: 45 TABLET | Refills: 1 | Status: SHIPPED | OUTPATIENT
Start: 2019-12-13 | End: 2019-12-25 | Stop reason: HOSPADM

## 2019-12-13 NOTE — TELEPHONE ENCOUNTER
Change to Promethazine; Warned patient that this medication can cause drowsiness and impair them operating machinery, including driving a car.  Caution is advised.

## 2019-12-13 NOTE — TELEPHONE ENCOUNTER
"Called pt - pt states that he is nauseated x 8 days - denies vomiting.  Pt states that he thinks he has been diagnosed with ulcers in the past.  Pt states that \"little white oblong pill\" Zofran? Is not helping.  Pt states he has \"Upper/lower GI\" scheduled for Monday at [facility].  "

## 2019-12-16 ENCOUNTER — ANESTHESIA (OUTPATIENT)
Dept: GASTROENTEROLOGY | Facility: HOSPITAL | Age: 69
End: 2019-12-16

## 2019-12-16 ENCOUNTER — HOSPITAL ENCOUNTER (OUTPATIENT)
Facility: HOSPITAL | Age: 69
Setting detail: HOSPITAL OUTPATIENT SURGERY
Discharge: HOME | End: 2019-12-16
Attending: INTERNAL MEDICINE | Admitting: INTERNAL MEDICINE

## 2019-12-16 ENCOUNTER — ANESTHESIA EVENT (OUTPATIENT)
Dept: GASTROENTEROLOGY | Facility: HOSPITAL | Age: 69
End: 2019-12-16

## 2019-12-16 VITALS
BODY MASS INDEX: 26.76 KG/M2 | OXYGEN SATURATION: 90 % | WEIGHT: 176.6 LBS | SYSTOLIC BLOOD PRESSURE: 134 MMHG | HEART RATE: 60 BPM | TEMPERATURE: 98.1 F | DIASTOLIC BLOOD PRESSURE: 78 MMHG | RESPIRATION RATE: 20 BRPM | HEIGHT: 68 IN

## 2019-12-16 DIAGNOSIS — Z80.0 FAMILY HISTORY OF MALIGNANT NEOPLASM OF COLON: ICD-10-CM

## 2019-12-16 DIAGNOSIS — D12.6 ADENOMATOUS POLYP OF COLON, UNSPECIFIED PART OF COLON: ICD-10-CM

## 2019-12-16 DIAGNOSIS — K21.9 GASTROESOPHAGEAL REFLUX DISEASE, ESOPHAGITIS PRESENCE NOT SPECIFIED: ICD-10-CM

## 2019-12-16 LAB — GLUCOSE BLDC GLUCOMTR-MCNC: 186 MG/DL (ref 70–130)

## 2019-12-16 PROCEDURE — 25010000002 MIDAZOLAM PER 1 MG: Performed by: ANESTHESIOLOGY

## 2019-12-16 PROCEDURE — 88305 TISSUE EXAM BY PATHOLOGIST: CPT | Performed by: INTERNAL MEDICINE

## 2019-12-16 PROCEDURE — S0260 H&P FOR SURGERY: HCPCS | Performed by: INTERNAL MEDICINE

## 2019-12-16 PROCEDURE — 82962 GLUCOSE BLOOD TEST: CPT

## 2019-12-16 PROCEDURE — 94799 UNLISTED PULMONARY SVC/PX: CPT

## 2019-12-16 PROCEDURE — 43239 EGD BIOPSY SINGLE/MULTIPLE: CPT | Performed by: INTERNAL MEDICINE

## 2019-12-16 PROCEDURE — 94640 AIRWAY INHALATION TREATMENT: CPT

## 2019-12-16 PROCEDURE — 45380 COLONOSCOPY AND BIOPSY: CPT | Performed by: INTERNAL MEDICINE

## 2019-12-16 PROCEDURE — 45385 COLONOSCOPY W/LESION REMOVAL: CPT | Performed by: INTERNAL MEDICINE

## 2019-12-16 PROCEDURE — 25010000002 PROPOFOL 10 MG/ML EMULSION: Performed by: ANESTHESIOLOGY

## 2019-12-16 DEVICE — DEV CLIP ENDO RESOLUTION360 CONTRL ROT 235CM: Type: IMPLANTABLE DEVICE | Site: TRANSVERSE COLON | Status: FUNCTIONAL

## 2019-12-16 RX ORDER — SODIUM CHLORIDE, SODIUM LACTATE, POTASSIUM CHLORIDE, CALCIUM CHLORIDE 600; 310; 30; 20 MG/100ML; MG/100ML; MG/100ML; MG/100ML
1000 INJECTION, SOLUTION INTRAVENOUS CONTINUOUS
Status: DISCONTINUED | OUTPATIENT
Start: 2019-12-16 | End: 2019-12-16 | Stop reason: HOSPADM

## 2019-12-16 RX ORDER — PROPOFOL 10 MG/ML
VIAL (ML) INTRAVENOUS CONTINUOUS PRN
Status: DISCONTINUED | OUTPATIENT
Start: 2019-12-16 | End: 2019-12-16 | Stop reason: SURG

## 2019-12-16 RX ORDER — MIDAZOLAM HYDROCHLORIDE 1 MG/ML
INJECTION INTRAMUSCULAR; INTRAVENOUS AS NEEDED
Status: DISCONTINUED | OUTPATIENT
Start: 2019-12-16 | End: 2019-12-16 | Stop reason: SURG

## 2019-12-16 RX ORDER — IPRATROPIUM BROMIDE AND ALBUTEROL SULFATE 2.5; .5 MG/3ML; MG/3ML
3 SOLUTION RESPIRATORY (INHALATION)
Status: DISCONTINUED | OUTPATIENT
Start: 2019-12-16 | End: 2019-12-16 | Stop reason: HOSPADM

## 2019-12-16 RX ORDER — PROPOFOL 10 MG/ML
VIAL (ML) INTRAVENOUS AS NEEDED
Status: DISCONTINUED | OUTPATIENT
Start: 2019-12-16 | End: 2019-12-16 | Stop reason: SURG

## 2019-12-16 RX ORDER — GLYCOPYRROLATE 0.2 MG/ML
INJECTION INTRAMUSCULAR; INTRAVENOUS AS NEEDED
Status: DISCONTINUED | OUTPATIENT
Start: 2019-12-16 | End: 2019-12-16 | Stop reason: SURG

## 2019-12-16 RX ADMIN — PROPOFOL 100 MCG/KG/MIN: 10 INJECTION, EMULSION INTRAVENOUS at 15:16

## 2019-12-16 RX ADMIN — GLYCOPYRROLATE 0.2 MCG: 0.2 INJECTION INTRAMUSCULAR; INTRAVENOUS at 15:16

## 2019-12-16 RX ADMIN — SODIUM CHLORIDE, POTASSIUM CHLORIDE, SODIUM LACTATE AND CALCIUM CHLORIDE 1000 ML: 600; 310; 30; 20 INJECTION, SOLUTION INTRAVENOUS at 14:25

## 2019-12-16 RX ADMIN — PROPOFOL 50 MG: 10 INJECTION, EMULSION INTRAVENOUS at 15:18

## 2019-12-16 RX ADMIN — MIDAZOLAM 1 MG: 1 INJECTION INTRAMUSCULAR; INTRAVENOUS at 15:15

## 2019-12-16 RX ADMIN — PROPOFOL 100 MG: 10 INJECTION, EMULSION INTRAVENOUS at 15:16

## 2019-12-16 RX ADMIN — IPRATROPIUM BROMIDE AND ALBUTEROL SULFATE 3 ML: 2.5; .5 SOLUTION RESPIRATORY (INHALATION) at 16:47

## 2019-12-16 NOTE — H&P
[facility]  Pre Procedure History & Physical    Chief Complaint:   Gerd, h/o adenomatous polyps    Subjective     HPI: Felicita Mike is a  68 y.o. male here for a follow up visit for heartburn.        He was last seen in the office in 2016.  He has a history of colonic polyps.  His mother had colorectal cancer.  Tubulovillous adenoma removed in 2016.     He thinks he has had an EGD in the past but not recently.  He has long-standing acid reflux for which he takes omeprazole.  He takes this approximately at 7 PM after dinner.  He denies dysphasia.  He reports his symptoms are well controlled unless he eats Italian food with red sauce.  His weight is been stable his appetite is been good.  He had a recent hospitalization at [facility] for a COPD exacerbation.  He reports continued improvement in his breathing.  He does continue to smoke.    Past Medical History:   Past Medical History:   Diagnosis Date   • Abnormal PSA    • Acute myocardial infarction (CMS/HCC) 2004    Stents   • Apnea, sleep    • Atherosclerotic heart disease    • Chronic pain     Chronic low back paoin, MRI 12/11,  L3 compression deformity, L2 degenerative disc disease and L5-S1 degenerative disc disease.   • Colon polyp    • Constipation    • COPD (chronic obstructive pulmonary disease) (CMS/HCC)    • Coronary artery disease    • Depressed bipolar II disorder (CMS/HCC)    • Diabetes mellitus (CMS/HCC)    • Fatigue    • GERD (gastroesophageal reflux disease)    • H/O transfusion of packed red blood cells    • Health care maintenance    • Hearing loss    • History of back pain    • Hypercholesterolemia    • Hyperlipidemia    • Hypertension    • Leukocytosis    • Osteoarthritis    • Proteinuria    • PVD (peripheral vascular disease) (CMS/HCC)    • Sebaceous cyst    • Seborrheic dermatitis    • Sleep apnea    • Tinea corporis    • Tobacco use    • Tremor        Past Surgical History:  Past Surgical History:    Procedure Laterality Date   • ANGIOPLASTY      Cath Stent Placement   • COLONOSCOPY  01/22/2014    ta polyps   • COLONOSCOPY N/A 11/29/2016    Procedure: COLONOSCOPY TO CECUM AND TERMINAL ILEUM WITH HOT POLYPECTOMIES;  Surgeon: Ivette Franco MD;  Location: [facility] ENDOSCOPY;  Service:    • CORONARY STENT PLACEMENT  2004   • EYE SURGERY      Cataract Surgery   • HAND SURGERY         Family History:  Family History   Problem Relation Age of Onset   • Cancer Mother    • Diabetes Mother    • Hypertension Mother    • Colon cancer Mother    • Heart disease Father    • Cancer Brother    • Stroke Brother        Social History:   reports that he has been smoking cigarettes. He has a 47.00 pack-year smoking history. He has never used smokeless tobacco. He reports that he drinks alcohol. He reports that he does not use drugs.    Medications:   Medications Prior to Admission   Medication Sig Dispense Refill Last Dose   • albuterol (PROVENTIL) (5 MG/ML) 0.5% nebulizer solution Take 2.5 mg by nebulization Every 6 (Six) Hours As Needed for Wheezing.   12/16/2019 at 1000   • amLODIPine (NORVASC) 10 MG tablet Take 1 tablet by mouth Daily. for blood pressure 90 tablet 1 12/16/2019 at 1000   • atorvastatin (LIPITOR) 80 MG tablet Take 1 tablet by mouth Daily. For cholesterol (new dose) 90 tablet 3 12/15/2019 at Unknown time   • carvedilol (COREG) 25 MG tablet Take 1 tablet by mouth 2 (Two) Times a Day With Meals. For heart 180 tablet 3 12/16/2019 at 1000   • cholecalciferol (VITAMIN D3) 1000 UNITS tablet Take 1 tablet by mouth 2 (two) times a day.   12/16/2019 at 1000   • Cyanocobalamin (B-12) 1000 MCG capsule Take 1 tablet by mouth Daily.   12/16/2019 at 1000   • FLUoxetine (PROzac) 20 MG capsule Take 60 mg by mouth Daily.   12/16/2019 at 100   • ipratropium-albuterol (DUO-NEB) 0.5-2.5 mg/3 ml nebulizer Take 3 mL by nebulization 4 (Four) Times a Day. 360 mL 1 12/16/2019 at 1000   • lisinopril-hydrochlorothiazide  "(PRINZIDE,ZESTORETIC) 20-12.5 MG per tablet Take 1 tablet by mouth Daily. For BP and renal protection 90 tablet 3 12/16/2019 at 1000   • lithium carbonate 150 MG capsule Take 150 mg by mouth Every Morning.   12/16/2019 at 1000   • lithium carbonate 150 MG capsule Take 300 mg by mouth Every Evening.   12/15/2019 at Unknown time   • Magnesium Oxide 400 (240 Mg) MG tablet Take 1 tablet by mouth Daily. 90 tablet 1 12/16/2019 at 1000   • metFORMIN (GLUCOPHAGE) 1000 MG tablet Take 1,000 mg by mouth 2 (Two) Times a Day With Meals.   12/15/2019 at Unknown time   • omeprazole (priLOSEC) 40 MG capsule Take 1 capsule by mouth 2 (Two) Times a Day. For GERD --new dose 180 capsule 3 12/16/2019 at 1000   • promethazine (PHENERGAN) 25 MG tablet Take 1 tablet by mouth Every 6 (Six) Hours As Needed for Nausea or Vomiting. Stop generic Zofran 45 tablet 1 12/16/2019 at Unknown time   • QUEtiapine (SEROquel) 100 MG tablet TAKE 1 TABLET BY MOUTH EVERY NIGHT FOR SLEEP 90 tablet 0 12/15/2019 at Unknown time   • tamsulosin (FLOMAX) 0.4 MG capsule 24 hr capsule Take 1 capsule by mouth Daily. 30 capsule 0 12/15/2019 at Unknown time   • aspirin 81 MG tablet Take 1 tablet by mouth Daily.   12/12/2019   • nicotine (NICODERM CQ) 21 MG/24HR patch Place 1 patch on the skin as directed by provider Daily. 30 patch 0 More than a month at Unknown time   • polyethylene glycol (MIRALAX) packet Take 17 g by mouth Daily As Needed.   More than a month at Unknown time   • wheat dextrin (BENEFIBER ON THE GO) powder powder Take 1 each by mouth Daily As Needed.   More than a month at Unknown time       Allergies:  Clopidogrel    ROS:    Pertinent items are noted in HPI, all other systems reviewed and negative     Objective     Blood pressure 145/76, pulse 70, temperature 98.1 °F (36.7 °C), temperature source Oral, resp. rate 21, height 172.7 cm (68\"), weight 80.1 kg (176 lb 9.6 oz), SpO2 96 %.    Physical Exam   Constitutional: Pt is oriented to person, place, " and time and well-developed, well-nourished, and in no distress.   Mouth/Throat: Oropharynx is clear and moist.   Neck: Normal range of motion.   Cardiovascular: Normal rate, regular rhythm and normal heart sounds.    Pulmonary/Chest: Effort normal and breath sounds normal.   Abdominal: Soft. Nontender  Skin: Skin is warm and dry.   Psychiatric: Mood, memory, affect and judgment normal.     Assessment/Plan     Diagnosis:  Gerd, h/o adenomatous polyps    Anticipated Surgical Procedure:  Egd/c/s    The risks, benefits, and alternatives of this procedure have been discussed with the patient or the responsible party- the patient understands and agrees to proceed.

## 2019-12-16 NOTE — ANESTHESIA PREPROCEDURE EVALUATION
Anesthesia Evaluation     Patient summary reviewed and Nursing notes reviewed   no history of anesthetic complications:  NPO Solid Status: > 8 hours  NPO Liquid Status: > 4 hours           Airway   Mallampati: II  Dental      Pulmonary - normal exam   (+) a smoker Current, COPD mild, sleep apnea,   Cardiovascular - normal exam    (+) hypertension 2 medications or greater, past MI  >12 months, CAD, cardiac stents more than 12 months ago PVD, hyperlipidemia,       Neuro/Psych  (+) tremors, psychiatric history Depression and Bipolar,     GI/Hepatic/Renal/Endo    (+)  GERD,  diabetes mellitus type 2,     Musculoskeletal     Abdominal    Substance History      OB/GYN          Other   arthritis,                      Anesthesia Plan    ASA 3     MAC     intravenous induction     Anesthetic plan, all risks, benefits, and alternatives have been provided, discussed and informed consent has been obtained with: patient.

## 2019-12-16 NOTE — ANESTHESIA POSTPROCEDURE EVALUATION
"Patient: Felicita Mike    Procedure Summary     Date:  12/16/19 Room / Location:   [facility]    Anesthesia Start:  1510 Anesthesia Stop:  1610    Procedures:       COLONOSCOPY to cecum with hot snare, cold biopsy polypectomies with clip placement x2 (N/A )      ESOPHAGOGASTRODUODENOSCOPY with biopsies (N/A Esophagus) Diagnosis:       Adenomatous polyp of colon, unspecified part of colon      Family history of malignant neoplasm of colon      Gastroesophageal reflux disease, esophagitis presence not specified      (Adenomatous polyp of colon, unspecified part of colon [D12.6])      (Family history of malignant neoplasm of colon [Z80.0])      (Gastroesophageal reflux disease, esophagitis presence not specified [K21.9])    Surgeon:  Ivette Franco MD Provider:  Brody Gilbert MD    Anesthesia Type:  MAC ASA Status:  3          Anesthesia Type: MAC    Vitals  No vitals data found for the desired time range.          Post Anesthesia Care and Evaluation    Patient location during evaluation: bedside  Patient participation: complete - patient participated  Level of consciousness: awake and alert  Pain management: adequate  Airway patency: patent  Anesthetic complications: No anesthetic complications    Cardiovascular status: acceptable  Respiratory status: acceptable  Hydration status: acceptable    Comments: /76 (BP Location: Left arm, Patient Position: Sitting)   Pulse 70   Temp 36.7 °C (98.1 °F) (Oral)   Resp 21   Ht 172.7 cm (68\")   Wt 80.1 kg (176 lb 9.6 oz)   SpO2 96%   BMI 26.85 kg/m²       "

## 2019-12-16 NOTE — NURSING NOTE
"Spoke with daughter, Amy at [phone]. Pt stated that she was to pick him up today. Amy stated that she was unaware of her role in dad's care. Stated she was able to  him but that it would be \"awhile\" and she was coming from [facility].   "

## 2019-12-19 LAB
CYTO UR: NORMAL
LAB AP CASE REPORT: NORMAL
PATH REPORT.FINAL DX SPEC: NORMAL
PATH REPORT.GROSS SPEC: NORMAL

## 2019-12-23 ENCOUNTER — APPOINTMENT (OUTPATIENT)
Dept: GENERAL RADIOLOGY | Facility: HOSPITAL | Age: 69
End: 2019-12-23

## 2019-12-23 ENCOUNTER — HOSPITAL ENCOUNTER (INPATIENT)
Facility: HOSPITAL | Age: 69
LOS: 2 days | Discharge: HOME HEALTH | End: 2019-12-25
Attending: EMERGENCY MEDICINE | Admitting: INTERNAL MEDICINE

## 2019-12-23 DIAGNOSIS — I10 ESSENTIAL HYPERTENSION: ICD-10-CM

## 2019-12-23 DIAGNOSIS — J10.1 INFLUENZA A: ICD-10-CM

## 2019-12-23 DIAGNOSIS — J44.1 COPD WITH ACUTE EXACERBATION (HCC): Primary | ICD-10-CM

## 2019-12-23 DIAGNOSIS — J96.01 ACUTE RESPIRATORY FAILURE WITH HYPOXIA (HCC): ICD-10-CM

## 2019-12-23 LAB
ALBUMIN SERPL-MCNC: 4.5 G/DL (ref 3.5–5.2)
ALBUMIN/GLOB SERPL: 1.5 G/DL
ALP SERPL-CCNC: 70 U/L (ref 39–117)
ALT SERPL W P-5'-P-CCNC: 14 U/L (ref 1–41)
ANION GAP SERPL CALCULATED.3IONS-SCNC: 17 MMOL/L (ref 5–15)
ARTERIAL PATENCY WRIST A: POSITIVE
AST SERPL-CCNC: 18 U/L (ref 1–40)
ATMOSPHERIC PRESS: 756.5 MMHG
B PARAPERT DNA SPEC QL NAA+PROBE: NOT DETECTED
B PERT DNA SPEC QL NAA+PROBE: NOT DETECTED
BASE EXCESS BLDA CALC-SCNC: -2 MMOL/L (ref 0–2)
BASOPHILS # BLD AUTO: 0.02 10*3/MM3 (ref 0–0.2)
BASOPHILS NFR BLD AUTO: 0.1 % (ref 0–1.5)
BDY SITE: ABNORMAL
BILIRUB SERPL-MCNC: 0.9 MG/DL (ref 0.2–1.2)
BUN BLD-MCNC: 20 MG/DL (ref 8–23)
BUN/CREAT SERPL: 17.7 (ref 7–25)
C PNEUM DNA NPH QL NAA+NON-PROBE: NOT DETECTED
CALCIUM SPEC-SCNC: 10.3 MG/DL (ref 8.6–10.5)
CHLORIDE SERPL-SCNC: 93 MMOL/L (ref 98–107)
CO2 SERPL-SCNC: 21 MMOL/L (ref 22–29)
CREAT BLD-MCNC: 1.13 MG/DL (ref 0.76–1.27)
DEPRECATED RDW RBC AUTO: 37.3 FL (ref 37–54)
EOSINOPHIL # BLD AUTO: 0 10*3/MM3 (ref 0–0.4)
EOSINOPHIL NFR BLD AUTO: 0 % (ref 0.3–6.2)
ERYTHROCYTE [DISTWIDTH] IN BLOOD BY AUTOMATED COUNT: 12.2 % (ref 12.3–15.4)
FLUAV H1 2009 PAND RNA NPH QL NAA+PROBE: DETECTED
FLUAV H1 HA GENE NPH QL NAA+PROBE: NOT DETECTED
FLUAV H3 RNA NPH QL NAA+PROBE: NOT DETECTED
FLUBV RNA ISLT QL NAA+PROBE: NOT DETECTED
GFR SERPL CREATININE-BSD FRML MDRD: 65 ML/MIN/1.73
GLOBULIN UR ELPH-MCNC: 3 GM/DL
GLUCOSE BLD-MCNC: 190 MG/DL (ref 65–99)
GLUCOSE BLDC GLUCOMTR-MCNC: 196 MG/DL (ref 70–130)
GLUCOSE BLDC GLUCOMTR-MCNC: 247 MG/DL (ref 70–130)
HADV DNA SPEC NAA+PROBE: NOT DETECTED
HBA1C MFR BLD: 7.17 % (ref 4.8–5.6)
HCO3 BLDA-SCNC: 22.7 MMOL/L (ref 22–28)
HCOV 229E RNA SPEC QL NAA+PROBE: NOT DETECTED
HCOV HKU1 RNA SPEC QL NAA+PROBE: NOT DETECTED
HCOV NL63 RNA SPEC QL NAA+PROBE: NOT DETECTED
HCOV OC43 RNA SPEC QL NAA+PROBE: NOT DETECTED
HCT VFR BLD AUTO: 35.4 % (ref 37.5–51)
HGB BLD-MCNC: 12.3 G/DL (ref 13–17.7)
HMPV RNA NPH QL NAA+NON-PROBE: NOT DETECTED
HOROWITZ INDEX BLD+IHG-RTO: 40 %
HPIV1 RNA SPEC QL NAA+PROBE: NOT DETECTED
HPIV2 RNA SPEC QL NAA+PROBE: NOT DETECTED
HPIV3 RNA NPH QL NAA+PROBE: NOT DETECTED
HPIV4 P GENE NPH QL NAA+PROBE: NOT DETECTED
IMM GRANULOCYTES # BLD AUTO: 0.08 10*3/MM3 (ref 0–0.05)
IMM GRANULOCYTES NFR BLD AUTO: 0.6 % (ref 0–0.5)
LIPASE SERPL-CCNC: 13 U/L (ref 13–60)
LYMPHOCYTES # BLD AUTO: 0.48 10*3/MM3 (ref 0.7–3.1)
LYMPHOCYTES NFR BLD AUTO: 3.4 % (ref 19.6–45.3)
M PNEUMO IGG SER IA-ACNC: NOT DETECTED
MCH RBC QN AUTO: 29.9 PG (ref 26.6–33)
MCHC RBC AUTO-ENTMCNC: 34.7 G/DL (ref 31.5–35.7)
MCV RBC AUTO: 85.9 FL (ref 79–97)
MODALITY: ABNORMAL
MONOCYTES # BLD AUTO: 2.13 10*3/MM3 (ref 0.1–0.9)
MONOCYTES NFR BLD AUTO: 15.3 % (ref 5–12)
NEUTROPHILS # BLD AUTO: 11.25 10*3/MM3 (ref 1.7–7)
NEUTROPHILS NFR BLD AUTO: 80.6 % (ref 42.7–76)
NRBC BLD AUTO-RTO: 0 /100 WBC (ref 0–0.2)
NT-PROBNP SERPL-MCNC: 522.9 PG/ML (ref 5–900)
O2 A-A PPRESDIFF RESPIRATORY: 0.5 MMHG
PCO2 BLDA: 38 MM HG (ref 35–45)
PH BLDA: 7.38 PH UNITS (ref 7.35–7.45)
PLATELET # BLD AUTO: 175 10*3/MM3 (ref 140–450)
PMV BLD AUTO: 11.7 FL (ref 6–12)
PO2 BLDA: 134.6 MM HG (ref 80–100)
POTASSIUM BLD-SCNC: 4.3 MMOL/L (ref 3.5–5.2)
PROCALCITONIN SERPL-MCNC: 0.15 NG/ML (ref 0.1–0.25)
PROT SERPL-MCNC: 7.5 G/DL (ref 6–8.5)
RBC # BLD AUTO: 4.12 10*6/MM3 (ref 4.14–5.8)
RHINOVIRUS RNA SPEC NAA+PROBE: NOT DETECTED
RSV RNA NPH QL NAA+NON-PROBE: NOT DETECTED
SAO2 % BLDCOA: 99 % (ref 92–99)
SET MECH RESP RATE: 18
SODIUM BLD-SCNC: 131 MMOL/L (ref 136–145)
TOTAL RATE: 31 BREATHS/MINUTE
TROPONIN T SERPL-MCNC: <0.01 NG/ML (ref 0–0.03)
VT ON VENT VENT: 638 ML
WBC NRBC COR # BLD: 13.96 10*3/MM3 (ref 3.4–10.8)

## 2019-12-23 PROCEDURE — 25010000002 ENOXAPARIN PER 10 MG: Performed by: NURSE PRACTITIONER

## 2019-12-23 PROCEDURE — 0100U HC BIOFIRE FILMARRAY RESP PANEL 2: CPT | Performed by: EMERGENCY MEDICINE

## 2019-12-23 PROCEDURE — 85025 COMPLETE CBC W/AUTO DIFF WBC: CPT | Performed by: EMERGENCY MEDICINE

## 2019-12-23 PROCEDURE — 99285 EMERGENCY DEPT VISIT HI MDM: CPT

## 2019-12-23 PROCEDURE — 94799 UNLISTED PULMONARY SVC/PX: CPT

## 2019-12-23 PROCEDURE — 80053 COMPREHEN METABOLIC PANEL: CPT | Performed by: EMERGENCY MEDICINE

## 2019-12-23 PROCEDURE — 93010 ELECTROCARDIOGRAM REPORT: CPT | Performed by: INTERNAL MEDICINE

## 2019-12-23 PROCEDURE — 83036 HEMOGLOBIN GLYCOSYLATED A1C: CPT | Performed by: NURSE PRACTITIONER

## 2019-12-23 PROCEDURE — 36600 WITHDRAWAL OF ARTERIAL BLOOD: CPT

## 2019-12-23 PROCEDURE — 25010000002 FUROSEMIDE PER 20 MG: Performed by: INTERNAL MEDICINE

## 2019-12-23 PROCEDURE — 25010000002 ONDANSETRON PER 1 MG: Performed by: EMERGENCY MEDICINE

## 2019-12-23 PROCEDURE — 93005 ELECTROCARDIOGRAM TRACING: CPT | Performed by: EMERGENCY MEDICINE

## 2019-12-23 PROCEDURE — 25010000002 METHYLPREDNISOLONE PER 125 MG: Performed by: NURSE PRACTITIONER

## 2019-12-23 PROCEDURE — 84484 ASSAY OF TROPONIN QUANT: CPT | Performed by: EMERGENCY MEDICINE

## 2019-12-23 PROCEDURE — 83880 ASSAY OF NATRIURETIC PEPTIDE: CPT | Performed by: EMERGENCY MEDICINE

## 2019-12-23 PROCEDURE — 94640 AIRWAY INHALATION TREATMENT: CPT

## 2019-12-23 PROCEDURE — 83690 ASSAY OF LIPASE: CPT | Performed by: EMERGENCY MEDICINE

## 2019-12-23 PROCEDURE — 84145 PROCALCITONIN (PCT): CPT | Performed by: EMERGENCY MEDICINE

## 2019-12-23 PROCEDURE — 71045 X-RAY EXAM CHEST 1 VIEW: CPT

## 2019-12-23 PROCEDURE — 82962 GLUCOSE BLOOD TEST: CPT

## 2019-12-23 PROCEDURE — 82803 BLOOD GASES ANY COMBINATION: CPT

## 2019-12-23 PROCEDURE — 25010000002 METHYLPREDNISOLONE PER 125 MG: Performed by: EMERGENCY MEDICINE

## 2019-12-23 PROCEDURE — 63710000001 INSULIN LISPRO (HUMAN) PER 5 UNITS: Performed by: NURSE PRACTITIONER

## 2019-12-23 PROCEDURE — 94660 CPAP INITIATION&MGMT: CPT

## 2019-12-23 RX ORDER — IPRATROPIUM BROMIDE AND ALBUTEROL SULFATE 2.5; .5 MG/3ML; MG/3ML
3 SOLUTION RESPIRATORY (INHALATION) ONCE
Status: COMPLETED | OUTPATIENT
Start: 2019-12-23 | End: 2019-12-23

## 2019-12-23 RX ORDER — PANTOPRAZOLE SODIUM 40 MG/1
40 TABLET, DELAYED RELEASE ORAL
Status: DISCONTINUED | OUTPATIENT
Start: 2019-12-23 | End: 2019-12-25 | Stop reason: HOSPADM

## 2019-12-23 RX ORDER — TAMSULOSIN HYDROCHLORIDE 0.4 MG/1
0.4 CAPSULE ORAL DAILY
Status: DISCONTINUED | OUTPATIENT
Start: 2019-12-24 | End: 2019-12-25 | Stop reason: HOSPADM

## 2019-12-23 RX ORDER — ACETAMINOPHEN 650 MG/1
650 SUPPOSITORY RECTAL EVERY 4 HOURS PRN
Status: DISCONTINUED | OUTPATIENT
Start: 2019-12-23 | End: 2019-12-25 | Stop reason: HOSPADM

## 2019-12-23 RX ORDER — IPRATROPIUM BROMIDE AND ALBUTEROL SULFATE 2.5; .5 MG/3ML; MG/3ML
3 SOLUTION RESPIRATORY (INHALATION)
Status: DISCONTINUED | OUTPATIENT
Start: 2019-12-23 | End: 2019-12-25 | Stop reason: HOSPADM

## 2019-12-23 RX ORDER — NICOTINE POLACRILEX 4 MG
15 LOZENGE BUCCAL
Status: DISCONTINUED | OUTPATIENT
Start: 2019-12-23 | End: 2019-12-25 | Stop reason: HOSPADM

## 2019-12-23 RX ORDER — LITHIUM CARBONATE 300 MG/1
300 CAPSULE ORAL EVERY EVENING
Status: DISCONTINUED | OUTPATIENT
Start: 2019-12-23 | End: 2019-12-25 | Stop reason: HOSPADM

## 2019-12-23 RX ORDER — DEXTROSE MONOHYDRATE 25 G/50ML
25 INJECTION, SOLUTION INTRAVENOUS
Status: DISCONTINUED | OUTPATIENT
Start: 2019-12-23 | End: 2019-12-25 | Stop reason: HOSPADM

## 2019-12-23 RX ORDER — OSELTAMIVIR PHOSPHATE 75 MG/1
75 CAPSULE ORAL EVERY 12 HOURS SCHEDULED
Status: DISCONTINUED | OUTPATIENT
Start: 2019-12-23 | End: 2019-12-23

## 2019-12-23 RX ORDER — ATORVASTATIN CALCIUM 80 MG/1
80 TABLET, FILM COATED ORAL DAILY
Status: DISCONTINUED | OUTPATIENT
Start: 2019-12-24 | End: 2019-12-25 | Stop reason: HOSPADM

## 2019-12-23 RX ORDER — QUETIAPINE FUMARATE 100 MG/1
100 TABLET, FILM COATED ORAL NIGHTLY
Status: DISCONTINUED | OUTPATIENT
Start: 2019-12-23 | End: 2019-12-25 | Stop reason: HOSPADM

## 2019-12-23 RX ORDER — ALBUTEROL SULFATE 2.5 MG/3ML
5 SOLUTION RESPIRATORY (INHALATION) ONCE
Status: COMPLETED | OUTPATIENT
Start: 2019-12-23 | End: 2019-12-23

## 2019-12-23 RX ORDER — ONDANSETRON 2 MG/ML
4 INJECTION INTRAMUSCULAR; INTRAVENOUS EVERY 6 HOURS PRN
Status: DISCONTINUED | OUTPATIENT
Start: 2019-12-23 | End: 2019-12-25 | Stop reason: HOSPADM

## 2019-12-23 RX ORDER — FLUOXETINE HYDROCHLORIDE 20 MG/1
60 CAPSULE ORAL DAILY
Status: DISCONTINUED | OUTPATIENT
Start: 2019-12-24 | End: 2019-12-25 | Stop reason: HOSPADM

## 2019-12-23 RX ORDER — CARVEDILOL 25 MG/1
25 TABLET ORAL 2 TIMES DAILY WITH MEALS
Status: DISCONTINUED | OUTPATIENT
Start: 2019-12-23 | End: 2019-12-24

## 2019-12-23 RX ORDER — ONDANSETRON 2 MG/ML
4 INJECTION INTRAMUSCULAR; INTRAVENOUS ONCE
Status: COMPLETED | OUTPATIENT
Start: 2019-12-23 | End: 2019-12-23

## 2019-12-23 RX ORDER — ONDANSETRON 4 MG/1
4 TABLET, FILM COATED ORAL EVERY 6 HOURS PRN
Status: DISCONTINUED | OUTPATIENT
Start: 2019-12-23 | End: 2019-12-25 | Stop reason: HOSPADM

## 2019-12-23 RX ORDER — ACETAMINOPHEN 325 MG/1
650 TABLET ORAL EVERY 4 HOURS PRN
Status: DISCONTINUED | OUTPATIENT
Start: 2019-12-23 | End: 2019-12-25 | Stop reason: HOSPADM

## 2019-12-23 RX ORDER — LITHIUM CARBONATE 150 MG/1
150 CAPSULE ORAL EVERY MORNING
Status: DISCONTINUED | OUTPATIENT
Start: 2019-12-24 | End: 2019-12-25 | Stop reason: HOSPADM

## 2019-12-23 RX ORDER — NITROGLYCERIN 0.4 MG/1
0.4 TABLET SUBLINGUAL
Status: DISCONTINUED | OUTPATIENT
Start: 2019-12-23 | End: 2019-12-25 | Stop reason: HOSPADM

## 2019-12-23 RX ORDER — AMLODIPINE BESYLATE 10 MG/1
10 TABLET ORAL DAILY
Status: DISCONTINUED | OUTPATIENT
Start: 2019-12-24 | End: 2019-12-24

## 2019-12-23 RX ORDER — ASPIRIN 81 MG/1
81 TABLET ORAL DAILY
Status: DISCONTINUED | OUTPATIENT
Start: 2019-12-24 | End: 2019-12-25 | Stop reason: HOSPADM

## 2019-12-23 RX ORDER — FUROSEMIDE 10 MG/ML
40 INJECTION INTRAMUSCULAR; INTRAVENOUS ONCE
Status: COMPLETED | OUTPATIENT
Start: 2019-12-23 | End: 2019-12-23

## 2019-12-23 RX ORDER — ACETAMINOPHEN 160 MG/5ML
650 SOLUTION ORAL EVERY 4 HOURS PRN
Status: DISCONTINUED | OUTPATIENT
Start: 2019-12-23 | End: 2019-12-25 | Stop reason: HOSPADM

## 2019-12-23 RX ORDER — METHYLPREDNISOLONE SODIUM SUCCINATE 125 MG/2ML
125 INJECTION, POWDER, LYOPHILIZED, FOR SOLUTION INTRAMUSCULAR; INTRAVENOUS ONCE
Status: COMPLETED | OUTPATIENT
Start: 2019-12-23 | End: 2019-12-23

## 2019-12-23 RX ORDER — SODIUM CHLORIDE 0.9 % (FLUSH) 0.9 %
10 SYRINGE (ML) INJECTION AS NEEDED
Status: DISCONTINUED | OUTPATIENT
Start: 2019-12-23 | End: 2019-12-25 | Stop reason: HOSPADM

## 2019-12-23 RX ORDER — METHYLPREDNISOLONE SODIUM SUCCINATE 125 MG/2ML
80 INJECTION, POWDER, LYOPHILIZED, FOR SOLUTION INTRAMUSCULAR; INTRAVENOUS EVERY 8 HOURS
Status: DISCONTINUED | OUTPATIENT
Start: 2019-12-23 | End: 2019-12-24

## 2019-12-23 RX ORDER — POLYETHYLENE GLYCOL 3350 17 G/17G
17 POWDER, FOR SOLUTION ORAL DAILY PRN
Status: DISCONTINUED | OUTPATIENT
Start: 2019-12-23 | End: 2019-12-25 | Stop reason: HOSPADM

## 2019-12-23 RX ORDER — OSELTAMIVIR PHOSPHATE 75 MG/1
75 CAPSULE ORAL EVERY 12 HOURS SCHEDULED
Status: DISCONTINUED | OUTPATIENT
Start: 2019-12-23 | End: 2019-12-25 | Stop reason: HOSPADM

## 2019-12-23 RX ORDER — OSELTAMIVIR PHOSPHATE 75 MG/1
75 CAPSULE ORAL ONCE
Status: COMPLETED | OUTPATIENT
Start: 2019-12-23 | End: 2019-12-23

## 2019-12-23 RX ADMIN — IPRATROPIUM BROMIDE AND ALBUTEROL SULFATE 3 ML: 2.5; .5 SOLUTION RESPIRATORY (INHALATION) at 12:11

## 2019-12-23 RX ADMIN — IPRATROPIUM BROMIDE AND ALBUTEROL SULFATE 3 ML: 2.5; .5 SOLUTION RESPIRATORY (INHALATION) at 21:10

## 2019-12-23 RX ADMIN — INSULIN LISPRO 2 UNITS: 100 INJECTION, SOLUTION INTRAVENOUS; SUBCUTANEOUS at 21:44

## 2019-12-23 RX ADMIN — METHYLPREDNISOLONE SODIUM SUCCINATE 80 MG: 125 INJECTION, POWDER, FOR SOLUTION INTRAMUSCULAR; INTRAVENOUS at 17:39

## 2019-12-23 RX ADMIN — INSULIN LISPRO 4 UNITS: 100 INJECTION, SOLUTION INTRAVENOUS; SUBCUTANEOUS at 17:39

## 2019-12-23 RX ADMIN — PANTOPRAZOLE SODIUM 40 MG: 40 TABLET, DELAYED RELEASE ORAL at 17:38

## 2019-12-23 RX ADMIN — CARVEDILOL 25 MG: 25 TABLET, FILM COATED ORAL at 17:38

## 2019-12-23 RX ADMIN — QUETIAPINE FUMARATE 100 MG: 100 TABLET ORAL at 21:44

## 2019-12-23 RX ADMIN — ONDANSETRON 4 MG: 2 INJECTION INTRAMUSCULAR; INTRAVENOUS at 12:53

## 2019-12-23 RX ADMIN — IPRATROPIUM BROMIDE AND ALBUTEROL SULFATE 3 ML: 2.5; .5 SOLUTION RESPIRATORY (INHALATION) at 15:42

## 2019-12-23 RX ADMIN — METHYLPREDNISOLONE SODIUM SUCCINATE 125 MG: 125 INJECTION, POWDER, FOR SOLUTION INTRAMUSCULAR; INTRAVENOUS at 10:15

## 2019-12-23 RX ADMIN — OSELTAMIVIR PHOSPHATE 75 MG: 75 CAPSULE ORAL at 22:46

## 2019-12-23 RX ADMIN — LITHIUM CARBONATE 300 MG: 300 CAPSULE, GELATIN COATED ORAL at 17:38

## 2019-12-23 RX ADMIN — ENOXAPARIN SODIUM 40 MG: 40 INJECTION SUBCUTANEOUS at 17:39

## 2019-12-23 RX ADMIN — OSELTAMIVIR PHOSPHATE 75 MG: 75 CAPSULE ORAL at 12:26

## 2019-12-23 RX ADMIN — FUROSEMIDE 40 MG: 40 INJECTION, SOLUTION INTRAMUSCULAR; INTRAVENOUS at 17:39

## 2019-12-23 RX ADMIN — ALBUTEROL SULFATE 5 MG: 2.5 SOLUTION RESPIRATORY (INHALATION) at 10:25

## 2019-12-24 ENCOUNTER — APPOINTMENT (OUTPATIENT)
Dept: CARDIOLOGY | Facility: HOSPITAL | Age: 69
End: 2019-12-24

## 2019-12-24 LAB
ANION GAP SERPL CALCULATED.3IONS-SCNC: 15.8 MMOL/L (ref 5–15)
AORTIC DIMENSIONLESS INDEX: 0.7 (DI)
BH CV ECHO MEAS - ACS: 2.1 CM
BH CV ECHO MEAS - AO MAX PG: 9.4 MMHG
BH CV ECHO MEAS - AO MEAN PG (FULL): 3 MMHG
BH CV ECHO MEAS - AO MEAN PG: 4 MMHG
BH CV ECHO MEAS - AO ROOT AREA (BSA CORRECTED): 1.6
BH CV ECHO MEAS - AO ROOT AREA: 7.1 CM^2
BH CV ECHO MEAS - AO ROOT DIAM: 3 CM
BH CV ECHO MEAS - AO V2 MAX: 153 CM/SEC
BH CV ECHO MEAS - AO V2 MEAN: 87.4 CM/SEC
BH CV ECHO MEAS - AO V2 VTI: 30.3 CM
BH CV ECHO MEAS - AVA(I,A): 2.7 CM^2
BH CV ECHO MEAS - AVA(I,D): 2.7 CM^2
BH CV ECHO MEAS - BSA(HAYCOCK): 1.9 M^2
BH CV ECHO MEAS - BSA: 1.9 M^2
BH CV ECHO MEAS - BZI_BMI: 24.2 KILOGRAMS/M^2
BH CV ECHO MEAS - BZI_METRIC_HEIGHT: 175 CM
BH CV ECHO MEAS - BZI_METRIC_WEIGHT: 74 KG
BH CV ECHO MEAS - EDV(CUBED): 175.6 ML
BH CV ECHO MEAS - EDV(MOD-SP2): 55 ML
BH CV ECHO MEAS - EDV(MOD-SP4): 78 ML
BH CV ECHO MEAS - EDV(TEICH): 153.7 ML
BH CV ECHO MEAS - EF(CUBED): 68.8 %
BH CV ECHO MEAS - EF(MOD-BP): 66 %
BH CV ECHO MEAS - EF(MOD-SP2): 61.8 %
BH CV ECHO MEAS - EF(MOD-SP4): 69.2 %
BH CV ECHO MEAS - EF(TEICH): 59.7 %
BH CV ECHO MEAS - ESV(CUBED): 54.9 ML
BH CV ECHO MEAS - ESV(MOD-SP2): 21 ML
BH CV ECHO MEAS - ESV(MOD-SP4): 24 ML
BH CV ECHO MEAS - ESV(TEICH): 62 ML
BH CV ECHO MEAS - FS: 32.1 %
BH CV ECHO MEAS - IVS/LVPW: 1.1
BH CV ECHO MEAS - IVSD: 1.2 CM
BH CV ECHO MEAS - LA DIMENSION: 3.8 CM
BH CV ECHO MEAS - LA/AO: 1.3
BH CV ECHO MEAS - LAT PEAK E' VEL: 7.6 CM/SEC
BH CV ECHO MEAS - LV DIASTOLIC VOL/BSA (35-75): 41.2 ML/M^2
BH CV ECHO MEAS - LV MASS(C)D: 264.7 GRAMS
BH CV ECHO MEAS - LV MASS(C)DI: 139.9 GRAMS/M^2
BH CV ECHO MEAS - LV MEAN PG: 1 MMHG
BH CV ECHO MEAS - LV SYSTOLIC VOL/BSA (12-30): 12.7 ML/M^2
BH CV ECHO MEAS - LV V1 MEAN: 56 CM/SEC
BH CV ECHO MEAS - LV V1 VTI: 21.4 CM
BH CV ECHO MEAS - LVIDD: 5.6 CM
BH CV ECHO MEAS - LVIDS: 3.8 CM
BH CV ECHO MEAS - LVLD AP2: 7.3 CM
BH CV ECHO MEAS - LVLD AP4: 7.3 CM
BH CV ECHO MEAS - LVLS AP2: 6 CM
BH CV ECHO MEAS - LVLS AP4: 6.4 CM
BH CV ECHO MEAS - LVOT AREA (M): 3.8 CM^2
BH CV ECHO MEAS - LVOT AREA: 3.8 CM^2
BH CV ECHO MEAS - LVOT DIAM: 2.2 CM
BH CV ECHO MEAS - LVPWD: 1.1 CM
BH CV ECHO MEAS - MED PEAK E' VEL: 6 CM/SEC
BH CV ECHO MEAS - MV A DUR: 137 SEC
BH CV ECHO MEAS - MV A MAX VEL: 85.4 CM/SEC
BH CV ECHO MEAS - MV DEC SLOPE: 602 CM/SEC^2
BH CV ECHO MEAS - MV DEC TIME: 123 SEC
BH CV ECHO MEAS - MV E MAX VEL: 73.1 CM/SEC
BH CV ECHO MEAS - MV E/A: 0.86
BH CV ECHO MEAS - MV MEAN PG: 2 MMHG
BH CV ECHO MEAS - MV P1/2T MAX VEL: 95 CM/SEC
BH CV ECHO MEAS - MV P1/2T: 46.2 MSEC
BH CV ECHO MEAS - MV V2 MEAN: 60.2 CM/SEC
BH CV ECHO MEAS - MV V2 VTI: 28.4 CM
BH CV ECHO MEAS - MVA P1/2T LCG: 2.3 CM^2
BH CV ECHO MEAS - MVA(P1/2T): 4.8 CM^2
BH CV ECHO MEAS - MVA(VTI): 2.9 CM^2
BH CV ECHO MEAS - PA ACC SLOPE: 1293 CM/SEC^2
BH CV ECHO MEAS - PA ACC TIME: 0.11 SEC
BH CV ECHO MEAS - PA MAX PG (FULL): 3.3 MMHG
BH CV ECHO MEAS - PA MAX PG: 8.2 MMHG
BH CV ECHO MEAS - PA PR(ACCEL): 30 MMHG
BH CV ECHO MEAS - PA V2 MAX: 143 CM/SEC
BH CV ECHO MEAS - PVA(V,A): 2.7 CM^2
BH CV ECHO MEAS - PVA(V,D): 2.7 CM^2
BH CV ECHO MEAS - QP/QS: 1.2
BH CV ECHO MEAS - RV MAX PG: 4.9 MMHG
BH CV ECHO MEAS - RV MEAN PG: 3 MMHG
BH CV ECHO MEAS - RV V1 MAX: 111 CM/SEC
BH CV ECHO MEAS - RV V1 MEAN: 82.8 CM/SEC
BH CV ECHO MEAS - RV V1 VTI: 28.3 CM
BH CV ECHO MEAS - RVOT AREA: 3.5 CM^2
BH CV ECHO MEAS - RVOT DIAM: 2.1 CM
BH CV ECHO MEAS - SI(AO): 113.2 ML/M^2
BH CV ECHO MEAS - SI(CUBED): 63.8 ML/M^2
BH CV ECHO MEAS - SI(LVOT): 43 ML/M^2
BH CV ECHO MEAS - SI(MOD-SP2): 18 ML/M^2
BH CV ECHO MEAS - SI(MOD-SP4): 28.5 ML/M^2
BH CV ECHO MEAS - SI(TEICH): 48.5 ML/M^2
BH CV ECHO MEAS - SV(AO): 214.2 ML
BH CV ECHO MEAS - SV(CUBED): 120.7 ML
BH CV ECHO MEAS - SV(LVOT): 81.3 ML
BH CV ECHO MEAS - SV(MOD-SP2): 34 ML
BH CV ECHO MEAS - SV(MOD-SP4): 54 ML
BH CV ECHO MEAS - SV(RVOT): 98 ML
BH CV ECHO MEAS - SV(TEICH): 91.7 ML
BH CV ECHO MEAS - TAPSE (>1.6): 2.7 CM2
BH CV ECHO MEASUREMENTS AVERAGE E/E' RATIO: 10.75
BH CV XLRA - RV BASE: 2.9 CM
BH CV XLRA - TDI S': 13 CM/SEC
BUN BLD-MCNC: 37 MG/DL (ref 8–23)
BUN/CREAT SERPL: 30.8 (ref 7–25)
CALCIUM SPEC-SCNC: 10.3 MG/DL (ref 8.6–10.5)
CHLORIDE SERPL-SCNC: 99 MMOL/L (ref 98–107)
CO2 SERPL-SCNC: 21.2 MMOL/L (ref 22–29)
CREAT BLD-MCNC: 1.2 MG/DL (ref 0.76–1.27)
DEPRECATED RDW RBC AUTO: 37 FL (ref 37–54)
ERYTHROCYTE [DISTWIDTH] IN BLOOD BY AUTOMATED COUNT: 12.3 % (ref 12.3–15.4)
GFR SERPL CREATININE-BSD FRML MDRD: 60 ML/MIN/1.73
GLUCOSE BLD-MCNC: 193 MG/DL (ref 65–99)
GLUCOSE BLDC GLUCOMTR-MCNC: 197 MG/DL (ref 70–130)
GLUCOSE BLDC GLUCOMTR-MCNC: 221 MG/DL (ref 70–130)
GLUCOSE BLDC GLUCOMTR-MCNC: 223 MG/DL (ref 70–130)
GLUCOSE BLDC GLUCOMTR-MCNC: 227 MG/DL (ref 70–130)
HCT VFR BLD AUTO: 35 % (ref 37.5–51)
HGB BLD-MCNC: 11.7 G/DL (ref 13–17.7)
LEFT ATRIUM VOLUME INDEX: 23.8 ML/M2
LV EF 2D ECHO EST: 66 %
MAXIMAL PREDICTED HEART RATE: 153 BPM
MCH RBC QN AUTO: 28.5 PG (ref 26.6–33)
MCHC RBC AUTO-ENTMCNC: 33.4 G/DL (ref 31.5–35.7)
MCV RBC AUTO: 85.2 FL (ref 79–97)
NT-PROBNP SERPL-MCNC: 359.9 PG/ML (ref 5–900)
PLATELET # BLD AUTO: 191 10*3/MM3 (ref 140–450)
PMV BLD AUTO: 12.2 FL (ref 6–12)
POTASSIUM BLD-SCNC: 4.4 MMOL/L (ref 3.5–5.2)
RBC # BLD AUTO: 4.11 10*6/MM3 (ref 4.14–5.8)
SODIUM BLD-SCNC: 136 MMOL/L (ref 136–145)
STRESS TARGET HR: 130 BPM
WBC NRBC COR # BLD: 14.25 10*3/MM3 (ref 3.4–10.8)

## 2019-12-24 PROCEDURE — 94799 UNLISTED PULMONARY SVC/PX: CPT

## 2019-12-24 PROCEDURE — 63710000001 INSULIN LISPRO (HUMAN) PER 5 UNITS: Performed by: NURSE PRACTITIONER

## 2019-12-24 PROCEDURE — 25010000002 METHYLPREDNISOLONE PER 125 MG: Performed by: NURSE PRACTITIONER

## 2019-12-24 PROCEDURE — 83880 ASSAY OF NATRIURETIC PEPTIDE: CPT | Performed by: INTERNAL MEDICINE

## 2019-12-24 PROCEDURE — 25010000002 ENOXAPARIN PER 10 MG: Performed by: NURSE PRACTITIONER

## 2019-12-24 PROCEDURE — 93306 TTE W/DOPPLER COMPLETE: CPT | Performed by: INTERNAL MEDICINE

## 2019-12-24 PROCEDURE — 82962 GLUCOSE BLOOD TEST: CPT

## 2019-12-24 PROCEDURE — 97162 PT EVAL MOD COMPLEX 30 MIN: CPT

## 2019-12-24 PROCEDURE — 80048 BASIC METABOLIC PNL TOTAL CA: CPT | Performed by: NURSE PRACTITIONER

## 2019-12-24 PROCEDURE — 93306 TTE W/DOPPLER COMPLETE: CPT

## 2019-12-24 PROCEDURE — 85027 COMPLETE CBC AUTOMATED: CPT | Performed by: NURSE PRACTITIONER

## 2019-12-24 RX ORDER — CARVEDILOL 12.5 MG/1
12.5 TABLET ORAL 2 TIMES DAILY WITH MEALS
Status: DISCONTINUED | OUTPATIENT
Start: 2019-12-25 | End: 2019-12-25 | Stop reason: HOSPADM

## 2019-12-24 RX ORDER — PREDNISONE 20 MG/1
40 TABLET ORAL
Status: DISCONTINUED | OUTPATIENT
Start: 2019-12-25 | End: 2019-12-25 | Stop reason: HOSPADM

## 2019-12-24 RX ADMIN — LISINOPRIL: 20 TABLET ORAL at 11:25

## 2019-12-24 RX ADMIN — AMLODIPINE BESYLATE 10 MG: 10 TABLET ORAL at 11:25

## 2019-12-24 RX ADMIN — METHYLPREDNISOLONE SODIUM SUCCINATE 80 MG: 125 INJECTION, POWDER, FOR SOLUTION INTRAMUSCULAR; INTRAVENOUS at 01:43

## 2019-12-24 RX ADMIN — SODIUM CHLORIDE, PRESERVATIVE FREE 10 ML: 5 INJECTION INTRAVENOUS at 11:26

## 2019-12-24 RX ADMIN — INSULIN LISPRO 4 UNITS: 100 INJECTION, SOLUTION INTRAVENOUS; SUBCUTANEOUS at 20:55

## 2019-12-24 RX ADMIN — OSELTAMIVIR PHOSPHATE 75 MG: 75 CAPSULE ORAL at 11:26

## 2019-12-24 RX ADMIN — ASPIRIN 81 MG: 81 TABLET, COATED ORAL at 11:25

## 2019-12-24 RX ADMIN — ENOXAPARIN SODIUM 40 MG: 40 INJECTION SUBCUTANEOUS at 15:35

## 2019-12-24 RX ADMIN — INSULIN LISPRO 4 UNITS: 100 INJECTION, SOLUTION INTRAVENOUS; SUBCUTANEOUS at 11:58

## 2019-12-24 RX ADMIN — PANTOPRAZOLE SODIUM 40 MG: 40 TABLET, DELAYED RELEASE ORAL at 18:27

## 2019-12-24 RX ADMIN — OSELTAMIVIR PHOSPHATE 75 MG: 75 CAPSULE ORAL at 20:44

## 2019-12-24 RX ADMIN — ATORVASTATIN CALCIUM 80 MG: 80 TABLET, FILM COATED ORAL at 11:25

## 2019-12-24 RX ADMIN — SODIUM CHLORIDE, PRESERVATIVE FREE 10 ML: 5 INJECTION INTRAVENOUS at 20:44

## 2019-12-24 RX ADMIN — IPRATROPIUM BROMIDE AND ALBUTEROL SULFATE 3 ML: 2.5; .5 SOLUTION RESPIRATORY (INHALATION) at 12:37

## 2019-12-24 RX ADMIN — IPRATROPIUM BROMIDE AND ALBUTEROL SULFATE 3 ML: 2.5; .5 SOLUTION RESPIRATORY (INHALATION) at 21:14

## 2019-12-24 RX ADMIN — PANTOPRAZOLE SODIUM 40 MG: 40 TABLET, DELAYED RELEASE ORAL at 06:40

## 2019-12-24 RX ADMIN — LITHIUM CARBONATE 300 MG: 300 CAPSULE, GELATIN COATED ORAL at 17:56

## 2019-12-24 RX ADMIN — CARVEDILOL 25 MG: 25 TABLET, FILM COATED ORAL at 11:25

## 2019-12-24 RX ADMIN — IPRATROPIUM BROMIDE AND ALBUTEROL SULFATE 3 ML: 2.5; .5 SOLUTION RESPIRATORY (INHALATION) at 16:37

## 2019-12-24 RX ADMIN — METHYLPREDNISOLONE SODIUM SUCCINATE 80 MG: 125 INJECTION, POWDER, FOR SOLUTION INTRAMUSCULAR; INTRAVENOUS at 11:26

## 2019-12-24 RX ADMIN — LITHIUM CARBONATE 150 MG: 150 CAPSULE, GELATIN COATED ORAL at 06:40

## 2019-12-24 RX ADMIN — FLUOXETINE HYDROCHLORIDE 60 MG: 20 CAPSULE ORAL at 11:25

## 2019-12-24 RX ADMIN — INSULIN LISPRO 4 UNITS: 100 INJECTION, SOLUTION INTRAVENOUS; SUBCUTANEOUS at 17:55

## 2019-12-24 RX ADMIN — ACETAMINOPHEN 650 MG: 325 TABLET, FILM COATED ORAL at 15:35

## 2019-12-24 RX ADMIN — QUETIAPINE FUMARATE 100 MG: 100 TABLET ORAL at 20:44

## 2019-12-24 RX ADMIN — TAMSULOSIN HYDROCHLORIDE 0.4 MG: 0.4 CAPSULE ORAL at 11:25

## 2019-12-25 VITALS
BODY MASS INDEX: 24.16 KG/M2 | HEART RATE: 63 BPM | RESPIRATION RATE: 18 BRPM | SYSTOLIC BLOOD PRESSURE: 121 MMHG | HEIGHT: 69 IN | TEMPERATURE: 97.9 F | DIASTOLIC BLOOD PRESSURE: 69 MMHG | OXYGEN SATURATION: 90 % | WEIGHT: 163.14 LBS

## 2019-12-25 PROBLEM — N17.9 AKI (ACUTE KIDNEY INJURY) (HCC): Status: ACTIVE | Noted: 2019-12-25

## 2019-12-25 LAB
ANION GAP SERPL CALCULATED.3IONS-SCNC: 14 MMOL/L (ref 5–15)
BUN BLD-MCNC: 64 MG/DL (ref 8–23)
BUN/CREAT SERPL: 37.2 (ref 7–25)
CALCIUM SPEC-SCNC: 10.1 MG/DL (ref 8.6–10.5)
CHLORIDE SERPL-SCNC: 102 MMOL/L (ref 98–107)
CO2 SERPL-SCNC: 21 MMOL/L (ref 22–29)
CREAT BLD-MCNC: 1.72 MG/DL (ref 0.76–1.27)
GFR SERPL CREATININE-BSD FRML MDRD: 40 ML/MIN/1.73
GLUCOSE BLD-MCNC: 163 MG/DL (ref 65–99)
GLUCOSE BLDC GLUCOMTR-MCNC: 190 MG/DL (ref 70–130)
POTASSIUM BLD-SCNC: 3.9 MMOL/L (ref 3.5–5.2)
SODIUM BLD-SCNC: 137 MMOL/L (ref 136–145)

## 2019-12-25 PROCEDURE — 82962 GLUCOSE BLOOD TEST: CPT

## 2019-12-25 PROCEDURE — 63710000001 INSULIN LISPRO (HUMAN) PER 5 UNITS: Performed by: NURSE PRACTITIONER

## 2019-12-25 PROCEDURE — 94799 UNLISTED PULMONARY SVC/PX: CPT

## 2019-12-25 PROCEDURE — 94618 PULMONARY STRESS TESTING: CPT

## 2019-12-25 PROCEDURE — 80048 BASIC METABOLIC PNL TOTAL CA: CPT | Performed by: INTERNAL MEDICINE

## 2019-12-25 PROCEDURE — 63710000001 PREDNISONE PER 1 MG: Performed by: INTERNAL MEDICINE

## 2019-12-25 RX ORDER — PREDNISONE 20 MG/1
40 TABLET ORAL
Qty: 12 TABLET | Refills: 0 | Status: SHIPPED | OUTPATIENT
Start: 2019-12-26 | End: 2020-01-02

## 2019-12-25 RX ORDER — ACETAMINOPHEN 325 MG/1
650 TABLET ORAL EVERY 6 HOURS PRN
Start: 2019-12-25

## 2019-12-25 RX ORDER — OSELTAMIVIR PHOSPHATE 75 MG/1
75 CAPSULE ORAL EVERY 12 HOURS SCHEDULED
Qty: 5 CAPSULE | Refills: 0 | Status: SHIPPED | OUTPATIENT
Start: 2019-12-26 | End: 2020-01-02

## 2019-12-25 RX ORDER — CARVEDILOL 25 MG/1
12.5 TABLET ORAL 2 TIMES DAILY WITH MEALS
Qty: 180 TABLET | Refills: 3
Start: 2019-12-25 | End: 2020-07-26

## 2019-12-25 RX ADMIN — LITHIUM CARBONATE 150 MG: 150 CAPSULE, GELATIN COATED ORAL at 09:09

## 2019-12-25 RX ADMIN — PANTOPRAZOLE SODIUM 40 MG: 40 TABLET, DELAYED RELEASE ORAL at 09:09

## 2019-12-25 RX ADMIN — IPRATROPIUM BROMIDE AND ALBUTEROL SULFATE 3 ML: 2.5; .5 SOLUTION RESPIRATORY (INHALATION) at 11:18

## 2019-12-25 RX ADMIN — IPRATROPIUM BROMIDE AND ALBUTEROL SULFATE 3 ML: 2.5; .5 SOLUTION RESPIRATORY (INHALATION) at 08:06

## 2019-12-25 RX ADMIN — INSULIN LISPRO 2 UNITS: 100 INJECTION, SOLUTION INTRAVENOUS; SUBCUTANEOUS at 09:10

## 2019-12-25 RX ADMIN — TAMSULOSIN HYDROCHLORIDE 0.4 MG: 0.4 CAPSULE ORAL at 09:09

## 2019-12-25 RX ADMIN — OSELTAMIVIR PHOSPHATE 75 MG: 75 CAPSULE ORAL at 09:18

## 2019-12-25 RX ADMIN — ATORVASTATIN CALCIUM 80 MG: 80 TABLET, FILM COATED ORAL at 09:10

## 2019-12-25 RX ADMIN — FLUOXETINE HYDROCHLORIDE 60 MG: 20 CAPSULE ORAL at 09:10

## 2019-12-25 RX ADMIN — CARVEDILOL 12.5 MG: 12.5 TABLET, FILM COATED ORAL at 09:10

## 2019-12-25 RX ADMIN — PREDNISONE 40 MG: 20 TABLET ORAL at 09:09

## 2019-12-25 RX ADMIN — ASPIRIN 81 MG: 81 TABLET, COATED ORAL at 09:09

## 2019-12-25 RX ADMIN — INSULIN LISPRO 2 UNITS: 100 INJECTION, SOLUTION INTRAVENOUS; SUBCUTANEOUS at 11:52

## 2019-12-26 ENCOUNTER — READMISSION MANAGEMENT (OUTPATIENT)
Dept: CALL CENTER | Facility: HOSPITAL | Age: 69
End: 2019-12-26

## 2019-12-26 ENCOUNTER — TELEPHONE (OUTPATIENT)
Dept: FAMILY MEDICINE CLINIC | Facility: CLINIC | Age: 69
End: 2019-12-26

## 2019-12-26 ENCOUNTER — TRANSITIONAL CARE MANAGEMENT TELEPHONE ENCOUNTER (OUTPATIENT)
Dept: FAMILY MEDICINE CLINIC | Facility: CLINIC | Age: 69
End: 2019-12-26

## 2019-12-26 NOTE — OUTREACH NOTE
Spoke with pt, feeling a bit better, s/p hospital stay for Flu A complicated by COPD and current smoker. Pt was sent home with oxygen, but does not like the nostril hose. Can a mask be ordered for pt? Oxygen was supplied by [facility]. I called [facility] but was told M.D. order is required. Pt states he has not yet picked up Tamilflu or Deltasone from [facility] which he is to start today, but will get it today. Confirmed receipt and understanding of d/c orders. I did sched pt for TCM [facility] with Alexandrea Jimenez on 01/02/20.

## 2019-12-26 NOTE — TELEPHONE ENCOUNTER
Monica with [facility]  called stating that pt was d/c from hospital yesterday with new oxygen and needs BMP drawn tomorrow.  Need verbal orders.  Ok - fave to face within 14 days.

## 2019-12-26 NOTE — OUTREACH NOTE
Prep Survey      Responses   Facility patient discharged from?  [place]   Is patient eligible?  Yes   Discharge diagnosis  influenza A, COPD exacerbaton   Does the patient have one of the following disease processes/diagnoses(primary or secondary)?  COPD/Pneumonia   Does the patient have Home health ordered?  Yes   What is the Home health agency?   [facility]   Is there a DME ordered?  Yes   What DME was ordered?  Home O2 from [facility]   Prep survey completed?  Yes          Chelsea Ashley RN

## 2019-12-27 ENCOUNTER — LAB REQUISITION (OUTPATIENT)
Dept: LAB | Facility: HOSPITAL | Age: 69
End: 2019-12-27

## 2019-12-27 ENCOUNTER — EPISODE CHANGES (OUTPATIENT)
Dept: CASE MANAGEMENT | Facility: OTHER | Age: 69
End: 2019-12-27

## 2019-12-27 DIAGNOSIS — Z00.00 ENCOUNTER FOR GENERAL ADULT MEDICAL EXAMINATION WITHOUT ABNORMAL FINDINGS: ICD-10-CM

## 2019-12-27 LAB
ANION GAP SERPL CALCULATED.3IONS-SCNC: 12.2 MMOL/L (ref 5–15)
BUN BLD-MCNC: 35 MG/DL (ref 8–23)
BUN/CREAT SERPL: 36.1 (ref 7–25)
CALCIUM SPEC-SCNC: 9.9 MG/DL (ref 8.6–10.5)
CHLORIDE SERPL-SCNC: 105 MMOL/L (ref 98–107)
CO2 SERPL-SCNC: 21.8 MMOL/L (ref 22–29)
CREAT BLD-MCNC: 0.97 MG/DL (ref 0.76–1.27)
GFR SERPL CREATININE-BSD FRML MDRD: 77 ML/MIN/1.73
GLUCOSE BLD-MCNC: 229 MG/DL (ref 65–99)
POTASSIUM BLD-SCNC: 4.9 MMOL/L (ref 3.5–5.2)
SODIUM BLD-SCNC: 139 MMOL/L (ref 136–145)

## 2019-12-27 PROCEDURE — 80048 BASIC METABOLIC PNL TOTAL CA: CPT | Performed by: FAMILY MEDICINE

## 2019-12-30 ENCOUNTER — READMISSION MANAGEMENT (OUTPATIENT)
Dept: CALL CENTER | Facility: HOSPITAL | Age: 69
End: 2019-12-30

## 2019-12-30 NOTE — OUTREACH NOTE
COPD/PN Week 1 Survey      Responses   Facility patient discharged from?  [place]   Does the patient have one of the following disease processes/diagnoses(primary or secondary)?  COPD/Pneumonia   Is there a successful TCM telephone encounter documented?  Yes          Sarah Segundo RN

## 2020-01-02 ENCOUNTER — TELEPHONE (OUTPATIENT)
Dept: GASTROENTEROLOGY | Facility: CLINIC | Age: 70
End: 2020-01-02

## 2020-01-02 ENCOUNTER — OFFICE VISIT (OUTPATIENT)
Dept: FAMILY MEDICINE CLINIC | Facility: CLINIC | Age: 70
End: 2020-01-02

## 2020-01-02 VITALS
WEIGHT: 168 LBS | RESPIRATION RATE: 16 BRPM | SYSTOLIC BLOOD PRESSURE: 150 MMHG | HEART RATE: 66 BPM | DIASTOLIC BLOOD PRESSURE: 80 MMHG | BODY MASS INDEX: 24.88 KG/M2 | OXYGEN SATURATION: 98 % | HEIGHT: 69 IN | TEMPERATURE: 97.6 F

## 2020-01-02 DIAGNOSIS — Z79.4 TYPE 2 DIABETES MELLITUS WITHOUT COMPLICATION, WITH LONG-TERM CURRENT USE OF INSULIN (HCC): ICD-10-CM

## 2020-01-02 DIAGNOSIS — Z09 HOSPITAL DISCHARGE FOLLOW-UP: Primary | ICD-10-CM

## 2020-01-02 DIAGNOSIS — J44.1 COPD WITH ACUTE EXACERBATION (HCC): ICD-10-CM

## 2020-01-02 DIAGNOSIS — J43.9 PULMONARY EMPHYSEMA, UNSPECIFIED EMPHYSEMA TYPE (HCC): ICD-10-CM

## 2020-01-02 DIAGNOSIS — I10 ESSENTIAL HYPERTENSION: ICD-10-CM

## 2020-01-02 DIAGNOSIS — E11.9 TYPE 2 DIABETES MELLITUS WITHOUT COMPLICATION, WITH LONG-TERM CURRENT USE OF INSULIN (HCC): ICD-10-CM

## 2020-01-02 PROCEDURE — 99495 TRANSJ CARE MGMT MOD F2F 14D: CPT | Performed by: PHYSICIAN ASSISTANT

## 2020-01-02 RX ORDER — LISINOPRIL 20 MG/1
20 TABLET ORAL DAILY
Qty: 90 TABLET | Refills: 1 | Status: SHIPPED | OUTPATIENT
Start: 2020-01-02 | End: 2020-04-22 | Stop reason: SDUPTHER

## 2020-01-02 NOTE — PATIENT INSTRUCTIONS

## 2020-01-02 NOTE — PROGRESS NOTES
Transitional Care Follow Up Visit  Subjective     Felicita Mike is a 69 y.o. male who presents for a transitional care management visit.    Within 48 business hours after discharge our office contacted him via telephone to coordinate his care and needs.      I reviewed and discussed the details of that call along with the discharge summary, hospital problems, inpatient lab results, inpatient diagnostic studies, and consultation reports with Felicita.     Current outpatient and discharge medications have been reconciled for the patient.  Reviewed by: Alexandrea Jimenez PA-C      Date of TCM Phone Call 12/26/2019   [facility]   Discharge Disposition Home or Self Care     Risk for Readmission (LACE) Score: 10 (12/25/2019  6:00 AM)  I do want to note that he did have the repeat BMP with home health on 12/27/2019.  His creatinine was back to normal and we are watching this.  I had also sent a message right at discharge to check with the home transitional nurse to make sure he is gotten his medications any new to hold his blood pressure medicine.    I also watch his CBC for his known leukocytosis and borderline anemia that he is seeing hematology for.  He just had an EGD and colonoscopy as part of the work-up for this as well.  No bleeding but some polyps on colonoscopy.  Lab Results   Component Value Date    GLUCOSE 229 (H) 12/27/2019    CALCIUM 9.9 12/27/2019     12/27/2019    K 4.9 12/27/2019    CO2 21.8 (L) 12/27/2019     12/27/2019    BUN 35 (H) 12/27/2019    CREATININE 0.97 12/27/2019    EGFRIFAFRI 92 12/05/2019    EGFRIFNONA 77 12/27/2019    BCR 36.1 (H) 12/27/2019    ANIONGAP 12.2 12/27/2019       History of Present Illness   Course During Hospital Stay:  12-23-19 to 12-25-19    Mr. Mike is a 69 y.o. man with known COPD who was admitted for exacerbation due to acute infection with influenza A.  He continues to smoke. He was started on Tamiflu and IV steroids; mini nebs were continued.  He  received 1 dose of Lasix because of complaints of orthopnea.  Initial chest x-ray showed some vascular congestion.  He had 1 L of urine output after the Lasix dosage.  Echocardiogram was done with results as noted below.  He is feeling better.  Steroids were changed to oral today.  He was followed by pulmonary.  He has BiPAP at home and a nebulizer machine.  He does not have oxygen.  He is still requiring 3 L of oxygen.  Will check oximetry but I anticipate he will need home O2.  I have ordered it.  Keep follow-up appointment with pulmonary as previously directed.     His blood pressure was on the low side.  Coreg dosage was decreased.  Lisinopril/hydrochlorothiazide has been stopped.  Creatinine noted.  Follow-up with primary care provider.     Metformin was held during this admission.  He has required very low doses of as needed short acting insulin.  Restart metformin at half dose beginning 12/27/2019.  Follow-up with primary care provider.    Current outpatient and discharge medications have been reconciled for the patient.  Reviewed by: Alexandrea Jimenez PA-C     Addendum: Creatinine just now available and elevated at 1.7.  Discussed with nurse.  Patient very much wants to go home because it is [place].  We will have him push fluids.  Stay off Norvasc, lisinopril/hydrochlorothiazide.  Home health to check BMP on 12/26/2019    I will continue follow-up with urology  Have him follow-up with GI  Follow-up with pulmonology  Sees Dr Lew once a year for CAD;     I need to watch his blood pressure because while he was inpatient his carvedilol dose was decreased.  I see his metformin was held completely at the beginning of hospitalization due to renal functions and then restarted at half the dose.  I also see that we due to low blood pressure they held his lisinopril hydrochlorothiazide 20/12.5  Must f/u pulm and is on O2; he stopped smoking    Nausea did not respond to Zofran and I changed it to promethazine from  his last visit.  I know that this is resolved he has not taken the promethazine since he has been home from the hospital.  He wants to try to go back down on his omeprazole to once a day next more due to cost.  He stopped smoking; patch on  The following portions of the patient's history were reviewed and updated as appropriate: allergies, current medications, past family history, past medical history, past social history, past surgical history and problem list.    Review of Systems   Constitutional: Negative for activity change, appetite change and unexpected weight change.   HENT: Negative for nosebleeds and trouble swallowing.    Eyes: Negative for pain and visual disturbance.   Respiratory: Negative for chest tightness, shortness of breath and wheezing.    Cardiovascular: Negative for chest pain and palpitations.   Gastrointestinal: Negative for abdominal pain and blood in stool.   Endocrine: Negative.    Genitourinary: Negative for difficulty urinating and hematuria.   Musculoskeletal: Negative for joint swelling.   Skin: Negative for color change and rash.   Allergic/Immunologic: Negative.    Neurological: Negative for syncope and speech difficulty.   Hematological: Negative for adenopathy.   Psychiatric/Behavioral: Negative for agitation and confusion.   All other systems reviewed and are negative.      Objective   Physical Exam   Constitutional: He is oriented to person, place, and time. He appears well-developed and well-nourished. No distress.   HENT:   Head: Normocephalic and atraumatic.   Eyes: Pupils are equal, round, and reactive to light. Conjunctivae and EOM are normal. Right eye exhibits no discharge. Left eye exhibits no discharge. No scleral icterus.   Neck: Normal range of motion. Neck supple. No tracheal deviation present. No thyromegaly present.   Cardiovascular: Normal rate, regular rhythm, normal heart sounds, intact distal pulses and normal pulses. Exam reveals no gallop.   No murmur  heard.  Pulmonary/Chest: Effort normal and breath sounds normal. No respiratory distress. He has no wheezes. He has no rales.   Musculoskeletal: Normal range of motion.   Neurological: He is alert and oriented to person, place, and time. He exhibits normal muscle tone. Coordination normal.   Skin: Skin is warm. No rash noted. No erythema. No pallor.   Psychiatric: He has a normal mood and affect. His behavior is normal. Judgment and thought content normal.   Nursing note and vitals reviewed.      Assessment/Plan   Problems Addressed this Visit        Cardiovascular and Mediastinum    Hypertension       Respiratory    Chronic obstructive pulmonary disease (CMS/[facility])    Relevant Orders    Ambulatory Referral to Pulmonology    COPD with acute exacerbation (CMS/[facility])    Relevant Orders    Ambulatory Referral to Pulmonology       Endocrine    Type 2 diabetes mellitus without complication, with long-term current use of insulin (CMS/[facility])      Other Visit Diagnoses     Hospital discharge follow-up    -  Primary        I do plan on going back up to the full dose of metformin at 1000 twice a day  I see his carvedilol dose has been decreased  I know his lisinopril hydrochlorothiazide was discontinued in the hospital for low blood pressure, his blood pressure today was 150/80 I am going to restart the lisinopril but not the hydrochlorothiazide and we will see what his blood pressure does  I do see the renal functions were done by home health and back to normal  He will go down to once daily on his PPI---will see if controls GERD; BID is $$$  Want home health to also check his blood pressure  I will see him back in 2 weeks

## 2020-01-02 NOTE — TELEPHONE ENCOUNTER
Esophageal bx were normal- cont bid ppi    The polyp(s) biopsies showed adenomatous change. This is not cancerous but is considered potentially precancerous. Follow-up colonoscopy in 3 years is advised.

## 2020-01-03 ENCOUNTER — READMISSION MANAGEMENT (OUTPATIENT)
Dept: CALL CENTER | Facility: HOSPITAL | Age: 70
End: 2020-01-03

## 2020-01-03 NOTE — OUTREACH NOTE
COPD/PN Week 2 Survey      Responses   Facility patient discharged from?  [place]   Does the patient have one of the following disease processes/diagnoses(primary or secondary)?  COPD/Pneumonia   Was the primary reason for admission:  COPD exacerbation   Week 2 attempt successful?  No   Unsuccessful attempts  Attempt 1          Emily Lakhani RN

## 2020-01-07 ENCOUNTER — READMISSION MANAGEMENT (OUTPATIENT)
Dept: CALL CENTER | Facility: HOSPITAL | Age: 70
End: 2020-01-07

## 2020-01-07 NOTE — OUTREACH NOTE
COPD/PN Week 2 Survey      Responses   Facility patient discharged from?  [place]   Does the patient have one of the following disease processes/diagnoses(primary or secondary)?  COPD/Pneumonia   Was the primary reason for admission:  COPD exacerbation   Week 2 attempt successful?  Yes   Call start time  1529   Call end time  1532   Discharge diagnosis  influenza A, COPD exacerbaton   Meds reviewed with patient/caregiver?  Yes   Is the patient taking all medications as directed (includes completed medication regime)?  Yes   Has the patient kept scheduled appointments due by today?  Yes   What is the Home health agency?   [facility]   Has home health visited the patient within 72 hours of discharge?  Yes   What DME was ordered?  Home O2 from [place]   What is the patient's perception of their health status since discharge?  Improving   Is the patient able to teach back COPD zones?  Yes   Nursing interventions  Education provided on various zones   Patient reports what zone on this call?  Green Zone   Green Zone  Reports doing well, Breathing without shortness of breath, Usual activity and exercise level, Usual amount of phlegm/mucus without difficulty coughing up, Sleeping well, Appetite is good   Green Zone interventions:  Take daily medications, Continue regular exercise/diet plan, Use oxygen as prescribed, Do not smoke   Week 2 call completed?  Yes   Wrap up additional comments  HH is with patient at time of call.  He is on 3 liters O2, sat is 97%. Improving. Has not smoked since he was admitted.           Felicita Buck RN

## 2020-01-08 NOTE — TELEPHONE ENCOUNTER
Called pt and advised per Dr Franco that the esophagela bx were normal and to continue bid ppi.      The polyp bx showed adenomatous change.  This is not cancerous but is considered potentially precancerous . She recommends a repeat c/s in 3 yrs.  Pt verb understanding.     C/s placed in recall for 12/16/2022.

## 2020-01-16 ENCOUNTER — READMISSION MANAGEMENT (OUTPATIENT)
Dept: CALL CENTER | Facility: HOSPITAL | Age: 70
End: 2020-01-16

## 2020-01-16 ENCOUNTER — OFFICE VISIT (OUTPATIENT)
Dept: FAMILY MEDICINE CLINIC | Facility: CLINIC | Age: 70
End: 2020-01-16

## 2020-01-16 VITALS
HEIGHT: 69 IN | DIASTOLIC BLOOD PRESSURE: 80 MMHG | BODY MASS INDEX: 25.33 KG/M2 | WEIGHT: 171 LBS | HEART RATE: 77 BPM | TEMPERATURE: 98.4 F | SYSTOLIC BLOOD PRESSURE: 120 MMHG | OXYGEN SATURATION: 94 % | RESPIRATION RATE: 16 BRPM

## 2020-01-16 DIAGNOSIS — I10 ESSENTIAL HYPERTENSION: ICD-10-CM

## 2020-01-16 DIAGNOSIS — E11.9 TYPE 2 DIABETES MELLITUS WITHOUT COMPLICATION, WITH LONG-TERM CURRENT USE OF INSULIN (HCC): Primary | ICD-10-CM

## 2020-01-16 DIAGNOSIS — J43.9 PULMONARY EMPHYSEMA, UNSPECIFIED EMPHYSEMA TYPE (HCC): ICD-10-CM

## 2020-01-16 DIAGNOSIS — Z79.4 TYPE 2 DIABETES MELLITUS WITHOUT COMPLICATION, WITH LONG-TERM CURRENT USE OF INSULIN (HCC): Primary | ICD-10-CM

## 2020-01-16 PROCEDURE — 99213 OFFICE O/P EST LOW 20 MIN: CPT | Performed by: PHYSICIAN ASSISTANT

## 2020-01-16 NOTE — OUTREACH NOTE
COPD/PN Week 3 Survey      Responses   Facility patient discharged from?  [place]   Does the patient have one of the following disease processes/diagnoses(primary or secondary)?  COPD/Pneumonia   Was the primary reason for admission:  COPD exacerbation   Week 3 attempt successful?  No   Unsuccessful attempts  Attempt 1          Alexandrea Jimenez RN

## 2020-01-16 NOTE — PROGRESS NOTES
"Stella Mike is a 69 y.o. male.     History of Present Illness    Since the last visit, he has overall felt fairly well.  He has DMII well controlled on medication and will continue regimen, GERD controlled on PPI Rx and Hyperlipidemia with goals met with current Rx.  he has been compliant with current medications have reviewed them.  The patient denies medication side effects.  Will refill medications. /80   Pulse 77   Temp 98.4 °F (36.9 °C) (Oral)   Resp 16   Ht 175 cm (68.9\")   Wt 77.6 kg (171 lb)   SpO2 94%   BMI 25.33 kg/m²   Is also been having some dull headache since he quit smoking.  He has been to neurology in the past and declines further work-up and referral to neurology for now.  We will let him take Tylenol and avoid NSAIDs and more aspirin.  I had him come back today to follow-up on his blood pressure.  I made notes last visit in do want a note again today that while he was inpatient last month his carvedilol dose was decreased and this is still at the lower dose, he was taken off lisinopril HCT due to low blood pressure, when I last saw him his blood pressure was up to 150/80 so I did restart him just on lisinopril and today is follow-up from doing this.  He is here for blood pressure check.  I also had him restart the metformin for diabetes due to his renal functions had returned to normal.  Results for orders placed or performed in visit on 12/27/19   Basic Metabolic Panel   Result Value Ref Range    Glucose 229 (H) 65 - 99 mg/dL    BUN 35 (H) 8 - 23 mg/dL    Creatinine 0.97 0.76 - 1.27 mg/dL    Sodium 139 136 - 145 mmol/L    Potassium 4.9 3.5 - 5.2 mmol/L    Chloride 105 98 - 107 mmol/L    CO2 21.8 (L) 22.0 - 29.0 mmol/L    Calcium 9.9 8.6 - 10.5 mg/dL    eGFR Non African Amer 77 >60 mL/min/1.73    BUN/Creatinine Ratio 36.1 (H) 7.0 - 25.0    Anion Gap 12.2 5.0 - 15.0 mmol/L     Lab Results   Component Value Date    HGBA1C 7.17 (H) 12/23/2019       I see he had ICC visit " d/t hand trauma and see Xray neg fx; showed OA  He is still not smoking  The following portions of the patient's history were reviewed and updated as appropriate: allergies, current medications, past family history, past medical history, past social history, past surgical history and problem list.    Review of Systems   Constitutional: Negative for activity change, appetite change and unexpected weight change.   HENT: Negative for nosebleeds and trouble swallowing.    Eyes: Negative for pain and visual disturbance.   Respiratory: Negative for chest tightness, shortness of breath and wheezing.    Cardiovascular: Negative for chest pain and palpitations.   Gastrointestinal: Negative for abdominal pain and blood in stool.   Endocrine: Negative.    Genitourinary: Negative for difficulty urinating and hematuria.   Musculoskeletal: Negative for joint swelling.   Skin: Negative for color change and rash.   Allergic/Immunologic: Negative.    Neurological: Negative for syncope and speech difficulty.   Hematological: Negative for adenopathy.   Psychiatric/Behavioral: Negative for agitation and confusion.   All other systems reviewed and are negative.      Objective   Physical Exam   Constitutional: He is oriented to person, place, and time. He appears well-developed and well-nourished. No distress.   HENT:   Head: Normocephalic and atraumatic.   Left pupil stays dilated   Eyes: Pupils are equal, round, and reactive to light. Conjunctivae and EOM are normal. Right eye exhibits no discharge. Left eye exhibits no discharge. No scleral icterus.   Left pupil damage is chronic   Neck: Normal range of motion. Neck supple. No tracheal deviation present. No thyromegaly present.   Cardiovascular: Normal rate, regular rhythm, normal heart sounds, intact distal pulses and normal pulses. Exam reveals no gallop and no friction rub.   No murmur heard.  Fingernails clubbing   Pulmonary/Chest: Effort normal and breath sounds normal. No  respiratory distress. He has no wheezes. He has no rales.   decreased   Abdominal:   Not tender   Musculoskeletal: Normal range of motion.   Tremor in hands      During the foot exam he had a monofilament test performed.    Neurological Sensory Findings - Unaltered hot/cold right ankle/foot discrimination and unaltered hot/cold left ankle/foot discrimination. Unaltered sharp/dull right ankle/foot discrimination and unaltered sharp/dull left ankle/foot discrimination. No right ankle/foot altered proprioception and no left ankle/foot altered proprioception  Vascular Status -  His right foot exhibits normal foot vasculature  and no edema. His left foot exhibits normal foot vasculature  and no edema.  Skin Integrity  -  His right foot skin is intact.  He has no right foot ulcer, non-callous right foot, no right foot warmth and no right foot blister.His left foot skin is intact. He has no left foot ulcer, non-callous left foot, no left foot warmth and no left foot blister..  Lymphadenopathy:     He has no cervical adenopathy.   Neurological: He is alert and oriented to person, place, and time. He has normal reflexes. No cranial nerve deficit (see note on pupil left). He exhibits normal muscle tone. Coordination normal.   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 No rash noted. No erythema. No pallor.   Clubbing toenails and fingernails     Psychiatric: He has a normal mood and affect. His behavior is normal. Judgment and thought content normal.   Nursing note and vitals reviewed.      Assessment/Plan   Felicita was seen today for hypertension.    Diagnoses and all orders for this visit:    Type 2 diabetes mellitus without complication, with long-term current use of insulin (CMS/[facility])    Essential hypertension    Pulmonary emphysema, unspecified emphysema type (CMS/[facility])      He does have an appointment to follow-up with pulmonary for the COPD and he has a follow-up with neurology March 3  Plan, Felicita Mike, was  seen today.  he was seen for HTN and continue medication, DMII and refilled medications and GERD and will continue on PPI medication.  Today was to follow-up on adding lisinopril back since he was in the hospital and I am very pleased with his blood pressure today and will continue to monitor  Also want a note we restarted metformin  I want to see him back at the end of March and then will do labs that day  I am very proud of him for not smoking since he has been discharged from the hospital

## 2020-01-16 NOTE — PATIENT INSTRUCTIONS

## 2020-01-20 ENCOUNTER — READMISSION MANAGEMENT (OUTPATIENT)
Dept: CALL CENTER | Facility: HOSPITAL | Age: 70
End: 2020-01-20

## 2020-01-20 ENCOUNTER — EPISODE CHANGES (OUTPATIENT)
Dept: CASE MANAGEMENT | Facility: OTHER | Age: 70
End: 2020-01-20

## 2020-01-20 NOTE — OUTREACH NOTE
COPD/PN Week 3 Survey      Responses   Facility patient discharged from?  [place]   Does the patient have one of the following disease processes/diagnoses(primary or secondary)?  COPD/Pneumonia   Was the primary reason for admission:  COPD exacerbation   Week 3 attempt successful?  No   Unsuccessful attempts  Attempt 2          Emily Lakhani RN

## 2020-01-22 ENCOUNTER — TELEPHONE (OUTPATIENT)
Dept: FAMILY MEDICINE CLINIC | Facility: CLINIC | Age: 70
End: 2020-01-22

## 2020-01-22 NOTE — TELEPHONE ENCOUNTER
Siri with [facility] callled asking for orders to continue care with pt.  She stated that pt has been exacerbating.  She stated that pt has started wheezing she was wanting to know if you could call him something in?  Please advise.  Thanks

## 2020-01-22 NOTE — TELEPHONE ENCOUNTER
I am going to need to hold up more information in this.  ?  Does he have a head cold?  Fever?  Contacted his pulmonologist?

## 2020-01-23 NOTE — TELEPHONE ENCOUNTER
I spoke with Siri charles/[facility], She stated that pt does not have a cold, pt does not have a fever, and stated that pt has an appointment to see pulmonologist tomorrow.

## 2020-01-31 ENCOUNTER — EPISODE CHANGES (OUTPATIENT)
Dept: CASE MANAGEMENT | Facility: OTHER | Age: 70
End: 2020-01-31

## 2020-02-05 ENCOUNTER — EPISODE CHANGES (OUTPATIENT)
Dept: CASE MANAGEMENT | Facility: OTHER | Age: 70
End: 2020-02-05

## 2020-02-13 ENCOUNTER — EPISODE CHANGES (OUTPATIENT)
Dept: CASE MANAGEMENT | Facility: OTHER | Age: 70
End: 2020-02-13

## 2020-02-17 ENCOUNTER — EPISODE CHANGES (OUTPATIENT)
Dept: CASE MANAGEMENT | Facility: OTHER | Age: 70
End: 2020-02-17

## 2020-02-27 ENCOUNTER — EPISODE CHANGES (OUTPATIENT)
Dept: CASE MANAGEMENT | Facility: OTHER | Age: 70
End: 2020-02-27

## 2020-02-29 RX ORDER — QUETIAPINE FUMARATE 100 MG/1
100 TABLET, FILM COATED ORAL NIGHTLY
Qty: 90 TABLET | Refills: 0 | Status: SHIPPED | OUTPATIENT
Start: 2020-02-29 | End: 2020-04-22 | Stop reason: SDUPTHER

## 2020-03-03 ENCOUNTER — OFFICE VISIT (OUTPATIENT)
Dept: NEUROLOGY | Facility: CLINIC | Age: 70
End: 2020-03-03

## 2020-03-03 VITALS
WEIGHT: 180 LBS | HEIGHT: 69 IN | HEART RATE: 71 BPM | BODY MASS INDEX: 26.66 KG/M2 | OXYGEN SATURATION: 94 % | SYSTOLIC BLOOD PRESSURE: 138 MMHG | DIASTOLIC BLOOD PRESSURE: 68 MMHG

## 2020-03-03 DIAGNOSIS — Z72.0 TOBACCO ABUSE: ICD-10-CM

## 2020-03-03 DIAGNOSIS — G25.0 ESSENTIAL TREMOR: Primary | ICD-10-CM

## 2020-03-03 PROCEDURE — 99214 OFFICE O/P EST MOD 30 MIN: CPT | Performed by: NURSE PRACTITIONER

## 2020-03-03 RX ORDER — PRIMIDONE 50 MG/1
50 TABLET ORAL NIGHTLY
Qty: 90 TABLET | Refills: 0 | Status: SHIPPED | OUTPATIENT
Start: 2020-03-03 | End: 2020-04-22

## 2020-03-03 RX ORDER — AMLODIPINE BESYLATE 10 MG/1
TABLET ORAL
COMMUNITY
Start: 2020-01-31 | End: 2020-04-22 | Stop reason: SDUPTHER

## 2020-03-03 RX ORDER — LISINOPRIL AND HYDROCHLOROTHIAZIDE 20; 12.5 MG/1; MG/1
TABLET ORAL
COMMUNITY
Start: 2020-01-31 | End: 2020-04-22

## 2020-03-03 NOTE — PROGRESS NOTES
DOS: 3/7/2020  NAME: Felicita Mike   : 1950  PCP: Alexandrea Jimenez, DANIEL    Chief Complaint   Patient presents with   • Tremors      SUBJECTIVE  Neurological Problem:  69 y.o. RHW male with COPD, bipolar, DM, HTN, HLD, CAD s/p stents, vitamin D deficiency, vitamin B12 deficiency, BPH, hearing loss, MARTINE on CPAP, GERD, PVD who presents for follow-up of tremor.  He is unaccompanied.  Patient and problem are new to examiner. History is provided by patient and review of records that are summarized below.     Interval History:   Mr. Mike was initially evaluated by Dr. Rhoades on 2019 for tremor.  Review of her progress notes indicate the patient was seen in the hospital on 2019 for tremor.  Symptoms started when he was in his teens and have gotten progressively worse, affecting his hands and head, mainly with action, symmetric, worse with action.  Symptoms have been stable for about the last 5 years, he has never been on medication for this.  He has no other signs or symptoms of Parkinson's, no loss of smell, no slowed movements, no stiffness/rigidity, no change in handwriting, swallowing problems.  He does have a psychiatric history and has been on lithium for 5 to 6 years, also on Prozac and Seroquel.  He is also currently on a beta-blocker. At the time of his visit in 2019 he was not interested in any pharmacologic treatment.    He was seen by Dr. Sanchez outpatient in  for headaches, review of note indicates he had a normal brain MRI in 2015 he also had a CT head done in 2017 that was normal.  His headaches had apparently resolved so there was no medication treatment or follow-up.    He presents today and inquires about starting a medication to control his tremors, although he does express hesitation given his extensive medication list currently.  He denies any change in tremor since his last visit.  He was admitted to [facility] in 2019 for COPD exacerbation and acute  flu infection.  Denies any other changes in his health since his last visit.  He does continue to smoke about a pack day.  So consumes a significant amount of caffeine as he drinks about 10 mountain dews per day.  Rare alcohol use.     Review of Systems:Review of Systems   Constitutional: Negative for activity change, appetite change and fatigue.   HENT: Negative for ear pain, facial swelling and trouble swallowing.    Eyes: Negative for photophobia, pain and visual disturbance.   Musculoskeletal: Negative for back pain, gait problem and neck pain.   Allergic/Immunologic: Negative for environmental allergies, food allergies and immunocompromised state.   Neurological: Positive for tremors (b/l hand). Negative for dizziness, seizures, syncope, facial asymmetry, speech difficulty, weakness, light-headedness, numbness and headaches.   Hematological: Negative for adenopathy. Does not bruise/bleed easily.   Psychiatric/Behavioral: Negative for agitation, behavioral problems, confusion, decreased concentration, dysphoric mood, hallucinations, self-injury, sleep disturbance and suicidal ideas. The patient is not nervous/anxious and is not hyperactive.     Above ROS reviewed    The following portions of the patient's history were reviewed and updated as appropriate: allergies, current medications, past family history, past medical history, past social history, past surgical history and problem list.    Current Medications:   Current Outpatient Medications:   •  acetaminophen (TYLENOL) 325 MG tablet, Take 2 tablets by mouth Every 6 (Six) Hours As Needed for Mild Pain ., Disp: , Rfl:   •  albuterol (PROVENTIL) (5 MG/ML) 0.5% nebulizer solution, Take 2.5 mg by nebulization Every 6 (Six) Hours As Needed for Wheezing., Disp: , Rfl:   •  amLODIPine (NORVASC) 10 MG tablet, , Disp: , Rfl:   •  aspirin 81 MG tablet, Take 1 tablet by mouth Daily., Disp: , Rfl:   •  atorvastatin (LIPITOR) 80 MG tablet, Take 1 tablet by mouth Daily.  For cholesterol (new dose), Disp: 90 tablet, Rfl: 3  •  carvedilol (COREG) 25 MG tablet, Take 0.5 tablets by mouth 2 (Two) Times a Day With Meals. For heart, Disp: 180 tablet, Rfl: 3  •  cholecalciferol (VITAMIN D3) 1000 UNITS tablet, Take 1 tablet by mouth 2 (two) times a day., Disp: , Rfl:   •  Cyanocobalamin (B-12) 1000 MCG capsule, Take 1 tablet by mouth Daily., Disp: , Rfl:   •  FLUoxetine (PROzac) 20 MG capsule, Take 60 mg by mouth Daily., Disp: , Rfl:   •  ipratropium-albuterol (DUO-NEB) 0.5-2.5 mg/3 ml nebulizer, Take 3 mL by nebulization 4 (Four) Times a Day., Disp: 360 mL, Rfl: 1  •  lisinopril (PRINIVIL,ZESTRIL) 20 MG tablet, Take 1 tablet by mouth Daily. For BP and heart, Disp: 90 tablet, Rfl: 1  •  lisinopril-hydrochlorothiazide (PRINZIDE,ZESTORETIC) 20-12.5 MG per tablet, , Disp: , Rfl:   •  lithium carbonate 150 MG capsule, Take 150 mg by mouth Every Morning., Disp: , Rfl:   •  lithium carbonate 150 MG capsule, Take 300 mg by mouth Every Evening., Disp: , Rfl:   •  metFORMIN (GLUCOPHAGE) 1000 MG tablet, TAKE 1 TABLET BY MOUTH TWICE DAILY, Disp: 180 tablet, Rfl: 1  •  omeprazole (priLOSEC) 40 MG capsule, Take 1 capsule by mouth 2 (Two) Times a Day. For GERD --new dose, Disp: 180 capsule, Rfl: 3  •  polyethylene glycol (MIRALAX) packet, Take 17 g by mouth Daily As Needed., Disp: , Rfl:   •  QUEtiapine (SEROquel) 100 MG tablet, TAKE 1 TABLET BY MOUTH EVERY NIGHT FOR SLEEP, Disp: 90 tablet, Rfl: 0  •  tamsulosin (FLOMAX) 0.4 MG capsule 24 hr capsule, Take 1 capsule by mouth Daily. (Patient taking differently: Take 1 capsule by mouth Daily. Every other day), Disp: 30 capsule, Rfl: 0  •  wheat dextrin (BENEFIBER ON THE GO) powder powder, Take 1 each by mouth Daily As Needed., Disp: , Rfl:   •  primidone (MYSOLINE) 50 MG tablet, Take 1 tablet by mouth Every Night., Disp: 90 tablet, Rfl: 0      OBJECTIVE  Vitals:    03/03/20 1337   BP: 138/68   Pulse: 71   SpO2: 94%     Body mass index is 26.66  kg/m².    Diagnostics:  CT head 7/24/17: IMPRESSION:  Normal noncontrast head CT with no change when compared to prior MRI of  the head from [facility] 12/02/2015. The etiology of the  new daily headaches is not established on this exam.    Laboratory Results:         Lab Results   Component Value Date    WBC 14.25 (H) 12/24/2019    HGB 11.7 (L) 12/24/2019    HCT 35.0 (L) 12/24/2019    MCV 85.2 12/24/2019     12/24/2019     Lab Results   Component Value Date    GLUCOSE 229 (H) 12/27/2019    BUN 35 (H) 12/27/2019    CREATININE 0.97 12/27/2019    EGFRIFNONA 77 12/27/2019    EGFRIFAFRI 92 12/05/2019    BCR 36.1 (H) 12/27/2019    K 4.9 12/27/2019    CO2 21.8 (L) 12/27/2019    CALCIUM 9.9 12/27/2019    PROTENTOTREF 6.7 12/05/2019    ALBUMIN 4.50 12/23/2019    LABIL2 2.2 12/05/2019    AST 18 12/23/2019    ALT 14 12/23/2019     Lab Results   Component Value Date    HGBA1C 7.17 (H) 12/23/2019     No results found for: CHOL  Lab Results   Component Value Date    HDL 38 (L) 12/05/2019    HDL 41 04/29/2019    HDL 40 01/23/2019     Lab Results   Component Value Date    LDL 55 12/05/2019    LDL 53 04/29/2019    LDL 86 01/23/2019     Lab Results   Component Value Date    TRIG 123 12/05/2019    TRIG 127 04/29/2019    TRIG 171 (H) 01/23/2019     No results found for: RPR  Lab Results   Component Value Date    TSH 1.310 12/05/2019     Lab Results   Component Value Date    [id number] >2000 (H) 12/05/2019       Physical Examination:   General Appearance:   Well developed, well nourished, ill-appearing.  HEENT: Normocephalic.    Respiratory: Audible wheezing, gets easily winded.  Cardiac: Regular rate and rhythm.   Peripheral Vasculature: Radial pulses are equal and symmetric. No signs of distal embolization.  Extremities:    Significant clubbing of fingernails  Skin:    No rashes or birth marks.  Psychiatric:    Normal mood and affect.    Neurological examination:  Higher Integrative  Function: Oriented to time,  place and person. Normal registration, attention span and concentration. Normal language including comprehension, spontaneous speech, and vocabulary. No neglect. Normal fund of knowledge and higher integrative function.  CN II: Reactive on right, Left irregular. Normal visual acuity and visual fields.    CN III IV VI: Extraocular movements are full without nystagmus.   CN V: Normal facial sensation and strength of muscles of mastication.  CN VII: Facial movements are symmetric. No weakness.  CN VIII:   Decreased auditory acuity bilaterally  CN IX & X:   Symmetric palatal movement.  CN XI: Sternocleidomastoid and trapezius are normal.  No weakness.  CN XII:   The tongue is midline.  No atrophy or fasciculations.  Motor: Normal muscle strength, bulk and tone in upper and lower extremities.  Has postural and intention tremor in BUE.  Reflexes: 2+ in the upper and lower extremities.   Sensation: Normal to light touch in arms and legs.   Station and Gait: Normal gait and station.    Coordination: Finger to nose test shows no dysmetria.  Heel to shin normal.    Impression:  Mr. Mike presents for f/u of essential tremor.  He is on some medications that are likely making his symptoms worse however these are necessary.  He was not previously interested in any added pharmacologic agents for tremor control; however, he is interested today.  Will initiate primidone, reviewed risks, benefits, potential side effects.  We also discussed that it is important for him to minimize his caffeine use, recommend significantly reducing his Mountain Dew intake and supplementing with water.  I also reiterated that he may benefit from weighted utensils.     Plan:     Primidone 50 mg qhs  Reduce caffeine intake  May benefit from weighted utensils.   Counseled > 5 min on importance of smoking cessation.    Greater than 50% of this 25-minute visit was spent counseling patient regarding diagnosis, review of diagnostics, medication treatment  options including purpose, risk, benefits, possible side effects as well as recommended lifestyle modifications and preventative measures.  Felicita was seen today for tremors.    Diagnoses and all orders for this visit:    Essential tremor    Tobacco abuse    Other orders  -     primidone (MYSOLINE) 50 MG tablet; Take 1 tablet by mouth Every Night.        Coding      Dictated using Dragon

## 2020-04-14 RX ORDER — ATORVASTATIN CALCIUM 80 MG/1
TABLET, FILM COATED ORAL
Qty: 90 TABLET | Refills: 3 | OUTPATIENT
Start: 2020-04-14

## 2020-04-22 ENCOUNTER — OFFICE VISIT (OUTPATIENT)
Dept: FAMILY MEDICINE CLINIC | Facility: CLINIC | Age: 70
End: 2020-04-22

## 2020-04-22 VITALS — DIASTOLIC BLOOD PRESSURE: 86 MMHG | SYSTOLIC BLOOD PRESSURE: 138 MMHG

## 2020-04-22 DIAGNOSIS — G25.0 ESSENTIAL TREMOR: ICD-10-CM

## 2020-04-22 DIAGNOSIS — E55.9 VITAMIN D DEFICIENCY: ICD-10-CM

## 2020-04-22 DIAGNOSIS — J43.9 PULMONARY EMPHYSEMA, UNSPECIFIED EMPHYSEMA TYPE (HCC): ICD-10-CM

## 2020-04-22 DIAGNOSIS — F31.70 BIPOLAR DISORDER IN FULL REMISSION, MOST RECENT EPISODE UNSPECIFIED TYPE (HCC): ICD-10-CM

## 2020-04-22 DIAGNOSIS — I25.10 CORONARY ARTERIOSCLEROSIS IN NATIVE ARTERY: ICD-10-CM

## 2020-04-22 DIAGNOSIS — E11.9 TYPE 2 DIABETES MELLITUS WITHOUT COMPLICATION, WITH LONG-TERM CURRENT USE OF INSULIN (HCC): Primary | ICD-10-CM

## 2020-04-22 DIAGNOSIS — Z79.4 TYPE 2 DIABETES MELLITUS WITHOUT COMPLICATION, WITH LONG-TERM CURRENT USE OF INSULIN (HCC): Primary | ICD-10-CM

## 2020-04-22 DIAGNOSIS — I73.9 PERIPHERAL VASCULAR DISEASE (HCC): ICD-10-CM

## 2020-04-22 DIAGNOSIS — Z72.0 TOBACCO ABUSE: ICD-10-CM

## 2020-04-22 DIAGNOSIS — Z12.5 SCREENING PSA (PROSTATE SPECIFIC ANTIGEN): ICD-10-CM

## 2020-04-22 DIAGNOSIS — F41.9 ANXIETY: ICD-10-CM

## 2020-04-22 DIAGNOSIS — K21.9 GASTROESOPHAGEAL REFLUX DISEASE WITHOUT ESOPHAGITIS: ICD-10-CM

## 2020-04-22 DIAGNOSIS — E61.2 MAGNESIUM DEFICIENCY: ICD-10-CM

## 2020-04-22 DIAGNOSIS — Z79.899 LONG-TERM CURRENT USE OF LITHIUM: ICD-10-CM

## 2020-04-22 DIAGNOSIS — K21.9 GASTROESOPHAGEAL REFLUX DISEASE, ESOPHAGITIS PRESENCE NOT SPECIFIED: ICD-10-CM

## 2020-04-22 PROBLEM — J96.01 ACUTE RESPIRATORY FAILURE WITH HYPOXIA (HCC): Status: RESOLVED | Noted: 2019-08-08 | Resolved: 2020-04-22

## 2020-04-22 PROCEDURE — 99443 PR PHYS/QHP TELEPHONE EVALUATION 21-30 MIN: CPT | Performed by: PHYSICIAN ASSISTANT

## 2020-04-22 RX ORDER — LISINOPRIL 20 MG/1
20 TABLET ORAL DAILY
Qty: 90 TABLET | Refills: 1 | Status: ON HOLD | OUTPATIENT
Start: 2020-04-22 | End: 2020-07-13 | Stop reason: SDUPTHER

## 2020-04-22 RX ORDER — FLUOXETINE HYDROCHLORIDE 20 MG/1
60 CAPSULE ORAL DAILY
Qty: 270 CAPSULE | Refills: 1 | Status: SHIPPED | OUTPATIENT
Start: 2020-04-22 | End: 2020-07-13 | Stop reason: HOSPADM

## 2020-04-22 RX ORDER — ATORVASTATIN CALCIUM 80 MG/1
80 TABLET, FILM COATED ORAL DAILY
Qty: 90 TABLET | Refills: 3 | Status: SHIPPED | OUTPATIENT
Start: 2020-04-22 | End: 2021-04-11

## 2020-04-22 RX ORDER — PROMETHAZINE HYDROCHLORIDE 25 MG/1
25 TABLET ORAL EVERY 8 HOURS PRN
Qty: 180 TABLET | Refills: 3 | Status: SHIPPED | OUTPATIENT
Start: 2020-04-22 | End: 2021-03-02 | Stop reason: SDUPTHER

## 2020-04-22 RX ORDER — AMLODIPINE BESYLATE 10 MG/1
10 TABLET ORAL DAILY
Qty: 90 TABLET | Refills: 1 | Status: SHIPPED | OUTPATIENT
Start: 2020-04-22 | End: 2020-08-10

## 2020-04-22 RX ORDER — LITHIUM CARBONATE 150 MG/1
CAPSULE ORAL
Qty: 270 CAPSULE | Refills: 3 | Status: SHIPPED | OUTPATIENT
Start: 2020-04-22 | End: 2020-07-13 | Stop reason: HOSPADM

## 2020-04-22 RX ORDER — FAMOTIDINE 20 MG/1
20 TABLET, FILM COATED ORAL 2 TIMES DAILY
Qty: 180 TABLET | Refills: 3 | Status: SHIPPED | OUTPATIENT
Start: 2020-04-22 | End: 2020-06-10 | Stop reason: ALTCHOICE

## 2020-04-22 RX ORDER — TAMSULOSIN HYDROCHLORIDE 0.4 MG/1
1 CAPSULE ORAL DAILY
Qty: 90 CAPSULE | Refills: 3 | Status: SHIPPED | OUTPATIENT
Start: 2020-04-22

## 2020-04-22 RX ORDER — QUETIAPINE FUMARATE 100 MG/1
100 TABLET, FILM COATED ORAL NIGHTLY
Qty: 90 TABLET | Refills: 3 | Status: SHIPPED | OUTPATIENT
Start: 2020-04-22 | End: 2020-07-13 | Stop reason: HOSPADM

## 2020-04-22 NOTE — PROGRESS NOTES
You have chosen to receive care through a telephone visit. Do you consent to use a telephone visit for your medical care today? Yes    Subjective   Felicita Mike is a 69 y.o. male.     History of Present Illness       Since the last visit, he has overall felt anxious.  He has Essential Hypertension and well controlled on current medication, DMII well controlled on medication and will continue regimen, GERD well controlled on PPI and plan trial of step down therapy with H2 blocker, Hyperlipidemia with goals met with current Rx, CAD and remains under care of their Cardiologist for mangement and Vitamin D deficiency and will update labs for continued management.  he has been compliant with current medications have reviewed them.  The patient denies medication side effects.  Will refill medications. /82   Takes Phenergan 25mg PRN nausea; works well.  Zofran no help. Need to change PPI to H2 Blocker  Still taking Magnesium  Results for orders placed or performed in visit on 12/27/19   Basic Metabolic Panel   Result Value Ref Range    Glucose 229 (H) 65 - 99 mg/dL    BUN 35 (H) 8 - 23 mg/dL    Creatinine 0.97 0.76 - 1.27 mg/dL    Sodium 139 136 - 145 mmol/L    Potassium 4.9 3.5 - 5.2 mmol/L    Chloride 105 98 - 107 mmol/L    CO2 21.8 (L) 22.0 - 29.0 mmol/L    Calcium 9.9 8.6 - 10.5 mg/dL    eGFR Non African Amer 77 >60 mL/min/1.73    BUN/Creatinine Ratio 36.1 (H) 7.0 - 25.0    Anion Gap 12.2 5.0 - 15.0 mmol/L     I have eval his leukocytosis with Dr Perdue and from smoking  Did see neuro last mo and was started on Primidone. He is started to smoke again.      He did see the neurologist and the primidone is just not helped his tremor and he stopped taking it.  I was concerned that primidone increased his Seroquel level even though he was not on a high dose but I am going to make a note to pharmacy that he is not taking primidone.  He does request refill for the promethazine for the nausea and that works really well.   He still taking the magnesium and I will need to get a follow-up level.  With the PPI being so expensive I will let him try an H2 blocker to see if that controls his reflux and made a note to him and the pharmacist of the change.  He does see pulmonary for COPD  Has been to cardiology for coronary artery disease  Needs me to refill his Flomax he has had trouble pushing out urine that does not make him dizzy.   Needs to get labs done in the next month and I will follow-up also on his diabetes and is back on the metformin also he went up on the lithium level to where he was before his last hospitalization of a total of 3 pills of the 150 mg a day.  I am concerned about his psych meds but he has been on them for so many years and states nothing else helps and he does not want to see a psychiatrist.  Felicita Mike male 69 y.o., /86   who presents today for follow up of Bipolar Disorder, Anxiety and Panic Attacks.  He reports this is the best medication regimen he has been on several and wants to continue even though he still reports some anxiety at.  He did increase his lithium as I stated above to his dose prior to hospitalization 1 in the morning and 2 in the evening he feels much better with his mood.  I do want to measure a lithium level with his next labs planned for next month. Onset of symptoms was approximately several years ago.  He denies current suicidal and homicidal ideation. Risk factors are family history of anxiety and or depression and chronic illness.  Previous treatment includes current Rx.  He complains of the following medication side effects: none.  The patient declines to go to counseling..    No results found for: PSA    The following portions of the patient's history were reviewed and updated as appropriate: allergies, current medications, past family history, past medical history, past social history, past surgical history and problem list.    Review of Systems   Constitutional: Negative  for activity change, appetite change and unexpected weight change.   HENT: Negative for nosebleeds and trouble swallowing.    Eyes: Negative for pain and visual disturbance.   Respiratory: Positive for cough, shortness of breath and wheezing.    Cardiovascular: Negative for chest pain and palpitations.   Gastrointestinal: Positive for nausea. Negative for abdominal pain and blood in stool.   Endocrine: Negative.    Genitourinary: Positive for difficulty urinating. Negative for hematuria.   Musculoskeletal: Negative for joint swelling.   Skin: Negative for color change and rash.   Allergic/Immunologic: Negative.    Neurological: Positive for tremors. Negative for syncope and speech difficulty.   Hematological: Negative for adenopathy.   Psychiatric/Behavioral: Positive for sleep disturbance. Negative for agitation and confusion. The patient is nervous/anxious.    All other systems reviewed and are negative.      Objective   Physical Exam   Constitutional: He is oriented to person, place, and time. He appears well-developed and well-nourished. No distress.   HENT:   Head: Normocephalic and atraumatic.   Eyes: No scleral icterus.   Pulmonary/Chest: No stridor.   Neurological: He is alert and oriented to person, place, and time.   Skin: He is not diaphoretic.   Psychiatric: He has a normal mood and affect. His behavior is normal. Judgment and thought content normal.   anxious       Assessment/Plan   Felicita was seen today for diabetes.    Diagnoses and all orders for this visit:    Type 2 diabetes mellitus without complication, with long-term current use of insulin (CMS/[facility])    Bipolar disorder in full remission, most recent episode unspecified type (CMS/[facility])    Tobacco abuse    Gastroesophageal reflux disease, esophagitis presence not specified    Pulmonary emphysema, unspecified emphysema type (CMS/[facility])    Coronary arteriosclerosis in native artery    Essential tremor    Anxiety    Gastroesophageal reflux disease  without esophagitis    Other orders  -     atorvastatin (Lipitor) 80 MG tablet; Take 1 tablet by mouth Daily. For cholesterol  -     famotidine (Pepcid) 20 MG tablet; Take 1 tablet by mouth 2 (Two) Times a Day. See if this can replace Omeprazole  -     promethazine (PHENERGAN) 25 MG tablet; Take 1 tablet by mouth Every 8 (Eight) Hours As Needed for Nausea.  -     amLODIPine (NORVASC) 10 MG tablet; Take 1 tablet by mouth Daily. For BP  -     metFORMIN (GLUCOPHAGE) 1000 MG tablet; Take 1 tablet by mouth 2 (Two) Times a Day. For DMII  -     lithium carbonate 150 MG capsule; One PO in AM and 2 PO in evening for mood  -     lisinopril (PRINIVIL,ZESTRIL) 20 MG tablet; Take 1 tablet by mouth Daily. For BP and heart  -     FLUoxetine (PROzac) 20 MG capsule; Take 3 capsules by mouth Daily. For anxiety and depression  -     QUEtiapine (SEROquel) 100 MG tablet; Take 1 tablet by mouth Every Night. For sleep  -     tamsulosin (FLOMAX) 0.4 MG capsule 24 hr capsule; Take 1 capsule by mouth Daily. For urine flow    Plan, Felicita Mike, was seen today.  he was seen for HTN and continue medication, DMII and refilled medications, GERD and has been well controlled on PPI and will do trial of H2 blocker , Hyperlipidemia and will continue current medication, CAD and is under care of their Cardiologist for medical management and Vitamin D deficiency and will update labs .  Ordering update on magnesium and lithium levels  Refilling his Prozac lithium and Seroquel for anxiety bipolar disorder and helping  Refill promethazine for nausea and helping  Refill Flomax for urine flow and is definitely helping  He will get some more blood pressure readings at home and let me know his average readings  Labs are due soon as the pandemic's over    Time spent was 28 minutes

## 2020-04-22 NOTE — PATIENT INSTRUCTIONS

## 2020-05-20 ENCOUNTER — RESULTS ENCOUNTER (OUTPATIENT)
Dept: FAMILY MEDICINE CLINIC | Facility: CLINIC | Age: 70
End: 2020-05-20

## 2020-05-20 DIAGNOSIS — K21.9 GASTROESOPHAGEAL REFLUX DISEASE WITHOUT ESOPHAGITIS: ICD-10-CM

## 2020-05-20 DIAGNOSIS — F41.9 ANXIETY: ICD-10-CM

## 2020-05-20 DIAGNOSIS — J43.9 PULMONARY EMPHYSEMA, UNSPECIFIED EMPHYSEMA TYPE (HCC): ICD-10-CM

## 2020-05-20 DIAGNOSIS — K21.9 GASTROESOPHAGEAL REFLUX DISEASE, ESOPHAGITIS PRESENCE NOT SPECIFIED: ICD-10-CM

## 2020-05-20 DIAGNOSIS — Z79.4 TYPE 2 DIABETES MELLITUS WITHOUT COMPLICATION, WITH LONG-TERM CURRENT USE OF INSULIN (HCC): ICD-10-CM

## 2020-05-20 DIAGNOSIS — Z79.899 LONG-TERM CURRENT USE OF LITHIUM: ICD-10-CM

## 2020-05-20 DIAGNOSIS — I25.10 CORONARY ARTERIOSCLEROSIS IN NATIVE ARTERY: ICD-10-CM

## 2020-05-20 DIAGNOSIS — E11.9 TYPE 2 DIABETES MELLITUS WITHOUT COMPLICATION, WITH LONG-TERM CURRENT USE OF INSULIN (HCC): ICD-10-CM

## 2020-05-20 DIAGNOSIS — G25.0 ESSENTIAL TREMOR: ICD-10-CM

## 2020-05-20 DIAGNOSIS — F31.70 BIPOLAR DISORDER IN FULL REMISSION, MOST RECENT EPISODE UNSPECIFIED TYPE (HCC): ICD-10-CM

## 2020-05-20 DIAGNOSIS — E61.2 MAGNESIUM DEFICIENCY: ICD-10-CM

## 2020-05-20 DIAGNOSIS — Z72.0 TOBACCO ABUSE: ICD-10-CM

## 2020-05-20 DIAGNOSIS — E55.9 VITAMIN D DEFICIENCY: ICD-10-CM

## 2020-05-20 DIAGNOSIS — Z12.5 SCREENING PSA (PROSTATE SPECIFIC ANTIGEN): ICD-10-CM

## 2020-05-20 DIAGNOSIS — I73.9 PERIPHERAL VASCULAR DISEASE (HCC): ICD-10-CM

## 2020-05-29 RX ORDER — PRIMIDONE 50 MG/1
50 TABLET ORAL NIGHTLY
Qty: 90 TABLET | Refills: 2 | Status: SHIPPED | OUTPATIENT
Start: 2020-05-29 | End: 2020-07-13 | Stop reason: HOSPADM

## 2020-05-29 NOTE — TELEPHONE ENCOUNTER
Pt last seen 3/3/2020. Follow up scheduled 6/4/2020.       Quantity 90 for 90 days.      Please review and approve or advise.     Thank you.

## 2020-06-08 ENCOUNTER — TELEPHONE (OUTPATIENT)
Dept: FAMILY MEDICINE CLINIC | Facility: CLINIC | Age: 70
End: 2020-06-08

## 2020-06-08 NOTE — TELEPHONE ENCOUNTER
He was on omeprazole but complained about the cost.  Have him check into over-the-counter omeprazole to see if 20 mg is cheaper than prescription before I send in a prescription

## 2020-06-08 NOTE — TELEPHONE ENCOUNTER
Pt states that pepcid is not helping. About four hours after he takes it he has to take another one. He is wanting something stronger. Please advise

## 2020-06-10 RX ORDER — OMEPRAZOLE 20 MG/1
20 CAPSULE, DELAYED RELEASE ORAL DAILY
Qty: 30 CAPSULE | Refills: 5 | Status: SHIPPED | OUTPATIENT
Start: 2020-06-10 | End: 2020-07-17

## 2020-07-06 ENCOUNTER — APPOINTMENT (OUTPATIENT)
Dept: CT IMAGING | Facility: HOSPITAL | Age: 70
End: 2020-07-06

## 2020-07-06 ENCOUNTER — HOSPITAL ENCOUNTER (INPATIENT)
Facility: HOSPITAL | Age: 70
LOS: 7 days | Discharge: HOME HEALTH | End: 2020-07-13
Attending: EMERGENCY MEDICINE | Admitting: HOSPITALIST

## 2020-07-06 DIAGNOSIS — R10.84 GENERALIZED ABDOMINAL PAIN: ICD-10-CM

## 2020-07-06 DIAGNOSIS — R78.89 ABNORMAL LITHIUM LEVEL IN BLOOD: ICD-10-CM

## 2020-07-06 DIAGNOSIS — N17.9 ACUTE KIDNEY INJURY (HCC): Primary | ICD-10-CM

## 2020-07-06 PROBLEM — E87.1 HYPONATREMIA: Status: ACTIVE | Noted: 2020-07-06

## 2020-07-06 PROBLEM — T56.891A LITHIUM TOXICITY: Status: ACTIVE | Noted: 2020-07-06

## 2020-07-06 LAB
ALBUMIN SERPL-MCNC: 4.3 G/DL (ref 3.5–5.2)
ALBUMIN/GLOB SERPL: 2.3 G/DL
ALP SERPL-CCNC: 132 U/L (ref 39–117)
ALT SERPL W P-5'-P-CCNC: 8 U/L (ref 1–41)
ANION GAP SERPL CALCULATED.3IONS-SCNC: 10.3 MMOL/L (ref 5–15)
ANION GAP SERPL CALCULATED.3IONS-SCNC: 10.7 MMOL/L (ref 5–15)
AST SERPL-CCNC: 8 U/L (ref 1–40)
BACTERIA UR QL AUTO: NORMAL /HPF
BASOPHILS # BLD AUTO: 0.03 10*3/MM3 (ref 0–0.2)
BASOPHILS NFR BLD AUTO: 0.3 % (ref 0–1.5)
BILIRUB SERPL-MCNC: 0.7 MG/DL (ref 0–1.2)
BILIRUB UR QL STRIP: NEGATIVE
BUN SERPL-MCNC: 29 MG/DL (ref 8–23)
BUN SERPL-MCNC: 33 MG/DL (ref 8–23)
BUN/CREAT SERPL: 14.6 (ref 7–25)
BUN/CREAT SERPL: 16 (ref 7–25)
CALCIUM SPEC-SCNC: 9.5 MG/DL (ref 8.6–10.5)
CALCIUM SPEC-SCNC: 9.6 MG/DL (ref 8.6–10.5)
CHLORIDE SERPL-SCNC: 103 MMOL/L (ref 98–107)
CHLORIDE SERPL-SCNC: 99 MMOL/L (ref 98–107)
CLARITY UR: CLEAR
CO2 SERPL-SCNC: 19.7 MMOL/L (ref 22–29)
CO2 SERPL-SCNC: 20.3 MMOL/L (ref 22–29)
COLOR UR: YELLOW
CREAT SERPL-MCNC: 1.81 MG/DL (ref 0.76–1.27)
CREAT SERPL-MCNC: 2.26 MG/DL (ref 0.76–1.27)
DEPRECATED RDW RBC AUTO: 46.3 FL (ref 37–54)
EOSINOPHIL # BLD AUTO: 0.23 10*3/MM3 (ref 0–0.4)
EOSINOPHIL NFR BLD AUTO: 2.1 % (ref 0.3–6.2)
ERYTHROCYTE [DISTWIDTH] IN BLOOD BY AUTOMATED COUNT: 15 % (ref 12.3–15.4)
GFR SERPL CREATININE-BSD FRML MDRD: 29 ML/MIN/1.73
GFR SERPL CREATININE-BSD FRML MDRD: 38 ML/MIN/1.73
GLOBULIN UR ELPH-MCNC: 1.9 GM/DL
GLUCOSE SERPL-MCNC: 101 MG/DL (ref 65–99)
GLUCOSE SERPL-MCNC: 91 MG/DL (ref 65–99)
GLUCOSE UR STRIP-MCNC: NEGATIVE MG/DL
HCT VFR BLD AUTO: 31.4 % (ref 37.5–51)
HGB BLD-MCNC: 10.5 G/DL (ref 13–17.7)
HGB UR QL STRIP.AUTO: NEGATIVE
HYALINE CASTS UR QL AUTO: NORMAL /LPF
IMM GRANULOCYTES # BLD AUTO: 0.07 10*3/MM3 (ref 0–0.05)
IMM GRANULOCYTES NFR BLD AUTO: 0.6 % (ref 0–0.5)
KETONES UR QL STRIP: NEGATIVE
LEUKOCYTE ESTERASE UR QL STRIP.AUTO: ABNORMAL
LIPASE SERPL-CCNC: 34 U/L (ref 13–60)
LITHIUM SERPL-SCNC: 2.4 MMOL/L (ref 0.6–1.2)
LYMPHOCYTES # BLD AUTO: 0.78 10*3/MM3 (ref 0.7–3.1)
LYMPHOCYTES NFR BLD AUTO: 7.1 % (ref 19.6–45.3)
MCH RBC QN AUTO: 28.5 PG (ref 26.6–33)
MCHC RBC AUTO-ENTMCNC: 33.4 G/DL (ref 31.5–35.7)
MCV RBC AUTO: 85.1 FL (ref 79–97)
MONOCYTES # BLD AUTO: 0.54 10*3/MM3 (ref 0.1–0.9)
MONOCYTES NFR BLD AUTO: 4.9 % (ref 5–12)
NEUTROPHILS NFR BLD AUTO: 85 % (ref 42.7–76)
NEUTROPHILS NFR BLD AUTO: 9.29 10*3/MM3 (ref 1.7–7)
NITRITE UR QL STRIP: NEGATIVE
NRBC BLD AUTO-RTO: 0 /100 WBC (ref 0–0.2)
PH UR STRIP.AUTO: 6 [PH] (ref 5–8)
PLATELET # BLD AUTO: 199 10*3/MM3 (ref 140–450)
PMV BLD AUTO: 11.6 FL (ref 6–12)
POTASSIUM SERPL-SCNC: 4.5 MMOL/L (ref 3.5–5.2)
POTASSIUM SERPL-SCNC: 4.5 MMOL/L (ref 3.5–5.2)
PROT SERPL-MCNC: 6.2 G/DL (ref 6–8.5)
PROT UR QL STRIP: NEGATIVE
RBC # BLD AUTO: 3.69 10*6/MM3 (ref 4.14–5.8)
RBC # UR: NORMAL /HPF
REF LAB TEST METHOD: NORMAL
SODIUM SERPL-SCNC: 130 MMOL/L (ref 136–145)
SODIUM SERPL-SCNC: 133 MMOL/L (ref 136–145)
SP GR UR STRIP: 1.01 (ref 1–1.03)
SQUAMOUS #/AREA URNS HPF: NORMAL /HPF
UROBILINOGEN UR QL STRIP: ABNORMAL
WBC # BLD AUTO: 10.94 10*3/MM3 (ref 3.4–10.8)
WBC UR QL AUTO: NORMAL /HPF

## 2020-07-06 PROCEDURE — 80178 ASSAY OF LITHIUM: CPT | Performed by: PHYSICIAN ASSISTANT

## 2020-07-06 PROCEDURE — 81001 URINALYSIS AUTO W/SCOPE: CPT | Performed by: PHYSICIAN ASSISTANT

## 2020-07-06 PROCEDURE — 99285 EMERGENCY DEPT VISIT HI MDM: CPT

## 2020-07-06 PROCEDURE — 85025 COMPLETE CBC W/AUTO DIFF WBC: CPT | Performed by: PHYSICIAN ASSISTANT

## 2020-07-06 PROCEDURE — 74176 CT ABD & PELVIS W/O CONTRAST: CPT

## 2020-07-06 PROCEDURE — 80053 COMPREHEN METABOLIC PANEL: CPT | Performed by: PHYSICIAN ASSISTANT

## 2020-07-06 PROCEDURE — 83690 ASSAY OF LIPASE: CPT | Performed by: PHYSICIAN ASSISTANT

## 2020-07-06 RX ORDER — NICOTINE POLACRILEX 4 MG
15 LOZENGE BUCCAL
Status: DISCONTINUED | OUTPATIENT
Start: 2020-07-06 | End: 2020-07-13 | Stop reason: HOSPADM

## 2020-07-06 RX ORDER — ACETAMINOPHEN 650 MG/1
650 SUPPOSITORY RECTAL EVERY 4 HOURS PRN
Status: DISCONTINUED | OUTPATIENT
Start: 2020-07-06 | End: 2020-07-13 | Stop reason: HOSPADM

## 2020-07-06 RX ORDER — ONDANSETRON 2 MG/ML
4 INJECTION INTRAMUSCULAR; INTRAVENOUS EVERY 6 HOURS PRN
Status: DISCONTINUED | OUTPATIENT
Start: 2020-07-06 | End: 2020-07-13 | Stop reason: HOSPADM

## 2020-07-06 RX ORDER — NITROGLYCERIN 0.4 MG/1
0.4 TABLET SUBLINGUAL
Status: DISCONTINUED | OUTPATIENT
Start: 2020-07-06 | End: 2020-07-13 | Stop reason: HOSPADM

## 2020-07-06 RX ORDER — DEXTROSE MONOHYDRATE 25 G/50ML
25 INJECTION, SOLUTION INTRAVENOUS
Status: DISCONTINUED | OUTPATIENT
Start: 2020-07-06 | End: 2020-07-13 | Stop reason: HOSPADM

## 2020-07-06 RX ORDER — ACETAMINOPHEN 160 MG/5ML
650 SOLUTION ORAL EVERY 4 HOURS PRN
Status: DISCONTINUED | OUTPATIENT
Start: 2020-07-06 | End: 2020-07-13 | Stop reason: HOSPADM

## 2020-07-06 RX ORDER — ACETAMINOPHEN 325 MG/1
650 TABLET ORAL EVERY 4 HOURS PRN
Status: DISCONTINUED | OUTPATIENT
Start: 2020-07-06 | End: 2020-07-13 | Stop reason: HOSPADM

## 2020-07-06 RX ORDER — SODIUM CHLORIDE 9 MG/ML
100 INJECTION, SOLUTION INTRAVENOUS CONTINUOUS
Status: DISCONTINUED | OUTPATIENT
Start: 2020-07-06 | End: 2020-07-08

## 2020-07-06 RX ORDER — SODIUM CHLORIDE 0.9 % (FLUSH) 0.9 %
10 SYRINGE (ML) INJECTION AS NEEDED
Status: DISCONTINUED | OUTPATIENT
Start: 2020-07-06 | End: 2020-07-13 | Stop reason: HOSPADM

## 2020-07-06 RX ORDER — SODIUM CHLORIDE 0.9 % (FLUSH) 0.9 %
10 SYRINGE (ML) INJECTION EVERY 12 HOURS SCHEDULED
Status: DISCONTINUED | OUTPATIENT
Start: 2020-07-06 | End: 2020-07-13 | Stop reason: HOSPADM

## 2020-07-06 RX ADMIN — SODIUM CHLORIDE, POTASSIUM CHLORIDE, SODIUM LACTATE AND CALCIUM CHLORIDE 1000 ML: 600; 310; 30; 20 INJECTION, SOLUTION INTRAVENOUS at 18:30

## 2020-07-06 RX ADMIN — SODIUM CHLORIDE 1000 ML: 9 INJECTION, SOLUTION INTRAVENOUS at 16:07

## 2020-07-06 NOTE — ED NOTES
Patient was placed in face mask in first look. Patient was wearing facemask when I entered the room and throughout our encounter. I wore full protective equipment throughout this patient encounter including a face mask, and gloves. Hand hygiene was performed before donning protective equipment and after removal when leaving the room.       Rowdy Dowling RN  07/06/20 0440

## 2020-07-06 NOTE — ED PROVIDER NOTES
EMERGENCY DEPARTMENT ENCOUNTER    Room Number:  43/43  Date seen:  7/6/2020  Time seen: 3:26 PM  PCP: Alexandrea Jimenez PA-C  Historian: patient      HPI:  Chief Complaint: abdominal pain    A complete HPI/ROS/PMH/PSH/SH/FH are unobtainable due to: none    Context: Felicita Mike is a 69 y.o. male who presents to the ED for evaluation of 6-week history of gradual onset abdominal pain that he states is generalized, comes and goes, and he has difficulty describing.  Nothing seems to make it worse.  He states that his PCP gave him her medicine he does not know the name of that does make it better for short period of time when he takes it.  It does not radiate.  He denies fever chills, nausea vomiting diarrhea, hematuria dysuria.  No history of abdominal surgeries.  He arrives via EMS.  He lives independently and states he was carrying in groceries that his family dropped off for him and he tripped and fell on the stairs.  He denies any head injury, loss of consciousness or any other injuries from the fall.  He did require EMS to come help him up and at that time requested to be transported for evaluation of his abdominal pain.  He also has a history of tremors and mentioned this as well but tells me that they are unchanged that he came for abdominal pain. He denies alcohol use.        PAST MEDICAL HISTORY  Active Ambulatory Problems     Diagnosis Date Noted   • Elevated prostate specific antigen (PSA) 02/25/2016   • Coronary arteriosclerosis in native artery 02/25/2016   • Bipolar affective disorder (CMS/[facility]) 02/25/2016   • Chronic obstructive pulmonary disease (CMS/[facility]) 02/25/2016   • Colon polyp 02/25/2016   • Constipation 02/25/2016   • Type 2 diabetes mellitus without complication, with long-term current use of insulin (CMS/[facility]) 02/25/2016   • GERD (gastroesophageal reflux disease) 02/25/2016   • Fatigue 02/25/2016   • Hearing loss 02/25/2016   • Hyperlipidemia 02/25/2016   • Peripheral vascular disease (CMS/[facility])  02/25/2016   • Proteinuria 02/25/2016   • Muscle pain 02/25/2016   • Low back pain 02/25/2016   • Leukocytosis 02/25/2016   • Seborrheic eczema 02/25/2016   • Sleep apnea 02/25/2016   • Tinea corporis 02/25/2016   • Tremor 02/25/2016   • Hypertension 02/25/2016   • Anxiety 07/18/2017   • Benign prostatic hyperplasia with lower urinary tract symptoms 07/18/2017   • Tobacco abuse 11/21/2017   • Normocytic anemia 08/21/2018   • Thyroid nodule 09/11/2018   • Iron deficiency anemia 12/18/2018   • Adenomatous polyp of colon 09/10/2019   • Family history of malignant neoplasm of colon 09/10/2019   • Gastroesophageal reflux disease 09/10/2019   • COPD with acute exacerbation (CMS/[facility]) 12/23/2019   • Influenza A 12/23/2019   • SWAPNIL (acute kidney injury) (CMS/[facility]) 12/25/2019   • Essential tremor 03/03/2020   • Magnesium deficiency 04/22/2020   • Long-term current use of lithium 04/22/2020     Resolved Ambulatory Problems     Diagnosis Date Noted   • Benign essential hypertension 02/25/2016   • Tension headache 08/28/2017   • Acute respiratory failure with hypoxia (CMS/[facility]) 08/08/2019     Past Medical History:   Diagnosis Date   • Abnormal PSA    • Acute myocardial infarction (CMS/[facility]) 2004   • Apnea, sleep    • Atherosclerotic heart disease    • Chronic pain    • COPD (chronic obstructive pulmonary disease) (CMS/[facility])    • Coronary artery disease    • Depressed bipolar II disorder (CMS/[facility])    • Diabetes mellitus (CMS/[facility])    • H/O transfusion of packed red blood cells    • Health care maintenance    • History of back pain    • Hypercholesterolemia    • Osteoarthritis    • PVD (peripheral vascular disease) (CMS/[facility])    • Sebaceous cyst    • Seborrheic dermatitis    • Tobacco use          PAST SURGICAL HISTORY  Past Surgical History:   Procedure Laterality Date   • ANGIOPLASTY      Cath Stent Placement   • COLONOSCOPY  01/22/2014    ta polyps   • COLONOSCOPY N/A 11/29/2016    Procedure: COLONOSCOPY TO CECUM AND TERMINAL ILEUM  WITH HOT POLYPECTOMIES;  Surgeon: Ivette Franco MD;  Location: [facility] ENDOSCOPY;  Service:    • COLONOSCOPY N/A 12/16/2019    Procedure: COLONOSCOPY to cecum with hot snare, cold biopsy polypectomies with clip placement x2;  Surgeon: Ivette Franco MD;  Location: [facility] ENDOSCOPY;  Service: Gastroenterology   • CORONARY STENT PLACEMENT  2004   • ENDOSCOPY N/A 12/16/2019    Procedure: ESOPHAGOGASTRODUODENOSCOPY with biopsies;  Surgeon: Ivette Franco MD;  Location: [facility] ENDOSCOPY;  Service: Gastroenterology   • EYE SURGERY      Cataract Surgery   • HAND SURGERY           FAMILY HISTORY  Family History   Problem Relation Age of Onset   • Cancer Mother    • Diabetes Mother    • Hypertension Mother    • Colon cancer Mother    • Heart disease Father    • Cancer Brother    • Stroke Brother          SOCIAL HISTORY  Social History     Socioeconomic History   • Marital status:      Spouse name: Not on file   • Number of children: Not on file   • Years of education: Not on file   • Highest education level: Not on file   Occupational History     Employer: DISABLED   Tobacco Use   • Smoking status: Current Every Day Smoker     Packs/day: 1.00     Years: 47.00     Pack years: 47.00     Types: Cigarettes   • Smokeless tobacco: Never Used   • Tobacco comment: NO CAFFEINE USE   50 years 1 ppd    Substance and Sexual Activity   • Alcohol use: Yes     Comment: very seldom   • Drug use: No   • Sexual activity: Defer         ALLERGIES  Clopidogrel        REVIEW OF SYSTEMS  Review of Systems     All systems reviewed and negative except for those discussed in HPI.       PHYSICAL EXAM  ED Triage Vitals [07/06/20 1414]   Temp Heart Rate Resp BP SpO2   98.2 °F (36.8 °C) 70 16 120/62 94 %      Temp src Heart Rate Source Patient Position BP Location FiO2 (%)   Tympanic Monitor Sitting Right arm --         GENERAL: not distressed, disheviled  HENT: atraumatic, normocephalic  EYES: no scleral icterus  CV: regular rhythm,  regular rate  RESPIRATORY: normal effort, diffuse rhonchi  ABDOMEN: soft, nontender, nondistended, hyperactive bowel sounds, no guarding or rigidity  MUSCULOSKELETAL: no deformity  NEURO: alert, moves all extremities, follows commands, persistent tremor to the bilateral arms with rest and intention, disoriented to the date  SKIN: warm, dry    Vital signs and nursing notes reviewed.          LAB RESULTS  Recent Results (from the past 24 hour(s))   Comprehensive Metabolic Panel    Collection Time: 07/06/20  3:04 PM   Result Value Ref Range    Glucose 101 (H) 65 - 99 mg/dL    BUN 33 (H) 8 - 23 mg/dL    Creatinine 2.26 (H) 0.76 - 1.27 mg/dL    Sodium 130 (L) 136 - 145 mmol/L    Potassium 4.5 3.5 - 5.2 mmol/L    Chloride 99 98 - 107 mmol/L    CO2 20.3 (L) 22.0 - 29.0 mmol/L    Calcium 9.5 8.6 - 10.5 mg/dL    Total Protein 6.2 6.0 - 8.5 g/dL    Albumin 4.30 3.50 - 5.20 g/dL    ALT (SGPT) 8 1 - 41 U/L    AST (SGOT) 8 1 - 40 U/L    Alkaline Phosphatase 132 (H) 39 - 117 U/L    Total Bilirubin 0.7 0.0 - 1.2 mg/dL    eGFR Non African Amer 29 (L) >60 mL/min/1.73    Globulin 1.9 gm/dL    A/G Ratio 2.3 g/dL    BUN/Creatinine Ratio 14.6 7.0 - 25.0    Anion Gap 10.7 5.0 - 15.0 mmol/L   Lipase    Collection Time: 07/06/20  3:04 PM   Result Value Ref Range    Lipase 34 13 - 60 U/L   Lithium Level    Collection Time: 07/06/20  3:04 PM   Result Value Ref Range    Lithium 2.4 (C) 0.6 - 1.2 mmol/L   CBC Auto Differential    Collection Time: 07/06/20  3:04 PM   Result Value Ref Range    WBC 10.94 (H) 3.40 - 10.80 10*3/mm3    RBC 3.69 (L) 4.14 - 5.80 10*6/mm3    Hemoglobin 10.5 (L) 13.0 - 17.7 g/dL    Hematocrit 31.4 (L) 37.5 - 51.0 %    MCV 85.1 79.0 - 97.0 fL    MCH 28.5 26.6 - 33.0 pg    MCHC 33.4 31.5 - 35.7 g/dL    RDW 15.0 12.3 - 15.4 %    RDW-SD 46.3 37.0 - 54.0 fl    MPV 11.6 6.0 - 12.0 fL    Platelets 199 140 - 450 10*3/mm3    Neutrophil % 85.0 (H) 42.7 - 76.0 %    Lymphocyte % 7.1 (L) 19.6 - 45.3 %    Monocyte % 4.9 (L) 5.0 -  12.0 %    Eosinophil % 2.1 0.3 - 6.2 %    Basophil % 0.3 0.0 - 1.5 %    Immature Grans % 0.6 (H) 0.0 - 0.5 %    Neutrophils, Absolute 9.29 (H) 1.70 - 7.00 10*3/mm3    Lymphocytes, Absolute 0.78 0.70 - 3.10 10*3/mm3    Monocytes, Absolute 0.54 0.10 - 0.90 10*3/mm3    Eosinophils, Absolute 0.23 0.00 - 0.40 10*3/mm3    Basophils, Absolute 0.03 0.00 - 0.20 10*3/mm3    Immature Grans, Absolute 0.07 (H) 0.00 - 0.05 10*3/mm3    nRBC 0.0 0.0 - 0.2 /100 WBC   Urinalysis With Microscopic If Indicated (No Culture) - Urine, Clean Catch    Collection Time: 07/06/20  3:54 PM   Result Value Ref Range    Color, UA Yellow Yellow, Straw    Appearance, UA Clear Clear    pH, UA 6.0 5.0 - 8.0    Specific Gravity, UA 1.009 1.005 - 1.030    Glucose, UA Negative Negative    Ketones, UA Negative Negative    Bilirubin, UA Negative Negative    Blood, UA Negative Negative    Protein, UA Negative Negative    Leuk Esterase, UA Trace (A) Negative    Nitrite, UA Negative Negative    Urobilinogen, UA 0.2 E.U./dL 0.2 - 1.0 E.U./dL   Urinalysis, Microscopic Only - Urine, Clean Catch    Collection Time: 07/06/20  3:54 PM   Result Value Ref Range    RBC, UA 0-2 None Seen, 0-2 /HPF    WBC, UA 0-2 None Seen, 0-2 /HPF    Bacteria, UA None Seen None Seen /HPF    Squamous Epithelial Cells, UA 0-2 None Seen, 0-2 /HPF    Hyaline Casts, UA None Seen None Seen /LPF    Methodology Automated Microscopy        Ordered the above labs and independently reviewed the results.        RADIOLOGY  CT Abdomen Pelvis Without Contrast   Preliminary Result   1.  Small amount of nonspecific free intraperitoneal fluid.   2.  Compression deformity at the L3 vertebral body is favored be chronic   as above. However, correlation with patient history and point tenderness   recommended to exclude an acute etiology. Findings can be further   evaluated with MRI if clinically indicated.   3.  Other findings as above.       The above findings were discussed with Samantha Jamison by telephone by  Fito Brunson at 4:40 PM on  07/06/2020.              I ordered the above noted radiological studies. Reviewed by me and discussed with radiologist.  See dictation for official radiology interpretation.    PROCEDURES  Procedures        MEDICATIONS GIVEN IN ER  Medications   lactated ringers bolus 1,000 mL (has no administration in time range)   sodium chloride 0.9 % bolus 1,000 mL (1,000 mL Intravenous New Bag 7/6/20 1607)             PROGRESS AND CONSULTS    Differential diagnosis includes but is not limited to:  - hepatobiliary pathology such as cholecystitis, cholangitis, and symptomatic cholelithiasis  - Pancreatitis  - Dyspepsia  - Small bowel obstruction  - Appendicitis  - Diverticulitis  - UTI including pyelonephritis  - Ureteral stone  - Zoster  - Colitis, including infectious and ischemic  - Atypical ACS      ED Course as of Jul 06 1936   Mon Jul 06, 2020   1539 0.97 six months ago   Creatinine(!): 2.26 [KA]   1539 Sodium(!): 130 [KA]   1539 Potassium: 4.5 [KA]   1541 Medical Chart Reviewed.  Patient admitted 12/23/2019 to 12/25/2019 due to acute respiratory failure with hypoxia and SWAPNIL and influenza A and COPD exacerbation.  Also noted to have bipolar disorder.  He improved with IV Lasix and steroids.  Had elevation of his creatinine to 1.7 at discharge but patient wanted to go home.  Norvasc lisinopril ACE ECG were discontinued and he was to push fluids and have a BMP recheck the next day.    [KA]   1643 I discussed the CT abdomen with Dr. Brunson, radiologist. It shows a small amount of nonspecific free fluid in the pelvis with no acute findings.   Lithium(!!): 2.4 [KA]   1820 I rechecked the patient, he is resting comfortably.  Abdomen remains soft and very mildly generally tender on reexamination.  I discussed the findings of the acute kidney injury as well as his elevated lithium level with him and unremarkable CT scan.  I have recommended admission for IV hydration and to monitor kidney function he  is agreeable with the plan.    [KA]   1843 The patient with Dr. Escobedo including presentation labs and imaging who agrees to admit inpatient to telemetry.    [KA]      ED Course User Index  [KA] Yuliana Jamison PA        Reviewed pt's history and workup with Dr. Jimenez.  After a bedside evaluation; they agree with the plan of care      Patient was placed in face mask in first look. Patient was wearing facemask each time I entered the room and throughout our encounter. I wore protective equipment throughout this patient encounter including a face mask, eye shield and gloves. Hand hygiene was performed before donning protective equipment and after removal when leaving the room.        DIAGNOSIS  Final diagnoses:   Acute kidney injury (CMS/HCC)   Generalized abdominal pain   Abnormal lithium level in blood               Latest Documented Vital Signs:  As of 19:36  BP- 120/62 HR- 70 Temp- 98.2 °F (36.8 °C) (Tympanic) O2 sat- 94%       Yuliana Jamison PA  07/06/20 1936       Yuliana Jamison PA  07/06/20 1938

## 2020-07-06 NOTE — ED NOTES
Pt presents to ED via EMS from home. Pt had a fall today and called 911. Upon EMS arrival patient states he wants to go to the hospital because hes shaking which he has done for 20 years, its just gotten worse, and abd pain for the last three months. Pt is A&OX4, ambulated once EMS helped him to his feet, and in a mask at this time.      Clinton Jimenez, RN  07/06/20 [id number]

## 2020-07-06 NOTE — ED PROVIDER NOTES
MD ATTESTATION NOTE    The EVELIN and I have discussed this patient's history, physical exam, and treatment plan.  I have reviewed the documentation and personally had a face to face interaction with the patient. I affirm the documentation and agree with the treatment and plan.  The attached note describes my personal findings.      Felicita Mike is a 69 y.o. male who presents to the ED c/o numerous complaints.  He is a rather inconsistent historian.  He states that he is here because of shortness of breath.  He later said that he fell.  He states that his tremors are chronic.      On exam:  Clear to auscultation bilaterally  Regular rate and rhythm  Abdomen soft and nontender  Tremors to the bilateral arms that do not improve with rest and do not worsen with movement  Moves all extremities  Alert and oriented x3, GCS 15  Symmetric smile    Labs  Recent Results (from the past 24 hour(s))   Comprehensive Metabolic Panel    Collection Time: 07/06/20  3:04 PM   Result Value Ref Range    Glucose 101 (H) 65 - 99 mg/dL    BUN 33 (H) 8 - 23 mg/dL    Creatinine 2.26 (H) 0.76 - 1.27 mg/dL    Sodium 130 (L) 136 - 145 mmol/L    Potassium 4.5 3.5 - 5.2 mmol/L    Chloride 99 98 - 107 mmol/L    CO2 20.3 (L) 22.0 - 29.0 mmol/L    Calcium 9.5 8.6 - 10.5 mg/dL    Total Protein 6.2 6.0 - 8.5 g/dL    Albumin 4.30 3.50 - 5.20 g/dL    ALT (SGPT) 8 1 - 41 U/L    AST (SGOT) 8 1 - 40 U/L    Alkaline Phosphatase 132 (H) 39 - 117 U/L    Total Bilirubin 0.7 0.0 - 1.2 mg/dL    eGFR Non African Amer 29 (L) >60 mL/min/1.73    Globulin 1.9 gm/dL    A/G Ratio 2.3 g/dL    BUN/Creatinine Ratio 14.6 7.0 - 25.0    Anion Gap 10.7 5.0 - 15.0 mmol/L   Lipase    Collection Time: 07/06/20  3:04 PM   Result Value Ref Range    Lipase 34 13 - 60 U/L   Lithium Level    Collection Time: 07/06/20  3:04 PM   Result Value Ref Range    Lithium 2.4 (C) 0.6 - 1.2 mmol/L   CBC Auto Differential    Collection Time: 07/06/20  3:04 PM   Result Value Ref Range    WBC 10.94  (H) 3.40 - 10.80 10*3/mm3    RBC 3.69 (L) 4.14 - 5.80 10*6/mm3    Hemoglobin 10.5 (L) 13.0 - 17.7 g/dL    Hematocrit 31.4 (L) 37.5 - 51.0 %    MCV 85.1 79.0 - 97.0 fL    MCH 28.5 26.6 - 33.0 pg    MCHC 33.4 31.5 - 35.7 g/dL    RDW 15.0 12.3 - 15.4 %    RDW-SD 46.3 37.0 - 54.0 fl    MPV 11.6 6.0 - 12.0 fL    Platelets 199 140 - 450 10*3/mm3    Neutrophil % 85.0 (H) 42.7 - 76.0 %    Lymphocyte % 7.1 (L) 19.6 - 45.3 %    Monocyte % 4.9 (L) 5.0 - 12.0 %    Eosinophil % 2.1 0.3 - 6.2 %    Basophil % 0.3 0.0 - 1.5 %    Immature Grans % 0.6 (H) 0.0 - 0.5 %    Neutrophils, Absolute 9.29 (H) 1.70 - 7.00 10*3/mm3    Lymphocytes, Absolute 0.78 0.70 - 3.10 10*3/mm3    Monocytes, Absolute 0.54 0.10 - 0.90 10*3/mm3    Eosinophils, Absolute 0.23 0.00 - 0.40 10*3/mm3    Basophils, Absolute 0.03 0.00 - 0.20 10*3/mm3    Immature Grans, Absolute 0.07 (H) 0.00 - 0.05 10*3/mm3    nRBC 0.0 0.0 - 0.2 /100 WBC   Urinalysis With Microscopic If Indicated (No Culture) - Urine, Clean Catch    Collection Time: 07/06/20  3:54 PM   Result Value Ref Range    Color, UA Yellow Yellow, Straw    Appearance, UA Clear Clear    pH, UA 6.0 5.0 - 8.0    Specific Gravity, UA 1.009 1.005 - 1.030    Glucose, UA Negative Negative    Ketones, UA Negative Negative    Bilirubin, UA Negative Negative    Blood, UA Negative Negative    Protein, UA Negative Negative    Leuk Esterase, UA Trace (A) Negative    Nitrite, UA Negative Negative    Urobilinogen, UA 0.2 E.U./dL 0.2 - 1.0 E.U./dL   Urinalysis, Microscopic Only - Urine, Clean Catch    Collection Time: 07/06/20  3:54 PM   Result Value Ref Range    RBC, UA 0-2 None Seen, 0-2 /HPF    WBC, UA 0-2 None Seen, 0-2 /HPF    Bacteria, UA None Seen None Seen /HPF    Squamous Epithelial Cells, UA 0-2 None Seen, 0-2 /HPF    Hyaline Casts, UA None Seen None Seen /LPF    Methodology Automated Microscopy    Basic Metabolic Panel    Collection Time: 07/06/20  8:42 PM   Result Value Ref Range    Glucose 91 65 - 99 mg/dL    BUN  29 (H) 8 - 23 mg/dL    Creatinine 1.81 (H) 0.76 - 1.27 mg/dL    Sodium 133 (L) 136 - 145 mmol/L    Potassium 4.5 3.5 - 5.2 mmol/L    Chloride 103 98 - 107 mmol/L    CO2 19.7 (L) 22.0 - 29.0 mmol/L    Calcium 9.6 8.6 - 10.5 mg/dL    eGFR Non African Amer 38 (L) >60 mL/min/1.73    BUN/Creatinine Ratio 16.0 7.0 - 25.0    Anion Gap 10.3 5.0 - 15.0 mmol/L       Radiology  Ct Abdomen Pelvis Without Contrast    Result Date: 7/6/2020  CT ABDOMEN AND PELVIS WITHOUT IV CONTRAST  HISTORY: Generalized abdominal pain for several weeks  TECHNIQUE: Radiation dose reduction techniques were utilized, including automated exposure control and exposure modulation based on body size. 3 mm images were obtained through the abdomen and pelvis without IV contrast.  COMPARISON: None  FINDINGS: Moderate emphysema is present within the lung bases.  The stomach has a diffusely thickened appearance without significant surrounding fat stranding and is grossly unchanged since 2018. There are no findings of small bowel obstruction. The appendix is unremarkable. The liver, gallbladder, pancreas, spleen and adrenal glands have an unremarkable noncontrast CT appearance. Bilateral perinephric stranding is roughly symmetric, a portion of which was seen on 08/28/2018. Cystic density lesion arising from the right kidney likely represents simple cyst. Subcentimeter hypo and hyperdense renal lesions are too small to characterize. There is no hydronephrosis.  No abdominopelvic adenopathy is present by size criteria. Fat density lesion within the hepatic flexure likely represents a lipoma.  There is no free intraperitoneal air. Small amount of free intraperitoneal fluid is present. There are no suspicious lytic or blastic osseous lesions. Anterior wedge compression deformity of the L3 vertebral body results in approximately 40% loss of height with a well corticated osteophyte along its anterior aspect, favored to be chronic.  The bladder is unremarkable for  its degree of distention.      1.  Small amount of nonspecific free intraperitoneal fluid. 2.  Compression deformity at the L3 vertebral body is favored be chronic as above. However, correlation with patient history and point tenderness recommended to exclude an acute etiology. Findings can be further evaluated with MRI if clinically indicated. 3.  Other findings as above.  The above findings were discussed with Samantha Jamison by telephone by Fito Brunson at 4:40 PM on  07/06/2020.        Medical Decision Making:  ED Course as of Jul 06 2350   Mon Jul 06, 2020   1539 0.97 six months ago   Creatinine(!): 2.26 [KA]   1539 Sodium(!): 130 [KA]   1539 Potassium: 4.5 [KA]   1541 Medical Chart Reviewed.  Patient admitted 12/23/2019 to 12/25/2019 due to acute respiratory failure with hypoxia and SWAPNIL and influenza A and COPD exacerbation.  Also noted to have bipolar disorder.  He improved with IV Lasix and steroids.  Had elevation of his creatinine to 1.7 at discharge but patient wanted to go home.  Norvasc lisinopril ACE ECG were discontinued and he was to push fluids and have a BMP recheck the next day.    [KA]   1643 I discussed the CT abdomen with Dr. Brunson, radiologist. It shows a small amount of nonspecific free fluid in the pelvis with no acute findings.   Lithium(!!): 2.4 [KA]   1820 I rechecked the patient, he is resting comfortably.  Abdomen remains soft and very mildly generally tender on reexamination.  I discussed the findings of the acute kidney injury as well as his elevated lithium level with him and unremarkable CT scan.  I have recommended admission for IV hydration and to monitor kidney function he is agreeable with the plan.    [KA]   1843 The patient with Dr. Escobedo including presentation labs and imaging who agrees to admit inpatient to telemetry.    [KA]      ED Course User Index  [KA] Yuliana Jamison PA       I am concerned that this patient's acute kidney injury with elevated lithium levels.  I have  ordered a second liter of IV fluid.  He will need close monitoring of his creatinine as well as lithium levels.    Critical Care  Performed by: Oliverio Jimenez II, MD  Authorized by: Oliverio Jimenez II, MD     Critical care provider statement:     Critical care time (minutes):  30    Critical care was necessary to treat or prevent imminent or life-threatening deterioration of the following conditions:  Toxidrome    Critical care was time spent personally by me on the following activities:  Ordering and performing treatments and interventions, ordering and review of laboratory studies, ordering and review of radiographic studies, pulse oximetry, re-evaluation of patient's condition, obtaining history from patient or surrogate, evaluation of patient's response to treatment, examination of patient and development of treatment plan with patient or surrogate          PPE: Both the patient and I wore a surgical mask throughout the entire patient encounter.     Diagnosis  Final diagnoses:   Acute kidney injury (CMS/HCC)   Generalized abdominal pain   Abnormal lithium level in blood        Oliverio Jimenez II, MD  07/06/20 2533

## 2020-07-07 ENCOUNTER — APPOINTMENT (OUTPATIENT)
Dept: GENERAL RADIOLOGY | Facility: HOSPITAL | Age: 70
End: 2020-07-07

## 2020-07-07 LAB
ANION GAP SERPL CALCULATED.3IONS-SCNC: 10.8 MMOL/L (ref 5–15)
BASOPHILS # BLD AUTO: 0.03 10*3/MM3 (ref 0–0.2)
BASOPHILS NFR BLD AUTO: 0.3 % (ref 0–1.5)
BUN SERPL-MCNC: 25 MG/DL (ref 8–23)
BUN/CREAT SERPL: 17.6 (ref 7–25)
CALCIUM SPEC-SCNC: 10 MG/DL (ref 8.6–10.5)
CHLORIDE SERPL-SCNC: 103 MMOL/L (ref 98–107)
CK SERPL-CCNC: 61 U/L (ref 20–200)
CO2 SERPL-SCNC: 20.2 MMOL/L (ref 22–29)
CREAT SERPL-MCNC: 1.42 MG/DL (ref 0.76–1.27)
DEPRECATED RDW RBC AUTO: 49.4 FL (ref 37–54)
EOSINOPHIL # BLD AUTO: 0.2 10*3/MM3 (ref 0–0.4)
EOSINOPHIL NFR BLD AUTO: 1.8 % (ref 0.3–6.2)
ERYTHROCYTE [DISTWIDTH] IN BLOOD BY AUTOMATED COUNT: 15.4 % (ref 12.3–15.4)
GFR SERPL CREATININE-BSD FRML MDRD: 50 ML/MIN/1.73
GLUCOSE BLDC GLUCOMTR-MCNC: 129 MG/DL (ref 70–130)
GLUCOSE BLDC GLUCOMTR-MCNC: 149 MG/DL (ref 70–130)
GLUCOSE BLDC GLUCOMTR-MCNC: 149 MG/DL (ref 70–130)
GLUCOSE BLDC GLUCOMTR-MCNC: 165 MG/DL (ref 70–130)
GLUCOSE SERPL-MCNC: 103 MG/DL (ref 65–99)
HBA1C MFR BLD: 6 % (ref 4.8–5.6)
HCT VFR BLD AUTO: 32.4 % (ref 37.5–51)
HGB BLD-MCNC: 10.5 G/DL (ref 13–17.7)
IMM GRANULOCYTES # BLD AUTO: 0.05 10*3/MM3 (ref 0–0.05)
IMM GRANULOCYTES NFR BLD AUTO: 0.4 % (ref 0–0.5)
LITHIUM SERPL-SCNC: 2.2 MMOL/L (ref 0.6–1.2)
LYMPHOCYTES # BLD AUTO: 0.79 10*3/MM3 (ref 0.7–3.1)
LYMPHOCYTES NFR BLD AUTO: 7 % (ref 19.6–45.3)
MCH RBC QN AUTO: 28.3 PG (ref 26.6–33)
MCHC RBC AUTO-ENTMCNC: 32.4 G/DL (ref 31.5–35.7)
MCV RBC AUTO: 87.3 FL (ref 79–97)
MONOCYTES # BLD AUTO: 0.95 10*3/MM3 (ref 0.1–0.9)
MONOCYTES NFR BLD AUTO: 8.5 % (ref 5–12)
NEUTROPHILS NFR BLD AUTO: 82 % (ref 42.7–76)
NEUTROPHILS NFR BLD AUTO: 9.22 10*3/MM3 (ref 1.7–7)
NRBC BLD AUTO-RTO: 0 /100 WBC (ref 0–0.2)
PLATELET # BLD AUTO: 223 10*3/MM3 (ref 140–450)
PMV BLD AUTO: 11.7 FL (ref 6–12)
POTASSIUM SERPL-SCNC: 4.2 MMOL/L (ref 3.5–5.2)
RBC # BLD AUTO: 3.71 10*6/MM3 (ref 4.14–5.8)
SODIUM SERPL-SCNC: 134 MMOL/L (ref 136–145)
TSH SERPL DL<=0.05 MIU/L-ACNC: 3.16 UIU/ML (ref 0.27–4.2)
WBC # BLD AUTO: 11.24 10*3/MM3 (ref 3.4–10.8)

## 2020-07-07 PROCEDURE — 94799 UNLISTED PULMONARY SVC/PX: CPT

## 2020-07-07 PROCEDURE — 71045 X-RAY EXAM CHEST 1 VIEW: CPT

## 2020-07-07 PROCEDURE — 84443 ASSAY THYROID STIM HORMONE: CPT | Performed by: NURSE PRACTITIONER

## 2020-07-07 PROCEDURE — 82962 GLUCOSE BLOOD TEST: CPT

## 2020-07-07 PROCEDURE — 99221 1ST HOSP IP/OBS SF/LOW 40: CPT | Performed by: PSYCHIATRY & NEUROLOGY

## 2020-07-07 PROCEDURE — 90791 PSYCH DIAGNOSTIC EVALUATION: CPT

## 2020-07-07 PROCEDURE — 80178 ASSAY OF LITHIUM: CPT | Performed by: NURSE PRACTITIONER

## 2020-07-07 PROCEDURE — 82550 ASSAY OF CK (CPK): CPT | Performed by: INTERNAL MEDICINE

## 2020-07-07 PROCEDURE — 25010000002 ENOXAPARIN PER 10 MG: Performed by: INTERNAL MEDICINE

## 2020-07-07 PROCEDURE — 85025 COMPLETE CBC W/AUTO DIFF WBC: CPT | Performed by: NURSE PRACTITIONER

## 2020-07-07 PROCEDURE — 83036 HEMOGLOBIN GLYCOSYLATED A1C: CPT | Performed by: NURSE PRACTITIONER

## 2020-07-07 PROCEDURE — 94640 AIRWAY INHALATION TREATMENT: CPT

## 2020-07-07 PROCEDURE — 80048 BASIC METABOLIC PNL TOTAL CA: CPT | Performed by: NURSE PRACTITIONER

## 2020-07-07 RX ORDER — LORAZEPAM 1 MG/1
1 TABLET ORAL EVERY 6 HOURS PRN
Status: DISCONTINUED | OUTPATIENT
Start: 2020-07-07 | End: 2020-07-13 | Stop reason: HOSPADM

## 2020-07-07 RX ORDER — QUETIAPINE FUMARATE 100 MG/1
100 TABLET, FILM COATED ORAL NIGHTLY
Status: DISCONTINUED | OUTPATIENT
Start: 2020-07-07 | End: 2020-07-07

## 2020-07-07 RX ORDER — TAMSULOSIN HYDROCHLORIDE 0.4 MG/1
0.4 CAPSULE ORAL NIGHTLY
Status: DISCONTINUED | OUTPATIENT
Start: 2020-07-07 | End: 2020-07-13 | Stop reason: HOSPADM

## 2020-07-07 RX ORDER — BISACODYL 5 MG/1
10 TABLET, DELAYED RELEASE ORAL DAILY PRN
Status: DISCONTINUED | OUTPATIENT
Start: 2020-07-07 | End: 2020-07-13 | Stop reason: HOSPADM

## 2020-07-07 RX ORDER — PRIMIDONE 50 MG/1
50 TABLET ORAL EVERY 12 HOURS SCHEDULED
Status: DISCONTINUED | OUTPATIENT
Start: 2020-07-07 | End: 2020-07-13 | Stop reason: HOSPADM

## 2020-07-07 RX ORDER — CHOLECALCIFEROL (VITAMIN D3) 125 MCG
1000 CAPSULE ORAL DAILY
Status: DISCONTINUED | OUTPATIENT
Start: 2020-07-07 | End: 2020-07-13 | Stop reason: HOSPADM

## 2020-07-07 RX ORDER — CARVEDILOL 12.5 MG/1
12.5 TABLET ORAL 2 TIMES DAILY WITH MEALS
Status: DISCONTINUED | OUTPATIENT
Start: 2020-07-07 | End: 2020-07-13 | Stop reason: HOSPADM

## 2020-07-07 RX ORDER — TAMSULOSIN HYDROCHLORIDE 0.4 MG/1
0.4 CAPSULE ORAL DAILY
Status: DISCONTINUED | OUTPATIENT
Start: 2020-07-07 | End: 2020-07-07 | Stop reason: SDUPTHER

## 2020-07-07 RX ORDER — AMLODIPINE BESYLATE 10 MG/1
10 TABLET ORAL
Status: DISCONTINUED | OUTPATIENT
Start: 2020-07-07 | End: 2020-07-13 | Stop reason: HOSPADM

## 2020-07-07 RX ORDER — BUDESONIDE 0.5 MG/2ML
0.5 INHALANT ORAL
Status: DISCONTINUED | OUTPATIENT
Start: 2020-07-07 | End: 2020-07-13 | Stop reason: HOSPADM

## 2020-07-07 RX ORDER — DOCUSATE SODIUM 100 MG/1
100 CAPSULE, LIQUID FILLED ORAL 2 TIMES DAILY
Status: DISCONTINUED | OUTPATIENT
Start: 2020-07-07 | End: 2020-07-13 | Stop reason: HOSPADM

## 2020-07-07 RX ORDER — AMLODIPINE BESYLATE 10 MG/1
10 TABLET ORAL DAILY
Status: DISCONTINUED | OUTPATIENT
Start: 2020-07-07 | End: 2020-07-07 | Stop reason: SDUPTHER

## 2020-07-07 RX ORDER — ATORVASTATIN CALCIUM 80 MG/1
80 TABLET, FILM COATED ORAL DAILY
Status: DISCONTINUED | OUTPATIENT
Start: 2020-07-07 | End: 2020-07-13 | Stop reason: HOSPADM

## 2020-07-07 RX ORDER — PANTOPRAZOLE SODIUM 40 MG/1
40 TABLET, DELAYED RELEASE ORAL EVERY MORNING
Status: DISCONTINUED | OUTPATIENT
Start: 2020-07-07 | End: 2020-07-07 | Stop reason: SDUPTHER

## 2020-07-07 RX ORDER — ASPIRIN 81 MG/1
81 TABLET ORAL DAILY
Status: DISCONTINUED | OUTPATIENT
Start: 2020-07-07 | End: 2020-07-13 | Stop reason: HOSPADM

## 2020-07-07 RX ORDER — ASPIRIN 81 MG/1
81 TABLET ORAL DAILY
Status: DISCONTINUED | OUTPATIENT
Start: 2020-07-07 | End: 2020-07-07 | Stop reason: SDUPTHER

## 2020-07-07 RX ORDER — FLUOXETINE HYDROCHLORIDE 20 MG/1
60 CAPSULE ORAL DAILY
Status: DISCONTINUED | OUTPATIENT
Start: 2020-07-07 | End: 2020-07-07

## 2020-07-07 RX ORDER — PRIMIDONE 50 MG/1
50 TABLET ORAL NIGHTLY
Status: DISCONTINUED | OUTPATIENT
Start: 2020-07-07 | End: 2020-07-07

## 2020-07-07 RX ORDER — PANTOPRAZOLE SODIUM 40 MG/1
40 TABLET, DELAYED RELEASE ORAL
Status: DISCONTINUED | OUTPATIENT
Start: 2020-07-07 | End: 2020-07-13 | Stop reason: HOSPADM

## 2020-07-07 RX ORDER — MELATONIN
1000 DAILY
Status: DISCONTINUED | OUTPATIENT
Start: 2020-07-07 | End: 2020-07-13 | Stop reason: HOSPADM

## 2020-07-07 RX ORDER — IPRATROPIUM BROMIDE AND ALBUTEROL SULFATE 2.5; .5 MG/3ML; MG/3ML
3 SOLUTION RESPIRATORY (INHALATION)
Status: DISCONTINUED | OUTPATIENT
Start: 2020-07-07 | End: 2020-07-13 | Stop reason: HOSPADM

## 2020-07-07 RX ORDER — CARVEDILOL 25 MG/1
25 TABLET ORAL EVERY 12 HOURS SCHEDULED
Status: DISCONTINUED | OUTPATIENT
Start: 2020-07-07 | End: 2020-07-07

## 2020-07-07 RX ORDER — POLYETHYLENE GLYCOL 3350 17 G/17G
17 POWDER, FOR SOLUTION ORAL DAILY PRN
Status: DISCONTINUED | OUTPATIENT
Start: 2020-07-07 | End: 2020-07-13 | Stop reason: HOSPADM

## 2020-07-07 RX ADMIN — IPRATROPIUM BROMIDE AND ALBUTEROL SULFATE 3 ML: 2.5; .5 SOLUTION RESPIRATORY (INHALATION) at 12:23

## 2020-07-07 RX ADMIN — ASPIRIN 81 MG: 81 TABLET, COATED ORAL at 11:20

## 2020-07-07 RX ADMIN — IPRATROPIUM BROMIDE AND ALBUTEROL SULFATE 3 ML: 2.5; .5 SOLUTION RESPIRATORY (INHALATION) at 06:49

## 2020-07-07 RX ADMIN — CARVEDILOL 12.5 MG: 12.5 TABLET, FILM COATED ORAL at 11:20

## 2020-07-07 RX ADMIN — SODIUM CHLORIDE 125 ML/HR: 9 INJECTION, SOLUTION INTRAVENOUS at 02:30

## 2020-07-07 RX ADMIN — VITAMIN D, TAB 1000IU (100/BT) 1000 UNITS: 25 TAB at 11:20

## 2020-07-07 RX ADMIN — BUDESONIDE 0.5 MG: 0.5 INHALANT RESPIRATORY (INHALATION) at 21:03

## 2020-07-07 RX ADMIN — BUDESONIDE 0.5 MG: 0.5 INHALANT RESPIRATORY (INHALATION) at 06:49

## 2020-07-07 RX ADMIN — CARVEDILOL 12.5 MG: 12.5 TABLET, FILM COATED ORAL at 17:21

## 2020-07-07 RX ADMIN — ATORVASTATIN CALCIUM 80 MG: 80 TABLET, FILM COATED ORAL at 11:21

## 2020-07-07 RX ADMIN — ACETAMINOPHEN 650 MG: 325 TABLET, FILM COATED ORAL at 20:00

## 2020-07-07 RX ADMIN — PRIMIDONE 50 MG: 50 TABLET ORAL at 20:00

## 2020-07-07 RX ADMIN — DOCUSATE SODIUM 100 MG: 100 CAPSULE ORAL at 20:00

## 2020-07-07 RX ADMIN — PRIMIDONE 50 MG: 50 TABLET ORAL at 11:20

## 2020-07-07 RX ADMIN — AMLODIPINE BESYLATE 10 MG: 10 TABLET ORAL at 11:30

## 2020-07-07 RX ADMIN — SODIUM CHLORIDE, PRESERVATIVE FREE 10 ML: 5 INJECTION INTRAVENOUS at 11:19

## 2020-07-07 RX ADMIN — IPRATROPIUM BROMIDE AND ALBUTEROL SULFATE 3 ML: 2.5; .5 SOLUTION RESPIRATORY (INHALATION) at 21:03

## 2020-07-07 RX ADMIN — Medication 400 MG: at 11:20

## 2020-07-07 RX ADMIN — DOCUSATE SODIUM 100 MG: 100 CAPSULE ORAL at 11:20

## 2020-07-07 RX ADMIN — PANTOPRAZOLE SODIUM 40 MG: 40 TABLET, DELAYED RELEASE ORAL at 06:07

## 2020-07-07 RX ADMIN — IPRATROPIUM BROMIDE AND ALBUTEROL SULFATE 3 ML: 2.5; .5 SOLUTION RESPIRATORY (INHALATION) at 16:09

## 2020-07-07 RX ADMIN — Medication 1000 MCG: at 11:20

## 2020-07-07 RX ADMIN — SODIUM CHLORIDE, PRESERVATIVE FREE 10 ML: 5 INJECTION INTRAVENOUS at 02:34

## 2020-07-07 RX ADMIN — ENOXAPARIN SODIUM 40 MG: 40 INJECTION SUBCUTANEOUS at 11:19

## 2020-07-07 NOTE — PLAN OF CARE
Problem: Patient Care Overview  Goal: Plan of Care Review  7/7/2020 1626 by yRlee Austin RN  Outcome: Ongoing (interventions implemented as appropriate)  Flowsheets (Taken 7/7/2020 1626)  Outcome Summary: VSS,96%O2 on 3L. Assist X1 to restroom, No c/o pain or distress. will continue to monitor  7/7/2020 0940 by Rylee Austin RN  Outcome: Ongoing (interventions implemented as appropriate)  Goal: Individualization and Mutuality  7/7/2020 1626 by Rylee Austin RN  Outcome: Ongoing (interventions implemented as appropriate)  7/7/2020 0940 by Rylee Austin RN  Outcome: Ongoing (interventions implemented as appropriate)  Goal: Discharge Needs Assessment  7/7/2020 1626 by Rylee Austin RN  Outcome: Ongoing (interventions implemented as appropriate)  7/7/2020 0940 by Rylee Austin RN  Outcome: Ongoing (interventions implemented as appropriate)  Goal: Interprofessional Rounds/Family Conf  7/7/2020 1626 by Rylee Austin RN  Outcome: Ongoing (interventions implemented as appropriate)  7/7/2020 0940 by Rylee Austin RN  Outcome: Ongoing (interventions implemented as appropriate)     Problem: Fall Risk (Adult)  Goal: Identify Related Risk Factors and Signs and Symptoms  7/7/2020 1626 by Rylee Austin RN  Outcome: Ongoing (interventions implemented as appropriate)  7/7/2020 0940 by Rylee Austin RN  Outcome: Ongoing (interventions implemented as appropriate)  Goal: Absence of Fall  7/7/2020 1626 by Rylee Austin RN  Outcome: Ongoing (interventions implemented as appropriate)  7/7/2020 0940 by Rylee Austin RN  Outcome: Ongoing (interventions implemented as appropriate)

## 2020-07-07 NOTE — PROGRESS NOTES
Access Ctr Note.     Chart reviewed. 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 Input is requested about medication options for Pt's condition as a result of the above. In that case, a consult for the Psychiatrist is necessary.     Spoke with RNBessie about need for Psychiatry consult order.    Attempted to see Pt later this afternoon and he was sleeping. Will return for consult.

## 2020-07-07 NOTE — CONSULTS
Neurology Consult Note    Consult Date: 7/7/2020    Referring MD: Karlo Escobedo MD    Reason for Consult I have been asked to see the patient in neurological consultation to render advice and opinion regarding tremor    Felicita Mike is a 69 y.o. male who has been admitted to the hospital for multiple medical problems but nothing specific as per H&P.  Patient was complaining of abdominal pain with constipation, he also complained of tremor which is more than usual since he has a previous diagnosis of essential tremor.  Patient found to have toxic lithium level of 2.4.  He is a known case of bipolar.  A consult has been forwarded to me regard to this increase the tremor.  Unfortunately the patient was confused, he is inattentive, it was very difficult to take any further information from him regarding his current or previous encounter.  There is no other family member to take further history from.  As far as I gather that the patient is a single paul living by himself with his dog.  He was usually able to look after himself.  Recently found by the neighbor lying on the driveway and unable to stand up.  911 has been called and patient brought to the ER then admitted for further evaluation and management.  No further information available apart from the past medical history which reflect multiple chronic medical problems including coronary artery disease, hypertension, bipolar, COPD, sleep apnea, diabetes mellitus, GERD, as well as a peripheral vascular disease.  No further information available.      Past Medical History:   Diagnosis Date   • Abnormal PSA    • Acute myocardial infarction (CMS/HCC) 2004    Stents   • Apnea, sleep    • Atherosclerotic heart disease    • Chronic pain     Chronic low back paoin, MRI 12/11,  L3 compression deformity, L2 degenerative disc disease and L5-S1 degenerative disc disease.   • Colon polyp    • Constipation    • COPD (chronic obstructive pulmonary disease) (CMS/HCC)    •  Coronary artery disease    • Depressed bipolar II disorder (CMS/[facility])    • Diabetes mellitus (CMS/[facility])    • Essential tremor    • Fatigue    • GERD (gastroesophageal reflux disease)    • H/O transfusion of packed red blood cells    • Health care maintenance    • Hearing loss    • History of back pain    • Hypercholesterolemia    • Hyperlipidemia    • Hypertension    • Leukocytosis    • Osteoarthritis    • Proteinuria    • PVD (peripheral vascular disease) (CMS/[facility])    • Sebaceous cyst    • Seborrheic dermatitis    • Sleep apnea    • Tinea corporis    • Tobacco use    • Tremor        ROS: 10 systems checked and nothing pertinent to his current condition apart from the bipolar and the lithium treatment.  No fevers, chills  No weakness, numbness    Exam  /92 (BP Location: Left arm, Patient Position: Sitting)   Pulse 70   Temp 98.2 °F (36.8 °C) (Oral)   Resp 24   Wt 81.6 kg (180 lb)   SpO2 97%   BMI 26.66 kg/m²   Gen: NAD, vitals reviewed  MS: Alert, oriented x2, comprehending following simple command and attentive.    CN: Are grossly intact from 2-12.    Pupils: Normal in size equal round and reactive to light.    Visual fields: Normal on both eyes.    Motor system: Showed bilateral both upper and lower extremity as well as the trunk and face myoclonus jerking.  Minor tremor.  Hyperreflexia on both knee and ankle as well as upper extremity.  Plantar reflexes were downgoing.    Sensory examination: Is intact in terms of touch and temperature.    Coordination is clumsy all over both upper and lower extremity.    Gait was very difficult to as the patient to stand up and walk with a tremor, myoclonic jerking as well as the confusion.        DATA:    Lab Results   Component Value Date    GLUCOSE 103 (H) 07/07/2020    CALCIUM 10.0 07/07/2020     (L) 07/07/2020    K 4.2 07/07/2020    CO2 20.2 (L) 07/07/2020     07/07/2020    BUN 25 (H) 07/07/2020    CREATININE 1.42 (H) 07/07/2020    EGFRIFAFRI 92  12/05/2019    EGFRIFNONA 50 (L) 07/07/2020    BCR 17.6 07/07/2020    ANIONGAP 10.8 07/07/2020     Lab Results   Component Value Date    WBC 11.24 (H) 07/07/2020    HGB 10.5 (L) 07/07/2020    HCT 32.4 (L) 07/07/2020    MCV 87.3 07/07/2020     07/07/2020       Lab review: Current lab test showed borderline increase in his blood sugar, creatinine, white cell count but nothing significant the rest were within normal limits.    Imaging review: No new imaging done on him in this admission.    Diagnoses:  Given the above history, physical examination and lab test patient current tremor is myoclonic jerking rather than essential tremor.  That is most likely secondary to the lithium toxicity and irritability of the central nervous system.  Patient may benefit from Klonopin if the myoclonus is so severe and interferes with patient daily activity.  Improve renal function for further elimination of the lithium.        PLAN:   -He may benefit from benzodiazepine, Klonopin if the myoclonic jerks became too severe.  2-correct the underlying metabolic derangement and enhance lithium excretion.  3-continue with other medication and care.    I will follow-up on the case and keep you updated thank you for the consult please do not hesitate if you have any further question.

## 2020-07-07 NOTE — PROGRESS NOTES
Discharge Planning Assessment  [facility]     Patient Name: Felicita Mike  MRN: [id number]  Today's Date: 7/7/2020    Admit Date: 7/6/2020    Discharge Needs Assessment     Row Name 07/07/20 1416       Living Environment    Lives With  alone    Current Living Arrangements  home/apartment/condo    Primary Care Provided by  self    Provides Primary Care For  no one, unable/limited ability to care for self    Family Caregiver if Needed  child(jonnathan), adult    Family Caregiver Names  Reina Farfan [phone]    Quality of Family Relationships  helpful;involved    Able to Return to Prior Arrangements  yes       Resource/Environmental Concerns    Resource/Environmental Concerns  none    Transportation Concerns  car, none       Transition Planning    Patient/Family Anticipates Transition to  home    Patient/Family Anticipated Services at Transition  none    Transportation Anticipated  family or friend will provide       Discharge Needs Assessment    Readmission Within the Last 30 Days  no previous admission in last 30 days    Concerns to be Addressed  denies needs/concerns at this time    Equipment Currently Used at Home  bipap/cpap;oxygen;nebulizer    Anticipated Changes Related to Illness  none    Equipment Needed After Discharge  none        Discharge Plan     Row Name 07/07/20 1417       Plan    Plan  Home, denies needs    Patient/Family in Agreement with Plan  yes    Plan Comments  Met with patient at the bedside. Explained CCP role and verified facesheet. Patient lives at home alone. He has home oxygen from [employer], nebulizer, and a cpap. He normally wears 3.5L NC. He has used [facility] in the past but denies SNF use. Patient plans to return home and denies havign any needs. He stated reina Farfan will provide transportation home. CCP to follow for needs. Yamel Rodriguez RN               Demographic Summary     Row Name 07/07/20 1419       General Information    Admission Type  inpatient    Arrived From  emergency  department    Referral Source  admission list    Reason for Consult  discharge planning    Preferred Language  English       Contact Information    Permission Granted to Share Info With  family/designee        Functional Status     Row Name 07/07/20 1415       Functional Status    Usual Activity Tolerance  fair    Current Activity Tolerance  fair       Functional Status, IADL    Medications  independent    Meal Preparation  independent    Housekeeping  independent    Laundry  independent    Shopping  independent       Mental Status    General Appearance WDL  ex;appearance    General Appearance  unkempt;unshaven       Mental Status Summary    Recent Changes in Mental Status/Cognitive Functioning  no changes        Psychosocial     Row Name 07/07/20 1416       Speech WDL    Speech WDL  ex;speech    Speech  response lag       Coping/Stress    Major Change/Loss/Stressor  none    Sources of Support  adult child(jonnathan)    Understanding of Condition and Treatment  partial understanding of treatment;partial understanding of medical condition       Developmental Stage (Eriksson's)    Developmental Stage  Stage 8 (67 years-death/Late Adulthood) Integrity vs. Despair        Abuse/Neglect     Row Name 07/07/20 1416       Personal Safety    Feels Unsafe at Home or Work/School  no    Feels Threatened by Someone  no    Does Anyone Try to Keep You From Having Contact with Others or Doing Things Outside Your Home?  no    Physical Signs of Abuse Present  no                Yamel Rodriguez, DIRK

## 2020-07-07 NOTE — H&P
Patient Name:  Felicita Mike  YOB: 1950  MRN:  [id number]  Admit Date:  7/6/2020  Patient Care Team:  Alexandrea Jimenez PA-C as PCP - General (Family Medicine)  Alexandrea Jimenez PA-C as PCP - Claims Attributed  Ivette Franco MD as Consulting Physician (Gastroenterology)  Suleiman Anne MD as Consulting Physician (Cardiology)  Brennan Martinez MD as Consulting Physician (Urology)  Juanito Perdue Jr., MD as Consulting Physician (Hematology and Oncology)  Alexandrea Jimenez PA-C as Referring Physician (Family Medicine)  Suleiman Anne MD as Consulting Physician (Cardiology)      Subjective   History Present Illness     Chief Complaint   Patient presents with   • Fall   • Tremors   • Abdominal Pain     History of Present Illness   Mr. Mike is a 69-year-old male with history of coronary artery disease, bipolar disorder, COPD, type 2 diabetes, GERD, hyperlipidemia, peripheral vascular disease, sleep apnea, on chronic oxygen at home, iron deficiency anemia, essential tremor who presents to the emergency room with generalized weakness and a mechanical fall today.  Patient states his been having some abdominal pain for the past 5 to 6 weeks, and is gradually worsening, but it comes and goes is not been severe, he did have some associated constipation, which has resolved and he states his abdominal pain feels better.  He denies any fever or chills, no nausea or vomiting.  He has had a normal appetite, eating and drinking normally.  He denies any blood in his stool, no blood in his urine, no trouble with urination.  Patient states he actually called EMS today because he was carrying some groceries his family left forearm and fell and could not get back up, so decided to come to the emergency room after the fall and to check on the abdominal pain.  He denies any loss of consciousness or head injury.  He states he just tripped over the step and fell, and he does have essential tremors which made it  difficult for him to get up.  He states his tremors have been worse over the last few days.  In the emergency room patient was found to have sodium of 130, repeat in the ED was 133, potassium 4.5, creatinine initially 2.26, repeat 1.81 after bolus of fluids, BUN initially 30 3 repeat was 29.  White blood cell count 10.9, hemoglobin 10.5, hematocrit 31.4.  Lithium level is elevated at 2.4.  Patient states has been taking his medication as directed.  CT scan his abdomen shows small amount of nonspecific free intraperitoneal fluid.  Compression deformity at the L3 vertebral body is favored to be chronic, patient does not complain of any pain, and does not have tenderness on palpation.    Review of Systems   Constitutional: Positive for fatigue. Negative for appetite change and fever.   HENT: Negative for nosebleeds and trouble swallowing.    Eyes: Negative for photophobia, redness and visual disturbance.   Respiratory: Negative for cough, chest tightness, shortness of breath and wheezing.         On O2 3.5 L at home, no other changes in resp status   Cardiovascular: Negative for chest pain, palpitations and leg swelling.   Gastrointestinal: Positive for abdominal pain (comes and goies, currently not having any pain) and constipation. Negative for abdominal distention, nausea and vomiting.   Endocrine: Negative.    Genitourinary: Negative.    Musculoskeletal: Negative for gait problem and joint swelling.   Skin: Negative.    Neurological: Positive for tremors (baseline but slightly worse over the last few days) and weakness. Negative for dizziness, seizures, speech difficulty, light-headedness and headaches.   Hematological: Negative.    Psychiatric/Behavioral: Negative for behavioral problems and confusion.        Personal History     Past Medical History:   Diagnosis Date   • Abnormal PSA    • Acute myocardial infarction (CMS/HCC) 2004    Stents   • Apnea, sleep    • Atherosclerotic heart disease    • Chronic pain      Chronic low back paoin, MRI 12/11,  L3 compression deformity, L2 degenerative disc disease and L5-S1 degenerative disc disease.   • Colon polyp    • Constipation    • COPD (chronic obstructive pulmonary disease) (CMS/HCC)    • Coronary artery disease    • Depressed bipolar II disorder (CMS/HCC)    • Diabetes mellitus (CMS/HCC)    • Essential tremor    • Fatigue    • GERD (gastroesophageal reflux disease)    • H/O transfusion of packed red blood cells    • Health care maintenance    • Hearing loss    • History of back pain    • Hypercholesterolemia    • Hyperlipidemia    • Hypertension    • Leukocytosis    • Osteoarthritis    • Proteinuria    • PVD (peripheral vascular disease) (CMS/HCC)    • Sebaceous cyst    • Seborrheic dermatitis    • Sleep apnea    • Tinea corporis    • Tobacco use    • Tremor      Past Surgical History:   Procedure Laterality Date   • ANGIOPLASTY      Cath Stent Placement   • COLONOSCOPY  01/22/2014    ta polyps   • COLONOSCOPY N/A 11/29/2016    Procedure: COLONOSCOPY TO CECUM AND TERMINAL ILEUM WITH HOT POLYPECTOMIES;  Surgeon: Ivette Franco MD;  Location: [facility] ENDOSCOPY;  Service:    • COLONOSCOPY N/A 12/16/2019    Procedure: COLONOSCOPY to cecum with hot snare, cold biopsy polypectomies with clip placement x2;  Surgeon: Ivette Franco MD;  Location: [facility] ENDOSCOPY;  Service: Gastroenterology   • CORONARY STENT PLACEMENT  2004   • ENDOSCOPY N/A 12/16/2019    Procedure: ESOPHAGOGASTRODUODENOSCOPY with biopsies;  Surgeon: Ivette Franco MD;  Location: [facility] ENDOSCOPY;  Service: Gastroenterology   • EYE SURGERY      Cataract Surgery   • HAND SURGERY       Family History   Problem Relation Age of Onset   • Cancer Mother    • Diabetes Mother    • Hypertension Mother    • Colon cancer Mother    • Heart disease Father    • Cancer Brother    • Stroke Brother      Social History     Tobacco Use   • Smoking status: Current Every Day Smoker     Packs/day: 1.00     Years: 47.00     Pack  years: 47.00     Types: Cigarettes   • Smokeless tobacco: Never Used   • Tobacco comment: NO CAFFEINE USE   50 years 1 ppd    Substance Use Topics   • Alcohol use: Yes     Comment: very seldom   • Drug use: No     No current facility-administered medications on file prior to encounter.      Current Outpatient Medications on File Prior to Encounter   Medication Sig Dispense Refill   • acetaminophen (TYLENOL) 325 MG tablet Take 2 tablets by mouth Every 6 (Six) Hours As Needed for Mild Pain .     • albuterol (PROVENTIL) (5 MG/ML) 0.5% nebulizer solution Take 2.5 mg by nebulization Every 6 (Six) Hours As Needed for Wheezing.     • amLODIPine (NORVASC) 10 MG tablet Take 1 tablet by mouth Daily. For BP 90 tablet 1   • aspirin 81 MG tablet Take 1 tablet by mouth Daily.     • atorvastatin (Lipitor) 80 MG tablet Take 1 tablet by mouth Daily. For cholesterol 90 tablet 3   • carvedilol (COREG) 25 MG tablet Take 0.5 tablets by mouth 2 (Two) Times a Day With Meals. For heart 180 tablet 3   • cholecalciferol (VITAMIN D3) 1000 UNITS tablet Take 1 tablet by mouth 2 (two) times a day.     • Cyanocobalamin (B-12) 1000 MCG capsule Take 1 tablet by mouth Daily.     • FLUoxetine (PROzac) 20 MG capsule Take 3 capsules by mouth Daily. For anxiety and depression 270 capsule 1   • ipratropium-albuterol (DUO-NEB) 0.5-2.5 mg/3 ml nebulizer Take 3 mL by nebulization 4 (Four) Times a Day. 360 mL 1   • lisinopril (PRINIVIL,ZESTRIL) 20 MG tablet Take 1 tablet by mouth Daily. For BP and heart 90 tablet 1   • lithium carbonate 150 MG capsule One PO in AM and 2 PO in evening for mood 270 capsule 3   • MAGNESIUM-OXIDE 400 (241.3 Mg) MG tablet tablet TAKE 1 TABLET BY MOUTH DAILY 90 tablet 1   • metFORMIN (GLUCOPHAGE) 1000 MG tablet Take 1 tablet by mouth 2 (Two) Times a Day. For DMII 180 tablet 1   • omeprazole (PrilOSEC) 20 MG capsule Take 1 capsule by mouth Daily. 30 capsule 5   • polyethylene glycol (MIRALAX) packet Take 17 g by mouth Daily As  Needed.     • primidone (MYSOLINE) 50 MG tablet Take 1 tablet by mouth Every Night. 90 tablet 2   • promethazine (PHENERGAN) 25 MG tablet Take 1 tablet by mouth Every 8 (Eight) Hours As Needed for Nausea. 180 tablet 3   • QUEtiapine (SEROquel) 100 MG tablet Take 1 tablet by mouth Every Night. For sleep 90 tablet 3   • tamsulosin (FLOMAX) 0.4 MG capsule 24 hr capsule Take 1 capsule by mouth Daily. For urine flow 90 capsule 3   • wheat dextrin (BENEFIBER ON THE GO) powder powder Take 1 each by mouth Daily As Needed.       Allergies   Allergen Reactions   • Clopidogrel Itching       Objective    Objective     Vital Signs  Temp:  [98.2 °F (36.8 °C)] 98.2 °F (36.8 °C)  Heart Rate:  [68-72] 71  Resp:  [16-19] 19  BP: (107-148)/(62-90) 132/90  SpO2:  [94 %-98 %] 97 %  on  Flow (L/min):  [3.5] 3.5;   Device (Oxygen Therapy): room air  Body mass index is 26.66 kg/m².    Physical Exam   Constitutional: He is oriented to person, place, and time. He appears well-developed and well-nourished. No distress.   HENT:   Head: Normocephalic.   Eyes: EOM are normal.   Neck: Normal range of motion. No JVD present.   Cardiovascular: Normal rate, regular rhythm and normal heart sounds.   Patient appears to be normal sinus rhythm on the monitor with heart rate 86 during my exam, no acute distress or chest pain noted.   Pulmonary/Chest: Effort normal. He has decreased breath sounds in the right lower field and the left lower field. He has wheezes in the right upper field and the left upper field.   Patient on 3-1/2 L oxygen nasal cannula which is his home dose, his sats are 95% during my exam, he is in no acute distress.  He does have some diminished breath sounds bilateral lower lobes and some wheezing in bilateral upper lobes.   Abdominal: Soft. He exhibits no distension. There is no tenderness.   Musculoskeletal: Normal range of motion.   Neurological: He is alert and oriented to person, place, and time. He has normal strength.   Patient  has no focal neuro deficits, normal strength.  He does have some obvious essential tremors of all 4 extremities, worse with movement, but he does have resting tremors in all extremities.   Skin: Skin is warm and dry. Capillary refill takes less than 2 seconds.   Psychiatric: He has a normal mood and affect. His speech is normal and behavior is normal. Judgment and thought content normal. Cognition and memory are normal.   Patient very calm and cooperative at this time, no changes in speech pattern, no behavioral issues.   Nursing note and vitals reviewed.      Results Review:  I reviewed the patient's new clinical results.  I reviewed the patient's new imaging results and agree with the interpretation.  I reviewed the patient's other test results and agree with the interpretation  I personally viewed and interpreted the patient's EKG/Telemetry data  Discussed with ED provider.    Lab Results (last 24 hours)     Procedure Component Value Units Date/Time    CBC & Differential [[id number]] Collected:  07/06/20 1504    Specimen:  Blood Updated:  07/06/20 1517    Narrative:       The following orders were created for panel order CBC & Differential.  Procedure                               Abnormality         Status                     ---------                               -----------         ------                     CBC Auto Differential[[id number]]        Abnormal            Final result                 Please view results for these tests on the individual orders.    Comprehensive Metabolic Panel [[id number]]  (Abnormal) Collected:  07/06/20 1504    Specimen:  Blood Updated:  07/06/20 1534     Glucose 101 mg/dL      BUN 33 mg/dL      Creatinine 2.26 mg/dL      Sodium 130 mmol/L      Potassium 4.5 mmol/L      Chloride 99 mmol/L      CO2 20.3 mmol/L      Calcium 9.5 mg/dL      Total Protein 6.2 g/dL      Albumin 4.30 g/dL      ALT (SGPT) 8 U/L      AST (SGOT) 8 U/L      Alkaline Phosphatase 132 U/L      Total Bilirubin  0.7 mg/dL      eGFR Non African Amer 29 mL/min/1.73      Globulin 1.9 gm/dL      A/G Ratio 2.3 g/dL      BUN/Creatinine Ratio 14.6     Anion Gap 10.7 mmol/L     Narrative:       GFR Normal >60  Chronic Kidney Disease <60  Kidney Failure <15      Lipase [[id number]]  (Normal) Collected:  07/06/20 1504    Specimen:  Blood Updated:  07/06/20 1534     Lipase 34 U/L     Lithium Level [[id number]]  (Abnormal) Collected:  07/06/20 1504    Specimen:  Blood Updated:  07/06/20 1543     Lithium 2.4 mmol/L     CBC Auto Differential [[id number]]  (Abnormal) Collected:  07/06/20 1504    Specimen:  Blood Updated:  07/06/20 1517     WBC 10.94 10*3/mm3      RBC 3.69 10*6/mm3      Hemoglobin 10.5 g/dL      Hematocrit 31.4 %      MCV 85.1 fL      MCH 28.5 pg      MCHC 33.4 g/dL      RDW 15.0 %      RDW-SD 46.3 fl      MPV 11.6 fL      Platelets 199 10*3/mm3      Neutrophil % 85.0 %      Lymphocyte % 7.1 %      Monocyte % 4.9 %      Eosinophil % 2.1 %      Basophil % 0.3 %      Immature Grans % 0.6 %      Neutrophils, Absolute 9.29 10*3/mm3      Lymphocytes, Absolute 0.78 10*3/mm3      Monocytes, Absolute 0.54 10*3/mm3      Eosinophils, Absolute 0.23 10*3/mm3      Basophils, Absolute 0.03 10*3/mm3      Immature Grans, Absolute 0.07 10*3/mm3      nRBC 0.0 /100 WBC     Urinalysis With Microscopic If Indicated (No Culture) - Urine, Clean Catch [[id number]]  (Abnormal) Collected:  07/06/20 1554    Specimen:  Urine, Clean Catch Updated:  07/06/20 1613     Color, UA Yellow     Appearance, UA Clear     pH, UA 6.0     Specific Gravity, UA 1.009     Glucose, UA Negative     Ketones, UA Negative     Bilirubin, UA Negative     Blood, UA Negative     Protein, UA Negative     Leuk Esterase, UA Trace     Nitrite, UA Negative     Urobilinogen, UA 0.2 E.U./dL    Urinalysis, Microscopic Only - Urine, Clean Catch [[id number]] Collected:  07/06/20 1554    Specimen:  Urine, Clean Catch Updated:  07/06/20 1613     RBC, UA 0-2 /HPF      WBC, UA 0-2 /HPF       Bacteria, UA None Seen /HPF      Squamous Epithelial Cells, UA 0-2 /HPF      Hyaline Casts, UA None Seen /LPF      Methodology Automated Microscopy    Basic Metabolic Panel [[id number]]  (Abnormal) Collected:  07/06/20 2042    Specimen:  Blood Updated:  07/06/20 2111     Glucose 91 mg/dL      BUN 29 mg/dL      Creatinine 1.81 mg/dL      Sodium 133 mmol/L      Potassium 4.5 mmol/L      Chloride 103 mmol/L      CO2 19.7 mmol/L      Calcium 9.6 mg/dL      eGFR Non African Amer 38 mL/min/1.73      BUN/Creatinine Ratio 16.0     Anion Gap 10.3 mmol/L     Narrative:       GFR Normal >60  Chronic Kidney Disease <60  Kidney Failure <15            Imaging Results (Last 24 Hours)     Procedure Component Value Units Date/Time    CT Abdomen Pelvis Without Contrast [[id number]] Collected:  07/06/20 1758     Updated:  07/06/20 1758    Narrative:       CT ABDOMEN AND PELVIS WITHOUT IV CONTRAST     HISTORY: Generalized abdominal pain for several weeks     TECHNIQUE: Radiation dose reduction techniques were utilized, including  automated exposure control and exposure modulation based on body size.   3 mm images were obtained through the abdomen and pelvis without IV  contrast.      COMPARISON: None     FINDINGS:  Moderate emphysema is present within the lung bases.     The stomach has a diffusely thickened appearance without significant  surrounding fat stranding and is grossly unchanged since 2018. There are  no findings of small bowel obstruction. The appendix is unremarkable.  The liver, gallbladder, pancreas, spleen and adrenal glands have an  unremarkable noncontrast CT appearance. Bilateral perinephric stranding  is roughly symmetric, a portion of which was seen on 08/28/2018. Cystic  density lesion arising from the right kidney likely represents simple  cyst. Subcentimeter hypo and hyperdense renal lesions are too small to  characterize. There is no hydronephrosis.     No abdominopelvic adenopathy is present by size criteria.  Fat density  lesion within the hepatic flexure likely represents a lipoma.     There is no free intraperitoneal air. Small amount of free  intraperitoneal fluid is present. There are no suspicious lytic or  blastic osseous lesions. Anterior wedge compression deformity of the L3  vertebral body results in approximately 40% loss of height with a well  corticated osteophyte along its anterior aspect, favored to be chronic.     The bladder is unremarkable for its degree of distention.       Impression:       1.  Small amount of nonspecific free intraperitoneal fluid.  2.  Compression deformity at the L3 vertebral body is favored be chronic  as above. However, correlation with patient history and point tenderness  recommended to exclude an acute etiology. Findings can be further  evaluated with MRI if clinically indicated.  3.  Other findings as above.     The above findings were discussed with Samantha Jamison by telephone by Fito Brunson at 4:40 PM on  07/06/2020.             Results for orders placed during the hospital encounter of 12/23/19   Adult Transthoracic Echo Complete W/ Cont if Necessary Per Protocol    Narrative · Estimated EF = 66%.  · Left ventricular systolic function is normal.  · Left ventricular wall thickness is consistent with borderline concentric   hypertrophy.          No orders to display        Assessment/Plan     Active Hospital Problems    Diagnosis POA   • **Acute kidney injury (CMS/HCC) [N17.9] Yes   • Hyponatremia [E87.1] Unknown   • Lithium toxicity [T56.891A] Unknown   • Essential tremor [G25.0] Yes   • Iron deficiency anemia [D50.9] Yes   • Coronary arteriosclerosis in native artery [I25.10] Yes     Description: Follows with Dr Anne annually.     • Bipolar affective disorder (CMS/HCC) [F31.9] Yes   • Chronic obstructive pulmonary disease (CMS/HCC) [J44.9] Yes     Description: Follows with Dr Lopez every 6 months     • Type 2 diabetes mellitus without complication, with long-term current use  of insulin (CMS/[facility]) [E11.9, Z79.4] Not Applicable   • Hyperlipidemia [E78.5] Yes     Description: July 2014 simvastatin discontinued because of muscle pain     • GERD (gastroesophageal reflux disease) [K21.9] Yes   • Peripheral vascular disease (CMS/[facility]) [I73.9] Yes   • Sleep apnea [G47.30] Yes     Description: Sleep study 7/13 Dr Lopez. New CPAP with additional oxygen supply at night.       Mr. Mike is a 69-year-old male with history of coronary artery disease, bipolar disorder, COPD, type 2 diabetes, GERD, hyperlipidemia, peripheral vascular disease, sleep apnea, on chronic oxygen at home, iron deficiency anemia, essential tremor who presents to the emergency room with generalized weakness and a mechanical fall today.    Acute kidney injury/hyponatremia  -Normal saline at 125 cc overnight  -Recheck BMP in a.m.  -Monitor fluid volume status  -PT to eval and treat for generalized weakness    Lithium toxicity/bipolar disorder  -Hold lithium for now  -Recheck level in a.m.    Essential tremors  -Continue primidone    COPD  -Continue home meds  -Patient on 3-1/2 L oxygen at home, continue that to keep sats above 90    Type 2 diabetes  -Accu-Cheks before meals and at bedtime with correctional dose insulin  -Hold oral diabetic medications for now  -Check A1c in a.m.      · I discussed the patient's findings and my recommendations with patient and ED provider.    VTE Prophylaxis - SCDs.  Code Status - Full code.       GARIMA Dueñas  [facility]  07/06/20  21:52

## 2020-07-07 NOTE — PLAN OF CARE
Problem: Patient Care Overview  Goal: Plan of Care Review  7/7/2020 1626 by Rylee Austin RN  Outcome: Ongoing (interventions implemented as appropriate)  Flowsheets (Taken 7/7/2020 1626)  Outcome Summary: VSS,96%O2 on 3L. Assist X1 to restroom, No c/o pain or distress. will continue to monitor  7/7/2020 0940 by Rylee Austin RN  Outcome: Ongoing (interventions implemented as appropriate)  Goal: Individualization and Mutuality  7/7/2020 1626 by Rylee Austin RN  Outcome: Ongoing (interventions implemented as appropriate)  7/7/2020 0940 by Rylee Austin RN  Outcome: Ongoing (interventions implemented as appropriate)  Goal: Discharge Needs Assessment  7/7/2020 1626 by Rylee Austin RN  Outcome: Ongoing (interventions implemented as appropriate)  7/7/2020 0940 by Rylee Austin RN  Outcome: Ongoing (interventions implemented as appropriate)  Goal: Interprofessional Rounds/Family Conf  7/7/2020 1626 by Rylee Austin RN  Outcome: Ongoing (interventions implemented as appropriate)  7/7/2020 0940 by Rylee Austin RN  Outcome: Ongoing (interventions implemented as appropriate)

## 2020-07-07 NOTE — SIGNIFICANT NOTE
Attempted to see patient, but RT in room doing breathing treatment. Will attempt to see 7/8 if able.

## 2020-07-07 NOTE — CONSULTS
"PT is a 70 yo male seen on [address] for hx of bipolar disorder.  Pt is , lives alone with his dog, Lady and is retired.  He has a flat affect and appropriate mood upon approach.  He is pleasant and cooperative with interview.  Pt appears to be in pain, grabs his abdomen throughout interview.  He is currently admitted for acute kidney injury.  He is a poor historian. He does acknowledge a bipolar diagnosis and that he takes Lithium prescribed by his PCP.  His current dosage is 150mg in AM and 300 mg in PM.  He also takes Prozac 60mg daily and Seroquel 100mg at night.  Pt is unsure if he was taking as prescribed but \"thinks\" he was.    Pt denies SI, HI and hallucinations.  Pt reports he has never been hospitalized for psychiatric issues.  He has seen a psychiatrist and therapists in the past, but not currently.  He reports no drug use but no UDS on file to verify.  Pt's lithium levels were 2.4 on 7/6 and 2.2 on 7/7.  Per chart, there is some concern for serotonin syndrome.  Psychiatrist consult has been placed for medication options.  Access will follow briefly.  "

## 2020-07-07 NOTE — ED NOTES
Pt stated that he is usually on 3.5L/nc of O2 - pt placed on home dose     Rowdy Dowling RN  07/06/20 2038

## 2020-07-08 LAB
ANION GAP SERPL CALCULATED.3IONS-SCNC: 15.6 MMOL/L (ref 5–15)
BUN SERPL-MCNC: 13 MG/DL (ref 8–23)
BUN/CREAT SERPL: 12.7 (ref 7–25)
CALCIUM SPEC-SCNC: 10.2 MG/DL (ref 8.6–10.5)
CHLORIDE SERPL-SCNC: 106 MMOL/L (ref 98–107)
CO2 SERPL-SCNC: 14.4 MMOL/L (ref 22–29)
CREAT SERPL-MCNC: 1.02 MG/DL (ref 0.76–1.27)
GFR SERPL CREATININE-BSD FRML MDRD: 73 ML/MIN/1.73
GLUCOSE BLDC GLUCOMTR-MCNC: 137 MG/DL (ref 70–130)
GLUCOSE BLDC GLUCOMTR-MCNC: 144 MG/DL (ref 70–130)
GLUCOSE BLDC GLUCOMTR-MCNC: 148 MG/DL (ref 70–130)
GLUCOSE BLDC GLUCOMTR-MCNC: 150 MG/DL (ref 70–130)
GLUCOSE SERPL-MCNC: 130 MG/DL (ref 65–99)
LITHIUM SERPL-SCNC: 1.7 MMOL/L (ref 0.6–1.2)
POTASSIUM SERPL-SCNC: 4.4 MMOL/L (ref 3.5–5.2)
SODIUM SERPL-SCNC: 136 MMOL/L (ref 136–145)

## 2020-07-08 PROCEDURE — 94799 UNLISTED PULMONARY SVC/PX: CPT

## 2020-07-08 PROCEDURE — 97162 PT EVAL MOD COMPLEX 30 MIN: CPT

## 2020-07-08 PROCEDURE — 97110 THERAPEUTIC EXERCISES: CPT

## 2020-07-08 PROCEDURE — 80048 BASIC METABOLIC PNL TOTAL CA: CPT | Performed by: HOSPITALIST

## 2020-07-08 PROCEDURE — 82962 GLUCOSE BLOOD TEST: CPT

## 2020-07-08 PROCEDURE — 25010000002 ENOXAPARIN PER 10 MG: Performed by: INTERNAL MEDICINE

## 2020-07-08 PROCEDURE — 80178 ASSAY OF LITHIUM: CPT | Performed by: HOSPITALIST

## 2020-07-08 PROCEDURE — 99233 SBSQ HOSP IP/OBS HIGH 50: CPT | Performed by: NURSE PRACTITIONER

## 2020-07-08 PROCEDURE — 63710000001 INSULIN LISPRO (HUMAN) PER 5 UNITS: Performed by: NURSE PRACTITIONER

## 2020-07-08 RX ORDER — CLONAZEPAM 0.5 MG/1
0.5 TABLET ORAL EVERY 8 HOURS SCHEDULED
Status: DISCONTINUED | OUTPATIENT
Start: 2020-07-08 | End: 2020-07-09

## 2020-07-08 RX ORDER — CLONAZEPAM 0.5 MG/1
0.5 TABLET ORAL EVERY 8 HOURS SCHEDULED
Status: DISCONTINUED | OUTPATIENT
Start: 2020-07-08 | End: 2020-07-08

## 2020-07-08 RX ORDER — HALOPERIDOL 5 MG/ML
5 INJECTION INTRAMUSCULAR EVERY 6 HOURS PRN
Status: DISCONTINUED | OUTPATIENT
Start: 2020-07-08 | End: 2020-07-13 | Stop reason: HOSPADM

## 2020-07-08 RX ORDER — SODIUM CHLORIDE, SODIUM LACTATE, POTASSIUM CHLORIDE, CALCIUM CHLORIDE 600; 310; 30; 20 MG/100ML; MG/100ML; MG/100ML; MG/100ML
125 INJECTION, SOLUTION INTRAVENOUS CONTINUOUS
Status: DISCONTINUED | OUTPATIENT
Start: 2020-07-08 | End: 2020-07-09

## 2020-07-08 RX ORDER — CLONAZEPAM 0.5 MG/1
0.5 TABLET ORAL 2 TIMES DAILY
Status: DISCONTINUED | OUTPATIENT
Start: 2020-07-08 | End: 2020-07-08

## 2020-07-08 RX ADMIN — PRIMIDONE 50 MG: 50 TABLET ORAL at 20:07

## 2020-07-08 RX ADMIN — SODIUM CHLORIDE 100 ML/HR: 9 INJECTION, SOLUTION INTRAVENOUS at 04:55

## 2020-07-08 RX ADMIN — IPRATROPIUM BROMIDE AND ALBUTEROL SULFATE 3 ML: 2.5; .5 SOLUTION RESPIRATORY (INHALATION) at 07:38

## 2020-07-08 RX ADMIN — INSULIN LISPRO 2 UNITS: 100 INJECTION, SOLUTION INTRAVENOUS; SUBCUTANEOUS at 12:11

## 2020-07-08 RX ADMIN — TAMSULOSIN HYDROCHLORIDE 0.4 MG: 0.4 CAPSULE ORAL at 20:07

## 2020-07-08 RX ADMIN — DOCUSATE SODIUM 100 MG: 100 CAPSULE ORAL at 20:07

## 2020-07-08 RX ADMIN — CLONAZEPAM 0.5 MG: 0.5 TABLET ORAL at 12:11

## 2020-07-08 RX ADMIN — PANTOPRAZOLE SODIUM 40 MG: 40 TABLET, DELAYED RELEASE ORAL at 06:03

## 2020-07-08 RX ADMIN — BUDESONIDE 0.5 MG: 0.5 INHALANT RESPIRATORY (INHALATION) at 19:42

## 2020-07-08 RX ADMIN — Medication 400 MG: at 10:14

## 2020-07-08 RX ADMIN — Medication 1000 MCG: at 10:15

## 2020-07-08 RX ADMIN — CLONAZEPAM 0.5 MG: 0.5 TABLET ORAL at 20:07

## 2020-07-08 RX ADMIN — ASPIRIN 81 MG: 81 TABLET, COATED ORAL at 10:15

## 2020-07-08 RX ADMIN — VITAMIN D, TAB 1000IU (100/BT) 1000 UNITS: 25 TAB at 10:15

## 2020-07-08 RX ADMIN — AMLODIPINE BESYLATE 10 MG: 10 TABLET ORAL at 10:15

## 2020-07-08 RX ADMIN — SODIUM CHLORIDE, PRESERVATIVE FREE 10 ML: 5 INJECTION INTRAVENOUS at 10:16

## 2020-07-08 RX ADMIN — ATORVASTATIN CALCIUM 80 MG: 80 TABLET, FILM COATED ORAL at 10:14

## 2020-07-08 RX ADMIN — PRIMIDONE 50 MG: 50 TABLET ORAL at 10:14

## 2020-07-08 RX ADMIN — IPRATROPIUM BROMIDE AND ALBUTEROL SULFATE 3 ML: 2.5; .5 SOLUTION RESPIRATORY (INHALATION) at 19:42

## 2020-07-08 RX ADMIN — IPRATROPIUM BROMIDE AND ALBUTEROL SULFATE 3 ML: 2.5; .5 SOLUTION RESPIRATORY (INHALATION) at 15:48

## 2020-07-08 RX ADMIN — IPRATROPIUM BROMIDE AND ALBUTEROL SULFATE 3 ML: 2.5; .5 SOLUTION RESPIRATORY (INHALATION) at 11:30

## 2020-07-08 RX ADMIN — BUDESONIDE 0.5 MG: 0.5 INHALANT RESPIRATORY (INHALATION) at 07:38

## 2020-07-08 RX ADMIN — SODIUM CHLORIDE, POTASSIUM CHLORIDE, SODIUM LACTATE AND CALCIUM CHLORIDE 125 ML/HR: 600; 310; 30; 20 INJECTION, SOLUTION INTRAVENOUS at 12:11

## 2020-07-08 RX ADMIN — CARVEDILOL 12.5 MG: 12.5 TABLET, FILM COATED ORAL at 10:15

## 2020-07-08 RX ADMIN — CARVEDILOL 12.5 MG: 12.5 TABLET, FILM COATED ORAL at 17:59

## 2020-07-08 RX ADMIN — ENOXAPARIN SODIUM 40 MG: 40 INJECTION SUBCUTANEOUS at 10:15

## 2020-07-08 RX ADMIN — DOCUSATE SODIUM 100 MG: 100 CAPSULE ORAL at 10:14

## 2020-07-08 NOTE — CONSULTS
IDENTIFYING INFORMATION: The patient is a 69-year-old white male admitted after suffering a fall.  He is seen related to confusion with lithium toxicity and a possible serotonin syndrome    CHIEF COMPLAINT: None given    INFORMANT: Patient and chart    RELIABILITY: Fair    HISTORY OF PRESENT ILLNESS: The patient is a 69-year-old white male admitted after he had suffered a fall at home.  On admission to the hospital patient was found to have an elevated lithium level of 2.2.  The patient reports that he has been diagnosed with bipolar disorder and that his primary care physician has been prescribing medications for him including Prozac 60 mg daily, Seroquel 100 mg at at bedtime and lithium carbonate 400 mg daily.  The patient's creatinine on admission was only slightly elevated at 1.02.  The patient is  and lives alone.  He denies use of alcohol tobacco or street drugs.  During today's interview the patient is noted to continue to exhibit tremulousness, and he is a poor historian with significant delay in thought processing and some word finding difficulties.  He does not report positive auditory visual hallucinations and denies suicidal or homicidal.  He does not report a history of previous psychiatric hospitalization and it is unclear who made the diagnosis of a bipolar spectrum disorder.    PAST PSYCHIATRIC HISTORY: As above    PAST MEDICAL HISTORY: Significant for a history of coronary artery disease, COPD, diabetes mellitus, hypercholesterolemia, GERD, sleep apnea, anemia    MEDICATIONS:   Current Facility-Administered Medications   Medication Dose Route Frequency Provider Last Rate Last Dose   • acetaminophen (TYLENOL) tablet 650 mg  650 mg Oral Q4H PRN Valarie Mendoza APRN   650 mg at 07/07/20 2000    Or   • acetaminophen (TYLENOL) 160 MG/5ML solution 650 mg  650 mg Oral Q4H PRN Valarie Mendoza APRN        Or   • acetaminophen (TYLENOL) suppository 650 mg  650 mg Rectal Q4H PRN Reji  GARIMA Vicente       • amLODIPine (NORVASC) tablet 10 mg  10 mg Oral Q24H Karlo Escobedo MD   10 mg at 07/08/20 1015   • aspirin EC tablet 81 mg  81 mg Oral Daily Karlo Escobedo MD   81 mg at 07/08/20 1015   • atorvastatin (LIPITOR) tablet 80 mg  80 mg Oral Daily Valarie Mendoza APRN   80 mg at 07/08/20 1014   • bisacodyl (DULCOLAX) EC tablet 10 mg  10 mg Oral Daily PRN Karlo Escobedo MD       • budesonide (PULMICORT) nebulizer solution 0.5 mg  0.5 mg Nebulization BID - RT Karlo Escobedo MD   0.5 mg at 07/08/20 0738   • carvedilol (COREG) tablet 12.5 mg  12.5 mg Oral BID With Meals Valarie Mendoza APRN   12.5 mg at 07/08/20 1015   • cholecalciferol (VITAMIN D3) tablet 1,000 Units  1,000 Units Oral Daily Valarie Mendoza APRN   1,000 Units at 07/08/20 1015   • clonazePAM (KlonoPIN) tablet 0.5 mg  0.5 mg Oral BID René Pace MD       • dextrose (D50W) 25 g/ 50mL Intravenous Solution 25 g  25 g Intravenous Q15 Min PRN Valarie Mendoza APRN       • dextrose (GLUTOSE) oral gel 15 g  15 g Oral Q15 Min PRN Valarie Mendoza APRN       • docusate sodium (COLACE) capsule 100 mg  100 mg Oral BID Karlo Escobedo MD   100 mg at 07/08/20 1014   • enoxaparin (LOVENOX) syringe 40 mg  40 mg Subcutaneous Q24H Karlo Escobedo MD   40 mg at 07/08/20 1015   • glucagon (human recombinant) (GLUCAGEN DIAGNOSTIC) injection 1 mg  1 mg Subcutaneous Q15 Min PRN Valarie Mendoza APRN       • haloperidol lactate (HALDOL) injection 5 mg  5 mg Intramuscular Q6H PRN Juan Carlos Murillo III, MD       • insulin lispro (humaLOG) injection 0-7 Units  0-7 Units Subcutaneous TID AC Valarie Mendoza APRN       • ipratropium-albuterol (DUO-NEB) nebulizer solution 3 mL  3 mL Nebulization 4x Daily - RT Karlo Escobedo MD   3 mL at 07/08/20 1130   • lactated ringers infusion  125 mL/hr Intravenous Continuous René Pace MD       • LORazepam (ATIVAN) tablet 1 mg  1 mg Oral Q6H PRN Gregg  Karlo NOLEN MD       • magnesium oxide (MAG-OX) tablet 400 mg  400 mg Oral Daily Valarie Mendoza APRN   400 mg at 07/08/20 1014   • nitroglycerin (NITROSTAT) SL tablet 0.4 mg  0.4 mg Sublingual Q5 Min PRN Valarie Mendoza APRN       • ondansetron (ZOFRAN) injection 4 mg  4 mg Intravenous Q6H PRN Valarie Mendoza APRN       • pantoprazole (PROTONIX) EC tablet 40 mg  40 mg Oral Q AM Karlo Escobedo MD   40 mg at 07/08/20 0603   • polyethylene glycol (MIRALAX) packet 17 g  17 g Oral Daily PRN Valarie Mendoza APRN       • primidone (MYSOLINE) tablet 50 mg  50 mg Oral Q12H Karlo Escobedo MD   50 mg at 07/08/20 1014   • sodium chloride 0.9 % flush 10 mL  10 mL Intravenous Q12H Valarie Mendoza APRN   10 mL at 07/08/20 1016   • sodium chloride 0.9 % flush 10 mL  10 mL Intravenous PRN Valarie Mendoza APRN       • tamsulosin (FLOMAX) 24 hr capsule 0.4 mg  0.4 mg Oral Nightly Karlo Escobedo MD       • vitamin B-12 (CYANOCOBALAMIN) tablet 1,000 mcg  1,000 mcg Oral Daily Valarie Mendoza APRN   1,000 mcg at 07/08/20 1015         ALLERGIES: Plavix    FAMILY HISTORY: Noncontributory    SOCIAL HISTORY: The patient is  and lives alone.  There is no reported use of alcohol tobacco or street drugs.    MENTAL STATUS EXAM: The patient is an elderly white male appearing his stated age.  He is dressed in hospital garb and is noted to be grossly tremulous.  Patient is awake and alert and oriented x3.  His mood is calm his affect flat.  Speech is impoverished with significant delayed thought processing and word finding difficulties noted.  Patient cannot comply with formal testing memory and cognition.  He does not appear to be responding to internal stimuli and denies suicidal or homicidal ideation.  Judgment and insight cannot be adequately assessed.    ASSETS/LIABILITIES: To be assessed    DIAGNOSTIC IMPRESSION: Delirium secondary to lithium toxicity and/or serotonin syndrome, multiple medical  problems described previously, bipolar disorder by history.    PLAN: The patient's changes in mentation and physical symptoms could be due to  serotonin syndrome, exacerbated by lithium toxicity.  It is thought that the patient's lithium level was somewhat elevated when his creatinine appears to be reasonably normal.  I think the wisest course at this point will be to hold all psychotropic medications and I will leave orders for PRN haloperidol to be given intramuscularly should the patient had issues with agitation.  This medication is chosen as it has no serotonergic activity whatsoever as opposed to second-generation antipsychotics which could potentially exacerbate serotonin related symptoms.  I would not recommend reinitiation of the patient's previously prescribed psychotropic medications unless the patient is able to provide a better history of his bipolar history.  There are certainly alternative medications which can be considered, however the patient is today unable to provide an adequate history of his treatment course to make such a decision.    Thank you for the opportunity to see this patient

## 2020-07-08 NOTE — PROGRESS NOTES
"DOS: 2020  NAME: Felicita Mike   : 1950  PCP: Alexandrea Jimenez, DANIEL  Chief Complaint   Patient presents with   • Fall   • Tremors   • Abdominal Pain     Patient seen in follow-up today; new to me          Subjective: No events overnight. Patient reports that he does not feel good in general. Myoclonic jerking still present on my exam; seen in all 4 ext.     Objective:  Vital signs: /72 (BP Location: Left arm, Patient Position: Lying)   Pulse 67   Temp 97.5 °F (36.4 °C) (Oral)   Resp 20   Ht 170.2 cm (67\")   Wt 81.6 kg (179 lb 14.3 oz)   SpO2 91%   BMI 28.18 kg/m²       HEENT: Normocephalic, atraumatic   COR: RRR  Resp: Even and unlabored  Extremities: Equal pulses, nondistal embolization    Neurological:   MS: AO. Language normal. No neglect. Higher integrative function normal  CN: II-XII normal  Motor: 5/5, normal tone  Reflexes: Toes down going   Sensory: Intact to light touch  Coordination: Normal (finger to nose)     Laboratory results:  Lab Results   Component Value Date    GLUCOSE 130 (H) 2020    CALCIUM 10.2 2020     2020    K 4.4 2020    CO2 14.4 (L) 2020     2020    BUN 13 2020    CREATININE 1.02 2020    EGFRIFAFRI 92 2019    EGFRIFNONA 73 2020    BCR 12.7 2020    ANIONGAP 15.6 (H) 2020     Lab Results   Component Value Date    WBC 11.24 (H) 2020    HGB 10.5 (L) 2020    HCT 32.4 (L) 2020    MCV 87.3 2020     2020     No results found for: CHOL  Lab Results   Component Value Date    HDL 38 (L) 2019    HDL 41 2019    HDL 40 2019     Lab Results   Component Value Date    LDL 55 2019    LDL 53 2019    LDL 86 2019     Lab Results   Component Value Date    TRIG 123 2019    TRIG 127 2019    TRIG 171 (H) 2019     Lab Results   Component Value Date    TSH 3.160 2020     Lab Results   Component Value Date    " TIWOFAKN26 >2000 (H) 12/05/2019     Lab Results   Component Value Date    HGBA1C 6.00 (H) 07/07/2020     Lab Results   Component Value Date    CHLPL 118 12/05/2019    CHLPL 119 04/29/2019    CHLPL 160 01/23/2019     Lab Results   Component Value Date    TRIG 123 12/05/2019    TRIG 127 04/29/2019    TRIG 171 (H) 01/23/2019     Lab Results   Component Value Date    HDL 38 (L) 12/05/2019    HDL 41 04/29/2019    HDL 40 01/23/2019     Lab Results   Component Value Date    LDL 55 12/05/2019    LDL 53 04/29/2019    LDL 86 01/23/2019     Lab Results   Component Value Date    TSH 3.160 07/07/2020     Lab Results   Component Value Date    HGBA1C 6.00 (H) 07/07/2020     Lab Results   Component Value Date    CHLPL 118 12/05/2019    CHLPL 119 04/29/2019    CHLPL 160 01/23/2019     Lab Results   Component Value Date    TRIG 123 12/05/2019    TRIG 127 04/29/2019    TRIG 171 (H) 01/23/2019     Lab Results   Component Value Date    HDL 38 (L) 12/05/2019    HDL 41 04/29/2019    HDL 40 01/23/2019     Lab Results   Component Value Date    LDL 55 12/05/2019    LDL 53 04/29/2019    LDL 86 01/23/2019     Lab Results   Component Value Date    HGBA1C 6.00 (H) 07/07/2020       Review and interpretation of imaging:  CT ABDOMEN AND PELVIS WITHOUT IV CONTRAST     HISTORY: Generalized abdominal pain for several weeks     TECHNIQUE: Radiation dose reduction techniques were utilized, including  automated exposure control and exposure modulation based on body size.   3 mm images were obtained through the abdomen and pelvis without IV  contrast.      COMPARISON: None     FINDINGS:  Moderate emphysema is present within the lung bases.     The stomach has a diffusely thickened appearance without significant  surrounding fat stranding and is grossly unchanged since 2018. There are  no findings of small bowel obstruction. The appendix is unremarkable.  The liver, gallbladder, pancreas, spleen and adrenal glands have an  unremarkable noncontrast CT  appearance. Bilateral perinephric stranding  is roughly symmetric, a portion of which was seen on 08/28/2018. Cystic  density lesion arising from the right kidney likely represents simple  cyst. Subcentimeter hypo and hyperdense renal lesions are too small to  characterize. There is no hydronephrosis.     No abdominopelvic adenopathy is present by size criteria. Fat density  lesion within the hepatic flexure likely represents a lipoma.     There is no free intraperitoneal air. Small amount of free  intraperitoneal fluid is present. There are no suspicious lytic or  blastic osseous lesions. Anterior wedge compression deformity of the L3  vertebral body results in approximately 40% loss of height with a well  corticated osteophyte along its anterior aspect, favored to be chronic.     The bladder is unremarkable for its degree of distention.     IMPRESSION:  1.  Small amount of nonspecific free intraperitoneal fluid.  2.  Compression deformity at the L3 vertebral body is favored be chronic  as above. However, correlation with patient history and point tenderness  recommended to exclude an acute etiology. Findings can be further  evaluated with MRI if clinically indicated.  3.  Other findings as above.     The above findings were discussed with Samantha Jamison by telephone by Fito Brunson at 4:40 PM on  07/06/2020.     This report was finalized on 7/7/2020 7:33 AM by Dr. Fito Brunson M.D.     XR CHEST 1 VW-  07/07/2020     HISTORY: COPD.     Heart size is within normal limits. Lungs appear free of acute  infiltrates. There is some aortic calcification. There is deformity of  the right mid clavicle probably from an old fracture.     IMPRESSION:  No acute cardiopulmonary process identified.     This report was finalized on 7/7/2020 8:02 AM by Dr. Sajan Golden M.D.       Impression: This is a 69-year-old male with history of coronary artery disease, hypertension, OPD on home O2 (dependent), hyperlipidemia, peripheral  vascular disease, obstructive sleep apnea, GERD, diabetes mellitus type 2, iron deficiency, essential tremor, bipolar depression, degenerative disc disease of the lumbar spine with chronic pain syndrome along with old L3 fracture  who presented to [facility] on 7/[facility] after sustaining a fall and he was subsequently admitted for multiple medical problems.  Service was consulted for tremor worse than normal; carries diagnosis of essential tremor.  Patient had toxic lithium level of 2.4 on arrival; lithium for known bipolar disorder-today level improved 1.7.     Neurologically, stable w/ presence of myoclonic jerking noted. Will add Klonopin 0.5mg TID to assist w/ jerking. Other recommendations below. PT/OT/ST. CCP to assist with discharge planning. Call RRT for any acute neurological changes and/or concerns. We will continue to follow and advise.         Differential:   1.  Chronic jerking rather than essential tremor thought to be secondary to lithium toxicity and irritability of CNS      Plan:  Klonopin 0.5mg TID   Aspirin 81 mg daily (on prior to arrival)  Lipitor 80 mg daily (on prior to arrival) (LDL 55)      Case reviewed w/ Dr. Richter 07/08 and he agrees w/ plan     GARIMA [place]

## 2020-07-08 NOTE — PLAN OF CARE
Problem: Patient Care Overview  Goal: Plan of Care Review  Flowsheets (Taken 7/8/2020 1132)  Plan of Care Reviewed With: patient  Outcome Summary: Pt is a 68 yo M who was admitted with fall, acute renal failure, and lithium toxicity. Pt presents to PT with impaired functional mobility and gait secondary to tremoring, myoclonic jerking, B knee buckling, generalized weakness and decreased activity tolerance. Pt may benefit from skilled PT to address strength, mobility, and gait.  Note:   Patient was wearing a face mask during this therapy encounter. Therapist used appropriate personal protective equipment including mask and gloves.  Mask used was standard procedure mask. Appropriate PPE was worn during the entire therapy session. Hand hygiene was completed before and after therapy session. Patient is not in enhanced droplet precautions.

## 2020-07-08 NOTE — PROGRESS NOTES
[facility]     LOS: 2 days     Name: Felicita Mike  Age: 69 y.o.  Sex: male  :  1950  MRN: [id number]         Primary Care Physician: Alexandrea Jimenez PA-C    Diet Regular; Cardiac, Consistent Carbohydrate, Renal    Subjective   He is feeling okay except for the shaking and jerking.  Denies chest pain, shortness of breath.    Review of Systems   Constitutional: Negative for fever.   Respiratory: Negative for cough and shortness of breath.    Cardiovascular: Negative for chest pain.   Gastrointestinal: Negative for abdominal pain.   Neurological: Positive for tremors.     Objective   Temp:  [97.5 °F (36.4 °C)-98.4 °F (36.9 °C)] 97.5 °F (36.4 °C)  Heart Rate:  [57-70] 67  Resp:  [20-24] 20  BP: (126-156)/(63-91) 156/72  SpO2:  [91 %-98 %] 91 %  on  Flow (L/min):  [3-3.5] 3;   Device (Oxygen Therapy): nasal cannula  Body mass index is 28.18 kg/m².    Physical Exam   Constitutional: He is oriented to person, place, and time. No distress.   Cardiovascular: Normal rate and regular rhythm.   Pulmonary/Chest: Effort normal and breath sounds normal. No respiratory distress.   Abdominal: Soft. Bowel sounds are normal. There is no tenderness. There is no rebound and no guarding.   Musculoskeletal: He exhibits no edema.   Neurological: He is alert and oriented to person, place, and time. He displays tremor.   Occasional myoclonic jerk   Skin: Skin is warm and dry.   Psychiatric: He has a normal mood and affect. His behavior is normal.   Nursing note and vitals reviewed.    Reviewed medications and new clinical results    Scheduled Meds  amLODIPine 10 mg Oral Q24H   aspirin 81 mg Oral Daily   atorvastatin 80 mg Oral Daily   budesonide 0.5 mg Nebulization BID - RT   carvedilol 12.5 mg Oral BID With Meals   cholecalciferol 1,000 Units Oral Daily   docusate sodium 100 mg Oral BID   enoxaparin 40 mg Subcutaneous Q24H   insulin lispro 0-7 Units Subcutaneous TID AC      ipratropium-albuterol 3 mL Nebulization 4x Daily - RT   magnesium oxide 400 mg Oral Daily   pantoprazole 40 mg Oral Q AM   primidone 50 mg Oral Q12H   sodium chloride 10 mL Intravenous Q12H   tamsulosin 0.4 mg Oral Nightly   vitamin B-12 1,000 mcg Oral Daily     Continuous Infusions  sodium chloride 100 mL/hr Last Rate: 100 mL/hr (07/08/20 0455)     PRN Meds  •  acetaminophen **OR** acetaminophen **OR** acetaminophen  •  bisacodyl  •  dextrose  •  dextrose  •  glucagon (human recombinant)  •  LORazepam  •  nitroglycerin  •  ondansetron  •  polyethylene glycol  •  sodium chloride    Results from last 7 days   Lab Units 07/07/20  0612 07/06/20  1504   WBC 10*3/mm3 11.24* 10.94*   HEMOGLOBIN g/dL 10.5* 10.5*   PLATELETS 10*3/mm3 223 199     Results from last 7 days   Lab Units 07/08/20  0603 07/07/20  0612 07/06/20 2042 07/06/20  1504   SODIUM mmol/L 136 134* 133* 130*   POTASSIUM mmol/L 4.4 4.2 4.5 4.5   CHLORIDE mmol/L 106 103 103 99   CO2 mmol/L 14.4* 20.2* 19.7* 20.3*   BUN mg/dL 13 25* 29* 33*   CREATININE mg/dL 1.02 1.42* 1.81* 2.26*   CALCIUM mg/dL 10.2 10.0 9.6 9.5   GLUCOSE mg/dL 130* 103* 91 101*     Lab Results   Component Value Date    ANIONGAP 15.6 (H) 07/08/2020     Glucose   Date/Time Value Ref Range Status   07/08/2020 0605 137 (H) 70 - 130 mg/dL Final   07/07/2020 2112 149 (H) 70 - 130 mg/dL Final   07/07/2020 1701 165 (H) 70 - 130 mg/dL Final   07/07/2020 1107 149 (H) 70 - 130 mg/dL Final   07/07/2020 0124 129 70 - 130 mg/dL Final     Hemoglobin A1C   Date Value Ref Range Status   07/07/2020 6.00 (H) 4.80 - 5.60 % Final     Estimated Creatinine Clearance: 71.9 mL/min (by C-G formula based on SCr of 1.02 mg/dL).    Lab Results   Component Value Date    LITHIUM 1.7 (H) 07/08/2020     I personally reviewed images, tele    Assessment/Plan   Active Hospital Problems    Diagnosis  POA   • **Acute kidney injury (CMS/HCC) [N17.9]  Yes   • Hyponatremia [E87.1]  Unknown   • Lithium toxicity [T56.891A]   Unknown   • Essential tremor [G25.0]  Yes   • Iron deficiency anemia [D50.9]  Yes   • Coronary arteriosclerosis in native artery [I25.10]  Yes   • Bipolar affective disorder (CMS/[facility]) [F31.9]  Yes   • Chronic obstructive pulmonary disease (CMS/[facility]) [J44.9]  Yes   • Type 2 diabetes mellitus without complication, with long-term current use of insulin (CMS/[facility]) [E11.9, Z79.4]  Not Applicable   • Hyperlipidemia [E78.5]  Yes   • GERD (gastroesophageal reflux disease) [K21.9]  Yes   • Peripheral vascular disease (CMS/[facility]) [I73.9]  Yes   • Sleep apnea [G47.30]  Yes      Resolved Hospital Problems   No resolved problems to display.     69 y.o. male with acute kidney injury and lithium toxicity    · Acute kidney injury improved, patient probably has some expansion acidosis from saline we will change fluids  · Lithium toxicity: Level decreasing.  Psychiatry to see to help with adjustment in psychiatric medications.  Add Klonopin and continue to monitor.  Monitor for seizures, decreased level of consciousness.  No indication for dialysis at this time  · Hyponatremia resolved  · Diabetes: A1c 6.  Glucoses 100s  · Disposition to be determined.  · Discussed with patient and nursing staff.    René Pace MD   07/08/20  10:37

## 2020-07-08 NOTE — THERAPY EVALUATION
Patient Name: Felicita Mike  : 1950    MRN: [id number]                              Today's Date: 2020       Admit Date: 2020    Visit Dx:     ICD-10-CM ICD-9-CM   1. Acute kidney injury (CMS/HCC) N17.9 584.9   2. Generalized abdominal pain R10.84 789.07   3. Abnormal lithium level in blood R78.89 790.6     Patient Active Problem List   Diagnosis   • Elevated prostate specific antigen (PSA)   • Coronary arteriosclerosis in native artery   • Bipolar affective disorder (CMS/HCC)   • Chronic obstructive pulmonary disease (CMS/HCC)   • Colon polyp   • Constipation   • Type 2 diabetes mellitus without complication, with long-term current use of insulin (CMS/HCC)   • GERD (gastroesophageal reflux disease)   • Fatigue   • Hearing loss   • Hyperlipidemia   • Peripheral vascular disease (CMS/HCC)   • Proteinuria   • Muscle pain   • Low back pain   • Leukocytosis   • Seborrheic eczema   • Sleep apnea   • Tinea corporis   • Tremor   • Hypertension   • Anxiety   • Benign prostatic hyperplasia with lower urinary tract symptoms   • Tobacco abuse   • Normocytic anemia   • Thyroid nodule   • Iron deficiency anemia   • Adenomatous polyp of colon   • Family history of malignant neoplasm of colon   • Gastroesophageal reflux disease   • COPD with acute exacerbation (CMS/HCC)   • Influenza A   • SWAPNIL (acute kidney injury) (CMS/HCC)   • Essential tremor   • Magnesium deficiency   • Long-term current use of lithium   • Acute kidney injury (CMS/HCC)   • Hyponatremia   • Lithium toxicity     Past Medical History:   Diagnosis Date   • Abnormal PSA    • Acute myocardial infarction (CMS/HCC) 2004    Stents   • Apnea, sleep    • Atherosclerotic heart disease    • Chronic pain     Chronic low back paoin, MRI ,  L3 compression deformity, L2 degenerative disc disease and L5-S1 degenerative disc disease.   • Colon polyp    • Constipation    • COPD (chronic obstructive pulmonary disease) (CMS/HCC)    • Coronary artery disease     • Depressed bipolar II disorder (CMS/[facility])    • Diabetes mellitus (CMS/[facility])    • Essential tremor    • Fatigue    • GERD (gastroesophageal reflux disease)    • H/O transfusion of packed red blood cells    • Health care maintenance    • Hearing loss    • History of back pain    • Hypercholesterolemia    • Hyperlipidemia    • Hypertension    • Leukocytosis    • Osteoarthritis    • Proteinuria    • PVD (peripheral vascular disease) (CMS/[facility])    • Sebaceous cyst    • Seborrheic dermatitis    • Sleep apnea    • Tinea corporis    • Tobacco use    • Tremor      Past Surgical History:   Procedure Laterality Date   • ANGIOPLASTY      Cath Stent Placement   • COLONOSCOPY  01/22/2014    ta polyps   • COLONOSCOPY N/A 11/29/2016    Procedure: COLONOSCOPY TO CECUM AND TERMINAL ILEUM WITH HOT POLYPECTOMIES;  Surgeon: Ivette Franco MD;  Location: [facility] ENDOSCOPY;  Service:    • COLONOSCOPY N/A 12/16/2019    Procedure: COLONOSCOPY to cecum with hot snare, cold biopsy polypectomies with clip placement x2;  Surgeon: Ivette Franco MD;  Location: [facility] ENDOSCOPY;  Service: Gastroenterology   • CORONARY STENT PLACEMENT  2004   • ENDOSCOPY N/A 12/16/2019    Procedure: ESOPHAGOGASTRODUODENOSCOPY with biopsies;  Surgeon: Ivette Franco MD;  Location: [facility] ENDOSCOPY;  Service: Gastroenterology   • EYE SURGERY      Cataract Surgery   • HAND SURGERY       General Information     Row Name 07/08/20 1127          PT Evaluation Time/Intention    Document Type  evaluation  -     Mode of Treatment  individual therapy;physical therapy  -     Row Name 07/08/20 1127          General Information    Prior Level of Function  gait;transfer;bed mobility;independent: no AD at baseline  -     Existing Precautions/Restrictions  fall  -     Row Name 07/08/20 1127          Relationship/Environment    Lives With  alone  -     Row Name 07/08/20 1127          Resource/Environmental Concerns    Current Living Arrangements   home/apartment/condo  -     Row Name 07/08/20 1127          Cognitive Assessment/Intervention- PT/OT    Orientation Status (Cognition)  oriented x 3  -     Row Name 07/08/20 1127          Safety Issues, Functional Mobility    Impairments Affecting Function (Mobility)  balance;coordination;endurance/activity tolerance;pain;strength  -       User Key  (r) = Recorded By, (t) = Taken By, (c) = Cosigned By    Initials Name Provider Type     Karen Brady, PT Physical Therapist        Mobility     Row Name 07/08/20 1128          Bed Mobility Assessment/Treatment    Bed Mobility Assessment/Treatment  supine-sit;sit-supine  -     Supine-Sit [place] (Bed Mobility)  supervision  -     Sit-Supine [place] (Bed Mobility)  supervision  -     Row Name 07/08/20 1128          Sit-Stand Transfer    Sit-Stand [place] (Transfers)  verbal cues;nonverbal cues (demo/gesture);minimum assist (75% patient effort)  -     Assistive Device (Sit-Stand Transfers)  -- HHA  -     Row Name 07/08/20 1128          Gait/Stairs Assessment/Training    [place] Level (Gait)  verbal cues;nonverbal cues (demo/gesture);minimum assist (75% patient effort)  -     Assistive Device (Gait)  -- HHA  -     Distance in Feet (Gait)  4 ft, a few shuffling steps forward and back  -     Pattern (Gait)  step-to  -     Deviations/Abnormal Patterns (Gait)  base of support, narrow;josh decreased;festinating/shuffling;gait speed decreased;stride length decreased  -     Bilateral Gait Deviations  forward flexed posture;knee buckling, bilateral  -     Comment (Gait/Stairs)  pt with tremoring, jerking and B knee buckling when trying to take steps  -       User Key  (r) = Recorded By, (t) = Taken By, (c) = Cosigned By    Initials Name Provider Type     Karen Brady, PT Physical Therapist        Obj/Interventions     Row Name 07/08/20 1131          General ROM    GENERAL ROM COMMENTS  AROM WFL   -     Row Name  07/08/20 1131          MMT (Manual Muscle Testing)    General MMT Comments  generalized weakness noted with functional mobility  -[facility] Name 07/08/20 1131          Therapeutic Exercise    Comment (Therapeutic Exercise)  exercises deferred due to fatigue  -[facility] Name 07/08/20 1131          Static Standing Balance    Level of [place] (Supported Standing, Static Balance)  minimal assist, 75% patient effort  -[facility] Name 07/08/20 1131          Dynamic Standing Balance    Level of [place], Reaches Outside Midline (Standing, Dynamic Balance)  minimal assist, 75% patient effort  -       User Key  (r) = Recorded By, (t) = Taken By, (c) = Cosigned By    Initials Name Provider Type     Karen Brady, PT Physical Therapist        Goals/Plan     [facility] Name 07/08/20 1134          Bed Mobility Goal 1 (PT)    Activity/Assistive Device (Bed Mobility Goal 1, PT)  bed mobility activities, all  -CH     [place] Level/Cues Needed (Bed Mobility Goal 1, PT)  supervision required  -CH     Time Frame (Bed Mobility Goal 1, PT)  1 week  -[facility] Name 07/08/20 1134          Transfer Goal 1 (PT)    Activity/Assistive Device (Transfer Goal 1, PT)  transfers, all  -CH     [place] Level/Cues Needed (Transfer Goal 1, PT)  supervision required  -CH     Time Frame (Transfer Goal 1, PT)  1 week  -[facility] Name 07/08/20 1134          Gait Training Goal 1 (PT)    Activity/Assistive Device (Gait Training Goal 1, PT)  gait (walking locomotion)  -CH     [place] Level (Gait Training Goal 1, PT)  supervision required  -CH     Distance (Gait Goal 1, PT)  150  -CH     Time Frame (Gait Training Goal 1, PT)  1 week  -       User Key  (r) = Recorded By, (t) = Taken By, (c) = Cosigned By    Initials Name Provider Type     Karen Brady, PT Physical Therapist        Clinical Impression     Row Name 07/08/20 1132          Pain Assessment    Additional Documentation  Pain Scale: Numbers Pre/Post-Treatment  (Group)  -     Row Name 07/08/20 1132          Pain Scale: Numbers Pre/Post-Treatment    Pain Scale: Numbers, Pretreatment  4/10  -     Pain Scale: Numbers, Post-Treatment  4/10  -CH     Pain Location  abdomen and chronic back pain  -     Pain Intervention(s)  Repositioned  -     Row Name 07/08/20 1132          Plan of Care Review    Plan of Care Reviewed With  patient  -CH     Outcome Summary  Pt is a 70 yo M who was admitted with fall, acute renal failure, and lithium toxicity. Pt presents to PT with impaired functional mobility and gait secondary to tremoring, myoclonic jerking, B knee buckling, generalized weakness and decreased activity tolerance. Pt may benefit from skilled PT to address strength, mobility, and gait.  -     Row Name 07/08/20 1132          Physical Therapy Clinical Impression    Patient/Family Goals Statement (PT Clinical Impression)  to return to [facility]  -     Criteria for Skilled Interventions Met (PT Clinical Impression)  treatment indicated  -     Rehab Potential (PT Clinical Summary)  good, to achieve stated therapy goals  -     Row Name 07/08/20 1132          Positioning and Restraints    Pre-Treatment Position  in bed  -     Post Treatment Position  bed  -     In Bed  supine;call light within reach;encouraged to call for assist;exit alarm on  -       User Key  (r) = Recorded By, (t) = Taken By, (c) = Cosigned By    Initials Name Provider Type     Karen Brady, PT Physical Therapist        Outcome Measures     Row Name 07/08/20 1135          How much help from another person do you currently need...    Turning from your back to your side while in flat bed without using bedrails?  3  -CH     Moving from lying on back to sitting on the side of a flat bed without bedrails?  3  -CH     Moving to and from a bed to a chair (including a wheelchair)?  3  -CH     Standing up from a chair using your arms (e.g., wheelchair, bedside chair)?  3  -CH     Climbing 3-5 steps  with a railing?  1  -     To walk in hospital room?  2  -CH     AM-PAC 6 Clicks Score (PT)  15  -     Row Name 07/08/20 1135          Functional Assessment    Outcome Measure Options  AM-PAC 6 Clicks Basic Mobility (PT)  -       User Key  (r) = Recorded By, (t) = Taken By, (c) = Cosigned By    Initials Name Provider Type     Karen Brady PT Physical Therapist        Physical Therapy Education                 Title: PT OT SLP Therapies (Done)     Topic: Physical Therapy (Done)     Point: Mobility training (Done)     Description:   Instruct learner(s) on safety and technique for assisting patient out of bed, chair or wheelchair.  Instruct in the proper use of assistive devices, such as walker, crutches, cane or brace.              Patient Friendly Description:   It's important to get you on your feet again, but we need to do so in a way that is safe for you. Falling has serious consequences, and your personal safety is the most important thing of all.        When it's time to get out of bed, one of us or a family member will sit next to you on the bed to give you support.     If your doctor or nurse tells you to use a walker, crutches, a cane, or a brace, be sure you use it every time you get out of bed, even if you think you don't need it.    Learning Progress Summary           Patient Acceptance, E,TB,D, VU,NR by  at 7/8/2020 1135                   Point: Body mechanics (Done)     Description:   Instruct learner(s) on proper positioning and spine alignment for patient and/or caregiver during mobility tasks and/or exercises.              Learning Progress Summary           Patient Acceptance, E,TB,D, VU,NR by  at 7/8/2020 1135                   Point: Precautions (Done)     Description:   Instruct learner(s) on prescribed precautions during mobility and gait tasks              Learning Progress Summary           Patient Acceptance, E,TB,D, VU,NR by  at 7/8/2020 1135                                User Key     Initials Effective Dates Name Provider Type Discipline     04/03/18 -  Karen Brady PT Physical Therapist PT              PT Recommendation and Plan  Planned Therapy Interventions (PT Eval): balance training, bed mobility training, gait training, home exercise program, patient/family education, strengthening, transfer training  Outcome Summary/Treatment Plan (PT)  Anticipated Discharge Disposition (PT): skilled nursing facility  Plan of Care Reviewed With: patient  Outcome Summary: Pt is a 70 yo M who was admitted with fall, acute renal failure, and lithium toxicity. Pt presents to PT with impaired functional mobility and gait secondary to tremoring, myoclonic jerking, B knee buckling, generalized weakness and decreased activity tolerance. Pt may benefit from skilled PT to address strength, mobility, and gait.     Time Calculation:   PT Charges     Row Name 07/08/20 1115             Time Calculation    Start Time  0859  -      Stop Time  0913  -      Time Calculation (min)  14 min  -      PT Received On  07/08/20  -      PT - Next Appointment  07/09/20  -      PT Goal Re-Cert Due Date  07/15/20  -         Time Calculation- PT    Total Timed Code Minutes- PT  8 minute(s)  -        User Key  (r) = Recorded By, (t) = Taken By, (c) = Cosigned By    Initials Name Provider Type     Karen Brady PT Physical Therapist        Therapy Charges for Today     Code Description Service Date Service Provider Modifiers Qty    [id number]  PT EVAL MOD COMPLEXITY 2 7/8/2020 Karen Brady, PT GP 1    [id number] HC PT THER PROC EA 15 MIN 7/8/2020 Karen Brady, PT GP 1          PT G-Codes  Outcome Measure Options: AM-PAC 6 Clicks Basic Mobility (PT)  AM-PAC 6 Clicks Score (PT): 15    Karen Brady PT  7/8/2020

## 2020-07-08 NOTE — PLAN OF CARE
VSS, SB on monitor, no signs of distress, continues on 3L O2 NC, on seizure precautions, LR @ 125cc, A&Ox3, continues having generalized tremors, will continue to monitor

## 2020-07-09 ENCOUNTER — APPOINTMENT (OUTPATIENT)
Dept: GENERAL RADIOLOGY | Facility: HOSPITAL | Age: 70
End: 2020-07-09

## 2020-07-09 PROBLEM — E87.1 HYPONATREMIA: Status: RESOLVED | Noted: 2020-07-06 | Resolved: 2020-07-09

## 2020-07-09 PROBLEM — N17.9 ACUTE KIDNEY INJURY (HCC): Status: RESOLVED | Noted: 2020-07-06 | Resolved: 2020-07-09

## 2020-07-09 PROBLEM — T56.891A LITHIUM TOXICITY: Status: RESOLVED | Noted: 2020-07-06 | Resolved: 2020-07-09

## 2020-07-09 LAB
ANION GAP SERPL CALCULATED.3IONS-SCNC: 12.8 MMOL/L (ref 5–15)
ARTERIAL PATENCY WRIST A: POSITIVE
ATMOSPHERIC PRESS: 748.3 MMHG
BASE EXCESS BLDA CALC-SCNC: -2.6 MMOL/L (ref 0–2)
BDY SITE: ABNORMAL
BUN SERPL-MCNC: 7 MG/DL (ref 8–23)
BUN/CREAT SERPL: 8.5 (ref 7–25)
CALCIUM SPEC-SCNC: 10.5 MG/DL (ref 8.6–10.5)
CHLORIDE SERPL-SCNC: 105 MMOL/L (ref 98–107)
CO2 SERPL-SCNC: 19.2 MMOL/L (ref 22–29)
CREAT SERPL-MCNC: 0.82 MG/DL (ref 0.76–1.27)
DEPRECATED RDW RBC AUTO: 47.2 FL (ref 37–54)
ERYTHROCYTE [DISTWIDTH] IN BLOOD BY AUTOMATED COUNT: 14.8 % (ref 12.3–15.4)
GAS FLOW AIRWAY: 3 LPM
GFR SERPL CREATININE-BSD FRML MDRD: 94 ML/MIN/1.73
GLUCOSE BLDC GLUCOMTR-MCNC: 138 MG/DL (ref 70–130)
GLUCOSE BLDC GLUCOMTR-MCNC: 154 MG/DL (ref 70–130)
GLUCOSE BLDC GLUCOMTR-MCNC: 154 MG/DL (ref 70–130)
GLUCOSE SERPL-MCNC: 124 MG/DL (ref 65–99)
HCO3 BLDA-SCNC: 21.6 MMOL/L (ref 22–28)
HCT VFR BLD AUTO: 31.1 % (ref 37.5–51)
HGB BLD-MCNC: 10.1 G/DL (ref 13–17.7)
LITHIUM SERPL-SCNC: 1.1 MMOL/L (ref 0.6–1.2)
MCH RBC QN AUTO: 28.1 PG (ref 26.6–33)
MCHC RBC AUTO-ENTMCNC: 32.5 G/DL (ref 31.5–35.7)
MCV RBC AUTO: 86.4 FL (ref 79–97)
MODALITY: ABNORMAL
PCO2 BLDA: 34.2 MM HG (ref 35–45)
PH BLDA: 7.41 PH UNITS (ref 7.35–7.45)
PLATELET # BLD AUTO: 228 10*3/MM3 (ref 140–450)
PMV BLD AUTO: 12.2 FL (ref 6–12)
PO2 BLDA: 49.1 MM HG (ref 80–100)
POTASSIUM SERPL-SCNC: 3.9 MMOL/L (ref 3.5–5.2)
RBC # BLD AUTO: 3.6 10*6/MM3 (ref 4.14–5.8)
SAO2 % BLDCOA: 85 % (ref 92–99)
SET MECH RESP RATE: 18
SODIUM SERPL-SCNC: 137 MMOL/L (ref 136–145)
WBC # BLD AUTO: 11.36 10*3/MM3 (ref 3.4–10.8)

## 2020-07-09 PROCEDURE — 71045 X-RAY EXAM CHEST 1 VIEW: CPT

## 2020-07-09 PROCEDURE — 97110 THERAPEUTIC EXERCISES: CPT

## 2020-07-09 PROCEDURE — 63710000001 INSULIN LISPRO (HUMAN) PER 5 UNITS: Performed by: NURSE PRACTITIONER

## 2020-07-09 PROCEDURE — 80048 BASIC METABOLIC PNL TOTAL CA: CPT | Performed by: HOSPITALIST

## 2020-07-09 PROCEDURE — 94799 UNLISTED PULMONARY SVC/PX: CPT

## 2020-07-09 PROCEDURE — 25010000002 FUROSEMIDE PER 20 MG: Performed by: HOSPITALIST

## 2020-07-09 PROCEDURE — 82962 GLUCOSE BLOOD TEST: CPT

## 2020-07-09 PROCEDURE — 25010000002 ENOXAPARIN PER 10 MG: Performed by: INTERNAL MEDICINE

## 2020-07-09 PROCEDURE — 82803 BLOOD GASES ANY COMBINATION: CPT

## 2020-07-09 PROCEDURE — 80178 ASSAY OF LITHIUM: CPT | Performed by: HOSPITALIST

## 2020-07-09 PROCEDURE — 99233 SBSQ HOSP IP/OBS HIGH 50: CPT | Performed by: NURSE PRACTITIONER

## 2020-07-09 PROCEDURE — 85027 COMPLETE CBC AUTOMATED: CPT | Performed by: HOSPITALIST

## 2020-07-09 RX ORDER — FUROSEMIDE 10 MG/ML
INJECTION INTRAMUSCULAR; INTRAVENOUS
Status: CANCELLED | OUTPATIENT
Start: 2020-07-09

## 2020-07-09 RX ORDER — FUROSEMIDE 10 MG/ML
20 INJECTION INTRAMUSCULAR; INTRAVENOUS ONCE
Status: COMPLETED | OUTPATIENT
Start: 2020-07-09 | End: 2020-07-09

## 2020-07-09 RX ADMIN — PANTOPRAZOLE SODIUM 40 MG: 40 TABLET, DELAYED RELEASE ORAL at 06:03

## 2020-07-09 RX ADMIN — Medication 1000 MCG: at 08:53

## 2020-07-09 RX ADMIN — IPRATROPIUM BROMIDE AND ALBUTEROL SULFATE 3 ML: 2.5; .5 SOLUTION RESPIRATORY (INHALATION) at 11:24

## 2020-07-09 RX ADMIN — CARVEDILOL 12.5 MG: 12.5 TABLET, FILM COATED ORAL at 08:53

## 2020-07-09 RX ADMIN — ENOXAPARIN SODIUM 40 MG: 40 INJECTION SUBCUTANEOUS at 08:52

## 2020-07-09 RX ADMIN — PRIMIDONE 50 MG: 50 TABLET ORAL at 08:54

## 2020-07-09 RX ADMIN — IPRATROPIUM BROMIDE AND ALBUTEROL SULFATE 3 ML: 2.5; .5 SOLUTION RESPIRATORY (INHALATION) at 07:54

## 2020-07-09 RX ADMIN — IPRATROPIUM BROMIDE AND ALBUTEROL SULFATE 3 ML: 2.5; .5 SOLUTION RESPIRATORY (INHALATION) at 19:30

## 2020-07-09 RX ADMIN — FUROSEMIDE 20 MG: 10 INJECTION, SOLUTION INTRAMUSCULAR; INTRAVENOUS at 17:58

## 2020-07-09 RX ADMIN — PRIMIDONE 50 MG: 50 TABLET ORAL at 20:56

## 2020-07-09 RX ADMIN — BUDESONIDE 0.5 MG: 0.5 INHALANT RESPIRATORY (INHALATION) at 07:53

## 2020-07-09 RX ADMIN — AMLODIPINE BESYLATE 10 MG: 10 TABLET ORAL at 08:53

## 2020-07-09 RX ADMIN — CARVEDILOL 12.5 MG: 12.5 TABLET, FILM COATED ORAL at 17:58

## 2020-07-09 RX ADMIN — Medication 400 MG: at 08:53

## 2020-07-09 RX ADMIN — ATORVASTATIN CALCIUM 80 MG: 80 TABLET, FILM COATED ORAL at 08:53

## 2020-07-09 RX ADMIN — IPRATROPIUM BROMIDE AND ALBUTEROL SULFATE 3 ML: 2.5; .5 SOLUTION RESPIRATORY (INHALATION) at 14:38

## 2020-07-09 RX ADMIN — SODIUM CHLORIDE, PRESERVATIVE FREE 10 ML: 5 INJECTION INTRAVENOUS at 20:56

## 2020-07-09 RX ADMIN — INSULIN LISPRO 2 UNITS: 100 INJECTION, SOLUTION INTRAVENOUS; SUBCUTANEOUS at 12:27

## 2020-07-09 RX ADMIN — TAMSULOSIN HYDROCHLORIDE 0.4 MG: 0.4 CAPSULE ORAL at 20:56

## 2020-07-09 RX ADMIN — DOCUSATE SODIUM 100 MG: 100 CAPSULE ORAL at 20:56

## 2020-07-09 RX ADMIN — ASPIRIN 81 MG: 81 TABLET, COATED ORAL at 08:53

## 2020-07-09 RX ADMIN — DOCUSATE SODIUM 100 MG: 100 CAPSULE ORAL at 08:53

## 2020-07-09 RX ADMIN — BUDESONIDE 0.5 MG: 0.5 INHALANT RESPIRATORY (INHALATION) at 19:31

## 2020-07-09 RX ADMIN — CLONAZEPAM 0.5 MG: 0.5 TABLET ORAL at 06:03

## 2020-07-09 RX ADMIN — VITAMIN D, TAB 1000IU (100/BT) 1000 UNITS: 25 TAB at 08:53

## 2020-07-09 RX ADMIN — SODIUM CHLORIDE, PRESERVATIVE FREE 10 ML: 5 INJECTION INTRAVENOUS at 08:55

## 2020-07-09 RX ADMIN — INSULIN LISPRO 2 UNITS: 100 INJECTION, SOLUTION INTRAVENOUS; SUBCUTANEOUS at 17:58

## 2020-07-09 NOTE — THERAPY TREATMENT NOTE
Patient Name: Felicita Mike  : 1950    MRN: [id number]                              Today's Date: 2020       Admit Date: 2020    Visit Dx:     ICD-10-CM ICD-9-CM   1. Acute kidney injury (CMS/HCC) N17.9 584.9   2. Generalized abdominal pain R10.84 789.07   3. Abnormal lithium level in blood R78.89 790.6     Patient Active Problem List   Diagnosis   • Elevated prostate specific antigen (PSA)   • Coronary arteriosclerosis in native artery   • Bipolar affective disorder (CMS/HCC)   • Chronic obstructive pulmonary disease (CMS/HCC)   • Colon polyp   • Constipation   • Type 2 diabetes mellitus without complication, with long-term current use of insulin (CMS/HCC)   • GERD (gastroesophageal reflux disease)   • Fatigue   • Hearing loss   • Hyperlipidemia   • Peripheral vascular disease (CMS/HCC)   • Proteinuria   • Muscle pain   • Low back pain   • Leukocytosis   • Seborrheic eczema   • Sleep apnea   • Tinea corporis   • Tremor   • Hypertension   • Anxiety   • Benign prostatic hyperplasia with lower urinary tract symptoms   • Tobacco abuse   • Normocytic anemia   • Thyroid nodule   • Iron deficiency anemia   • Adenomatous polyp of colon   • Family history of malignant neoplasm of colon   • Gastroesophageal reflux disease   • COPD with acute exacerbation (CMS/HCC)   • Influenza A   • SWAPNIL (acute kidney injury) (CMS/HCC)   • Essential tremor   • Magnesium deficiency   • Long-term current use of lithium   • Acute kidney injury (CMS/HCC)   • Hyponatremia   • Lithium toxicity     Past Medical History:   Diagnosis Date   • Abnormal PSA    • Acute myocardial infarction (CMS/HCC) 2004    Stents   • Apnea, sleep    • Atherosclerotic heart disease    • Chronic pain     Chronic low back paoin, MRI ,  L3 compression deformity, L2 degenerative disc disease and L5-S1 degenerative disc disease.   • Colon polyp    • Constipation    • COPD (chronic obstructive pulmonary disease) (CMS/HCC)    • Coronary artery disease     • Depressed bipolar II disorder (CMS/[facility])    • Diabetes mellitus (CMS/[facility])    • Essential tremor    • Fatigue    • GERD (gastroesophageal reflux disease)    • H/O transfusion of packed red blood cells    • Health care maintenance    • Hearing loss    • History of back pain    • Hypercholesterolemia    • Hyperlipidemia    • Hypertension    • Leukocytosis    • Osteoarthritis    • Proteinuria    • PVD (peripheral vascular disease) (CMS/[facility])    • Sebaceous cyst    • Seborrheic dermatitis    • Sleep apnea    • Tinea corporis    • Tobacco use    • Tremor      Past Surgical History:   Procedure Laterality Date   • ANGIOPLASTY      Cath Stent Placement   • COLONOSCOPY  01/22/2014    ta polyps   • COLONOSCOPY N/A 11/29/2016    Procedure: COLONOSCOPY TO CECUM AND TERMINAL ILEUM WITH HOT POLYPECTOMIES;  Surgeon: Ivette Franco MD;  Location: [facility] ENDOSCOPY;  Service:    • COLONOSCOPY N/A 12/16/2019    Procedure: COLONOSCOPY to cecum with hot snare, cold biopsy polypectomies with clip placement x2;  Surgeon: Ivette Franco MD;  Location: [facility] ENDOSCOPY;  Service: Gastroenterology   • CORONARY STENT PLACEMENT  2004   • ENDOSCOPY N/A 12/16/2019    Procedure: ESOPHAGOGASTRODUODENOSCOPY with biopsies;  Surgeon: Ivette Franco MD;  Location: [facility] ENDOSCOPY;  Service: Gastroenterology   • EYE SURGERY      Cataract Surgery   • HAND SURGERY       General Information     Row Name 07/09/20 1131          PT Evaluation Time/Intention    Document Type  therapy note (daily note)  -AR     Mode of Treatment  physical therapy  -AR     Row Name 07/09/20 1131          General Information    Patient Profile Reviewed?  yes  -AR     Existing Precautions/Restrictions  fall;oxygen therapy device and L/min  -AR     Barriers to Rehab  none identified  -AR     Row Name 07/09/20 1131          Relationship/Environment    Lives With  alone  -AR     Row Name 07/09/20 1131          Resource/Environmental Concerns    Current Living  Arrangements  home/apartment/condo  -AR     Row Name 07/09/20 1131          Cognitive Assessment/Intervention- PT/OT    Cognitive Assessment/Intervention Comment  lethargic, not answering questions, difficulty following directions today  -AR     Row Name 07/09/20 1131          Safety Issues, Functional Mobility    Safety Issues Affecting Function (Mobility)  ability to follow commands;at risk behavior observed;insight into deficits/self awareness;judgment;problem solving  -AR       User Key  (r) = Recorded By, (t) = Taken By, (c) = Cosigned By    Initials Name Provider Type    AR Rnad Gray, PT Physical Therapist        Mobility     Row Name 07/09/20 1132          Bed Mobility Assessment/Treatment    Bed Mobility Assessment/Treatment  supine-sit;sit-supine  -AR     Supine-Sit [place] (Bed Mobility)  minimum assist (75% patient effort)  -AR     Sit-Supine [place] (Bed Mobility)  moderate assist (50% patient effort)  -AR     Comment (Bed Mobility)  increased time with near constant verbal cues   -AR     Row Name 07/09/20 1132          Transfer Assessment/Treatment    Comment (Transfers)  sit<>stand twice  -AR     Row Name 07/09/20 1132          Sit-Stand Transfer    Sit-Stand [place] (Transfers)  minimum assist (75% patient effort)  -AR     Assistive Device (Sit-Stand Transfers)  walker, front-wheeled  -AR     Row Name 07/09/20 1132          Gait/Stairs Assessment/Training    Distance in Feet (Gait)  1 steps towards chair then sat back down on EOB w/o warning.  little safety awareness, following few commands.    -AR     Pattern (Gait)  swing-to  -AR     Comment (Gait/Stairs)  tremors, jerking and knees buckling throughout PT   -AR       User Key  (r) = Recorded By, (t) = Taken By, (c) = Cosigned By    Initials Name Provider Type    AR Rand Gray, PT Physical Therapist        Obj/Interventions     Row Name 07/09/20 1133          Static Sitting Balance    Level of [place] (Unsupported  Sitting, Static Balance)  contact guard assist  -AR     Sitting Position (Unsupported Sitting, Static Balance)  sitting on edge of bed  -AR     Time Able to Maintain Position (Unsupported Sitting, Static Balance)  more than 5 minutes  -AR     Row Name 07/09/20 1133          Dynamic Sitting Balance    Level of [place], Reaches Outside Midline (Sitting, Dynamic Balance)  contact guard assist  -AR     Sitting Position, Reaches Outside Midline (Sitting, Dynamic Balance)  sitting on edge of bed  -AR     Row Name 07/09/20 1133          Static Standing Balance    Level of [place] (Supported Standing, Static Balance)  minimal assist, 75% patient effort  -AR     Assistive Device Utilized (Supported Standing, Static Balance)  walker, rolling  -AR     Row Name 07/09/20 1133          Dynamic Standing Balance    Level of [place], Reaches Outside Midline (Standing, Dynamic Balance)  moderate assist, 50 to 74% patient effort  -AR     Assistive Device Utilized (Supported Standing, Dynamic Balance)  walker, rolling  -AR       User Key  (r) = Recorded By, (t) = Taken By, (c) = Cosigned By    Initials Name Provider Type    Rand Guerrero, PT Physical Therapist        Goals/Plan    No documentation.       Clinical Impression     Row Name 07/09/20 1134          Pain Assessment    Additional Documentation  Pain Scale: Numbers Pre/Post-Treatment (Group)  -AR     [facility] Name 07/09/20 1134          Pain Scale: Numbers Pre/Post-Treatment    Pre/Post Treatment Pain Comment  denies pain throughout PT, does not report any pain/distress throughout mobility  -AR     Pain Intervention(s)  Repositioned  -AR     Row Name 07/09/20 1133          Plan of Care Review    Plan of Care Reviewed With  patient  -AR     Outcome Summary  Declined independence w/ mobility and activity tolerance during PT today.  Able to  take one step away from bed but then impulsively return to sitting EOB.  Tremors and knees buckling throuoghout PT.  Difficulty following instructions or answering any questions.  Pt had little self or safety awareness during PT today.  Recommend DC to SNU, CCP aware.   -AR     Row Name 07/09/20 1134          Physical Therapy Clinical Impression    Patient/Family Goals Statement (PT Clinical Impression)  unable to state today   -AR     Rehab Potential (PT Clinical Summary)  good, to achieve stated therapy goals  -AR     Row Name 07/09/20 1134          Vital Signs    Pre SpO2 (%)  -- not on monitor  -AR     O2 Delivery Pre Treatment  supplemental O2  -AR     O2 Delivery Intra Treatment  supplemental O2  -AR     O2 Delivery Post Treatment  supplemental O2  -AR     Row Name 07/09/20 1134          Positioning and Restraints    Pre-Treatment Position  in bed  -AR     Post Treatment Position  bed  -AR     In Bed  notified nsg;supine;call light within reach;encouraged to call for assist;exit alarm on;with other staff  -AR       User Key  (r) = Recorded By, (t) = Taken By, (c) = Cosigned By    Initials Name Provider Type    Rand Guerrero, PT Physical Therapist        Outcome Measures     Row Name 07/09/20 1137          How much help from another person do you currently need...    Turning from your back to your side while in flat bed without using bedrails?  3  -AR     Moving from lying on back to sitting on the side of a flat bed without bedrails?  2  -AR     Moving to and from a bed to a chair (including a wheelchair)?  2  -AR     Standing up from a chair using your arms (e.g., wheelchair, bedside chair)?  3  -AR     Climbing 3-5 steps with a railing?  1  -AR     To walk in hospital room?  1  -AR     AM-PAC 6 Clicks Score (PT)  12  -AR     Row Name 07/09/20 1137          Functional Assessment    Outcome Measure Options  AM-PAC 6 Clicks Basic Mobility (PT)  -AR       User Key  (r) = Recorded By, (t) = Taken By, (c) = Cosigned By    Initials Name Provider Type    Rand Guerrero, PT Physical Therapist        Physical Therapy  Education                 Title: PT OT SLP Therapies (Done)     Topic: Physical Therapy (Done)     Point: Mobility training (Done)     Description:   Instruct learner(s) on safety and technique for assisting patient out of bed, chair or wheelchair.  Instruct in the proper use of assistive devices, such as walker, crutches, cane or brace.              Patient Friendly Description:   It's important to get you on your feet again, but we need to do so in a way that is safe for you. Falling has serious consequences, and your personal safety is the most important thing of all.        When it's time to get out of bed, one of us or a family member will sit next to you on the bed to give you support.     If your doctor or nurse tells you to use a walker, crutches, a cane, or a brace, be sure you use it every time you get out of bed, even if you think you don't need it.    Learning Progress Summary           Patient Acceptance, E, VU by AR at 7/9/2020 1137    Acceptance, E, VU by AG at 7/8/2020 1701    Acceptance, E,TB,D, VU,NR by  at 7/8/2020 1135                   Point: Body mechanics (Done)     Description:   Instruct learner(s) on proper positioning and spine alignment for patient and/or caregiver during mobility tasks and/or exercises.              Learning Progress Summary           Patient Acceptance, E, VU by AR at 7/9/2020 1137    Acceptance, E, VU by AG at 7/8/2020 1701    Acceptance, E,TB,D, VU,NR by  at 7/8/2020 1135                   Point: Precautions (Done)     Description:   Instruct learner(s) on prescribed precautions during mobility and gait tasks              Learning Progress Summary           Patient Acceptance, E, VU by AR at 7/9/2020 1137    Acceptance, E, VU by AG at 7/8/2020 1701    Acceptance, E,TB,D, VU,NR by  at 7/8/2020 1135                               User Key     Initials Effective Dates Name Provider Type Discipline     04/03/18 -  Karen Brady, PT Physical Therapist PT    AR  04/03/18 -  Rand Gray, PT Physical Therapist PT    AG 01/16/20 -  Mendez Fairchild, RN Registered Nurse Nurse              PT Recommendation and Plan     Outcome Summary/Treatment Plan (PT)  Anticipated Discharge Disposition (PT): skilled nursing facility  Plan of Care Reviewed With: patient  Outcome Summary: Declined independence w/ mobility and activity tolerance during PT today.  Able to  take one step away from bed but then impulsively return to sitting EOB.  Tremors and knees buckling throuoghout PT. Difficulty following instructions or answering any questions.  Pt had little self or safety awareness during PT today.  Recommend DC to SNU, CCP aware.      Time Calculation:   PT Charges     Row Name 07/09/20 1131             Time Calculation    Start Time  1108  -AR      Stop Time  1131  -AR      Time Calculation (min)  23 min  -AR      PT Received On  07/09/20  -AR      PT - Next Appointment  07/10/20  -AR        User Key  (r) = Recorded By, (t) = Taken By, (c) = Cosigned By    Initials Name Provider Type    AR Rand Gray, PT Physical Therapist        Therapy Charges for Today     Code Description Service Date Service Provider Modifiers Qty    32698837795 HC PT THER PROC EA 15 MIN 7/9/2020 Rand Gray, PT GP 2          PT G-Codes  Outcome Measure Options: AM-PAC 6 Clicks Basic Mobility (PT)  AM-PAC 6 Clicks Score (PT): 12    Rand Gray PT  7/9/2020

## 2020-07-09 NOTE — DISCHARGE PLACEMENT REQUEST
"Felicita Mike (69 y.o. Male)     Date of Birth Social Security Number Address Home Phone MRN    [id number]  [id number]  [address] [facility] [address] [phone] [id number]    [facility] Mar[facility] Status          None        Admission Date Admission Type Admitting Provider Attending Provider Department, Room/Bed    7/6/20 Emergency René Pace MD Nguyen, Minh Loc, MD [address], [address]    Discharge Date Discharge Disposition Discharge Destination                       Attending Provider:  René Pace MD    Allergies:  Clopidogrel    Isolation:  None   Infection:  None   Code Status:  CPR    Ht:  170.2 cm (67\")   Wt:  81.6 kg (179 lb 14.3 oz)    Admission Cmt:  None   Principal Problem:  Acute kidney injury (CMS/HCC) [N17.9]                 Active Insurance as of 7/6/2020     Primary Coverage     Payor Plan Insurance Group Employer/Plan Group    MEDICARE MEDICARE A & B      Payor Plan Address Payor Plan Phone Number Payor Plan Fax Number Effective Dates    [address] [phone]  2/1/2011 - None Entered    [facility] [id number]       Subscriber Name Subscriber Birth Date Member ID       FELICITA MIKE 1950 [id number]           Secondary Coverage     Payor Plan Insurance Group Employer/Plan Group    [employer] [employer]      Payor Plan Address Payor Plan Phone Number Payor Plan Fax Number Effective Dates    [address] [phone]  11/1/2017 - None Entered    [facility] [id number]       Subscriber Name Subscriber Birth Date Member ID       FELICITA MIKE 1950 [id number]                 Emergency Contacts      (Rel.) Home Phone Work Phone Mobile Phone    BIMAL LIN (Daughter) -- -- [phone]            {Outbreak/Travel/Exposure Documentation......;  Question Available Choices Patient Response   Outbreak Screen: Do you currently have the following symptoms?        Fever, Cough, Runny nose, Congestion, Diarrhea, " Nausea/Vomiting,Shortness of breath, Recent sudden loss of taste or smell, chills, sore throat, Muscle aches, New onset headache, No, Unknown  No (07/07/20 0925)   Outbreak Screen: In the last 14 days, have you had contact with anyone who is ill, has show any of the symptoms listed above and/or has been diagnosis with the 2019 Novel Coronavirus? This includes any immediate household members but excludes any patients with whom you have been in contact within your normal work duties wearing proper PPE, if you are a healthcare worker.  Yes, No, Unknown              No (07/07/20 0925)   Outbreak Screen: Who was notified?    Free text  (not recorded)   Travel Screen: Have you traveled in the last month? If so, to what country have you traveled? If [place] what state? Yes, No, Unknown  List of all countries  List of all States No (07/07/20 0925)  (not recorded)  (not recorded)   Infection Risk: Do you currently have the following symptoms?  (If cough is selected, the Tuberculosis Screen is performed.) Cough, Fever, Rash, No No (07/07/20 0925)   Tuberculosis Screen: Do you have any of the following Tuberculosis Risks?  · Have you lived or spent time with anyone who had or may have TB?  · Have you lived in or visited any of the following areas for more than one month: [place], [place], [place], [place] or [place], the [place] or [place]?  · Do you have HIV/AIDS?  · Have you lived in or worked in a nursing home, homeless shelter, correctional facility, or substance abuse treatment facility?   · No    If Yes do you have any of the following symptoms? Yes responses display to the right    If Yes, symptoms listed are:  Cough greater than or equal to 3 weeks, Loss of appetite, Unexplained weight loss, Night sweats, Bloody sputum or hemoptysis, Hoarseness, Fever, Fatigue, Chest pain, No (not recorded)  (not recorded)   Exposure Screen: Have you been exposed to any of these contagious diseases in the last month? Measles,  Chickenpox, Meningitis, Pertussis, Whooping Cough, No No (07/07/20 3424)

## 2020-07-09 NOTE — PROGRESS NOTES
Continued Stay Note  [facility]     Patient Name: Felicita Mike  MRN: [id number]  Today's Date: 7/9/2020    Admit Date: 7/6/2020    Discharge Plan     Row Name 07/09/20 1246       Plan    Plan Comments  Left a second message for reina Farfan. Yamel Rodriguez RN    Row Name 07/09/20 1042       Plan    Plan Comments  Met with patient at the bedside. Discussed DC plans and SNF. Patient refusing to go to rehab at DC stating he can take care of himself and does not need rehab. CCP explained it is indefinately but that it would just be to get him stronger so he is able to return home. Patient still refused. CCP asked if it was ok to call reina Farfan and discuss rehab with her. Patient was agreeable. Outbound call to reina Farfan. Left HIPAA compliant VM. Awaiting return call. Yamel Rodriguez RN        Discharge Codes    No documentation.             Yamel Rodriguez RN

## 2020-07-09 NOTE — NURSING NOTE
AC follow up.    Pt sound asleep upon approach. He briefly awoke but was too groggy to speak with this RN.    AC will continue to follow.

## 2020-07-09 NOTE — PROGRESS NOTES
DOS: 2020  NAME: Felicita Mike   : 1950  PCP: Alexandrea Jimenez, DANIEL    Chief Complaint   Patient presents with   • Fall   • Tremors   • Abdominal Pain         Subjective: Pt seen in follow up, however the problem is new to me.  Asleep as I entered the room.  Somewhat difficult to arouse but once he did awaken he followed mostly all of my commands.  He thinks his jerking is gotten better.  RN reports jerking is the same and patient has gotten up to go to the bathroom twice and to eat his food otherwise he has been sleeping.  Denies any new weakness, numbness, speech or visual disturbances, or headaches.     Objective:  Vital signs:   Vitals:    20 2322 20 0725 20 0743 20 0748   BP: 164/97 103/88     BP Location:  Right arm     Patient Position:  Sitting     Pulse: 58 61 59 58   Resp:    Temp: 98 °F (36.7 °C) 97.7 °F (36.5 °C)     TempSrc: Oral Oral     SpO2: 90% 91% 91% 93%   Weight:       Height:           Current Facility-Administered Medications:   •  acetaminophen (TYLENOL) tablet 650 mg, 650 mg, Oral, Q4H PRN, 650 mg at 20 **OR** acetaminophen (TYLENOL) 160 MG/5ML solution 650 mg, 650 mg, Oral, Q4H PRN **OR** acetaminophen (TYLENOL) suppository 650 mg, 650 mg, Rectal, Q4H PRN, Valarie Mendoza APRN  •  amLODIPine (NORVASC) tablet 10 mg, 10 mg, Oral, Q24H, Karlo Escobedo MD, 10 mg at 20  •  aspirin EC tablet 81 mg, 81 mg, Oral, Daily, Karlo Escobedo MD, 81 mg at 20  •  atorvastatin (LIPITOR) tablet 80 mg, 80 mg, Oral, Daily, Valarie Mendoza APRN, 80 mg at 20  •  bisacodyl (DULCOLAX) EC tablet 10 mg, 10 mg, Oral, Daily PRN, Karlo Escobedo MD  •  budesonide (PULMICORT) nebulizer solution 0.5 mg, 0.5 mg, Nebulization, BID - RT, Karlo Escobedo MD, 0.5 mg at 20 0753  •  carvedilol (COREG) tablet 12.5 mg, 12.5 mg, Oral, BID With Meals, Valarie Mendoza, APRN, 12.5 mg at 20 0853  •   cholecalciferol (VITAMIN D3) tablet 1,000 Units, 1,000 Units, Oral, Daily, Valarie Mendoza APRN, 1,000 Units at 07/09/20 0853  •  clonazePAM (KlonoPIN) tablet 0.5 mg, 0.5 mg, Oral, Q8H, Argelia Casiano G, APRN, 0.5 mg at 07/09/20 0603  •  dextrose (D50W) 25 g/ 50mL Intravenous Solution 25 g, 25 g, Intravenous, Q15 Min PRN, Valarie Mendoza APRN  •  dextrose (GLUTOSE) oral gel 15 g, 15 g, Oral, Q15 Min PRN, Valarie Mendoza APRN  •  docusate sodium (COLACE) capsule 100 mg, 100 mg, Oral, BID, Karlo Escobedo MD, 100 mg at 07/09/20 0853  •  enoxaparin (LOVENOX) syringe 40 mg, 40 mg, Subcutaneous, Q24H, Karlo Escobedo MD, 40 mg at 07/09/20 0852  •  glucagon (human recombinant) (GLUCAGEN DIAGNOSTIC) injection 1 mg, 1 mg, Subcutaneous, Q15 Min PRN, Valarie Mendoza APRN  •  haloperidol lactate (HALDOL) injection 5 mg, 5 mg, Intramuscular, Q6H PRN, Juan Carlos Murillo III, MD  •  insulin lispro (humaLOG) injection 0-7 Units, 0-7 Units, Subcutaneous, TID AC, Valarie Mendoza APRN, 2 Units at 07/08/20 1211  •  ipratropium-albuterol (DUO-NEB) nebulizer solution 3 mL, 3 mL, Nebulization, 4x Daily - RT, Karlo Escobedo MD, 3 mL at 07/09/20 0754  •  lactated ringers infusion, 125 mL/hr, Intravenous, Continuous, René Pace MD, Last Rate: 125 mL/hr at 07/08/20 1211, 125 mL/hr at 07/08/20 1211  •  LORazepam (ATIVAN) tablet 1 mg, 1 mg, Oral, Q6H PRN, Karlo Escobedo MD  •  magnesium oxide (MAG-OX) tablet 400 mg, 400 mg, Oral, Daily, Vaalrie Mendoza, GARIMA, 400 mg at 07/09/20 0853  •  nitroglycerin (NITROSTAT) SL tablet 0.4 mg, 0.4 mg, Sublingual, Q5 Min PRN, Valarie Mendoza, GARIMA  •  ondansetron (ZOFRAN) injection 4 mg, 4 mg, Intravenous, Q6H PRN, Valarie Mendoza, GARIMA  •  pantoprazole (PROTONIX) EC tablet 40 mg, 40 mg, Oral, Q AM, Karlo Escobedo MD, 40 mg at 07/09/20 0603  •  polyethylene glycol (MIRALAX) packet 17 g, 17 g, Oral, Daily PRN, Valarie Mendoza, APRN  •   primidone (MYSOLINE) tablet 50 mg, 50 mg, Oral, Q12H, Karlo Escobedo MD, 50 mg at 07/09/20 0854  •  sodium chloride 0.9 % flush 10 mL, 10 mL, Intravenous, Q12H, Valarie Mendoza APRN, 10 mL at 07/09/20 0855  •  sodium chloride 0.9 % flush 10 mL, 10 mL, Intravenous, PRN, Valarie Mendoza APRN  •  tamsulosin (FLOMAX) 24 hr capsule 0.4 mg, 0.4 mg, Oral, Nightly, Karlo Escobedo MD, 0.4 mg at 07/08/20 2007  •  vitamin B-12 (CYANOCOBALAMIN) tablet 1,000 mcg, 1,000 mcg, Oral, Daily, Valarie Mendoza APRN, 1,000 mcg at 07/09/20 0853    PRN meds  •  acetaminophen **OR** acetaminophen **OR** acetaminophen  •  bisacodyl  •  dextrose  •  dextrose  •  glucagon (human recombinant)  •  haloperidol lactate  •  LORazepam  •  nitroglycerin  •  ondansetron  •  polyethylene glycol  •  sodium chloride    No current facility-administered medications on file prior to encounter.      Current Outpatient Medications on File Prior to Encounter   Medication Sig   • acetaminophen (TYLENOL) 325 MG tablet Take 2 tablets by mouth Every 6 (Six) Hours As Needed for Mild Pain .   • albuterol (PROVENTIL) (5 MG/ML) 0.5% nebulizer solution Take 2.5 mg by nebulization Every 6 (Six) Hours As Needed for Wheezing.   • amLODIPine (NORVASC) 10 MG tablet Take 1 tablet by mouth Daily. For BP   • aspirin 81 MG tablet Take 1 tablet by mouth Daily.   • atorvastatin (Lipitor) 80 MG tablet Take 1 tablet by mouth Daily. For cholesterol   • carvedilol (COREG) 25 MG tablet Take 0.5 tablets by mouth 2 (Two) Times a Day With Meals. For heart   • cholecalciferol (VITAMIN D3) 1000 UNITS tablet Take 1 tablet by mouth 2 (two) times a day.   • Cyanocobalamin (B-12) 1000 MCG capsule Take 1 tablet by mouth Daily.   • FLUoxetine (PROzac) 20 MG capsule Take 3 capsules by mouth Daily. For anxiety and depression   • lisinopril (PRINIVIL,ZESTRIL) 20 MG tablet Take 1 tablet by mouth Daily. For BP and heart   • lithium carbonate 150 MG capsule One PO in AM and 2 PO  in evening for mood   • MAGNESIUM-OXIDE 400 (241.3 Mg) MG tablet tablet TAKE 1 TABLET BY MOUTH DAILY   • metFORMIN (GLUCOPHAGE) 1000 MG tablet Take 1 tablet by mouth 2 (Two) Times a Day. For DMII   • omeprazole (PrilOSEC) 20 MG capsule Take 1 capsule by mouth Daily.   • polyethylene glycol (MIRALAX) packet Take 17 g by mouth Daily As Needed.   • primidone (MYSOLINE) 50 MG tablet Take 1 tablet by mouth Every Night.   • promethazine (PHENERGAN) 25 MG tablet Take 1 tablet by mouth Every 8 (Eight) Hours As Needed for Nausea.   • QUEtiapine (SEROquel) 100 MG tablet Take 1 tablet by mouth Every Night. For sleep   • tamsulosin (FLOMAX) 0.4 MG capsule 24 hr capsule Take 1 capsule by mouth Daily. For urine flow   • wheat dextrin (BENEFIBER ON THE GO) powder powder Take 1 each by mouth Daily As Needed.       General appearance: Elderly male, appears older than stated age, NAD, mild lethargy, mostly cooperative  HEENT: Normocephalic, atraumatic, PERRL, no masses or tenderness  COR: RRR  Resp: Even and mildly labored with nasal cannula in place  Extremities: No obvious edema.  Skin: warm, dry    Neurological:   MS: oriented to self, hospital name, city and state, recent/remote memory impaired, decreased attention/concentration, language intact, no neglect  CN: visual fields full, PERRL, EOMI, no facial droop, hearing symmetric, palate elevates symmetrically, shoulder shrug equal, tongue midline  Motor: 5/5 in uppers, 3/5 BLE, mild to moderate myoclonus in bilateral upper and lower extremities, action tremor in upper extremities  Reflexes: absent in lowers, trace in upper ext.  Sensory: light touch sensation intact in all 4 ext.  Coordination: mildly abnormal finger to nose test 2/2 tremor  Gait and station: AL patient too drowsy to assess  Rapid alternating movements: normal finger to thumb tap    Laboratory results:  Lab Results   Component Value Date    TSH 3.160 07/07/2020     Lab Results   Component Value Date     AERRGNBY04 >2000 (H) 12/05/2019     Lab Results   Component Value Date    HGBA1C 6.00 (H) 07/07/2020     Lab Results   Component Value Date    GLUCOSE 124 (H) 07/09/2020    BUN 7 (L) 07/09/2020    CREATININE 0.82 07/09/2020    EGFRIFNONA 94 07/09/2020    EGFRIFAFRI 92 12/05/2019    BCR 8.5 07/09/2020    K 3.9 07/09/2020    CO2 19.2 (L) 07/09/2020    CALCIUM 10.5 07/09/2020    PROTENTOTREF 6.7 12/05/2019    ALBUMIN 4.30 07/06/2020    LABIL2 2.2 12/05/2019    AST 8 07/06/2020    ALT 8 07/06/2020     Lab Results   Component Value Date    WBC 11.36 (H) 07/09/2020    HGB 10.1 (L) 07/09/2020    HCT 31.1 (L) 07/09/2020    MCV 86.4 07/09/2020     07/09/2020     Brief Urine Lab Results  (Last result in the past 365 days)      Color   Clarity   Blood   Leuk Est   Nitrite   Protein   CREAT   Urine HCG        07/06/20 1554 Yellow Clear Negative Trace Negative Negative             No results found for: ACANTHNAEG, AFBCX, BPERTUSSISCX, BLOODCX  No results found for: BCIDPCR, CXREFLEX, CSFCX, CULTURETIS  No results found for: CULTURES, HSVCX, URCX  No results found for: EYECULTURE, GCCX, LABHSV  No results found for: LEGIONELLA, MRSACX, MUMPSCX, MYCOPLASCX  No results found for: NOCARDIACX, STOOLCX  No results found for: THROATCX, UNSTIMCULT, URINECX, CULTURE, VZVCULTUR  No results found for: VIRALCULTU, WOUNDCX  Pain Management Panel     There is no flowsheet data to display.          Review and interpretation of imaging:  Ct Abdomen Pelvis Without Contrast    Result Date: 7/7/2020  1.  Small amount of nonspecific free intraperitoneal fluid. 2.  Compression deformity at the L3 vertebral body is favored be chronic as above. However, correlation with patient history and point tenderness recommended to exclude an acute etiology. Findings can be further evaluated with MRI if clinically indicated. 3.  Other findings as above.  The above findings were discussed with Samantha Jamison by telephone by Fito Brunson at 4:40 PM on   07/06/2020.  This report was finalized on 7/7/2020 7:33 AM by Dr. Fito Brunson M.D.      Xr Chest 1 View    Result Date: 7/7/2020  No acute cardiopulmonary process identified.  This report was finalized on 7/7/2020 8:02 AM by Dr. Sajan Golden M.D.      Results for orders placed during the hospital encounter of 12/23/19   Adult Transthoracic Echo Complete W/ Cont if Necessary Per Protocol    Narrative · Estimated EF = 66%.  · Left ventricular systolic function is normal.  · Left ventricular wall thickness is consistent with borderline concentric   hypertrophy.          Impression/Assessment:  This is a 69-year-old male with a past medical history of CAD, hypertension, COPD on home O2, hyperlipidemia, PVD, MARTINE, diabetes, essential tremor, bipolar depression, chronic pain syndrome who presented to the hospital on 7/6/2020 with complaints of suffering a fall and abdominal pain.  He was found to have multiple electrolyte abnormalities, lithium toxicity (2.4) and possible serotonin syndrome therefore his lithium, Seroquel, and Prozac were held, and SWAPNIL.  Our service was consulted for worsening of his essential tremor. . Na 130. K 4.5, Cr. 2.26    Diagnosis: Myoclonus, acute kidney injury, lithium toxicity, electrolyte disturbances, history of essential tremor     Plan:  D/C Klonopin as I think this is contributing to his lethargy and myoclonus at this time does not seem to be very severe. This should continue to improve as his kidney injury, electrolyte imbalances, and lithium toxicity have improved along with his liver enzymes.   Would continue Primidone.  He has f/u with my colleague Taylor PAINTING on 7/13 that he should keep.  We will sign off, will see again per request.    Case discussed with patient, RN, Dr. Pace, and Dr. Richter, and he agrees with plan above.   GARIMA Leonardo

## 2020-07-09 NOTE — PROGRESS NOTES
Continued Stay Note  [facility]     Patient Name: Felicita Mike  MRN: [id number]  Today's Date: 7/9/2020    Admit Date: 7/6/2020    Discharge Plan     Row Name 07/09/20 1329       Plan    Plan  SNF    Patient/Family in Agreement with Plan  yes    Plan Comments  Inbound call from reina Farfan. She was unaware patient was in the hospital. CCP explained to Naheed why patient was here and how he is currently performing with PT. Explained he is not safe to be home alone unti lhe is stronger. Naheed verbalized understanding. Naheed was agreeable to referrals in the [facility]. Referrals placed in Epic. Patient will require WC van vs EMS. Partial packeti n CCP office. Yamel Rodriguez RN    Row Name 07/09/20 1246       Plan    Plan Comments  Left a second message for reina Farfan. Yamel Rodriguez RN    Row Name 07/09/20 1042       Plan    Plan Comments  Met with patient at the bedside. Discussed DC plans and SNF. Patient refusing to go to rehab at DC stating he can take care of himself and does not need rehab. CCP explained it is indefinately but that it would just be to get him stronger so he is able to return home. Patient still refused. CCP asked if it was ok to call reina Farfan and discuss rehab with her. Patient was agreeable. Outbound call to reina Farfan. Left HIPAA compliant . Awaiting return call. Yamel Rodriguez RN        Discharge Codes    No documentation.             Yamel Rodriguez RN

## 2020-07-09 NOTE — PLAN OF CARE
Problem: Patient Care Overview  Goal: Plan of Care Review  Flowsheets (Taken 7/9/2020 7188)  Outcome Summary: Declined independence w/ mobility and activity tolerance during PT today.  Able to  take one step away from bed but then impulsively return to sitting EOB.  Tremors and knees buckling throuoghout PT. Difficulty following instructions or answering any questions.  Pt had little self or safety awareness during PT today.  Recommend DC to SNU, CCP aware.      ..Patient was wearing a face mask during this therapy encounter. Therapist used appropriate personal protective equipment including mask and gloves.  Mask used was standard procedure mask. Appropriate PPE was worn during the entire therapy session. Hand hygiene was completed before and after therapy session. Patient is not in enhanced droplet precautions.

## 2020-07-09 NOTE — PROGRESS NOTES
[facility]     LOS: 3 days     Name: Felicita Mike  Age: 69 y.o.  Sex: male  :  1950  MRN: [id number]         Primary Care Physician: Alexandrea Jimenez, DANIEL    Diet Regular; Cardiac, Consistent Carbohydrate, Renal    Subjective    No complaints today.  Still some tremors but I do not see any myoclonus.  He is drowsy    Review of Systems   Constitutional: Negative for fever.   Respiratory: Negative for cough and shortness of breath.    Cardiovascular: Negative for chest pain.   Gastrointestinal: Negative for abdominal pain.   Neurological: Positive for tremors.     Objective   Temp:  [97.1 °F (36.2 °C)-98 °F (36.7 °C)] 97.7 °F (36.5 °C)  Heart Rate:  [58-70] 58  Resp:  [18-20] 20  BP: (103-164)/() 103/88  SpO2:  [85 %-93 %] 93 %  on  Flow (L/min):  [3] 3;   Device (Oxygen Therapy): nasal cannula  Body mass index is 28.18 kg/m².    Physical Exam   Constitutional: No distress.   Cardiovascular: Normal rate and regular rhythm.   Pulmonary/Chest: Effort normal. No respiratory distress. He has wheezes. He has rhonchi.   Abdominal: Soft. Bowel sounds are normal. There is no tenderness. There is no rebound and no guarding.   Musculoskeletal: He exhibits no edema.   Neurological: He displays tremor.   drowsy but appropriate and following commands and moving all extremities   Skin: Skin is warm and dry.   Psychiatric: He has a normal mood and affect. His behavior is normal.   Nursing note and vitals reviewed.    Reviewed medications and new clinical results    Scheduled Meds    amLODIPine 10 mg Oral Q24H   aspirin 81 mg Oral Daily   atorvastatin 80 mg Oral Daily   budesonide 0.5 mg Nebulization BID - RT   carvedilol 12.5 mg Oral BID With Meals   cholecalciferol 1,000 Units Oral Daily   docusate sodium 100 mg Oral BID   enoxaparin 40 mg Subcutaneous Q24H   insulin lispro 0-7 Units Subcutaneous TID AC   ipratropium-albuterol 3 mL Nebulization 4x Daily - RT      magnesium oxide 400 mg Oral Daily   pantoprazole 40 mg Oral Q AM   primidone 50 mg Oral Q12H   sodium chloride 10 mL Intravenous Q12H   tamsulosin 0.4 mg Oral Nightly   vitamin B-12 1,000 mcg Oral Daily     Continuous Infusions    lactated ringers 125 mL/hr Last Rate: 125 mL/hr (07/08/20 1211)     PRN Meds  •  acetaminophen **OR** acetaminophen **OR** acetaminophen  •  bisacodyl  •  dextrose  •  dextrose  •  glucagon (human recombinant)  •  haloperidol lactate  •  LORazepam  •  nitroglycerin  •  ondansetron  •  polyethylene glycol  •  sodium chloride    Results from last 7 days   Lab Units 07/09/20  0421 07/07/20  0612 07/06/20  1504   WBC 10*3/mm3 11.36* 11.24* 10.94*   HEMOGLOBIN g/dL 10.1* 10.5* 10.5*   PLATELETS 10*3/mm3 228 223 199     Results from last 7 days   Lab Units 07/09/20  0421 07/08/20  0603 07/07/20  0612 07/06/20  2042 07/06/20  1504   SODIUM mmol/L 137 136 134* 133* 130*   POTASSIUM mmol/L 3.9 4.4 4.2 4.5 4.5   CHLORIDE mmol/L 105 106 103 103 99   CO2 mmol/L 19.2* 14.4* 20.2* 19.7* 20.3*   BUN mg/dL 7* 13 25* 29* 33*   CREATININE mg/dL 0.82 1.02 1.42* 1.81* 2.26*   CALCIUM mg/dL 10.5 10.2 10.0 9.6 9.5   GLUCOSE mg/dL 124* 130* 103* 91 101*     Lab Results   Component Value Date    ANIONGAP 12.8 07/09/2020     Glucose   Date/Time Value Ref Range Status   07/09/2020 1152 154 (H) 70 - 130 mg/dL Final   07/08/2020 2119 148 (H) 70 - 130 mg/dL Final   07/08/2020 1553 144 (H) 70 - 130 mg/dL Final   07/08/2020 1126 150 (H) 70 - 130 mg/dL Final   07/08/2020 0605 137 (H) 70 - 130 mg/dL Final   07/07/2020 2112 149 (H) 70 - 130 mg/dL Final   07/07/2020 1701 165 (H) 70 - 130 mg/dL Final   07/07/2020 1107 149 (H) 70 - 130 mg/dL Final     Hemoglobin A1C   Date Value Ref Range Status   07/07/2020 6.00 (H) 4.80 - 5.60 % Final     Estimated Creatinine Clearance: 89.4 mL/min (by C-G formula based on SCr of 0.82 mg/dL).    Lab Results   Component Value Date    LITHIUM 1.1 07/09/2020     I personally reviewed  tele    Assessment/Plan   Active Hospital Problems    Diagnosis  POA   • Essential tremor [G25.0]  Yes   • Iron deficiency anemia [D50.9]  Yes   • Coronary arteriosclerosis in native artery [I25.10]  Yes   • Bipolar affective disorder (CMS/[facility]) [F31.9]  Yes   • Chronic obstructive pulmonary disease (CMS/[facility]) [J44.9]  Yes   • Type 2 diabetes mellitus without complication, with long-term current use of insulin (CMS/[facility]) [E11.9, Z79.4]  Not Applicable   • Hyperlipidemia [E78.5]  Yes   • GERD (gastroesophageal reflux disease) [K21.9]  Yes   • Peripheral vascular disease (CMS/[facility]) [I73.9]  Yes   • Sleep apnea [G47.30]  Yes      Resolved Hospital Problems    Diagnosis Date Resolved POA   • **Acute kidney injury (CMS/[facility]) [N17.9] 07/09/2020 Yes   • Hyponatremia [E87.1] 07/09/2020 Unknown   • Lithium toxicity [T56.891A] 07/09/2020 Unknown     69 y.o. male with acute kidney injury and lithium toxicity and possible serotonin syndrome.    · Acute kidney injury resolved, expansion acidosis improved after changing fluids.  · Lithium toxicity: Level normal today.  Psychiatry recommends holding all psychotropic medications at this time, PRN Haldol for agitation.  Would not restart previously prescribed psychotropic medications unless patient able to provide better history of bipolar disorder  · Klonopin was started for mild clonus but he is drowsy today will discontinue  · Rhonchi- check CXR concern for possible aspiration given drowsiness (stopping Klonopin).  WBC mild elevation.  No fever. On O2 now, has a history of COPD exacerbation and hypoxic respiratory failure had influenza in December.  Check blood gas  · Hyponatremia resolved  · Diabetes: A1c 6.  Glucoses 100s  · Disposition to be determined.  · Discussed with patient and nursing staff and Donna Sinclair, neurology GARIMA Pace MD   07/09/20  12:38

## 2020-07-09 NOTE — PLAN OF CARE
VSS, SB on monitor, no signs of distress, no c/o pain, tremors remain the same as before, sleeping between care, IVF d/c, A&O to self/place/situation, up to bedside commode with assist x1, will continue to monitor

## 2020-07-10 LAB
ANION GAP SERPL CALCULATED.3IONS-SCNC: 11.5 MMOL/L (ref 5–15)
BUN SERPL-MCNC: 8 MG/DL (ref 8–23)
BUN/CREAT SERPL: 10.5 (ref 7–25)
CALCIUM SPEC-SCNC: 9.8 MG/DL (ref 8.6–10.5)
CHLORIDE SERPL-SCNC: 107 MMOL/L (ref 98–107)
CO2 SERPL-SCNC: 21.5 MMOL/L (ref 22–29)
CREAT SERPL-MCNC: 0.76 MG/DL (ref 0.76–1.27)
GFR SERPL CREATININE-BSD FRML MDRD: 102 ML/MIN/1.73
GLUCOSE BLDC GLUCOMTR-MCNC: 125 MG/DL (ref 70–130)
GLUCOSE BLDC GLUCOMTR-MCNC: 156 MG/DL (ref 70–130)
GLUCOSE BLDC GLUCOMTR-MCNC: 187 MG/DL (ref 70–130)
GLUCOSE BLDC GLUCOMTR-MCNC: 217 MG/DL (ref 70–130)
GLUCOSE SERPL-MCNC: 122 MG/DL (ref 65–99)
POTASSIUM SERPL-SCNC: 3.4 MMOL/L (ref 3.5–5.2)
POTASSIUM SERPL-SCNC: 4.3 MMOL/L (ref 3.5–5.2)
SODIUM SERPL-SCNC: 140 MMOL/L (ref 136–145)

## 2020-07-10 PROCEDURE — 84132 ASSAY OF SERUM POTASSIUM: CPT | Performed by: HOSPITALIST

## 2020-07-10 PROCEDURE — 94799 UNLISTED PULMONARY SVC/PX: CPT

## 2020-07-10 PROCEDURE — 63710000001 INSULIN LISPRO (HUMAN) PER 5 UNITS: Performed by: NURSE PRACTITIONER

## 2020-07-10 PROCEDURE — 82962 GLUCOSE BLOOD TEST: CPT

## 2020-07-10 PROCEDURE — 80048 BASIC METABOLIC PNL TOTAL CA: CPT | Performed by: HOSPITALIST

## 2020-07-10 PROCEDURE — 25010000002 FUROSEMIDE PER 20 MG: Performed by: HOSPITALIST

## 2020-07-10 PROCEDURE — 97110 THERAPEUTIC EXERCISES: CPT

## 2020-07-10 PROCEDURE — 25010000002 ENOXAPARIN PER 10 MG: Performed by: INTERNAL MEDICINE

## 2020-07-10 RX ORDER — POTASSIUM CHLORIDE 7.45 MG/ML
10 INJECTION INTRAVENOUS
Status: DISCONTINUED | OUTPATIENT
Start: 2020-07-10 | End: 2020-07-13 | Stop reason: HOSPADM

## 2020-07-10 RX ORDER — FUROSEMIDE 10 MG/ML
20 INJECTION INTRAMUSCULAR; INTRAVENOUS ONCE
Status: COMPLETED | OUTPATIENT
Start: 2020-07-10 | End: 2020-07-10

## 2020-07-10 RX ORDER — POTASSIUM CHLORIDE 750 MG/1
40 CAPSULE, EXTENDED RELEASE ORAL AS NEEDED
Status: DISCONTINUED | OUTPATIENT
Start: 2020-07-10 | End: 2020-07-13 | Stop reason: HOSPADM

## 2020-07-10 RX ORDER — POTASSIUM CHLORIDE 1.5 G/1.77G
40 POWDER, FOR SOLUTION ORAL AS NEEDED
Status: DISCONTINUED | OUTPATIENT
Start: 2020-07-10 | End: 2020-07-13 | Stop reason: HOSPADM

## 2020-07-10 RX ADMIN — DOCUSATE SODIUM 100 MG: 100 CAPSULE ORAL at 20:58

## 2020-07-10 RX ADMIN — TAMSULOSIN HYDROCHLORIDE 0.4 MG: 0.4 CAPSULE ORAL at 20:58

## 2020-07-10 RX ADMIN — POTASSIUM CHLORIDE 40 MEQ: 10 CAPSULE, COATED, EXTENDED RELEASE ORAL at 17:40

## 2020-07-10 RX ADMIN — PANTOPRAZOLE SODIUM 40 MG: 40 TABLET, DELAYED RELEASE ORAL at 06:45

## 2020-07-10 RX ADMIN — IPRATROPIUM BROMIDE AND ALBUTEROL SULFATE 3 ML: 2.5; .5 SOLUTION RESPIRATORY (INHALATION) at 21:01

## 2020-07-10 RX ADMIN — IPRATROPIUM BROMIDE AND ALBUTEROL SULFATE 3 ML: 2.5; .5 SOLUTION RESPIRATORY (INHALATION) at 12:17

## 2020-07-10 RX ADMIN — AMLODIPINE BESYLATE 10 MG: 10 TABLET ORAL at 08:31

## 2020-07-10 RX ADMIN — ENOXAPARIN SODIUM 40 MG: 40 INJECTION SUBCUTANEOUS at 08:31

## 2020-07-10 RX ADMIN — POTASSIUM CHLORIDE 40 MEQ: 10 CAPSULE, COATED, EXTENDED RELEASE ORAL at 13:38

## 2020-07-10 RX ADMIN — BUDESONIDE 0.5 MG: 0.5 INHALANT RESPIRATORY (INHALATION) at 21:01

## 2020-07-10 RX ADMIN — SODIUM CHLORIDE, PRESERVATIVE FREE 10 ML: 5 INJECTION INTRAVENOUS at 20:58

## 2020-07-10 RX ADMIN — Medication 400 MG: at 08:31

## 2020-07-10 RX ADMIN — IPRATROPIUM BROMIDE AND ALBUTEROL SULFATE 3 ML: 2.5; .5 SOLUTION RESPIRATORY (INHALATION) at 07:51

## 2020-07-10 RX ADMIN — PRIMIDONE 50 MG: 50 TABLET ORAL at 20:58

## 2020-07-10 RX ADMIN — PRIMIDONE 50 MG: 50 TABLET ORAL at 08:31

## 2020-07-10 RX ADMIN — SODIUM CHLORIDE, PRESERVATIVE FREE 10 ML: 5 INJECTION INTRAVENOUS at 08:31

## 2020-07-10 RX ADMIN — ASPIRIN 81 MG: 81 TABLET, COATED ORAL at 08:31

## 2020-07-10 RX ADMIN — INSULIN LISPRO 2 UNITS: 100 INJECTION, SOLUTION INTRAVENOUS; SUBCUTANEOUS at 12:05

## 2020-07-10 RX ADMIN — BUDESONIDE 0.5 MG: 0.5 INHALANT RESPIRATORY (INHALATION) at 07:52

## 2020-07-10 RX ADMIN — Medication 1000 MCG: at 08:31

## 2020-07-10 RX ADMIN — VITAMIN D, TAB 1000IU (100/BT) 1000 UNITS: 25 TAB at 08:31

## 2020-07-10 RX ADMIN — CARVEDILOL 12.5 MG: 12.5 TABLET, FILM COATED ORAL at 08:31

## 2020-07-10 RX ADMIN — ATORVASTATIN CALCIUM 80 MG: 80 TABLET, FILM COATED ORAL at 08:31

## 2020-07-10 RX ADMIN — IPRATROPIUM BROMIDE AND ALBUTEROL SULFATE 3 ML: 2.5; .5 SOLUTION RESPIRATORY (INHALATION) at 16:06

## 2020-07-10 RX ADMIN — FUROSEMIDE 20 MG: 10 INJECTION, SOLUTION INTRAMUSCULAR; INTRAVENOUS at 13:40

## 2020-07-10 RX ADMIN — INSULIN LISPRO 2 UNITS: 100 INJECTION, SOLUTION INTRAVENOUS; SUBCUTANEOUS at 17:40

## 2020-07-10 RX ADMIN — DOCUSATE SODIUM 100 MG: 100 CAPSULE ORAL at 08:32

## 2020-07-10 RX ADMIN — CARVEDILOL 12.5 MG: 12.5 TABLET, FILM COATED ORAL at 17:40

## 2020-07-10 NOTE — PROGRESS NOTES
Continued Stay Note  [facility]     Patient Name: Felicita Mike  MRN: [id number]  Today's Date: 7/10/2020    Admit Date: 7/6/2020    Discharge Plan     Row Name 07/10/20 1050       Plan    Plan  Skilled bed at [facility]; follow for transportation needs    Plan Comments  CCP spoke to patient and patient's daughter Naheed Horn [phone] regarding SNF referrals. Patient and daughter prefer [facility] for SNF since it is closest to patient's home. CCP [facility]. Packet on desk in CCP office. CCP will continue to follow for progress with PT and for transportation needs at discharge (wheelchair van vs EMS). Brittany HOWARD        Discharge Codes    No documentation.             ERVIN Edmond

## 2020-07-10 NOTE — PLAN OF CARE
Problem: Patient Care Overview  Goal: Plan of Care Review  Outcome: Ongoing (interventions implemented as appropriate)  Flowsheets (Taken 7/10/2020 1126)  Progress: improving  Plan of Care Reviewed With: patient  Outcome Summary: Pt tolerated treatment well this date. Increased overall activity, ambulating 20ft x2 w/ CGA-min A and Rw. B knees occasionally buckled, though able to correct w/ only min A. Instructed pt on seated LE exercises for strengthening. Patient was wearing a face mask during this therapy encounter. Therapist used appropriate personal protective equipment including mask and gloves. Mask used was standard procedure mask. Appropriate PPE was worn during the entire therapy session. Hand hygiene was completed before and after therapy session. Patient is not in enhanced droplet precautions.

## 2020-07-10 NOTE — PLAN OF CARE
Problem: Patient Care Overview  Goal: Plan of Care Review  Outcome: Ongoing (interventions implemented as appropriate)  Flowsheets (Taken 7/10/2020 0624)  Progress: improving  Plan of Care Reviewed With: patient  Outcome Summary: Pt A&Ox3, not situation, continued on 3L NC, reg [facility] renal diet, accu checks, daily wt, urinal for voiding, occassionally tries to exit bed if help not present for voiding, padded rails for seizure precautions, vitals as charted

## 2020-07-10 NOTE — PROGRESS NOTES
[facility]     LOS: 4 days     Name: Felicita Mike  Age: 69 y.o.  Sex: male  :  1950  MRN: [id number]         Primary Care Physician: Alexandrea Jimenez, DANIEL    Diet Regular; Cardiac, Consistent Carbohydrate, Renal    Subjective    No complaints today.  Denies chest pain, shortness of breath, abdominal pain    Review of Systems   Constitutional: Negative for fever.   Respiratory: Negative for cough and shortness of breath.    Cardiovascular: Negative for chest pain.   Gastrointestinal: Negative for abdominal pain.     Objective   Temp:  [97.4 °F (36.3 °C)-98.2 °F (36.8 °C)] 97.8 °F (36.6 °C)  Heart Rate:  [55-76] 62  Resp:  [18-22] 20  BP: (151-179)/() 152/73  SpO2:  [88 %-99 %] 95 %  on  Flow (L/min):  [3] 3;   Device (Oxygen Therapy): nasal cannula;humidified  Body mass index is 28.18 kg/m².    Physical Exam   Constitutional: No distress.   Cardiovascular: Normal rate and regular rhythm.   Pulmonary/Chest: Effort normal. No respiratory distress. He has wheezes (mild).   Abdominal: Soft. Bowel sounds are normal. There is no tenderness. There is no rebound and no guarding.   Musculoskeletal: He exhibits no edema.   Neurological: He is alert. He displays tremor.   Skin: Skin is warm and dry.   Psychiatric: He has a normal mood and affect. His behavior is normal.   Nursing note and vitals reviewed.    Reviewed medications and new clinical results    Scheduled Meds    amLODIPine 10 mg Oral Q24H   aspirin 81 mg Oral Daily   atorvastatin 80 mg Oral Daily   budesonide 0.5 mg Nebulization BID - RT   carvedilol 12.5 mg Oral BID With Meals   cholecalciferol 1,000 Units Oral Daily   docusate sodium 100 mg Oral BID   enoxaparin 40 mg Subcutaneous Q24H   insulin lispro 0-7 Units Subcutaneous TID AC   ipratropium-albuterol 3 mL Nebulization 4x Daily - RT   magnesium oxide 400 mg Oral Daily   pantoprazole 40 mg Oral Q AM   primidone 50 mg Oral Q12H   sodium  chloride 10 mL Intravenous Q12H   tamsulosin 0.4 mg Oral Nightly   vitamin B-12 1,000 mcg Oral Daily     Continuous Infusions     PRN Meds  •  acetaminophen **OR** acetaminophen **OR** acetaminophen  •  bisacodyl  •  dextrose  •  dextrose  •  glucagon (human recombinant)  •  haloperidol lactate  •  LORazepam  •  nitroglycerin  •  ondansetron  •  polyethylene glycol  •  sodium chloride    Results from last 7 days   Lab Units 07/09/20  0421 07/07/20 0612 07/06/20  1504   WBC 10*3/mm3 11.36* 11.24* 10.94*   HEMOGLOBIN g/dL 10.1* 10.5* 10.5*   PLATELETS 10*3/mm3 228 223 199     Results from last 7 days   Lab Units 07/10/20  0532 07/09/20  0421 07/08/20  0603 07/07/20  0612 07/06/20 2042 07/06/20  1504   SODIUM mmol/L 140 137 136 134* 133* 130*   POTASSIUM mmol/L 3.4* 3.9 4.4 4.2 4.5 4.5   CHLORIDE mmol/L 107 105 106 103 103 99   CO2 mmol/L 21.5* 19.2* 14.4* 20.2* 19.7* 20.3*   BUN mg/dL 8 7* 13 25* 29* 33*   CREATININE mg/dL 0.76 0.82 1.02 1.42* 1.81* 2.26*   CALCIUM mg/dL 9.8 10.5 10.2 10.0 9.6 9.5   GLUCOSE mg/dL 122* 124* 130* 103* 91 101*     Lab Results   Component Value Date    ANIONGAP 11.5 07/10/2020     Glucose   Date/Time Value Ref Range Status   07/10/2020 1106 187 (H) 70 - 130 mg/dL Final   07/10/2020 0648 125 70 - 130 mg/dL Final   07/09/2020 2146 138 (H) 70 - 130 mg/dL Final   07/09/2020 1738 154 (H) 70 - 130 mg/dL Final   07/09/2020 1152 154 (H) 70 - 130 mg/dL Final   07/08/2020 2119 148 (H) 70 - 130 mg/dL Final   07/08/2020 1553 144 (H) 70 - 130 mg/dL Final   07/08/2020 1126 150 (H) 70 - 130 mg/dL Final     Estimated Creatinine Clearance: 91.6 mL/min (by C-G formula based on SCr of 0.76 mg/dL).    Lab Results   Component Value Date    LITHIUM 1.1 07/09/2020     I personally reviewed CXR  Site   Date Value Ref Range Status   07/09/2020 Arterial Line  Final     David's Test   Date Value Ref Range Status   07/09/2020 Positive  Final     pH, Arterial   Date Value Ref Range Status   07/09/2020 7.409 7.350 -  7.450 pH units Final     pCO2, Arterial   Date Value Ref Range Status   07/09/2020 34.2 (L) 35.0 - 45.0 mm Hg Final     pO2, Arterial   Date Value Ref Range Status   07/09/2020 49.1 (C) 80.0 - 100.0 mm Hg Final     Comment:     Critical:Notify RN genesisjacintos gly (09-Jul-20 16:56:43)Read back ok     HCO3, Arterial   Date Value Ref Range Status   07/09/2020 21.6 (L) 22.0 - 28.0 mmol/L Final     Base Excess, Arterial   Date Value Ref Range Status   07/09/2020 -2.6 (L) 0.0 - 2.0 mmol/L Final     Barometric Pressure for Blood Gas   Date Value Ref Range Status   07/09/2020 748.3 mmHg Final     Modality   Date Value Ref Range Status   07/09/2020 Cannula  Final        Intake/Output Summary (Last 24 hours) at 7/10/2020 1304  Last data filed at 7/10/2020 0436  Gross per 24 hour   Intake --   Output 2025 ml   Net -2025 ml      Assessment/Plan   Active Hospital Problems    Diagnosis  POA   • Essential tremor [G25.0]  Yes   • Iron deficiency anemia [D50.9]  Yes   • Coronary arteriosclerosis in native artery [I25.10]  Yes   • Bipolar affective disorder (CMS/[facility]) [F31.9]  Yes   • Chronic obstructive pulmonary disease (CMS/[facility]) [J44.9]  Yes   • Type 2 diabetes mellitus without complication, with long-term current use of insulin (CMS/[facility]) [E11.9, Z79.4]  Not Applicable   • Hyperlipidemia [E78.5]  Yes   • GERD (gastroesophageal reflux disease) [K21.9]  Yes   • Peripheral vascular disease (CMS/[facility]) [I73.9]  Yes   • Sleep apnea [G47.30]  Yes      Resolved Hospital Problems    Diagnosis Date Resolved POA   • **Acute kidney injury (CMS/[facility]) [N17.9] 07/09/2020 Yes   • Hyponatremia [E87.1] 07/09/2020 Unknown   • Lithium toxicity [T56.891A] 07/09/2020 Unknown     69 y.o. male with acute kidney injury and lithium toxicity and possible serotonin syndrome.    · Acute kidney injury resolved, expansion acidosis improved after changing fluids.  · Lithium toxicity: Level normalized.  Psychiatry recommends holding all psychotropic medications at this  time, PRN Haldol for agitation.  Would not restart previously prescribed psychotropic medications unless patient able to provide better history of bipolar disorder  · Klonopin was started for mild clonus but stopped due to drowsiness  · Chest x-ray shows possibly borderline CHF or bronchitis.  He was given a dose of Lasix yesterday lungs sound better today.  He did get couple days of fluids for acute kidney injury.  Only some wheezing.  Give another dose of Lasix today.  No fever.  Has a history of oxygen dependent COPD and had exacerbation and hypoxic respiratory failure had influenza in December.  Continue nebs and supportive care.  · Hyponatremia resolved.  Some mild hyponatremia probably from diuresis.  Potassium replacement protocol  · Diabetes: A1c 6.  Glucoses 100s  · Disposition to be determined.  PT following.  Probably will need skilled nursing facility  · Discussed with patient and nursing staff     René Pace MD   07/10/20  13:00

## 2020-07-10 NOTE — NURSING NOTE
Access center follow up:    Reviewed chart.  Pt using nebulizer during visit.  No major changes noted.  Access will continue to follow.

## 2020-07-10 NOTE — PLAN OF CARE
Pt denies pain, is disoriented to situation, given iv lasix, given ssi, potassium replaced per protocol, vss, will ctm.      Problem: Patient Care Overview  Goal: Plan of Care Review  Outcome: Ongoing (interventions implemented as appropriate)  Flowsheets (Taken 7/10/2020 1611)  Plan of Care Reviewed With: patient

## 2020-07-10 NOTE — THERAPY TREATMENT NOTE
Patient Name: Felicita Mike  : 1950    MRN: [id number]                              Today's Date: 7/10/2020       Admit Date: 2020    Visit Dx:     ICD-10-CM ICD-9-CM   1. Acute kidney injury (CMS/HCC) N17.9 584.9   2. Generalized abdominal pain R10.84 789.07   3. Abnormal lithium level in blood R78.89 790.6     Patient Active Problem List   Diagnosis   • Elevated prostate specific antigen (PSA)   • Coronary arteriosclerosis in native artery   • Bipolar affective disorder (CMS/HCC)   • Chronic obstructive pulmonary disease (CMS/HCC)   • Colon polyp   • Constipation   • Type 2 diabetes mellitus without complication, with long-term current use of insulin (CMS/HCC)   • GERD (gastroesophageal reflux disease)   • Fatigue   • Hearing loss   • Hyperlipidemia   • Peripheral vascular disease (CMS/HCC)   • Proteinuria   • Muscle pain   • Low back pain   • Leukocytosis   • Seborrheic eczema   • Sleep apnea   • Tinea corporis   • Tremor   • Hypertension   • Anxiety   • Benign prostatic hyperplasia with lower urinary tract symptoms   • Tobacco abuse   • Normocytic anemia   • Thyroid nodule   • Iron deficiency anemia   • Adenomatous polyp of colon   • Family history of malignant neoplasm of colon   • Gastroesophageal reflux disease   • COPD with acute exacerbation (CMS/HCC)   • Influenza A   • SWAPNIL (acute kidney injury) (CMS/HCC)   • Essential tremor   • Magnesium deficiency   • Long-term current use of lithium     Past Medical History:   Diagnosis Date   • Abnormal PSA    • Acute myocardial infarction (CMS/HCC) 2004    Stents   • Apnea, sleep    • Atherosclerotic heart disease    • Chronic pain     [facility] ,  L3 compression deformity, L2 degenerative disc disease and L5-S1 degenerative disc disease.   • Colon polyp    • Constipation    • COPD (chronic obstructive pulmonary disease) (CMS/HCC)    • Coronary artery disease    • Depressed bipolar II disorder (CMS/HCC)    • Diabetes mellitus  (CMS/[facility])    • Essential tremor    • Fatigue    • GERD (gastroesophageal reflux disease)    • H/O transfusion of packed red blood cells    • Health care maintenance    • Hearing loss    • History of back pain    • Hypercholesterolemia    • Hyperlipidemia    • Hypertension    • Leukocytosis    • Osteoarthritis    • Proteinuria    • PVD (peripheral vascular disease) (CMS/[facility])    • Sebaceous cyst    • Seborrheic dermatitis    • Sleep apnea    • Tinea corporis    • Tobacco use    • Tremor      Past Surgical History:   Procedure Laterality Date   • ANGIOPLASTY      Cath Stent Placement   • COLONOSCOPY  01/22/2014    ta polyps   • COLONOSCOPY N/A 11/29/2016    Procedure: COLONOSCOPY TO CECUM AND TERMINAL ILEUM WITH HOT POLYPECTOMIES;  Surgeon: Ivette Franco MD;  Location: [facility] ENDOSCOPY;  Service:    • COLONOSCOPY N/A 12/16/2019    Procedure: COLONOSCOPY to cecum with hot snare, cold biopsy polypectomies with clip placement x2;  Surgeon: Ivette Franco MD;  Location: [facility] ENDOSCOPY;  Service: Gastroenterology   • CORONARY STENT PLACEMENT  2004   • ENDOSCOPY N/A 12/16/2019    Procedure: ESOPHAGOGASTRODUODENOSCOPY with biopsies;  Surgeon: Ivette Franco MD;  Location: [facility] ENDOSCOPY;  Service: Gastroenterology   • EYE SURGERY      Cataract Surgery   • HAND SURGERY       General Information     Row Name 07/10/20 1123          PT Evaluation Time/Intention    Document Type  therapy note (daily note)  -     Mode of Treatment  physical therapy  -     Row Name 07/10/20 1123          General Information    Existing Precautions/Restrictions  fall;oxygen therapy device and L/min  -     Row Name 07/10/20 1123          Cognitive Assessment/Intervention- PT/OT    Orientation Status (Cognition)  oriented x 3  -       User Key  (r) = Recorded By, (t) = Taken By, (c) = Cosigned By    Initials Name Provider Type     Racquel Horn PTA Physical Therapy Assistant        Mobility     Row Name 07/10/20 1123           Bed Mobility Assessment/Treatment    Bed Mobility Assessment/Treatment  supine-sit;sit-supine  -SM     Supine-Sit Brilliant (Bed Mobility)  supervision  -     Sit-Supine Brilliant (Bed Mobility)  supervision  -     Assistive Device (Bed Mobility)  bed rails;head of bed elevated  -     Row Name 07/10/20 1123          Sit-Stand Transfer    Sit-Stand Brilliant (Transfers)  contact guard  -     Assistive Device (Sit-Stand Transfers)  walker, front-wheeled  -     Row Name 07/10/20 1123          Gait/Stairs Assessment/Training    Brilliant Level (Gait)  contact guard;minimum assist (75% patient effort)  -     Assistive Device (Gait)  walker, front-wheeled  -SM     Distance in Feet (Gait)  20ft x2  -SM     Pattern (Gait)  step-through  -SM     Deviations/Abnormal Patterns (Gait)  josh decreased;stride length decreased;antalgic  -SM     Bilateral Gait Deviations  forward flexed posture;knee buckling, bilateral  -     Comment (Gait/Stairs)  occasional knee buckling, though only needing min A to recover; seated rest break taken between trials  -       User Key  (r) = Recorded By, (t) = Taken By, (c) = Cosigned By    Initials Name Provider Type    Racquel Muñoz PTA Physical Therapy Assistant        Obj/Interventions     Row Name 07/10/20 1125          Therapeutic Exercise    Lower Extremity (Therapeutic Exercise)  LAQ (long arc quad), bilateral;marching while seated  -     Lower Extremity Range of Motion (Therapeutic Exercise)  hip abduction/adduction, bilateral;ankle dorsiflexion/plantar flexion, bilateral  -     Exercise Type (Therapeutic Exercise)  AROM (active range of motion)  -     Position (Therapeutic Exercise)  seated  -     Sets/Reps (Therapeutic Exercise)  10 reps  -       User Key  (r) = Recorded By, (t) = Taken By, (c) = Cosigned By    Initials Name Provider Type    Racquel Muñoz PTA Physical Therapy Assistant        Goals/Plan    No  documentation.       Clinical Impression     [facility] Name 07/10/20 [facility]5          Pain Assessment    Additional Documentation  Pain Scale: Numbers Pre/Post-Treatment (Group)  -[facility] Name 07/10/20 1125          Pain Scale: Numbers Pre/Post-Treatment    Pain Scale: Numbers, Pretreatment  0/10 - no pain  -     Pain Scale: Numbers, Post-Treatment  0/10 - no pain  -SM     [facility] Name 07/10/20 1125          Positioning and Restraints    Pre-Treatment Position  in bed  -     Post Treatment Position  bed  -SM     In Bed  supine;call light within reach;encouraged to call for assist;exit alarm on  -       User Key  (r) = Recorded By, (t) = Taken By, (c) = Cosigned By    Initials Name Provider Type    Racquel Muñoz PTA Physical Therapy Assistant        Outcome Measures     [facility] Name 07/10/20 1126          How much help from another person do you currently need...    Turning from your back to your side while in flat bed without using bedrails?  4  -SM     Moving from lying on back to sitting on the side of a flat bed without bedrails?  3  -SM     Moving to and from a bed to a chair (including a wheelchair)?  3  -SM     Standing up from a chair using your arms (e.g., wheelchair, bedside chair)?  3  -SM     Climbing 3-5 steps with a railing?  2  -SM     To walk in hospital room?  3  -SM     AM-PAC 6 Clicks Score (PT)  18  -SM     Row Name 07/10/20 [facility]6          Functional Assessment    Outcome Measure Options  AM-PAC 6 Clicks Basic Mobility (PT)  -       User Key  (r) = Recorded By, (t) = Taken By, (c) = Cosigned By    Initials Name Provider Type    Racquel Muñoz PTA Physical Therapy Assistant        Physical Therapy Education                 Title: PT OT SLP Therapies (Done)     Topic: Physical Therapy (Done)     Point: Mobility training (Done)     Description:   Instruct learner(s) on safety and technique for assisting patient out of bed, chair or wheelchair.  Instruct in the proper use of assistive  devices, such as walker, crutches, cane or brace.              Patient Friendly Description:   It's important to get you on your feet again, but we need to do so in a way that is safe for you. Falling has serious consequences, and your personal safety is the most important thing of all.        When it's time to get out of bed, one of us or a family member will sit next to you on the bed to give you support.     If your doctor or nurse tells you to use a walker, crutches, a cane, or a brace, be sure you use it every time you get out of bed, even if you think you don't need it.    Learning Progress Summary           Patient Acceptance, E,TB,D, VU,NR by  at 7/10/2020 1126    Acceptance, E, VU by AR at 7/9/2020 1137    Acceptance, E, VU by  at 7/8/2020 1701    Acceptance, E,TB,D, VU,NR by  at 7/8/2020 1135                   Point: Body mechanics (Done)     Description:   Instruct learner(s) on proper positioning and spine alignment for patient and/or caregiver during mobility tasks and/or exercises.              Learning Progress Summary           Patient Acceptance, E,TB,D, VU,NR by  at 7/10/2020 1126    Acceptance, E, VU by AR at 7/9/2020 1137    Acceptance, E, VU by  at 7/8/2020 1701    Acceptance, E,TB,D, VU,NR by  at 7/8/2020 1135                   Point: Precautions (Done)     Description:   Instruct learner(s) on prescribed precautions during mobility and gait tasks              Learning Progress Summary           Patient Acceptance, E,TB,D, VU,NR by  at 7/10/2020 1126    Acceptance, E, VU by AR at 7/9/2020 1137    Acceptance, E, VU by AG at 7/8/2020 1701    Acceptance, E,TB,D, VU,NR by  at 7/8/2020 1135                               User Key     Initials Effective Dates Name Provider Type Discipline     04/03/18 -  Karen Brady, PT Physical Therapist PT    AR 04/03/18 -  Rand Gray, PT Physical Therapist PT     03/07/18 -  Racquel Horn, COOPER Physical Therapy Assistant PT      01/16/20 -  Mendez Fairchild, RN Registered Nurse Nurse              PT Recommendation and Plan     Outcome Summary/Treatment Plan (PT)  Anticipated Discharge Disposition (PT): skilled nursing facility  Plan of Care Reviewed With: patient  Progress: improving  Outcome Summary: Pt tolerated treatment well this date. Increased overall activity, ambulating 20ft x2 w/ CGA-min A and Rw. B knees occasionally buckled, though able to correct w/ only min A. Instructed pt on seated LE exercises for strengthening. Patient was wearing a face mask during this therapy encounter. Therapist used appropriate personal protective equipment including mask and gloves. Mask used was standard procedure mask. Appropriate PPE was worn during the entire therapy session. Hand hygiene was completed before and after therapy session. Patient is not in enhanced droplet precautions.     Time Calculation:   PT Charges     Row Name 07/10/20 1130             Time Calculation    Start Time  1024  -      Stop Time  1047  -      Time Calculation (min)  23 min  -SM      PT Received On  07/10/20  -      PT - Next Appointment  07/11/20  -        User Key  (r) = Recorded By, (t) = Taken By, (c) = Cosigned By    Initials Name Provider Type    Racquel Muñoz PTA Physical Therapy Assistant        Therapy Charges for Today     Code Description Service Date Service Provider Modifiers Qty    [id number] HC PT THER PROC EA 15 MIN 7/10/2020 Racquel Horn PTA GP 2          PT G-Codes  Outcome Measure Options: AM-PAC 6 Clicks Basic Mobility (PT)  AM-PAC 6 Clicks Score (PT): 18    Racquel Horn PTA  7/10/2020

## 2020-07-10 NOTE — PROGRESS NOTES
Continued Stay Note  [facility]     Patient Name: Felicita Mike  MRN: [id number]  Today's Date: 7/10/2020    Admit Date: 7/6/2020    Discharge Plan     Row Name 07/10/20 1147       Plan    Plan  Skilled bed available at [facility]; needs negative covid within 48 hours of discharge     Plan Comments  [facility] received call from [facility] stating bed is available for the patient when he is medically stable for discharge. Patient will need a negative covid test within 48 hours of discharge and patient will need to be without PRN Haldol and Ativan for at least 24 hours prior to discharge. Birttany HOWARD                              Discharge Codes    No documentation.             ERVIN Edmond

## 2020-07-11 ENCOUNTER — APPOINTMENT (OUTPATIENT)
Dept: GENERAL RADIOLOGY | Facility: HOSPITAL | Age: 70
End: 2020-07-11

## 2020-07-11 PROBLEM — J96.11 CHRONIC RESPIRATORY FAILURE WITH HYPOXIA (HCC): Status: ACTIVE | Noted: 2020-07-11

## 2020-07-11 LAB
ANION GAP SERPL CALCULATED.3IONS-SCNC: 10.2 MMOL/L (ref 5–15)
BUN SERPL-MCNC: 10 MG/DL (ref 8–23)
BUN/CREAT SERPL: 10.5 (ref 7–25)
CALCIUM SPEC-SCNC: 9.9 MG/DL (ref 8.6–10.5)
CHLORIDE SERPL-SCNC: 105 MMOL/L (ref 98–107)
CO2 SERPL-SCNC: 23.8 MMOL/L (ref 22–29)
CREAT SERPL-MCNC: 0.95 MG/DL (ref 0.76–1.27)
DEPRECATED RDW RBC AUTO: 48 FL (ref 37–54)
ERYTHROCYTE [DISTWIDTH] IN BLOOD BY AUTOMATED COUNT: 14.9 % (ref 12.3–15.4)
GFR SERPL CREATININE-BSD FRML MDRD: 79 ML/MIN/1.73
GLUCOSE BLDC GLUCOMTR-MCNC: 141 MG/DL (ref 70–130)
GLUCOSE BLDC GLUCOMTR-MCNC: 142 MG/DL (ref 70–130)
GLUCOSE BLDC GLUCOMTR-MCNC: 144 MG/DL (ref 70–130)
GLUCOSE BLDC GLUCOMTR-MCNC: 155 MG/DL (ref 70–130)
GLUCOSE SERPL-MCNC: 114 MG/DL (ref 65–99)
HCT VFR BLD AUTO: 33.2 % (ref 37.5–51)
HGB BLD-MCNC: 10.7 G/DL (ref 13–17.7)
MCH RBC QN AUTO: 28 PG (ref 26.6–33)
MCHC RBC AUTO-ENTMCNC: 32.2 G/DL (ref 31.5–35.7)
MCV RBC AUTO: 86.9 FL (ref 79–97)
PLATELET # BLD AUTO: 237 10*3/MM3 (ref 140–450)
PMV BLD AUTO: 12 FL (ref 6–12)
POTASSIUM SERPL-SCNC: 4.4 MMOL/L (ref 3.5–5.2)
RBC # BLD AUTO: 3.82 10*6/MM3 (ref 4.14–5.8)
SODIUM SERPL-SCNC: 139 MMOL/L (ref 136–145)
WBC # BLD AUTO: 8.61 10*3/MM3 (ref 3.4–10.8)

## 2020-07-11 PROCEDURE — 94799 UNLISTED PULMONARY SVC/PX: CPT

## 2020-07-11 PROCEDURE — C9803 HOPD COVID-19 SPEC COLLECT: HCPCS | Performed by: HOSPITALIST

## 2020-07-11 PROCEDURE — 63710000001 INSULIN LISPRO (HUMAN) PER 5 UNITS: Performed by: NURSE PRACTITIONER

## 2020-07-11 PROCEDURE — 25010000002 ENOXAPARIN PER 10 MG: Performed by: INTERNAL MEDICINE

## 2020-07-11 PROCEDURE — 82962 GLUCOSE BLOOD TEST: CPT

## 2020-07-11 PROCEDURE — 97110 THERAPEUTIC EXERCISES: CPT

## 2020-07-11 PROCEDURE — 80048 BASIC METABOLIC PNL TOTAL CA: CPT | Performed by: HOSPITALIST

## 2020-07-11 PROCEDURE — 71045 X-RAY EXAM CHEST 1 VIEW: CPT

## 2020-07-11 PROCEDURE — U0004 COV-19 TEST NON-CDC HGH THRU: HCPCS | Performed by: HOSPITALIST

## 2020-07-11 PROCEDURE — 85027 COMPLETE CBC AUTOMATED: CPT | Performed by: HOSPITALIST

## 2020-07-11 RX ADMIN — ENOXAPARIN SODIUM 40 MG: 40 INJECTION SUBCUTANEOUS at 08:17

## 2020-07-11 RX ADMIN — ASPIRIN 81 MG: 81 TABLET, COATED ORAL at 08:17

## 2020-07-11 RX ADMIN — DOCUSATE SODIUM 100 MG: 100 CAPSULE ORAL at 21:58

## 2020-07-11 RX ADMIN — ACETAMINOPHEN 650 MG: 325 TABLET, FILM COATED ORAL at 22:05

## 2020-07-11 RX ADMIN — BUDESONIDE 0.5 MG: 0.5 INHALANT RESPIRATORY (INHALATION) at 07:30

## 2020-07-11 RX ADMIN — IPRATROPIUM BROMIDE AND ALBUTEROL SULFATE 3 ML: 2.5; .5 SOLUTION RESPIRATORY (INHALATION) at 16:45

## 2020-07-11 RX ADMIN — PRIMIDONE 50 MG: 50 TABLET ORAL at 08:17

## 2020-07-11 RX ADMIN — VITAMIN D, TAB 1000IU (100/BT) 1000 UNITS: 25 TAB at 08:17

## 2020-07-11 RX ADMIN — SODIUM CHLORIDE, PRESERVATIVE FREE 10 ML: 5 INJECTION INTRAVENOUS at 21:58

## 2020-07-11 RX ADMIN — PRIMIDONE 50 MG: 50 TABLET ORAL at 21:57

## 2020-07-11 RX ADMIN — AMLODIPINE BESYLATE 10 MG: 10 TABLET ORAL at 08:17

## 2020-07-11 RX ADMIN — IPRATROPIUM BROMIDE AND ALBUTEROL SULFATE 3 ML: 2.5; .5 SOLUTION RESPIRATORY (INHALATION) at 19:12

## 2020-07-11 RX ADMIN — Medication 400 MG: at 08:17

## 2020-07-11 RX ADMIN — Medication 1000 MCG: at 08:17

## 2020-07-11 RX ADMIN — IPRATROPIUM BROMIDE AND ALBUTEROL SULFATE 3 ML: 2.5; .5 SOLUTION RESPIRATORY (INHALATION) at 07:30

## 2020-07-11 RX ADMIN — CARVEDILOL 12.5 MG: 12.5 TABLET, FILM COATED ORAL at 17:13

## 2020-07-11 RX ADMIN — DOCUSATE SODIUM 100 MG: 100 CAPSULE ORAL at 08:17

## 2020-07-11 RX ADMIN — TAMSULOSIN HYDROCHLORIDE 0.4 MG: 0.4 CAPSULE ORAL at 21:58

## 2020-07-11 RX ADMIN — ACETAMINOPHEN 650 MG: 325 TABLET, FILM COATED ORAL at 01:08

## 2020-07-11 RX ADMIN — ATORVASTATIN CALCIUM 80 MG: 80 TABLET, FILM COATED ORAL at 08:17

## 2020-07-11 RX ADMIN — INSULIN LISPRO 2 UNITS: 100 INJECTION, SOLUTION INTRAVENOUS; SUBCUTANEOUS at 17:13

## 2020-07-11 RX ADMIN — PANTOPRAZOLE SODIUM 40 MG: 40 TABLET, DELAYED RELEASE ORAL at 07:28

## 2020-07-11 RX ADMIN — IPRATROPIUM BROMIDE AND ALBUTEROL SULFATE 3 ML: 2.5; .5 SOLUTION RESPIRATORY (INHALATION) at 12:48

## 2020-07-11 RX ADMIN — CARVEDILOL 12.5 MG: 12.5 TABLET, FILM COATED ORAL at 08:17

## 2020-07-11 RX ADMIN — SODIUM CHLORIDE, PRESERVATIVE FREE 10 ML: 5 INJECTION INTRAVENOUS at 08:18

## 2020-07-11 RX ADMIN — BUDESONIDE 0.5 MG: 0.5 INHALANT RESPIRATORY (INHALATION) at 19:12

## 2020-07-11 NOTE — PROGRESS NOTES
[facility]               ASSOCIATES     LOS: 5 days     Name: Felicita Mike  Age: 69 y.o.  Sex: male  :  1950  MRN: [id number]         Primary Care Physician: Alexandrea Jimenez, DANIEL    Diet Regular; Cardiac, Consistent Carbohydrate, Renal    Subjective    No complaints today.  Denies chest pain, shortness of breath, abdominal pain    Review of Systems   Constitutional: Negative for fever.   Respiratory: Negative for cough and shortness of breath.    Cardiovascular: Negative for chest pain.   Gastrointestinal: Negative for abdominal pain.     Objective   Temp:  [97.5 °F (36.4 °C)-98 °F (36.7 °C)] 97.5 °F (36.4 °C)  Heart Rate:  [58-75] 75  Resp:  [16-20] 20  BP: (137-177)/() 142/83  SpO2:  [90 %-100 %] 94 %  on  Flow (L/min):  [3] 3;   Device (Oxygen Therapy): humidified;nasal cannula  Body mass index is 28.18 kg/m².    Physical Exam   Constitutional: No distress.   Cardiovascular: Normal rate and regular rhythm.   Pulmonary/Chest: Effort normal. No respiratory distress. He has no wheezes.   Abdominal: Soft. Bowel sounds are normal. There is no tenderness. There is no rebound and no guarding.   Musculoskeletal: He exhibits no edema.   Neurological: He is alert.   Skin: Skin is warm and dry.   Psychiatric: He has a normal mood and affect. His behavior is normal.   Nursing note and vitals reviewed.    Reviewed medications and new clinical results    Scheduled Meds    amLODIPine 10 mg Oral Q24H   aspirin 81 mg Oral Daily   atorvastatin 80 mg Oral Daily   budesonide 0.5 mg Nebulization BID - RT   carvedilol 12.5 mg Oral BID With Meals   cholecalciferol 1,000 Units Oral Daily   docusate sodium 100 mg Oral BID   enoxaparin 40 mg Subcutaneous Q24H   insulin lispro 0-7 Units Subcutaneous TID AC   ipratropium-albuterol 3 mL Nebulization 4x Daily - RT   magnesium oxide 400 mg Oral Daily   pantoprazole 40 mg Oral Q AM   primidone 50 mg Oral Q12H   sodium chloride 10 mL Intravenous Q12H    tamsulosin 0.4 mg Oral Nightly   vitamin B-12 1,000 mcg Oral Daily     Continuous Infusions     PRN Meds  •  acetaminophen **OR** acetaminophen **OR** acetaminophen  •  bisacodyl  •  dextrose  •  dextrose  •  glucagon (human recombinant)  •  haloperidol lactate  •  LORazepam  •  nitroglycerin  •  ondansetron  •  polyethylene glycol  •  potassium chloride **OR** potassium chloride **OR** potassium chloride  •  sodium chloride    Results from last 7 days   Lab Units 07/11/20  0430 07/09/20  0421 07/07/20  0612 07/06/20  1504   WBC 10*3/mm3 8.61 11.36* 11.24* 10.94*   HEMOGLOBIN g/dL 10.7* 10.1* 10.5* 10.5*   PLATELETS 10*3/mm3 237 228 223 199     Results from last 7 days   Lab Units 07/11/20  0653 07/10/20  2145 07/10/20  0532 07/09/20  0421 07/08/20  0603 07/07/20  0612 07/06/20  2042 07/06/20  1504   SODIUM mmol/L 139  --  140 137 136 134* 133* 130*   POTASSIUM mmol/L 4.4 4.3 3.4* 3.9 4.4 4.2 4.5 4.5   CHLORIDE mmol/L 105  --  107 105 106 103 103 99   CO2 mmol/L 23.8  --  21.5* 19.2* 14.4* 20.2* 19.7* 20.3*   BUN mg/dL 10  --  8 7* 13 25* 29* 33*   CREATININE mg/dL 0.95  --  0.76 0.82 1.02 1.42* 1.81* 2.26*   CALCIUM mg/dL 9.9  --  9.8 10.5 10.2 10.0 9.6 9.5   GLUCOSE mg/dL 114*  --  122* 124* 130* 103* 91 101*     Lab Results   Component Value Date    ANIONGAP 10.2 07/11/2020     Glucose   Date/Time Value Ref Range Status   07/11/2020 1102 141 (H) 70 - 130 mg/dL Final   07/11/2020 0606 142 (H) 70 - 130 mg/dL Final   07/10/2020 2037 217 (H) 70 - 130 mg/dL Final   07/10/2020 1700 156 (H) 70 - 130 mg/dL Final   07/10/2020 1106 187 (H) 70 - 130 mg/dL Final   07/10/2020 0648 125 70 - 130 mg/dL Final   07/09/2020 2146 138 (H) 70 - 130 mg/dL Final   07/09/2020 1738 154 (H) 70 - 130 mg/dL Final     Estimated Creatinine Clearance: 77.2 mL/min (by C-G formula based on SCr of 0.95 mg/dL).    Lab Results   Component Value Date    LITHIUM 1.1 07/09/2020        Intake/Output Summary (Last 24 hours) at 7/11/2020 1418  Last data  filed at 7/10/2020 1820  Gross per 24 hour   Intake 220 ml   Output --   Net 220 ml      I personally reviewed CXR  Results for orders placed during the hospital encounter of 12/23/19   Adult Transthoracic Echo Complete W/ Cont if Necessary Per Protocol    Narrative · Estimated EF = 66%.  · Left ventricular systolic function is normal.  · Left ventricular wall thickness is consistent with borderline concentric   hypertrophy.           Assessment/Plan   Active Hospital Problems    Diagnosis  POA   • Chronic respiratory failure with hypoxia (CMS/[facility]) [J96.11]  Unknown   • Essential tremor [G25.0]  Yes   • Iron deficiency anemia [D50.9]  Yes   • Coronary arteriosclerosis in native artery [I25.10]  Yes   • Bipolar affective disorder (CMS/[facility]) [F31.9]  Yes   • Chronic obstructive pulmonary disease (CMS/[facility]) [J44.9]  Yes   • Type 2 diabetes mellitus without complication, with long-term current use of insulin (CMS/[facility]) [E11.9, Z79.4]  Not Applicable   • Hyperlipidemia [E78.5]  Yes   • GERD (gastroesophageal reflux disease) [K21.9]  Yes   • Peripheral vascular disease (CMS/[facility]) [I73.9]  Yes   • Sleep apnea [G47.30]  Yes      Resolved Hospital Problems    Diagnosis Date Resolved POA   • **Acute kidney injury (CMS/[facility]) [N17.9] 07/09/2020 Yes   • Hyponatremia [E87.1] 07/09/2020 Unknown   • Lithium toxicity [T56.891A] 07/09/2020 Unknown     69 y.o. male with acute kidney injury and lithium toxicity and possible serotonin syndrome.    · Acute kidney injury resolved, expansion acidosis improved after changing fluids.  · Lithium toxicity: Level normalized.  Psychiatry recommends holding all psychotropic medications at this time, PRN Haldol for agitation.  Would not restart previously prescribed psychotropic medications unless patient able to provide better history of bipolar disorder  · Klonopin was started for mild clonus but stopped due to drowsiness.  No more drowsiness  · Chest x-ray 7/9/2020 showed possibly borderline CHF or  bronchitis.  He was given a couple doses of Lasix.  Chest x-ray 7/11/2020 shows nearly complete resolution of congestion.  No fever.  Has a history of oxygen dependent COPD and had exacerbation and hypoxic respiratory failure had influenza in December.  Lungs sound fine today.  Continue nebs and supportive care.  Probably acute diastolic CHF resolved.  Echo from November noted.  · Hyponatremia resolved.  Some mild hypokalemia probably from diuresis.  Potassium replaced   · Diabetes: A1c 6.  Glucoses 100s  · Disposition to be determined.  PT following.  Will need skilled nursing facility.  Check COVID for placement  · Discussed with patient and nursing staff     René Pace MD   07/11/20  14:18

## 2020-07-11 NOTE — PLAN OF CARE
No c/o pain n/v or soa this shift. VSS covid test sent for placement in rehab. Will continue to monitor

## 2020-07-11 NOTE — PROGRESS NOTES
"Access did follow up with pt. Pt states he is not interested in outpt resources for counseling at this time. Pt states he saw a psychiatrist in the past and they \"did not get along.\" Access told pt to let us know if he changes his mind. Access will sign off. Please call if there is a need.  "

## 2020-07-11 NOTE — PLAN OF CARE
Problem: Patient Care Overview  Goal: Plan of Care Review  Flowsheets (Taken 7/11/2020 4653)  Plan of Care Reviewed With: patient  Outcome Summary: Pt tolerated treatment session but appeared fatigue. Nursing reports he has been out of bed today and up in chair some. Pt ambulated decreased distance but required decreased assistance. He completed BLE exercises. Pt will continue to benefit from skilled PT.  Note:   PT donned gloves, face mask, and protective eyewear and pt wore face mask throughout session. Hands washed prior to entering and immediately upon exiting room.

## 2020-07-11 NOTE — THERAPY TREATMENT NOTE
Patient Name: Felicita Mike  : 1950    MRN: [id number]                              Today's Date: 2020       Admit Date: 2020    Visit Dx:     ICD-10-CM ICD-9-CM   1. Acute kidney injury (CMS/HCC) N17.9 584.9   2. Generalized abdominal pain R10.84 789.07   3. Abnormal lithium level in blood R78.89 790.6     Patient Active Problem List   Diagnosis   • Elevated prostate specific antigen (PSA)   • Coronary arteriosclerosis in native artery   • Bipolar affective disorder (CMS/HCC)   • Chronic obstructive pulmonary disease (CMS/HCC)   • Colon polyp   • Constipation   • Type 2 diabetes mellitus without complication, with long-term current use of insulin (CMS/HCC)   • GERD (gastroesophageal reflux disease)   • Fatigue   • Hearing loss   • Hyperlipidemia   • Peripheral vascular disease (CMS/HCC)   • Proteinuria   • Muscle pain   • Low back pain   • Leukocytosis   • Seborrheic eczema   • Sleep apnea   • Tinea corporis   • Tremor   • Hypertension   • Anxiety   • Benign prostatic hyperplasia with lower urinary tract symptoms   • Tobacco abuse   • Normocytic anemia   • Thyroid nodule   • Iron deficiency anemia   • Adenomatous polyp of colon   • Family history of malignant neoplasm of colon   • Gastroesophageal reflux disease   • COPD with acute exacerbation (CMS/HCC)   • Influenza A   • SWAPNIL (acute kidney injury) (CMS/HCC)   • Essential tremor   • Magnesium deficiency   • Long-term current use of lithium   • Chronic respiratory failure with hypoxia (CMS/HCC)     Past Medical History:   Diagnosis Date   • Abnormal PSA    • Acute myocardial infarction (CMS/HCC) 2004    Stents   • Apnea, sleep    • Atherosclerotic heart disease    • Chronic pain     Chronic low back paoin, MRI ,  L3 compression deformity, L2 degenerative disc disease and L5-S1 degenerative disc disease.   • Colon polyp    • Constipation    • COPD (chronic obstructive pulmonary disease) (CMS/HCC)    • Coronary artery disease    • Depressed  bipolar II disorder (CMS/[facility])    • Diabetes mellitus (CMS/[facility])    • Essential tremor    • Fatigue    • GERD (gastroesophageal reflux disease)    • H/O transfusion of packed red blood cells    • Health care maintenance    • Hearing loss    • History of back pain    • Hypercholesterolemia    • Hyperlipidemia    • Hypertension    • Leukocytosis    • Osteoarthritis    • Proteinuria    • PVD (peripheral vascular disease) (CMS/[facility])    • Sebaceous cyst    • Seborrheic dermatitis    • Sleep apnea    • Tinea corporis    • Tobacco use    • Tremor      Past Surgical History:   Procedure Laterality Date   • ANGIOPLASTY      Cath Stent Placement   • COLONOSCOPY  01/22/2014    ta polyps   • COLONOSCOPY N/A 11/29/2016    Procedure: COLONOSCOPY TO CECUM AND TERMINAL ILEUM WITH HOT POLYPECTOMIES;  Surgeon: Ivette Franco MD;  Location: [facility] ENDOSCOPY;  Service:    • COLONOSCOPY N/A 12/16/2019    Procedure: COLONOSCOPY to cecum with hot snare, cold biopsy polypectomies with clip placement x2;  Surgeon: Ivette Franco MD;  Location: [facility] ENDOSCOPY;  Service: Gastroenterology   • CORONARY STENT PLACEMENT  2004   • ENDOSCOPY N/A 12/16/2019    Procedure: ESOPHAGOGASTRODUODENOSCOPY with biopsies;  Surgeon: Ivette Franco MD;  Location: [facility] ENDOSCOPY;  Service: Gastroenterology   • EYE SURGERY      Cataract Surgery   • HAND SURGERY       General Information     Row Name 07/11/20 1719          PT Evaluation Time/Intention    Document Type  therapy note (daily note)  -     Mode of Treatment  physical therapy;individual therapy  -     Row Name 07/11/20 1719          General Information    Patient Profile Reviewed?  yes  -     Existing Precautions/Restrictions  fall;oxygen therapy device and L/min  -     Row Name 07/11/20 1719          Cognitive Assessment/Intervention- PT/OT    Orientation Status (Cognition)  oriented x 3  -     Row Name 07/11/20 1719          Safety Issues, Functional Mobility    Impairments  Affecting Function (Mobility)  balance;coordination;endurance/activity tolerance;strength  -     Comment, Safety Issues/Impairments (Mobility)  fall prevention program maintained  -       User Key  (r) = Recorded By, (t) = Taken By, (c) = Cosigned By    Initials Name Provider Type    Katina Barnhart, PT Physical Therapist        Mobility     Row Name 07/11/20 1719          Bed Mobility Assessment/Treatment    Bed Mobility Assessment/Treatment  supine-sit;sit-supine  -     Supine-Sit [place] (Bed Mobility)  supervision  -     Sit-Supine [place] (Bed Mobility)  supervision  -     Assistive Device (Bed Mobility)  bed rails;head of bed elevated  -     Row Name 07/11/20 1719          Sit-Stand Transfer    Sit-Stand [place] (Transfers)  stand by assist;contact guard  -     Assistive Device (Sit-Stand Transfers)  walker, front-wheeled  -     Row Name 07/11/20 1719          Gait/Stairs Assessment/Training    Gait/Stairs Assessment/Training  gait/ambulation independence  -     [place] Level (Gait)  contact guard;verbal cues;nonverbal cues (demo/gesture)  -     Assistive Device (Gait)  walker, front-wheeled  -     Distance in Feet (Gait)  10 ft. x 2  -     Deviations/Abnormal Patterns (Gait)  josh decreased;gait speed decreased;stride length decreased  -     Bilateral Gait Deviations  forward flexed posture;weight shift ability decreased  -       User Key  (r) = Recorded By, (t) = Taken By, (c) = Cosigned By    Initials Name Provider Type    Katina Barnhart, PT Physical Therapist        Obj/Interventions     Row Name 07/11/20 1721          Therapeutic Exercise    Comment (Therapeutic Exercise)  1x 10 reps seated marching, LAQ, ankle pumps, hip abduction/adduction and glute sets  -     Row Name 07/11/20 1721          Static Sitting Balance    Level of [place] (Unsupported Sitting, Static Balance)  supervision  -     Sitting Position (Unsupported Sitting,  Static Balance)  sitting on edge of bed  -KH     Row Name 07/11/20 1721          Static Standing Balance    Level of [place] (Supported Standing, Static Balance)  contact guard assist  -     Assistive Device Utilized (Supported Standing, Static Balance)  walker, rolling  -       User Key  (r) = Recorded By, (t) = Taken By, (c) = Cosigned By    Initials Name Provider Type    Katina Barnhart, PT Physical Therapist        Goals/Plan    No documentation.       Clinical Impression     Row Name 07/11/20 1722          Pain Assessment    Additional Documentation  Pain Scale: Numbers Pre/Post-Treatment (Group)  -KH     Row Name 07/11/20 1722          Pain Scale: Numbers Pre/Post-Treatment    Pain Scale: Numbers, Pretreatment  0/10 - no pain  -     Pain Scale: Numbers, Post-Treatment  0/10 - no pain  -KH     Row Name 07/11/20 1722          Plan of Care Review    Plan of Care Reviewed With  patient  -     Outcome Summary  Pt tolerated treatment session but appeared fatigue. Nursing reports he has been out of bed today and up in chair some. Pt ambulated decreased distance but required decreased assistance. He completed BLE exercises. Pt will continue to benefit from skilled PT.  -KH     Row Name 07/11/20 1722          Vital Signs    O2 Delivery Pre Treatment  supplemental O2  -     O2 Delivery Intra Treatment  supplemental O2  -KH     O2 Delivery Post Treatment  supplemental O2  -KH     Row Name 07/11/20 1722          Positioning and Restraints    Pre-Treatment Position  in bed  -     Post Treatment Position  bed  -     In Bed  fowlers;call light within reach;encouraged to call for assist;exit alarm on;with nsg  -       User Key  (r) = Recorded By, (t) = Taken By, (c) = Cosigned By    Initials Name Provider Type    Katina Barnhart, RIZWANA Physical Therapist        Outcome Measures     Row Name 07/11/20 1724          How much help from another person do you currently need...    Turning from your back to  your side while in flat bed without using bedrails?  4  -KH     Moving from lying on back to sitting on the side of a flat bed without bedrails?  3  -KH     Moving to and from a bed to a chair (including a wheelchair)?  3  -KH     Standing up from a chair using your arms (e.g., wheelchair, bedside chair)?  3  -KH     Climbing 3-5 steps with a railing?  2  -KH     To walk in hospital room?  3  -KH     AM-PAC 6 Clicks Score (PT)  18  -KH     Row Name 07/11/20 1724          Functional Assessment    Outcome Measure Options  AM-PAC 6 Clicks Basic Mobility (PT)  -       User Key  (r) = Recorded By, (t) = Taken By, (c) = Cosigned By    Initials Name Provider Type    Katina Barnhart, PT Physical Therapist        Physical Therapy Education                 Title: PT OT SLP Therapies (Done)     Topic: Physical Therapy (Done)     Point: Mobility training (Done)     Description:   Instruct learner(s) on safety and technique for assisting patient out of bed, chair or wheelchair.  Instruct in the proper use of assistive devices, such as walker, crutches, cane or brace.              Patient Friendly Description:   It's important to get you on your feet again, but we need to do so in a way that is safe for you. Falling has serious consequences, and your personal safety is the most important thing of all.        When it's time to get out of bed, one of us or a family member will sit next to you on the bed to give you support.     If your doctor or nurse tells you to use a walker, crutches, a cane, or a brace, be sure you use it every time you get out of bed, even if you think you don't need it.    Learning Progress Summary           Patient Acceptance, E, VU by WALESKA at 7/11/2020 1724    Acceptance, E,TB,D, VU,NR by SM at 7/10/2020 1126    Acceptance, E, VU by AR at 7/9/2020 1137    Acceptance, E, VU by AG at 7/8/2020 1701    Acceptance, E,TB,D, VU,NR by  at 7/8/2020 1135                   Point: Body mechanics (Done)      Description:   Instruct learner(s) on proper positioning and spine alignment for patient and/or caregiver during mobility tasks and/or exercises.              Learning Progress Summary           Patient Acceptance, E, VU by  at 7/11/2020 1724    Acceptance, E,TB,D, VU,NR by  at 7/10/2020 1126    Acceptance, E, VU by AR at 7/9/2020 1137    Acceptance, E, VU by  at 7/8/2020 1701    Acceptance, E,TB,D, VU,NR by  at 7/8/2020 1135                   Point: Precautions (Done)     Description:   Instruct learner(s) on prescribed precautions during mobility and gait tasks              Learning Progress Summary           Patient Acceptance, E, VU by  at 7/11/2020 1724    Acceptance, E,TB,D, VU,NR by  at 7/10/2020 1126    Acceptance, E, VU by AR at 7/9/2020 1137    Acceptance, E, VU by  at 7/8/2020 1701    Acceptance, E,TB,D, VU,NR by  at 7/8/2020 1135                               User Key     Initials Effective Dates Name Provider Type Discipline     04/03/18 -  Karen Brady, PT Physical Therapist PT     04/03/18 -  Katina Mccain, PT Physical Therapist PT    AR 04/03/18 -  Rand Gray, PT Physical Therapist PT     03/07/18 -  Racquel Horn PTA Physical Therapy Assistant PT     01/16/20 -  Mendez Fairchild, RN Registered Nurse Nurse              PT Recommendation and Plan     Outcome Summary/Treatment Plan (PT)  Anticipated Discharge Disposition (PT): skilled nursing facility  Plan of Care Reviewed With: patient  Outcome Summary: Pt tolerated treatment session but appeared fatigue. Nursing reports he has been out of bed today and up in chair some. Pt ambulated decreased distance but required decreased assistance. He completed BLE exercises. Pt will continue to benefit from skilled PT.     Time Calculation:   PT Charges     Row Name 07/11/20 1725             Time Calculation    Start Time  1655  -      Stop Time  1715  -      Time Calculation (min)  20 min  -      PT  Received On  07/11/20  -WALESKA      PT - Next Appointment  07/13/20  -WALESKA        User Key  (r) = Recorded By, (t) = Taken By, (c) = Cosigned By    Initials Name Provider Type    Katina Barnhart, PT Physical Therapist        Therapy Charges for Today     Code Description Service Date Service Provider Modifiers Qty    [id number] HC PT THER PROC EA 15 MIN 7/11/2020 Katina Mccain, PT GP 1          PT G-Codes  Outcome Measure Options: AM-PAC 6 Clicks Basic Mobility (PT)  AM-PAC 6 Clicks Score (PT): 18    Katina Mccain, PT  7/11/2020

## 2020-07-12 LAB
GLUCOSE BLDC GLUCOMTR-MCNC: 136 MG/DL (ref 70–130)
GLUCOSE BLDC GLUCOMTR-MCNC: 141 MG/DL (ref 70–130)
GLUCOSE BLDC GLUCOMTR-MCNC: 173 MG/DL (ref 70–130)
GLUCOSE BLDC GLUCOMTR-MCNC: 190 MG/DL (ref 70–130)

## 2020-07-12 PROCEDURE — 94799 UNLISTED PULMONARY SVC/PX: CPT

## 2020-07-12 PROCEDURE — 25010000002 ENOXAPARIN PER 10 MG: Performed by: INTERNAL MEDICINE

## 2020-07-12 PROCEDURE — 63710000001 INSULIN LISPRO (HUMAN) PER 5 UNITS: Performed by: NURSE PRACTITIONER

## 2020-07-12 PROCEDURE — 82962 GLUCOSE BLOOD TEST: CPT

## 2020-07-12 RX ADMIN — IPRATROPIUM BROMIDE AND ALBUTEROL SULFATE 3 ML: 2.5; .5 SOLUTION RESPIRATORY (INHALATION) at 11:13

## 2020-07-12 RX ADMIN — ATORVASTATIN CALCIUM 80 MG: 80 TABLET, FILM COATED ORAL at 08:36

## 2020-07-12 RX ADMIN — BUDESONIDE 0.5 MG: 0.5 INHALANT RESPIRATORY (INHALATION) at 06:54

## 2020-07-12 RX ADMIN — ENOXAPARIN SODIUM 40 MG: 40 INJECTION SUBCUTANEOUS at 08:36

## 2020-07-12 RX ADMIN — DOCUSATE SODIUM 100 MG: 100 CAPSULE ORAL at 21:00

## 2020-07-12 RX ADMIN — Medication 400 MG: at 08:36

## 2020-07-12 RX ADMIN — SODIUM CHLORIDE, PRESERVATIVE FREE 10 ML: 5 INJECTION INTRAVENOUS at 21:00

## 2020-07-12 RX ADMIN — LORAZEPAM 1 MG: 1 TABLET ORAL at 00:04

## 2020-07-12 RX ADMIN — AMLODIPINE BESYLATE 10 MG: 10 TABLET ORAL at 08:36

## 2020-07-12 RX ADMIN — IPRATROPIUM BROMIDE AND ALBUTEROL SULFATE 3 ML: 2.5; .5 SOLUTION RESPIRATORY (INHALATION) at 06:54

## 2020-07-12 RX ADMIN — CARVEDILOL 12.5 MG: 12.5 TABLET, FILM COATED ORAL at 17:05

## 2020-07-12 RX ADMIN — CARVEDILOL 12.5 MG: 12.5 TABLET, FILM COATED ORAL at 08:36

## 2020-07-12 RX ADMIN — TAMSULOSIN HYDROCHLORIDE 0.4 MG: 0.4 CAPSULE ORAL at 21:00

## 2020-07-12 RX ADMIN — ASPIRIN 81 MG: 81 TABLET, COATED ORAL at 08:36

## 2020-07-12 RX ADMIN — VITAMIN D, TAB 1000IU (100/BT) 1000 UNITS: 25 TAB at 08:36

## 2020-07-12 RX ADMIN — BUDESONIDE 0.5 MG: 0.5 INHALANT RESPIRATORY (INHALATION) at 21:09

## 2020-07-12 RX ADMIN — INSULIN LISPRO 2 UNITS: 100 INJECTION, SOLUTION INTRAVENOUS; SUBCUTANEOUS at 12:23

## 2020-07-12 RX ADMIN — Medication 1000 MCG: at 08:36

## 2020-07-12 RX ADMIN — DOCUSATE SODIUM 100 MG: 100 CAPSULE ORAL at 08:36

## 2020-07-12 RX ADMIN — IPRATROPIUM BROMIDE AND ALBUTEROL SULFATE 3 ML: 2.5; .5 SOLUTION RESPIRATORY (INHALATION) at 21:09

## 2020-07-12 RX ADMIN — PRIMIDONE 50 MG: 50 TABLET ORAL at 21:00

## 2020-07-12 RX ADMIN — SODIUM CHLORIDE, PRESERVATIVE FREE 10 ML: 5 INJECTION INTRAVENOUS at 08:36

## 2020-07-12 RX ADMIN — IPRATROPIUM BROMIDE AND ALBUTEROL SULFATE 3 ML: 2.5; .5 SOLUTION RESPIRATORY (INHALATION) at 15:57

## 2020-07-12 RX ADMIN — PRIMIDONE 50 MG: 50 TABLET ORAL at 08:36

## 2020-07-12 RX ADMIN — PANTOPRAZOLE SODIUM 40 MG: 40 TABLET, DELAYED RELEASE ORAL at 05:19

## 2020-07-12 NOTE — PLAN OF CARE
Patient seemed very unstable this morning with ambulation to bathroom, by this afternoon patient much more stable able to walk to the bathroom without issue. VSS will continue to monitor

## 2020-07-12 NOTE — PROGRESS NOTES
[facility]               ASSOCIATES     LOS: 6 days     Name: Felicita Mike  Age: 69 y.o.  Sex: male  :  1950  MRN: [id number]         Primary Care Physician: Alexandrea Jimenez, DANIEL    Diet Regular; Cardiac, Consistent Carbohydrate, Renal    Subjective    No complaints today.  Denies chest pain, shortness of breath, abdominal pain.  Denies any myoclonic jerks.    Review of Systems   Constitutional: Negative for fever.   Respiratory: Negative for cough and shortness of breath.    Cardiovascular: Negative for chest pain.   Gastrointestinal: Negative for abdominal pain.     Objective   Temp:  [97.5 °F (36.4 °C)-97.8 °F (36.6 °C)] 97.8 °F (36.6 °C)  Heart Rate:  [60-75] 64  Resp:  [16-20] 18  BP: (135-169)/(70-83) 142/70  SpO2:  [90 %-100 %] 94 %  on  Flow (L/min):  [2-3] 2;   Device (Oxygen Therapy): nasal cannula  Body mass index is 28.18 kg/m².    Physical Exam   Constitutional: No distress.   Cardiovascular: Normal rate and regular rhythm.   Pulmonary/Chest: Effort normal. No respiratory distress. He has no wheezes.   Abdominal: Soft. Bowel sounds are normal. There is no tenderness. There is no rebound and no guarding.   Musculoskeletal: He exhibits no edema.   Neurological: He is alert.   Skin: Skin is warm and dry.   Psychiatric: He has a normal mood and affect. His behavior is normal.   Nursing note and vitals reviewed.    Reviewed medications and new clinical results    Scheduled Meds    amLODIPine 10 mg Oral Q24H   aspirin 81 mg Oral Daily   atorvastatin 80 mg Oral Daily   budesonide 0.5 mg Nebulization BID - RT   carvedilol 12.5 mg Oral BID With Meals   cholecalciferol 1,000 Units Oral Daily   docusate sodium 100 mg Oral BID   enoxaparin 40 mg Subcutaneous Q24H   insulin lispro 0-7 Units Subcutaneous TID AC   ipratropium-albuterol 3 mL Nebulization 4x Daily - RT   magnesium oxide 400 mg Oral Daily   pantoprazole 40 mg Oral Q AM   primidone 50 mg Oral Q12H   sodium chloride  10 mL Intravenous Q12H   tamsulosin 0.4 mg Oral Nightly   vitamin B-12 1,000 mcg Oral Daily     Continuous Infusions     PRN Meds  •  acetaminophen **OR** acetaminophen **OR** acetaminophen  •  bisacodyl  •  dextrose  •  dextrose  •  glucagon (human recombinant)  •  haloperidol lactate  •  LORazepam  •  nitroglycerin  •  ondansetron  •  polyethylene glycol  •  potassium chloride **OR** potassium chloride **OR** potassium chloride  •  sodium chloride    Results from last 7 days   Lab Units 07/11/20  0430 07/09/20  0421 07/07/20  0612 07/06/20  1504   WBC 10*3/mm3 8.61 11.36* 11.24* 10.94*   HEMOGLOBIN g/dL 10.7* 10.1* 10.5* 10.5*   PLATELETS 10*3/mm3 237 228 223 199     Results from last 7 days   Lab Units 07/11/20  0653 07/10/20  2145 07/10/20  0532 07/09/20  0421 07/08/20  0603 07/07/20  0612 07/06/20  2042 07/06/20  1504   SODIUM mmol/L 139  --  140 137 136 134* 133* 130*   POTASSIUM mmol/L 4.4 4.3 3.4* 3.9 4.4 4.2 4.5 4.5   CHLORIDE mmol/L 105  --  107 105 106 103 103 99   CO2 mmol/L 23.8  --  21.5* 19.2* 14.4* 20.2* 19.7* 20.3*   BUN mg/dL 10  --  8 7* 13 25* 29* 33*   CREATININE mg/dL 0.95  --  0.76 0.82 1.02 1.42* 1.81* 2.26*   CALCIUM mg/dL 9.9  --  9.8 10.5 10.2 10.0 9.6 9.5   GLUCOSE mg/dL 114*  --  122* 124* 130* 103* 91 101*     Lab Results   Component Value Date    ANIONGAP 10.2 07/11/2020     Glucose   Date/Time Value Ref Range Status   07/12/2020 1115 173 (H) 70 - 130 mg/dL Final   07/12/2020 0642 141 (H) 70 - 130 mg/dL Final   07/11/2020 2108 144 (H) 70 - 130 mg/dL Final   07/11/2020 1612 155 (H) 70 - 130 mg/dL Final   07/11/2020 1102 141 (H) 70 - 130 mg/dL Final   07/11/2020 0606 142 (H) 70 - 130 mg/dL Final   07/10/2020 2037 217 (H) 70 - 130 mg/dL Final   07/10/2020 1700 156 (H) 70 - 130 mg/dL Final     Estimated Creatinine Clearance: 77.2 mL/min (by C-G formula based on SCr of 0.95 mg/dL).    Lab Results   Component Value Date    LITHIUM 1.1 07/09/2020        Intake/Output Summary (Last 24 hours)  at 7/12/2020 1225  Last data filed at 7/12/2020 0900  Gross per 24 hour   Intake 240 ml   Output --   Net 240 ml      I personally reviewed tele    Assessment/Plan   Active Hospital Problems    Diagnosis  POA   • Chronic respiratory failure with hypoxia (CMS/[facility]) [J96.11]  Unknown   • Essential tremor [G25.0]  Yes   • Iron deficiency anemia [D50.9]  Yes   • Coronary arteriosclerosis in native artery [I25.10]  Yes   • Bipolar affective disorder (CMS/[facility]) [F31.9]  Yes   • Chronic obstructive pulmonary disease (CMS/[facility]) [J44.9]  Yes   • Type 2 diabetes mellitus without complication, with long-term current use of insulin (CMS/[facility]) [E11.9, Z79.4]  Not Applicable   • Hyperlipidemia [E78.5]  Yes   • GERD (gastroesophageal reflux disease) [K21.9]  Yes   • Peripheral vascular disease (CMS/[facility]) [I73.9]  Yes   • Sleep apnea [G47.30]  Yes      Resolved Hospital Problems    Diagnosis Date Resolved POA   • **Acute kidney injury (CMS/[facility]) [N17.9] 07/09/2020 Yes   • Hyponatremia [E87.1] 07/09/2020 Unknown   • Lithium toxicity [T56.891A] 07/09/2020 Unknown     69 y.o. male with acute kidney injury and lithium toxicity and possible serotonin syndrome.    · Acute kidney injury resolved, expansion acidosis improved after changing fluids.  · Lithium toxicity: Level normalized.  Psychiatry recommends holding all psychotropic medications at this time, PRN Haldol for agitation.  Would not restart previously prescribed psychotropic medications unless patient able to provide better history of bipolar disorder  · Klonopin was started for mild clonus but stopped due to drowsiness.  No more drowsiness  · Chest x-ray 7/9/2020 showed possibly borderline CHF or bronchitis.  He was given a couple doses of Lasix.  Chest x-ray 7/11/2020 shows nearly complete resolution of congestion.  No fever.  Has a history of oxygen dependent COPD and had exacerbation and hypoxic respiratory failure had influenza in December.  Lungs sound fine today.  Continue  nebs and supportive care.  Probably had acute diastolic CHF resolved.  Echo from November noted.  · Hyponatremia resolved.  Some mild hypokalemia probably from diuresis.  Potassium replaced   · Diabetes: A1c 6.  Glucoses 100s  · Disposition to be determined.  PT following.  Will need skilled nursing facility.  COVID pending  · Discussed with patient and nursing staff     René Pace MD   07/12/20  12:25

## 2020-07-12 NOTE — PLAN OF CARE
Problem: Patient Care Overview  Goal: Plan of Care Review  Outcome: Ongoing (interventions implemented as appropriate)  Flowsheets (Taken 7/12/2020 0125)  Plan of Care Reviewed With: patient     Problem: Patient Care Overview  Goal: Plan of Care Review  Outcome: Ongoing (interventions implemented as appropriate)  Flowsheets (Taken 7/12/2020 0125)  Plan of Care Reviewed With: patient

## 2020-07-12 NOTE — PLAN OF CARE
Problem: Patient Care Overview  Goal: Plan of Care Review  Outcome: Ongoing (interventions implemented as appropriate)  Flowsheets (Taken 7/12/2020 0125)  Plan of Care Reviewed With: patient     Problem: Patient Care Overview  Goal: Plan of Care Review  Outcome: Ongoing (interventions implemented as appropriate)  Flowsheets (Taken 7/12/2020 0125)  Plan of Care Reviewed With: patient   No respiratory distress noted. No tremors noted. Medicated for headache with Tylenol with good relief. Ativan given for anxiety. Assisted up to bathroom to void. Rested well. Will continue to monitor.

## 2020-07-13 ENCOUNTER — READMISSION MANAGEMENT (OUTPATIENT)
Dept: CALL CENTER | Facility: HOSPITAL | Age: 70
End: 2020-07-13

## 2020-07-13 VITALS
WEIGHT: 179.9 LBS | HEART RATE: 67 BPM | TEMPERATURE: 97.7 F | BODY MASS INDEX: 28.24 KG/M2 | HEIGHT: 67 IN | RESPIRATION RATE: 16 BRPM | SYSTOLIC BLOOD PRESSURE: 118 MMHG | DIASTOLIC BLOOD PRESSURE: 96 MMHG | OXYGEN SATURATION: 97 %

## 2020-07-13 PROBLEM — I50.31 ACUTE DIASTOLIC CHF (CONGESTIVE HEART FAILURE) (HCC): Status: ACTIVE | Noted: 2020-07-13

## 2020-07-13 LAB
GLUCOSE BLDC GLUCOMTR-MCNC: 143 MG/DL (ref 70–130)
GLUCOSE BLDC GLUCOMTR-MCNC: 239 MG/DL (ref 70–130)
REF LAB TEST METHOD: NORMAL
SARS-COV-2 RNA RESP QL NAA+PROBE: NOT DETECTED

## 2020-07-13 PROCEDURE — 94799 UNLISTED PULMONARY SVC/PX: CPT

## 2020-07-13 PROCEDURE — 63710000001 INSULIN LISPRO (HUMAN) PER 5 UNITS: Performed by: NURSE PRACTITIONER

## 2020-07-13 PROCEDURE — 25010000002 ENOXAPARIN PER 10 MG: Performed by: INTERNAL MEDICINE

## 2020-07-13 PROCEDURE — 82962 GLUCOSE BLOOD TEST: CPT

## 2020-07-13 PROCEDURE — 97110 THERAPEUTIC EXERCISES: CPT

## 2020-07-13 RX ORDER — BUDESONIDE 0.5 MG/2ML
0.5 INHALANT ORAL
Qty: 120 ML | Refills: 0 | Status: SHIPPED | OUTPATIENT
Start: 2020-07-13 | End: 2020-07-20 | Stop reason: SDUPTHER

## 2020-07-13 RX ORDER — PRIMIDONE 50 MG/1
50 TABLET ORAL EVERY 12 HOURS SCHEDULED
Qty: 60 TABLET | Refills: 0 | Status: SHIPPED | OUTPATIENT
Start: 2020-07-13 | End: 2020-07-17

## 2020-07-13 RX ORDER — LISINOPRIL 10 MG/1
10 TABLET ORAL DAILY
Qty: 30 TABLET | Refills: 0 | Status: SHIPPED | OUTPATIENT
Start: 2020-07-13 | End: 2020-07-17 | Stop reason: SDUPTHER

## 2020-07-13 RX ADMIN — CARVEDILOL 12.5 MG: 12.5 TABLET, FILM COATED ORAL at 09:25

## 2020-07-13 RX ADMIN — Medication 400 MG: at 09:24

## 2020-07-13 RX ADMIN — PANTOPRAZOLE SODIUM 40 MG: 40 TABLET, DELAYED RELEASE ORAL at 06:32

## 2020-07-13 RX ADMIN — VITAMIN D, TAB 1000IU (100/BT) 1000 UNITS: 25 TAB at 09:24

## 2020-07-13 RX ADMIN — AMLODIPINE BESYLATE 10 MG: 10 TABLET ORAL at 09:25

## 2020-07-13 RX ADMIN — DOCUSATE SODIUM 100 MG: 100 CAPSULE ORAL at 09:25

## 2020-07-13 RX ADMIN — SODIUM CHLORIDE, PRESERVATIVE FREE 10 ML: 5 INJECTION INTRAVENOUS at 09:25

## 2020-07-13 RX ADMIN — IPRATROPIUM BROMIDE AND ALBUTEROL SULFATE 3 ML: 2.5; .5 SOLUTION RESPIRATORY (INHALATION) at 07:17

## 2020-07-13 RX ADMIN — ATORVASTATIN CALCIUM 80 MG: 80 TABLET, FILM COATED ORAL at 09:24

## 2020-07-13 RX ADMIN — Medication 1000 MCG: at 09:24

## 2020-07-13 RX ADMIN — INSULIN LISPRO 3 UNITS: 100 INJECTION, SOLUTION INTRAVENOUS; SUBCUTANEOUS at 11:16

## 2020-07-13 RX ADMIN — IPRATROPIUM BROMIDE AND ALBUTEROL SULFATE 3 ML: 2.5; .5 SOLUTION RESPIRATORY (INHALATION) at 11:40

## 2020-07-13 RX ADMIN — ENOXAPARIN SODIUM 40 MG: 40 INJECTION SUBCUTANEOUS at 09:25

## 2020-07-13 RX ADMIN — BUDESONIDE 0.5 MG: 0.5 INHALANT RESPIRATORY (INHALATION) at 07:19

## 2020-07-13 RX ADMIN — ACETAMINOPHEN 650 MG: 325 TABLET, FILM COATED ORAL at 01:14

## 2020-07-13 RX ADMIN — PRIMIDONE 50 MG: 50 TABLET ORAL at 09:25

## 2020-07-13 RX ADMIN — ASPIRIN 81 MG: 81 TABLET, COATED ORAL at 09:25

## 2020-07-13 NOTE — PLAN OF CARE
Problem: Patient Care Overview  Goal: Plan of Care Review  Outcome: Outcome(s) achieved  Flowsheets (Taken 7/13/2020 1434)  Progress: improving  Plan of Care Reviewed With: patient  Outcome Summary: VSS. Telemetry monitor, SR. Alert and oriented x4. 2 lpm via nc. Ride here for transport, will proceed with discharge.

## 2020-07-13 NOTE — THERAPY TREATMENT NOTE
Patient Name: Felicita Mike  : 1950    MRN: [id number]                              Today's Date: 2020       Admit Date: 2020    Visit Dx:     ICD-10-CM ICD-9-CM   1. Acute kidney injury (CMS/HCC) N17.9 584.9   2. Generalized abdominal pain R10.84 789.07   3. Abnormal lithium level in blood R78.89 790.6     Patient Active Problem List   Diagnosis   • Elevated prostate specific antigen (PSA)   • Coronary arteriosclerosis in native artery   • Bipolar affective disorder (CMS/HCC)   • Chronic obstructive pulmonary disease (CMS/HCC)   • Colon polyp   • Constipation   • Type 2 diabetes mellitus without complication, with long-term current use of insulin (CMS/HCC)   • GERD (gastroesophageal reflux disease)   • Fatigue   • Hearing loss   • Hyperlipidemia   • Peripheral vascular disease (CMS/HCC)   • Proteinuria   • Muscle pain   • Low back pain   • Leukocytosis   • Seborrheic eczema   • Sleep apnea   • Tinea corporis   • Tremor   • Hypertension   • Anxiety   • Benign prostatic hyperplasia with lower urinary tract symptoms   • Tobacco abuse   • Normocytic anemia   • Thyroid nodule   • Iron deficiency anemia   • Adenomatous polyp of colon   • Family history of malignant neoplasm of colon   • Gastroesophageal reflux disease   • COPD with acute exacerbation (CMS/HCC)   • Influenza A   • SWAPNIL (acute kidney injury) (CMS/HCC)   • Essential tremor   • Magnesium deficiency   • Long-term current use of lithium   • Chronic respiratory failure with hypoxia (CMS/HCC)     Past Medical History:   Diagnosis Date   • Abnormal PSA    • Acute myocardial infarction (CMS/HCC) 2004    Stents   • Apnea, sleep    • Atherosclerotic heart disease    • Chronic pain     Chronic low back paoin, MRI ,  L3 compression deformity, L2 degenerative disc disease and L5-S1 degenerative disc disease.   • Colon polyp    • Constipation    • COPD (chronic obstructive pulmonary disease) (CMS/HCC)    • Coronary artery disease    • Depressed  bipolar II disorder (CMS/[facility])    • Diabetes mellitus (CMS/[facility])    • Essential tremor    • Fatigue    • GERD (gastroesophageal reflux disease)    • H/O transfusion of packed red blood cells    • Health care maintenance    • Hearing loss    • History of back pain    • Hypercholesterolemia    • Hyperlipidemia    • Hypertension    • Leukocytosis    • Osteoarthritis    • Proteinuria    • PVD (peripheral vascular disease) (CMS/[facility])    • Sebaceous cyst    • Seborrheic dermatitis    • Sleep apnea    • Tinea corporis    • Tobacco use    • Tremor      Past Surgical History:   Procedure Laterality Date   • ANGIOPLASTY      Cath Stent Placement   • COLONOSCOPY  01/22/2014    ta polyps   • COLONOSCOPY N/A 11/29/2016    Procedure: COLONOSCOPY TO CECUM AND TERMINAL ILEUM WITH HOT POLYPECTOMIES;  Surgeon: Ivette Franco MD;  Location: [facility] ENDOSCOPY;  Service:    • COLONOSCOPY N/A 12/16/2019    Procedure: COLONOSCOPY to cecum with hot snare, cold biopsy polypectomies with clip placement x2;  Surgeon: Ivette Franco MD;  Location: [facility] ENDOSCOPY;  Service: Gastroenterology   • CORONARY STENT PLACEMENT  2004   • ENDOSCOPY N/A 12/16/2019    Procedure: ESOPHAGOGASTRODUODENOSCOPY with biopsies;  Surgeon: Ivette Franco MD;  Location: [facility] ENDOSCOPY;  Service: Gastroenterology   • EYE SURGERY      Cataract Surgery   • HAND SURGERY       General Information     Row Name 07/13/20 1112          PT Evaluation Time/Intention    Document Type  therapy note (daily note)  -     Mode of Treatment  physical therapy;individual therapy  -     Row Name 07/13/20 1112          General Information    Existing Precautions/Restrictions  fall;oxygen therapy device and L/min  -     Row Name 07/13/20 1112          Cognitive Assessment/Intervention- PT/OT    Orientation Status (Cognition)  oriented x 3  -     Row Name 07/13/20 1112          Safety Issues, Functional Mobility    Impairments Affecting Function (Mobility)   balance;coordination;endurance/activity tolerance;strength  -       User Key  (r) = Recorded By, (t) = Taken By, (c) = Cosigned By    Initials Name Provider Type     Karen Brady, PT Physical Therapist        Mobility     Row Name 07/13/20 1113          Bed Mobility Assessment/Treatment    Supine-Sit [place] (Bed Mobility)  not tested  -     Sit-Supine [place] (Bed Mobility)  not tested  -     Comment (Bed Mobility)  sitting EOB, no tremor noted at rest  -[facility] Name 07/13/20 1113          Sit-Stand Transfer    Sit-Stand [place] (Transfers)  supervision  -     Assistive Device (Sit-Stand Transfers)  -- no AD required  -[facility] Name 07/13/20 1113          Gait/Stairs Assessment/Training    [place] Level (Gait)  verbal cues;stand by assist;contact guard  -     Assistive Device (Gait)  -- No AD  -     Distance in Feet (Gait)  150  -     Pattern (Gait)  step-through  -     Deviations/Abnormal Patterns (Gait)  josh decreased;gait speed decreased;stride length decreased  -     Comment (Gait/Stairs)  pt with 1 minor LOB requiring only CGA to correct, overall gait was slow but steady  -       User Key  (r) = Recorded By, (t) = Taken By, (c) = Cosigned By    Initials Name Provider Type     Karen Brady, PT Physical Therapist        Obj/Interventions    No documentation.       Goals/Plan    No documentation.       Clinical Impression     [facility] Name 07/13/20 1114          Pain Scale: Numbers Pre/Post-Treatment    Pain Scale: Numbers, Pretreatment  0/10 - no pain  -     Pain Scale: Numbers, Post-Treatment  0/10 - no pain  -[facility] Name 07/13/20 1114          Plan of Care Review    Plan of Care Reviewed With  patient  -     Progress  improving  -     Outcome Summary  Pt demonstrates increased activity tolerance and functional strength as he was able to increase his gait distance and required less assistance. Pt with less tremoring today and pt wanting to  return home. PT recommends home with HHPT to address higher level balance and home safety.  -     Row Name 07/13/20 1114          Positioning and Restraints    Pre-Treatment Position  in bed  -CH     Post Treatment Position  bed  -CH     In Bed  sitting EOB;call light within reach;encouraged to call for assist;exit alarm on  -       User Key  (r) = Recorded By, (t) = Taken By, (c) = Cosigned By    Initials Name Provider Type    Karen Perez PT Physical Therapist        Outcome Measures     Row Name 07/13/20 1116          How much help from another person do you currently need...    Turning from your back to your side while in flat bed without using bedrails?  4  -CH     Moving from lying on back to sitting on the side of a flat bed without bedrails?  4  -CH     Moving to and from a bed to a chair (including a wheelchair)?  4  -CH     Standing up from a chair using your arms (e.g., wheelchair, bedside chair)?  4  -CH     Climbing 3-5 steps with a railing?  3  -CH     To walk in hospital room?  3  -CH     AM-PAC 6 Clicks Score (PT)  22  -     Row Name 07/13/20 1116          Functional Assessment    Outcome Measure Options  AM-PAC 6 Clicks Basic Mobility (PT)  -       User Key  (r) = Recorded By, (t) = Taken By, (c) = Cosigned By    Initials Name Provider Type    Karen Perez PT Physical Therapist        Physical Therapy Education                 Title: PT OT SLP Therapies (Done)     Topic: Physical Therapy (Done)     Point: Mobility training (Done)     Description:   Instruct learner(s) on safety and technique for assisting patient out of bed, chair or wheelchair.  Instruct in the proper use of assistive devices, such as walker, crutches, cane or brace.              Patient Friendly Description:   It's important to get you on your feet again, but we need to do so in a way that is safe for you. Falling has serious consequences, and your personal safety is the most important thing of all.         When it's time to get out of bed, one of us or a family member will sit next to you on the bed to give you support.     If your doctor or nurse tells you to use a walker, crutches, a cane, or a brace, be sure you use it every time you get out of bed, even if you think you don't need it.    Learning Progress Summary           Patient Acceptance, E,TB,D, VU,NR by  at 7/13/2020 1117    Acceptance, E, VU by  at 7/11/2020 1724    Acceptance, E,TB,D, VU,NR by SM at 7/10/2020 1126    Acceptance, E, VU by AR at 7/9/2020 1137    Acceptance, E, VU by AG at 7/8/2020 1701    Acceptance, E,TB,D, VU,NR by  at 7/8/2020 1135                   Point: Body mechanics (Done)     Description:   Instruct learner(s) on proper positioning and spine alignment for patient and/or caregiver during mobility tasks and/or exercises.              Learning Progress Summary           Patient Acceptance, E,TB,D, VU,NR by  at 7/13/2020 1117    Acceptance, E, VU by  at 7/11/2020 1724    Acceptance, E,TB,D, VU,NR by  at 7/10/2020 1126    Acceptance, E, VU by AR at 7/9/2020 1137    Acceptance, E, VU by AG at 7/8/2020 1701    Acceptance, E,TB,D, VU,NR by  at 7/8/2020 1135                   Point: Precautions (Done)     Description:   Instruct learner(s) on prescribed precautions during mobility and gait tasks              Learning Progress Summary           Patient Acceptance, E,TB,D, VU,NR by  at 7/13/2020 1117    Acceptance, E, VU by  at 7/11/2020 1724    Acceptance, E,TB,D, VU,NR by SM at 7/10/2020 1126    Acceptance, E, VU by AR at 7/9/2020 1137    Acceptance, E, VU by AG at 7/8/2020 1701    Acceptance, E,TB,D, VU,NR by  at 7/8/2020 1135                               User Key     Initials Effective Dates Name Provider Type Discipline     04/03/18 -  Karen Brady, PT Physical Therapist PT    [place] 04/03/18 -  Katina Mccain, PT Physical Therapist PT    AR 04/03/18 -  Rand Gray, PT Physical Therapist PT    SM  03/07/18 -  Racquel Horn PTA Physical Therapy Assistant PT    AG 01/16/20 -  Mendez Fairchild, RN Registered Nurse Nurse              PT Recommendation and Plan  Planned Therapy Interventions (PT Eval): balance training, bed mobility training, gait training, home exercise program, patient/family education, strengthening, transfer training  Outcome Summary/Treatment Plan (PT)  Anticipated Discharge Disposition (PT): home with home health  Plan of Care Reviewed With: patient  Progress: improving  Outcome Summary: Pt demonstrates increased activity tolerance and functional strength as he was able to increase his gait distance and required less assistance. Pt with less tremoring today and pt wanting to return home. PT recommends home with HHPT to address higher level balance and home safety.     Time Calculation:   PT Charges     Row Name 07/13/20 1117             Time Calculation    Start Time  1046  -      Stop Time  1056  -      Time Calculation (min)  10 min  -      PT Received On  07/13/20  -      PT - Next Appointment  07/14/20  -         Time Calculation- PT    Total Timed Code Minutes- PT  9 minute(s)  -        User Key  (r) = Recorded By, (t) = Taken By, (c) = Cosigned By    Initials Name Provider Type     Karen Brady, PT Physical Therapist        Therapy Charges for Today     Code Description Service Date Service Provider Modifiers Qty    [id number] HC PT THER PROC EA 15 MIN 7/13/2020 Karen Brady, PT GP 1          PT G-Codes  Outcome Measure Options: AM-PAC 6 Clicks Basic Mobility (PT)  AM-PAC 6 Clicks Score (PT): 22    Karen Brady PT  7/13/2020

## 2020-07-13 NOTE — PLAN OF CARE
Problem: Patient Care Overview  Goal: Plan of Care Review  Flowsheets (Taken 7/13/2020 1114)  Plan of Care Reviewed With: patient  Outcome Summary: Pt demonstrates increased activity tolerance and functional strength as he was able to increase his gait distance and required less assistance. Pt with less tremoring today and pt wanting to return home. PT recommends home with HHPT to address higher level balance and home safety.  Note:   Patient was wearing a face mask during this therapy encounter. Therapist used appropriate personal protective equipment including mask and gloves.  Mask used was standard procedure mask. Appropriate PPE was worn during the entire therapy session. Hand hygiene was completed before and after therapy session. Patient is not in enhanced droplet precautions.

## 2020-07-13 NOTE — PROGRESS NOTES
Case Management Discharge Note      Final Note: Discharged home with [facility]. Brittany Alcantar CSW         Destination      No service has been selected for the patient.      Durable Medical Equipment      No service has been selected for the patient.      Dialysis/Infusion      No service has been selected for the patient.      Home Medical Care - Selection Complete      Service Provider Request Status Selected Services Address Phone Number Fax Number    [facility] Selected Home Health Services [address] [address] [phone]      Therapy      No service has been selected for the patient.      Community Resources      No service has been selected for the patient.        Transportation Services  Private: Car    Final Discharge Disposition Code: 06 - home with home health care

## 2020-07-13 NOTE — PROGRESS NOTES
Continued Stay Note  [facility]     Patient Name: Felicita Mike  MRN: [id number]  Today's Date: 7/13/2020    Admit Date: 7/6/2020    Discharge Plan     Row Name 07/13/20 1137       Plan    Plan  Home with [facility]    Plan Comments  CCP received returned call from patient's friend Brian Rodriguez [phone] who confirmed he can bring a portable O2 tank to the hospital at discharge and will provide transportation home. Brittany HOWARD    Row Name 07/13/20 1125       Plan    Plan  Home with [facility]    Plan Comments  CCP met with patient at bedside for follow up. Patient stated he does not feel he needs skilled rehab at discharge. CCP discussed current PT recommendations for home health. Patient requested to use [facility] again at discharge. CCP placed referral in [facility] Putnam from [facility] is following. Patient states he does not have a portable O2 tank here at the hospital with him. Patient consented for CCP to call his friend Brian Rodriguez [phone] to discuss discharge plan. Brian stated he would provide transportation at discharge and would see if he could bring a portable O2 tank for the patient and will call back within an hour. CCP will follow for portable O2 tank and any additional needs that may arise. Brittany HOWARD        Discharge Codes    No documentation.             ERVIN Edmond

## 2020-07-13 NOTE — DISCHARGE SUMMARY
[facility]    Date of Discharge:  7/13/2020    PCP: Alexandrea Jimenez PA-C    Discharge Diagnosis:   Active Hospital Problems    Diagnosis  POA   • Acute diastolic CHF (congestive heart failure) (CMS/[facility]) [I50.31]  Unknown   • Chronic respiratory failure with hypoxia (CMS/[facility]) [J96.11]  Unknown   • Essential tremor [G25.0]  Yes   • Iron deficiency anemia [D50.9]  Yes   • Coronary arteriosclerosis in native artery [I25.10]  Yes   • Bipolar affective disorder (CMS/[facility]) [F31.9]  Yes   • Chronic obstructive pulmonary disease (CMS/[facility]) [J44.9]  Yes   • Type 2 diabetes mellitus without complication, with long-term current use of insulin (CMS/[facility]) [E11.9, Z79.4]  Not Applicable   • Hyperlipidemia [E78.5]  Yes   • GERD (gastroesophageal reflux disease) [K21.9]  Yes   • Peripheral vascular disease (CMS/[facility]) [I73.9]  Yes   • Sleep apnea [G47.30]  Yes      Resolved Hospital Problems    Diagnosis Date Resolved POA   • **Acute kidney injury (CMS/[facility]) [N17.9] 07/09/2020 Yes   • Hyponatremia [E87.1] 07/09/2020 Unknown   • Lithium toxicity [T56.891A] 07/09/2020 Unknown      Consults     Date and Time Order Name Status Description    7/7/2020 1739 Inpatient Psychiatrist Consult Completed     7/7/2020 1243 Inpatient Neurology Consult General Completed     7/7/2020 0207 Inpatient Neurology Consult General Completed     7/6/2020 1805 [facility] (on-call MD unless specified) Details Completed         Hospital Course  Please see history and physical for details. Patient is a 69 y.o. male with a history of CAD, COPD, MARTINE, bipolar disorder presented with weakness and tremor and was found to have lithium toxicity and acute kidney injury.  He was given fluids with resolution of acute kidney injury and lithium level normalized after a couple of days.  Psychiatry recommended holding all psychotropic medications at this time and did not recommend restarting previously prescribed psychotropic  medications unless the patient was able to provide a better history of bipolar disorder.  Neurology felt he also had some possible serotonin syndrome.  Seroquel and Prozac were held.  He had some lethargy when he was given Klonopin for myoclonus which was actually mild.  Klonopin was discontinued and lethargy resolved.  He will follow-up with neurology 7/13/2020.  Initially patient was scheduled for skilled rehab however patient does not feel that he needs skilled rehab and PT recommended home with home health.  He had some CHF likely due to fluids that he was given for acute kidney injury and lithium toxicity that resolved after some IV furosemide given to treat acute diastolic CHF.  He is on chronic oxygen at home.    His A1c is 6.  Since renal function has returned to normal he can resume metformin.  His ACE inhibitor will also be resumed but at a lower dose.  Will have home health check blood pressure and BMP in a few days.    I discussed the patient's findings and my recommendations with patient and nursing staff.    Condition on Discharge: Improved.  Patient would like to go home today.    Temp:  [97.1 °F (36.2 °C)-98 °F (36.7 °C)] 97.7 °F (36.5 °C)  Heart Rate:  [56-81] 67  Resp:  [15-18] 16  BP: (118-172)/(73-96) 118/96  Body mass index is 28.18 kg/m².    Physical Exam   Constitutional: He is oriented to person, place, and time. No distress.   Cardiovascular: Normal rate and regular rhythm.   Pulmonary/Chest: Effort normal and breath sounds normal. No respiratory distress.   Abdominal: Soft. Bowel sounds are normal. There is no tenderness. There is no rebound and no guarding.   Musculoskeletal: He exhibits no edema.   Neurological: He is alert and oriented to person, place, and time.   Skin: Skin is warm and dry.   Psychiatric: He has a normal mood and affect. His behavior is normal.   Nursing note and vitals reviewed.       Discharge Medications      New Medications      Instructions Start Date   budesonide  0.5 MG/2ML nebulizer solution  Commonly known as:  PULMICORT   0.5 mg, Nebulization, 2 Times Daily - RT         Changes to Medications      Instructions Start Date   lisinopril 10 MG tablet  Commonly known as:  PRINIVILZESTRIL  What changed:    · medication strength  · how much to take   10 mg, Oral, Daily, For BP and heart      primidone 50 MG tablet  Commonly known as:  MYSOLINE  What changed:  when to take this   50 mg, Oral, Every 12 Hours Scheduled         Continue These Medications      Instructions Start Date   acetaminophen 325 MG tablet  Commonly known as:  TYLENOL   650 mg, Oral, Every 6 Hours PRN      albuterol (5 MG/ML) 0.5% nebulizer solution  Commonly known as:  PROVENTIL   2.5 mg, Nebulization, Every 6 Hours PRN      amLODIPine 10 MG tablet  Commonly known as:  NORVASC   10 mg, Oral, Daily, For BP      aspirin 81 MG tablet   1 tablet, Oral, Daily      atorvastatin 80 MG tablet  Commonly known as:  Lipitor   80 mg, Oral, Daily, For cholesterol      B-12 1000 MCG capsule   1 tablet, Oral, Daily      carvedilol 25 MG tablet  Commonly known as:  COREG   12.5 mg, Oral, 2 Times Daily With Meals, For heart      cholecalciferol 25 MCG (1000 UT) tablet  Commonly known as:  VITAMIN D3   1 tablet, Oral, 2 Times Daily      MAGnesium-Oxide 400 (241.3 Mg) MG tablet tablet  Generic drug:  magnesium oxide   TAKE 1 TABLET BY MOUTH DAILY      metFORMIN 1000 MG tablet  Commonly known as:  GLUCOPHAGE   1,000 mg, Oral, 2 Times Daily, For DMII      omeprazole 20 MG capsule  Commonly known as:  PrilOSEC   20 mg, Oral, Daily      polyethylene glycol packet  Commonly known as:  MIRALAX   17 g, Oral, Daily PRN      promethazine 25 MG tablet  Commonly known as:  PHENERGAN   25 mg, Oral, Every 8 Hours PRN      tamsulosin 0.4 MG capsule 24 hr capsule  Commonly known as:  FLOMAX   0.4 mg, Oral, Daily, For urine flow      wheat dextrin powder powder   1 each, Oral, Daily PRN         Stop These Medications    FLUoxetine 20 MG  capsule  Commonly known as:  PROzac     lithium carbonate 150 MG capsule     QUEtiapine 100 MG tablet  Commonly known as:  SEROquel           Diet Instructions     Diet: Regular, Cardiac      Discharge Diet:   Regular  Cardiac            Activity Instructions     Activity as Tolerated           Additional Instructions for the Follow-ups that You Need to Schedule     Ambulatory Referral to Home Health   As directed      Face to Face Visit Date:  7/13/2020    Follow-up provider for Plan of Care?:  I treated the patient in an acute care facility and will not continue treatment after discharge.    Follow-up provider:  ALEXANDREA JIMENEZ [[id number]]    Reason/Clinical Findings:  Weakness    Describe mobility limitations that make leaving home difficult:  Weakness    Nursing/Therapeutic Services Requested:  Physical Therapy (Check BMP in a few days.  Check BP) Other    Frequency:  1 Week 1         Call MD for problems / concerns.   As directed         Contact information for follow-up providers     Alexandrea Jimenez, DANIEL Follow up.    Specialty:  Family Medicine  Contact information:  [address]  [id number]  [facility] [id number] [phone]                   Contact information for after-discharge care     Home Medical Care     [facility] .    Service:  Home Health Services  Contact information:  [address]  [place] [address] [phone]                           Test Results Pending at Discharge   Order Current Status    COVID PRE-OP / PRE-PROCEDURE SCREENING ORDER (NO ISOLATION) - Swab, Nasopharynx In process    COVID-19,BIOTAP, NP/OP SWAB IN TRANSPORT MEDIA OR SALINE 24-36 HR TAT - Swab, Nasopharynx In process         René Pace MD  07/13/20  14:34    Discharge time spent greater than 30 minutes.

## 2020-07-13 NOTE — PROGRESS NOTES
Continued Stay Note  [facility]     Patient Name: Felicita Mike  MRN: [id number]  Today's Date: 7/13/2020    Admit Date: 7/6/2020    Discharge Plan     Row Name 07/13/20 1125       Plan    Plan  Home with [facility]    Plan Comments  CCP met with patient at bedside for follow up. Patient stated he does not feel he needs skilled rehab at discharge. CCP discussed current PT recommendations for home health. Patient requested to use [facility] again at discharge. CCP placed referral in Christal Putnam from [facility] is following. Patient states he does not have a portable O2 tank here at the hospital with him. Patient consented for CCP to call his friend Brian Rodriguez [phone] to discuss discharge plan. Brian stated he would provide transportation at discharge and would see if he could bring a portable O2 tank for the patient and will call back within an hour. CCP will follow for portable O2 tank and any additional needs that may arise. Brittany HOWARD        Discharge Codes    No documentation.             ERVIN Edmond

## 2020-07-14 ENCOUNTER — TRANSITIONAL CARE MANAGEMENT TELEPHONE ENCOUNTER (OUTPATIENT)
Dept: CALL CENTER | Facility: HOSPITAL | Age: 70
End: 2020-07-14

## 2020-07-14 NOTE — OUTREACH NOTE
Prep Survey      Responses   [facility] patient discharged from?  [place]   Is LACE score < 7 ?  No   Eligibility  [facility]   Date of Admission  07/06/20   Date of Discharge  07/13/20   Discharge Disposition  Home or Self Care   Discharge diagnosis  Acute kidney injury   COVID-19 Test Status  Negative   Does the patient have one of the following disease processes/diagnoses(primary or secondary)?  Other   Does the patient have Home health ordered?  Yes   What is the Home health agency?   [facility]   Is there a DME ordered?  No   Prep survey completed?  Yes          Digna Hernandez RN

## 2020-07-14 NOTE — OUTREACH NOTE
Call Center TCM Note      Responses   [facility]   COVID-19 Test Status  Negative   Does the patient have one of the following disease processes/diagnoses(primary or secondary)?  Other   TCM attempt successful?  Yes   Call start time  1323   Call end time  1325   Discharge diagnosis  Acute kidney injury   Meds reviewed with patient/caregiver?  Yes   Is the patient having any side effects they believe may be caused by any medication additions or changes?  No   Does the patient have all medications ordered at discharge?  Yes   Is the patient taking all medications as directed (includes completed medication regime)?  Yes   Does the patient have a primary care provider?   Yes   Does the patient have an appointment with their PCP within 7 days of discharge?  Yes   Comments regarding PCP  f/u with PCP on 7/17   Has the patient kept scheduled appointments due by today?  N/A   What is the Home health agency?   [facility]   Psychosocial issues?  No   Did the patient receive a copy of their discharge instructions?  Yes   Nursing interventions  Reviewed instructions with patient   What is the patient's perception of their health status since discharge?  Improving   Is the patient/caregiver able to teach back signs and symptoms related to disease process for when to call PCP?  Yes   Is the patient/caregiver able to teach back signs and symptoms related to disease process for when to call 911?  Yes   Is the patient/caregiver able to teach back the hierarchy of who to call/visit for symptoms/problems? PCP, Specialist, Home health nurse, Urgent Care, ED, 911  Yes   TCM call completed?  Yes   Wrap up additional comments  Patient says he is doing well, no questions or concerns at this time.          Maria Fernanda Means RN    7/14/2020, 13:25

## 2020-07-14 NOTE — OUTREACH NOTE
Call Center TCM Note      Responses   [facility] patient discharged from?  [place]   COVID-19 Test Status  Negative   Does the patient have one of the following disease processes/diagnoses(primary or secondary)?  Other   TCM attempt successful?  No   Unsuccessful attempts  Attempt 1          Maria Fernanda Means RN    7/14/2020, 10:55

## 2020-07-16 ENCOUNTER — TELEPHONE (OUTPATIENT)
Dept: FAMILY MEDICINE CLINIC | Facility: CLINIC | Age: 70
End: 2020-07-16

## 2020-07-16 NOTE — TELEPHONE ENCOUNTER
Preeti with [facility]  requesting verbal orders for nursing & PT.  Ok - pt has appt with Alexandrea Jimenez tomorrow.

## 2020-07-17 ENCOUNTER — OFFICE VISIT (OUTPATIENT)
Dept: FAMILY MEDICINE CLINIC | Facility: CLINIC | Age: 70
End: 2020-07-17

## 2020-07-17 VITALS
HEIGHT: 67 IN | DIASTOLIC BLOOD PRESSURE: 70 MMHG | SYSTOLIC BLOOD PRESSURE: 120 MMHG | RESPIRATION RATE: 16 BRPM | OXYGEN SATURATION: 99 % | TEMPERATURE: 99 F | WEIGHT: 156 LBS | HEART RATE: 69 BPM | BODY MASS INDEX: 24.48 KG/M2

## 2020-07-17 DIAGNOSIS — Z72.0 TOBACCO ABUSE: ICD-10-CM

## 2020-07-17 DIAGNOSIS — Z09 HOSPITAL DISCHARGE FOLLOW-UP: Primary | ICD-10-CM

## 2020-07-17 DIAGNOSIS — Z79.4 TYPE 2 DIABETES MELLITUS WITHOUT COMPLICATION, WITH LONG-TERM CURRENT USE OF INSULIN (HCC): ICD-10-CM

## 2020-07-17 DIAGNOSIS — E78.2 MIXED HYPERLIPIDEMIA: ICD-10-CM

## 2020-07-17 DIAGNOSIS — E11.9 TYPE 2 DIABETES MELLITUS WITHOUT COMPLICATION, WITH LONG-TERM CURRENT USE OF INSULIN (HCC): ICD-10-CM

## 2020-07-17 DIAGNOSIS — K21.9 GASTROESOPHAGEAL REFLUX DISEASE, ESOPHAGITIS PRESENCE NOT SPECIFIED: ICD-10-CM

## 2020-07-17 DIAGNOSIS — I10 ESSENTIAL HYPERTENSION: ICD-10-CM

## 2020-07-17 DIAGNOSIS — J43.9 PULMONARY EMPHYSEMA, UNSPECIFIED EMPHYSEMA TYPE (HCC): ICD-10-CM

## 2020-07-17 PROCEDURE — 99496 TRANSJ CARE MGMT HIGH F2F 7D: CPT | Performed by: PHYSICIAN ASSISTANT

## 2020-07-17 RX ORDER — QUETIAPINE FUMARATE 100 MG/1
100 TABLET, FILM COATED ORAL NIGHTLY
Qty: 90 TABLET | Refills: 1 | Status: SHIPPED | OUTPATIENT
Start: 2020-07-17 | End: 2021-03-04 | Stop reason: SDUPTHER

## 2020-07-17 RX ORDER — LISINOPRIL 10 MG/1
10 TABLET ORAL DAILY
Qty: 90 TABLET | Refills: 1 | Status: SHIPPED | OUTPATIENT
Start: 2020-07-17 | End: 2020-08-10

## 2020-07-17 NOTE — PROGRESS NOTES
Transitional Care Follow Up Visit  Subjective     Felicita Mike is a 69 y.o. male who presents for a transitional care management visit.    Within 48 business hours after discharge our office contacted him via telephone to coordinate his care and needs.      I reviewed and discussed the details of that call along with the discharge summary, hospital problems, inpatient lab results, inpatient diagnostic studies, and consultation reports with Felicita.     Current outpatient and discharge medications have been reconciled for the patient.  Reviewed by: Alexandrea Jimenez PA-C    Med change--lowered Lisinopril to 10mg  Also stopped lithium and Prozac; pt restarted Seroquel at HS and cannot sleep without it.    Date of TCM Phone Call 7/13/2020   [facility]   Date of Admission 7/6/2020   Date of Discharge 7/13/2020   Discharge Disposition Home or Self Care     Lab Results   Component Value Date    HGBA1C 6.00 (H) 07/07/2020       Risk for Readmission (LACE) Score: 12 (7/13/2020  6:00 AM)  Feeling better and less shaking; I asked for hospitalist to ask psych to see him.  He would NOT go to psychiatry and I refilled his meds. He is off Prozac and Lithium and still on Seroquel at HS and cannot sleep without it.    Ear pressure.  He is not taking Primidone--did not help---stopped a month ago    His blood pressure today is well controlled on his medications will continue same dose.  We will continue metformin and note his A1c in the hospital was 6.0 and goal met.  I can see that his potassium and renal functions were in range before discharge.  I also see that his lithium level was followed up on and was no longer toxic.  He is not currently taking a PPI or Carafate due to no complaints of nausea vomiting or abdominal pain or reflux.   I would like to see him on more aggressive COPD management but he declines to add anticholinergic medication for the COPD.  He does have a budesonide and albuterol also per nebulizer.    We  will watch his weight --- will watch blood pressure also due to his weight loss.  His appetite has picked up and he is wanting to eat more since he has been discharged from the hospital due to no more abdominal pain.  He is also noted to have coronary artery disease and sees Dr. Anne for cardiology.  We have also asked him to quit smoking.    History of Present Illness   Course During Hospital Stay:  7-6 to 7-13-20     The following portions of the patient's history were reviewed and updated as appropriate: allergies, current medications, past family history, past medical history, past social history, past surgical history and problem list.    Review of Systems   Constitutional: Negative for activity change, appetite change and unexpected weight change.   HENT: Negative for nosebleeds and trouble swallowing.    Eyes: Negative for pain and visual disturbance.   Respiratory: Positive for cough, shortness of breath and wheezing. Negative for chest tightness.    Cardiovascular: Negative for chest pain and palpitations.   Gastrointestinal: Negative for abdominal pain, blood in stool, diarrhea, nausea and vomiting.   Endocrine: Negative.    Genitourinary: Negative for difficulty urinating and hematuria.   Musculoskeletal: Negative for joint swelling.   Skin: Negative for color change and rash.   Allergic/Immunologic: Negative.    Neurological: Positive for tremors (so much improved). Negative for syncope and speech difficulty.   Hematological: Negative for adenopathy.   Psychiatric/Behavioral: Positive for dysphoric mood and sleep disturbance. Negative for agitation, confusion and suicidal ideas. The patient is not nervous/anxious.    All other systems reviewed and are negative.      Objective   Physical Exam   Constitutional: He is oriented to person, place, and time. He appears well-developed and well-nourished. No distress.   Looks good today   HENT:   Head: Normocephalic and atraumatic.   Left pupil stays dilated    Eyes: Pupils are equal, round, and reactive to light. Conjunctivae and EOM are normal. Right eye exhibits no discharge. Left eye exhibits no discharge. No scleral icterus.   Left pupil damage is chronic   Neck: Normal range of motion. Neck supple. No tracheal deviation present. No thyromegaly present.   Cardiovascular: Normal rate, regular rhythm, normal heart sounds, intact distal pulses and normal pulses. Exam reveals no gallop and no friction rub.   No murmur heard.  Fingernails clubbing   Pulmonary/Chest: Effort normal and breath sounds normal. No respiratory distress. He has no wheezes. He has no rales.   decreased   Abdominal:   Not tender   Musculoskeletal: Normal range of motion.   That hand tremor is just about gone      During the foot exam he had a monofilament test performed.    Neurological Sensory Findings - Unaltered hot/cold right ankle/foot discrimination and unaltered hot/cold left ankle/foot discrimination. Unaltered sharp/dull right ankle/foot discrimination and unaltered sharp/dull left ankle/foot discrimination. No right ankle/foot altered proprioception and no left ankle/foot altered proprioception  Vascular Status -  His right foot exhibits normal foot vasculature  and no edema. His left foot exhibits normal foot vasculature  and no edema.  Skin Integrity  -  His right foot skin is intact.  He has no right foot ulcer, non-callous right foot, no right foot warmth and no right foot blister.His left foot skin is intact. He has no left foot ulcer, non-callous left foot, no left foot warmth and no left foot blister..  Lymphadenopathy:     He has no cervical adenopathy.   Neurological: He is alert and oriented to person, place, and time. He has normal reflexes. No cranial nerve deficit (see note on pupil left). He exhibits normal muscle tone.   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 No rash noted. No erythema. No pallor.   Clubbing toenails and fingernails     Psychiatric: He has  a normal mood and affect. His behavior is normal. Judgment and thought content normal.   Nursing note and vitals reviewed.      Assessment/Plan   Felicita was seen today for transitional care management.    Diagnoses and all orders for this visit:    Hospital discharge follow-up    Pulmonary emphysema, unspecified emphysema type (CMS/HCC)    Mixed hyperlipidemia    Essential hypertension    Tobacco abuse    Type 2 diabetes mellitus without complication, with long-term current use of insulin (CMS/HCC)    Gastroesophageal reflux disease, esophagitis presence not specified    Other orders  -     QUEtiapine (SEROquel) 100 MG tablet; Take 1 tablet by mouth Every Night. Sleep and mood  -     lisinopril (PRINIVIL,ZESTRIL) 10 MG tablet; Take 1 tablet by mouth Daily. For BP and heart      Stop smoking  I do suggest Anoro for COPD  Stay off PPI and Carafate---no more GI C/o since d/c  Ok Seroquel 100mg to sleep and stay off Lithium and Prozac  Stopped Primidone a month ago--was not helping  Watch for low blood pressure  Follow-up with all specialist in see me in 3 months and then we will discuss labs

## 2020-07-17 NOTE — PATIENT INSTRUCTIONS

## 2020-07-20 DIAGNOSIS — J44.1 COPD WITH ACUTE EXACERBATION (HCC): Primary | ICD-10-CM

## 2020-07-20 RX ORDER — BUDESONIDE 0.5 MG/2ML
0.5 INHALANT ORAL
Qty: 120 ML | Refills: 11 | Status: SHIPPED | OUTPATIENT
Start: 2020-07-20 | End: 2021-01-04

## 2020-07-21 ENCOUNTER — READMISSION MANAGEMENT (OUTPATIENT)
Dept: CALL CENTER | Facility: HOSPITAL | Age: 70
End: 2020-07-21

## 2020-07-21 NOTE — OUTREACH NOTE
Medical Week 2 Survey      Responses   [facility] patient discharged from?  [place]   COVID-19 Test Status  Negative   Does the patient have one of the following disease processes/diagnoses(primary or secondary)?  Other   Week 2 attempt successful?  No   Unsuccessful attempts  Attempt 1          Felicita Buck RN

## 2020-07-23 ENCOUNTER — READMISSION MANAGEMENT (OUTPATIENT)
Dept: CALL CENTER | Facility: HOSPITAL | Age: 70
End: 2020-07-23

## 2020-07-23 NOTE — OUTREACH NOTE
Medical Week 2 Survey      Responses   [facility] patient discharged from?  [place]   COVID-19 Test Status  Negative   Does the patient have one of the following disease processes/diagnoses(primary or secondary)?  Other   Week 2 attempt successful?  Yes   Call start time  1410   Call end time  1416   Meds reviewed with patient/caregiver?  Yes   Is the patient taking all medications as directed (includes completed medication regime)?  Yes   Has the patient kept scheduled appointments due by today?  Yes   What is the Home health agency?   [facility]   Has home health visited the patient within 72 hours of discharge?  Yes   Pulse Ox monitoring  Intermittent   What is the patient's perception of their health status since discharge?  Improving   Week 2 Call Completed?  Yes   Wrap up additional comments  Improving HH coming today.          Sonal Patton RN

## 2020-07-26 DIAGNOSIS — I10 ESSENTIAL HYPERTENSION: ICD-10-CM

## 2020-07-26 RX ORDER — CARVEDILOL 25 MG/1
TABLET ORAL
Qty: 180 TABLET | Refills: 3 | Status: SHIPPED | OUTPATIENT
Start: 2020-07-26 | End: 2020-10-29 | Stop reason: SDUPTHER

## 2020-07-31 ENCOUNTER — READMISSION MANAGEMENT (OUTPATIENT)
Dept: CALL CENTER | Facility: HOSPITAL | Age: 70
End: 2020-07-31

## 2020-07-31 NOTE — OUTREACH NOTE
Medical Week 3 Survey      Responses   [facility] patient discharged from?  [place]   COVID-19 Test Status  Negative   Does the patient have one of the following disease processes/diagnoses(primary or secondary)?  Other   Week 3 attempt successful?  No   Unsuccessful attempts  Attempt 1          Elaine Ken RN

## 2020-08-04 ENCOUNTER — READMISSION MANAGEMENT (OUTPATIENT)
Dept: CALL CENTER | Facility: HOSPITAL | Age: 70
End: 2020-08-04

## 2020-08-04 NOTE — OUTREACH NOTE
Medical Week 3 Survey      Responses   [facility]   COVID-19 Test Status  Negative   Does the patient have one of the following disease processes/diagnoses(primary or secondary)?  Other   Week 3 attempt successful?  Yes   Call start time  1530   Call end time  1536   Discharge diagnosis  Acute kidney injury   Meds reviewed with patient/caregiver?  Yes   Is the patient having any side effects they believe may be caused by any medication additions or changes?  No   Does the patient have all medications ordered at discharge?  Yes   Is the patient taking all medications as directed (includes completed medication regime)?  Yes   Does the patient have a primary care provider?   Yes   Does the patient have an appointment with their PCP within 7 days of discharge?  Yes   Has the patient kept scheduled appointments due by today?  Yes   What is the Home health agency?   [facility]   Has home health visited the patient within 72 hours of discharge?  Yes   Pulse Ox monitoring  None   Psychosocial issues?  No   Did the patient receive a copy of their discharge instructions?  Yes   Nursing interventions  Reviewed instructions with patient   What is the patient's perception of their health status since discharge?  Improving   Is the patient/caregiver able to teach back signs and symptoms related to disease process for when to call PCP?  Yes   Is the patient/caregiver able to teach back signs and symptoms related to disease process for when to call 911?  Yes   Is the patient/caregiver able to teach back the hierarchy of who to call/visit for symptoms/problems? PCP, Specialist, Home health nurse, Urgent Care, ED, 911  Yes   Week 3 Call Completed?  Yes          Brody Wilson RN

## 2020-08-06 ENCOUNTER — TELEPHONE (OUTPATIENT)
Dept: FAMILY MEDICINE CLINIC | Facility: CLINIC | Age: 70
End: 2020-08-06

## 2020-08-06 NOTE — TELEPHONE ENCOUNTER
Siri called stating that pt is confused about carvedilol dose.  Pt was instructed to take 25 mg .5 tab BID when in the hospital.  Last refill was for old dose.  Siri states that pt is shaking and wants to bring him in to see you.  Please advise.

## 2020-08-06 NOTE — TELEPHONE ENCOUNTER
If bp or pulse staying low---do 1/2 tab BID; he needs to see psychiatrist for the anxiety and saw Dr Florian while inpatient.  Make sure he did not restart Lithium.  He can see me next week also.

## 2020-08-10 ENCOUNTER — TELEPHONE (OUTPATIENT)
Dept: NEUROLOGY | Facility: CLINIC | Age: 70
End: 2020-08-10

## 2020-08-10 ENCOUNTER — OFFICE VISIT (OUTPATIENT)
Dept: FAMILY MEDICINE CLINIC | Facility: CLINIC | Age: 70
End: 2020-08-10

## 2020-08-10 VITALS
TEMPERATURE: 97.9 F | HEART RATE: 83 BPM | HEIGHT: 67 IN | SYSTOLIC BLOOD PRESSURE: 110 MMHG | BODY MASS INDEX: 25.74 KG/M2 | WEIGHT: 164 LBS | OXYGEN SATURATION: 97 % | DIASTOLIC BLOOD PRESSURE: 60 MMHG | RESPIRATION RATE: 20 BRPM

## 2020-08-10 DIAGNOSIS — I73.9 PERIPHERAL VASCULAR DISEASE (HCC): ICD-10-CM

## 2020-08-10 DIAGNOSIS — I10 ESSENTIAL HYPERTENSION: ICD-10-CM

## 2020-08-10 DIAGNOSIS — Z72.0 TOBACCO ABUSE: ICD-10-CM

## 2020-08-10 DIAGNOSIS — I25.10 CORONARY ARTERIOSCLEROSIS IN NATIVE ARTERY: ICD-10-CM

## 2020-08-10 DIAGNOSIS — Z79.4 TYPE 2 DIABETES MELLITUS WITHOUT COMPLICATION, WITH LONG-TERM CURRENT USE OF INSULIN (HCC): ICD-10-CM

## 2020-08-10 DIAGNOSIS — E11.9 TYPE 2 DIABETES MELLITUS WITHOUT COMPLICATION, WITH LONG-TERM CURRENT USE OF INSULIN (HCC): ICD-10-CM

## 2020-08-10 DIAGNOSIS — F41.9 ANXIETY: ICD-10-CM

## 2020-08-10 DIAGNOSIS — J43.9 PULMONARY EMPHYSEMA, UNSPECIFIED EMPHYSEMA TYPE (HCC): Primary | ICD-10-CM

## 2020-08-10 DIAGNOSIS — F33.1 MODERATE EPISODE OF RECURRENT MAJOR DEPRESSIVE DISORDER (HCC): ICD-10-CM

## 2020-08-10 DIAGNOSIS — E78.2 MIXED HYPERLIPIDEMIA: ICD-10-CM

## 2020-08-10 DIAGNOSIS — E55.9 VITAMIN D DEFICIENCY: ICD-10-CM

## 2020-08-10 DIAGNOSIS — R25.1 TREMOR: ICD-10-CM

## 2020-08-10 DIAGNOSIS — K21.9 GASTROESOPHAGEAL REFLUX DISEASE WITHOUT ESOPHAGITIS: ICD-10-CM

## 2020-08-10 PROCEDURE — 99214 OFFICE O/P EST MOD 30 MIN: CPT | Performed by: PHYSICIAN ASSISTANT

## 2020-08-10 RX ORDER — PRIMIDONE 50 MG/1
50 TABLET ORAL 4 TIMES DAILY
COMMUNITY
End: 2020-08-11

## 2020-08-10 RX ORDER — LISINOPRIL 20 MG/1
TABLET ORAL
COMMUNITY
Start: 2020-07-18 | End: 2020-08-10

## 2020-08-10 RX ORDER — AMLODIPINE BESYLATE 5 MG/1
TABLET ORAL
Qty: 90 TABLET | Refills: 1 | Status: SHIPPED | OUTPATIENT
Start: 2020-08-10 | End: 2021-02-12

## 2020-08-10 RX ORDER — OMEPRAZOLE 40 MG/1
40 CAPSULE, DELAYED RELEASE ORAL DAILY
COMMUNITY
End: 2021-01-06

## 2020-08-10 RX ORDER — LISINOPRIL 10 MG/1
10 TABLET ORAL DAILY
Qty: 90 TABLET | Refills: 1 | Status: SHIPPED | OUTPATIENT
Start: 2020-08-10 | End: 2020-10-01

## 2020-08-10 RX ORDER — LISINOPRIL 10 MG/1
10 TABLET ORAL DAILY
COMMUNITY
End: 2020-08-10

## 2020-08-10 NOTE — PROGRESS NOTES
"The ABCs of the Annual Wellness Visit  Subsequent Medicare Wellness Visit    Chief Complaint   Patient presents with   • Hypertension   • Medicare Wellness-subsequent       Subjective   History of Present Illness:  Felicita Mike is a 69 y.o. male who presents for a Subsequent Medicare Wellness Visit.    HEALTH RISK ASSESSMENT    Recent Hospitalizations:  {Hospitalization history:[id number]::\"No hospitalization(s) within the last year.\"}    Current Medical Providers:  Patient Care Team:  Alexandrea Jimenez PA-C as PCP - General (Family Medicine)  Alexandrea Jimenez PA-C as PCP - Claims Attributed  Ivette Franco MD as Consulting Physician (Gastroenterology)  Suleiman Anne MD as Consulting Physician (Cardiology)  Brennan Martinez MD as Consulting Physician (Urology)  Juanito Perdue Jr., MD as Consulting Physician (Hematology and Oncology)  Alexandrea Jimenez PA-C as Referring Physician (Family Medicine)  Suleiman Anne MD as Consulting Physician (Cardiology)    Smoking Status:  Social History     Tobacco Use   Smoking Status Current Every Day Smoker   • Packs/day: 1.00   • Years: 47.00   • Pack years: 47.00   • Types: Cigarettes   Smokeless Tobacco Never Used   Tobacco Comment    NO CAFFEINE USE   50 years 1 ppd        Alcohol Consumption:  Social History     Substance and Sexual Activity   Alcohol Use Yes    Comment: very seldom       Depression Screen:   PHQ-2/PHQ-9 Depression Screening 8/10/2020   Little interest or pleasure in doing things 2   Feeling down, depressed, or hopeless 2   Trouble falling or staying asleep, or sleeping too much 2   Feeling tired or having little energy 3   Poor appetite or overeating 0   Feeling bad about yourself - or that you are a failure or have let yourself or your family down 0   Trouble concentrating on things, such as reading the newspaper or watching television 2   Moving or speaking so slowly that other people could have noticed. Or the opposite - being so fidgety or restless " that you have been moving around a lot more than usual 2   Thoughts that you would be better off dead, or of hurting yourself in some way 0   Total Score 13   If you checked off any problems, how difficult have these problems made it for you to do your work, take care of things at home, or get along with other people? Very difficult       Fall Risk Screen:  AZAR Fall Risk Assessment was completed, and patient is at HIGH risk for falls. Assessment completed on:8/10/2020    Health Habits and Functional and Cognitive Screening:  Functional & Cognitive Status 8/10/2020   Do you have difficulty preparing food and eating? Yes   Do you have difficulty bathing yourself, getting dressed or grooming yourself? No   Do you have difficulty using the toilet? No   Do you have difficulty moving around from place to place? No   Do you have trouble with steps or getting out of a bed or a chair? No   Current Diet Unhealthy Diet   Dental Exam Not up to date   Eye Exam Not up to date   Exercise (times per week) 0 times per week   Current Exercise Activities Include None   Do you need help using the phone?  No   Are you deaf or do you have serious difficulty hearing?  Yes   Do you need help with transportation? No   Do you need help shopping? Yes   Do you need help preparing meals?  Yes   Do you need help with housework?  Yes   Do you need help with laundry? No   Do you need help taking your medications? No   Do you need help managing money? No   Do you ever drive or ride in a car without wearing a seat belt? No   Have you felt unusual stress, anger or loneliness in the last month? Yes   Who do you live with? Alone   If you need help, do you have trouble finding someone available to you? Yes   Have you been bothered in the last four weeks by sexual problems? No   Do you have difficulty concentrating, remembering or making decisions? Yes         Does the patient have evidence of cognitive impairment? {Yes/No w/ pre-defaulted  "No:27785::\"No\"}    Asprin use counseling:{Aspirin :13914}    Age-appropriate Screening Schedule:  Refer to the list below for future screening recommendations based on patient's age, sex and/or medical conditions. Orders for these recommended tests are listed in the plan section. The patient has been provided with a written plan.    Health Maintenance   Topic Date Due   • DIABETIC FOOT EXAM  07/18/2018   • DIABETIC EYE EXAM  07/18/2018   • URINE MICROALBUMIN  07/23/2020   • INFLUENZA VACCINE  08/01/2020   • ZOSTER VACCINE (2 of 3) 08/19/2021 (Originally 7/26/2016)   • LIPID PANEL  12/05/2020   • HEMOGLOBIN A1C  01/07/2021   • COLONOSCOPY  12/16/2022   • TDAP/TD VACCINES  Discontinued          The following portions of the patient's history were reviewed and updated as appropriate: {history reviewed:20406::\"allergies\",\"current medications\",\"past family history\",\"past medical history\",\"past social history\",\"past surgical history\",\"problem list\"}.    Outpatient Medications Prior to Visit   Medication Sig Dispense Refill   • acetaminophen (TYLENOL) 325 MG tablet Take 2 tablets by mouth Every 6 (Six) Hours As Needed for Mild Pain .     • albuterol (PROVENTIL) (5 MG/ML) 0.5% nebulizer solution Take 2.5 mg by nebulization Every 6 (Six) Hours As Needed for Wheezing.     • amLODIPine (NORVASC) 10 MG tablet Take 1 tablet by mouth Daily. For BP 90 tablet 1   • aspirin 81 MG tablet Take 1 tablet by mouth Daily.     • atorvastatin (Lipitor) 80 MG tablet Take 1 tablet by mouth Daily. For cholesterol 90 tablet 3   • budesonide (PULMICORT) 0.5 MG/2ML nebulizer solution Take 2 mL by nebulization 2 (Two) Times a Day. 120 mL 11   • carvedilol (COREG) 25 MG tablet TAKE 1 TABLET BY MOUTH TWICE DAILY WITH MEALS FOR HEART (Patient taking differently: 12.5 mg 2 (Two) Times a Day.) 180 tablet 3   • cholecalciferol (VITAMIN D3) 1000 UNITS tablet Take 1 tablet by mouth 2 (two) times a day.     • Cyanocobalamin (B-12) 1000 MCG capsule " Take 1 tablet by mouth Daily.     • MAGNESIUM-OXIDE 400 (241.3 Mg) MG tablet tablet TAKE 1 TABLET BY MOUTH DAILY 90 tablet 1   • metFORMIN (GLUCOPHAGE) 1000 MG tablet Take 1 tablet by mouth 2 (Two) Times a Day. For DMII 180 tablet 1   • omeprazole (priLOSEC) 40 MG capsule Take 40 mg by mouth Daily.     • polyethylene glycol (MIRALAX) packet Take 17 g by mouth Daily As Needed.     • primidone (MYSOLINE) 50 MG tablet Take 50 mg by mouth 4 (Four) Times a Day.     • promethazine (PHENERGAN) 25 MG tablet Take 1 tablet by mouth Every 8 (Eight) Hours As Needed for Nausea. 180 tablet 3   • QUEtiapine (SEROquel) 100 MG tablet Take 1 tablet by mouth Every Night. Sleep and mood 90 tablet 1   • tamsulosin (FLOMAX) 0.4 MG capsule 24 hr capsule Take 1 capsule by mouth Daily. For urine flow 90 capsule 3   • Vitamin E 180 MG capsule Take  by mouth.     • wheat dextrin (BENEFIBER ON THE GO) powder powder Take 1 each by mouth Daily As Needed.     • lisinopril (PRINIVIL,ZESTRIL) 10 MG tablet Take 10 mg by mouth Daily.     • lisinopril (PRINIVIL,ZESTRIL) 20 MG tablet      • lisinopril (PRINIVIL,ZESTRIL) 10 MG tablet Take 1 tablet by mouth Daily. For BP and heart 90 tablet 1     No facility-administered medications prior to visit.        Patient Active Problem List   Diagnosis   • Elevated prostate specific antigen (PSA)   • Coronary arteriosclerosis in native artery   • Bipolar affective disorder (CMS/HCC)   • Chronic obstructive pulmonary disease (CMS/HCC)   • Colon polyp   • Constipation   • Type 2 diabetes mellitus without complication, with long-term current use of insulin (CMS/HCC)   • GERD (gastroesophageal reflux disease)   • Fatigue   • Hearing loss   • Hyperlipidemia   • Peripheral vascular disease (CMS/HCC)   • Proteinuria   • Muscle pain   • Low back pain   • Leukocytosis   • Seborrheic eczema   • Sleep apnea   • Tinea corporis   • Tremor   • Hypertension   • Anxiety   • Benign prostatic hyperplasia with lower urinary tract  "symptoms   • Tobacco abuse   • Normocytic anemia   • Thyroid nodule   • Iron deficiency anemia   • Adenomatous polyp of colon   • Family history of malignant neoplasm of colon   • Gastroesophageal reflux disease   • COPD with acute exacerbation (CMS/[facility])   • Influenza A   • SWAPNIL (acute kidney injury) (CMS/[facility])   • Essential tremor   • Magnesium deficiency   • Long-term current use of lithium   • Chronic respiratory failure with hypoxia (CMS/[facility])   • Acute diastolic CHF (congestive heart failure) (CMS/[facility])       Advanced Care Planning:  {Advanced Directive Status:94869}    Review of Systems    Compared to one year ago, the patient feels his physical health is {better worse same:31597}.  Compared to one year ago, the patient feels his mental health is {better worse same:49827}.    Reviewed chart for potential of high risk medication in the elderly: {Response;Yes/No/NA:[id number]::\"yes\"}  Reviewed chart for potential of harmful drug interactions in the elderly:{Response;Yes/No/NA:0122559556::\"yes\"}    Objective         Vitals:    08/10/20 1438   BP: 146/70   BP Location: Left arm   Patient Position: Sitting   Cuff Size: Adult   Pulse: 83   Resp: 20   Temp: 97.9 °F (36.6 °C)   TempSrc: Skin   SpO2: 97%   Weight: 74.4 kg (164 lb)   Height: 170.2 cm (67\")   PainSc:   6   PainLoc: Back       Body mass index is 25.69 kg/m².  Discussed the patient's BMI with him. The BMI {[facility] plan ([facility]72182}.    Physical Exam    Lab Results   Component Value Date    HGBA1C 6.00 (H) 07/07/2020        Assessment/Plan   Medicare Risks and Personalized Health Plan  CMS Preventative Services Quick Reference  {Medicare Wellness Risk Factors and Personalized Health Plan:83946}    The above risks/problems have been discussed with the patient.  Pertinent information has been shared with the patient in the After Visit Summary.  Follow up plans and orders are seen below in the Assessment/Plan Section.    Diagnoses and all orders for " this visit:    1. Pulmonary emphysema, unspecified emphysema type (CMS/HCC) (Primary)    2. Tremor    3. Essential hypertension    4. Tobacco abuse    5. Type 2 diabetes mellitus without complication, with long-term current use of insulin (CMS/[facility])    6. Coronary arteriosclerosis in native artery    7. Peripheral vascular disease (CMS/[facility])    8. Gastroesophageal reflux disease without esophagitis    9. Mixed hyperlipidemia    Other orders  -     lisinopril (PRINIVIL,ZESTRIL) 10 MG tablet; Take 1 tablet by mouth Daily. For BP and heart  Dispense: 90 tablet; Refill: 1      Follow Up:  Return if symptoms worsen or fail to improve.     An After Visit Summary and PPPS were given to the patient.

## 2020-08-10 NOTE — TELEPHONE ENCOUNTER
Attempted to reach patient in regards to his appointment with Taylor MARIE tomorrow. We can offer him a MyChart video visit or a telephone visit in place of him coming into office if he would like.

## 2020-08-10 NOTE — PROGRESS NOTES
"The ABCs of the Annual Wellness Visit  Subsequent Medicare Wellness Visit    Chief Complaint   Patient presents with   • Hypertension   • Medicare Wellness-subsequent       Subjective   History of Present Illness:  Felicita Mike is a 69 y.o. male who presents for a Subsequent Medicare Wellness Visit.    HEALTH RISK ASSESSMENT    Recent Hospitalizations:  {Hospitalization history:[id number]::\"No hospitalization(s) within the last year.\"}    Current Medical Providers:  Patient Care Team:  Alexandrea Jimenez PA-C as PCP - General (Family Medicine)  Alexandrea Jimenez PA-C as PCP - Claims Attributed  Ivette Franco MD as Consulting Physician (Gastroenterology)  Suleiman Anne MD as Consulting Physician (Cardiology)  Brennan Martinez MD as Consulting Physician (Urology)  Juanito Perdue Jr., MD as Consulting Physician (Hematology and Oncology)  Alexandrea Jimenez PA-C as Referring Physician (Family Medicine)  Suleiman Anne MD as Consulting Physician (Cardiology)    Smoking Status:  Social History     Tobacco Use   Smoking Status Current Every Day Smoker   • Packs/day: 1.00   • Years: 47.00   • Pack years: 47.00   • Types: Cigarettes   Smokeless Tobacco Never Used   Tobacco Comment    NO CAFFEINE USE   50 years 1 ppd        Alcohol Consumption:  Social History     Substance and Sexual Activity   Alcohol Use Yes    Comment: very seldom       Depression Screen:   PHQ-2/PHQ-9 Depression Screening 7/17/2020   Little interest or pleasure in doing things 2   Feeling down, depressed, or hopeless 2   Trouble falling or staying asleep, or sleeping too much 2   Feeling tired or having little energy 3   Poor appetite or overeating 0   Feeling bad about yourself - or that you are a failure or have let yourself or your family down 0   Trouble concentrating on things, such as reading the newspaper or watching television 2   Moving or speaking so slowly that other people could have noticed. Or the opposite - being so fidgety or restless " "that you have been moving around a lot more than usual 2   Thoughts that you would be better off dead, or of hurting yourself in some way 0   Total Score 13   If you checked off any problems, how difficult have these problems made it for you to do your work, take care of things at home, or get along with other people? Very difficult       Fall Risk Screen:  AZAR Fall Risk Assessment was completed, and patient is at HIGH risk for falls. Assessment completed on:7/17/2020    Health Habits and Functional and Cognitive Screening:  No flowsheet data found.      Does the patient have evidence of cognitive impairment? {Yes/No w/ pre-defaulted No:70488::\"No\"}    Asprin use counseling:{Aspirin :83154}    Age-appropriate Screening Schedule:  Refer to the list below for future screening recommendations based on patient's age, sex and/or medical conditions. Orders for these recommended tests are listed in the plan section. The patient has been provided with a written plan.    Health Maintenance   Topic Date Due   • DIABETIC FOOT EXAM  07/18/2018   • DIABETIC EYE EXAM  07/18/2018   • URINE MICROALBUMIN  07/23/2020   • INFLUENZA VACCINE  08/01/2020   • ZOSTER VACCINE (2 of 3) 08/19/2021 (Originally 7/26/2016)   • LIPID PANEL  12/05/2020   • HEMOGLOBIN A1C  01/07/2021   • COLONOSCOPY  12/16/2022   • TDAP/TD VACCINES  Discontinued          The following portions of the patient's history were reviewed and updated as appropriate: {history reviewed:20406::\"allergies\",\"current medications\",\"past family history\",\"past medical history\",\"past social history\",\"past surgical history\",\"problem list\"}.    Outpatient Medications Prior to Visit   Medication Sig Dispense Refill   • acetaminophen (TYLENOL) 325 MG tablet Take 2 tablets by mouth Every 6 (Six) Hours As Needed for Mild Pain .     • albuterol (PROVENTIL) (5 MG/ML) 0.5% nebulizer solution Take 2.5 mg by nebulization Every 6 (Six) Hours As Needed for Wheezing.     • amLODIPine " (NORVASC) 10 MG tablet Take 1 tablet by mouth Daily. For BP 90 tablet 1   • aspirin 81 MG tablet Take 1 tablet by mouth Daily.     • atorvastatin (Lipitor) 80 MG tablet Take 1 tablet by mouth Daily. For cholesterol 90 tablet 3   • budesonide (PULMICORT) 0.5 MG/2ML nebulizer solution Take 2 mL by nebulization 2 (Two) Times a Day. 120 mL 11   • carvedilol (COREG) 25 MG tablet TAKE 1 TABLET BY MOUTH TWICE DAILY WITH MEALS FOR HEART 180 tablet 3   • cholecalciferol (VITAMIN D3) 1000 UNITS tablet Take 1 tablet by mouth 2 (two) times a day.     • Cyanocobalamin (B-12) 1000 MCG capsule Take 1 tablet by mouth Daily.     • lisinopril (PRINIVIL,ZESTRIL) 10 MG tablet Take 1 tablet by mouth Daily. For BP and heart 90 tablet 1   • MAGNESIUM-OXIDE 400 (241.3 Mg) MG tablet tablet TAKE 1 TABLET BY MOUTH DAILY 90 tablet 1   • metFORMIN (GLUCOPHAGE) 1000 MG tablet Take 1 tablet by mouth 2 (Two) Times a Day. For DMII 180 tablet 1   • polyethylene glycol (MIRALAX) packet Take 17 g by mouth Daily As Needed.     • promethazine (PHENERGAN) 25 MG tablet Take 1 tablet by mouth Every 8 (Eight) Hours As Needed for Nausea. 180 tablet 3   • QUEtiapine (SEROquel) 100 MG tablet Take 1 tablet by mouth Every Night. Sleep and mood 90 tablet 1   • tamsulosin (FLOMAX) 0.4 MG capsule 24 hr capsule Take 1 capsule by mouth Daily. For urine flow 90 capsule 3   • wheat dextrin (BENEFIBER ON THE GO) powder powder Take 1 each by mouth Daily As Needed.     • lisinopril (PRINIVIL,ZESTRIL) 20 MG tablet        No facility-administered medications prior to visit.        Patient Active Problem List   Diagnosis   • Elevated prostate specific antigen (PSA)   • Coronary arteriosclerosis in native artery   • Bipolar affective disorder (CMS/HCC)   • Chronic obstructive pulmonary disease (CMS/HCC)   • Colon polyp   • Constipation   • Type 2 diabetes mellitus without complication, with long-term current use of insulin (CMS/HCC)   • GERD (gastroesophageal reflux disease)  "  • Fatigue   • Hearing loss   • Hyperlipidemia   • Peripheral vascular disease (CMS/[facility])   • Proteinuria   • Muscle pain   • Low back pain   • Leukocytosis   • Seborrheic eczema   • Sleep apnea   • Tinea corporis   • Tremor   • Hypertension   • Anxiety   • Benign prostatic hyperplasia with lower urinary tract symptoms   • Tobacco abuse   • Normocytic anemia   • Thyroid nodule   • Iron deficiency anemia   • Adenomatous polyp of colon   • Family history of malignant neoplasm of colon   • Gastroesophageal reflux disease   • COPD with acute exacerbation (CMS/[facility])   • Influenza A   • SWAPNIL (acute kidney injury) (CMS/[facility])   • Essential tremor   • Magnesium deficiency   • Long-term current use of lithium   • Chronic respiratory failure with hypoxia (CMS/[facility])   • Acute diastolic CHF (congestive heart failure) (CMS/[facility])       Advanced Care Planning:  {Advanced Directive Status:88978}    Review of Systems    Compared to one year ago, the patient feels his physical health is {better worse same:13337}.  Compared to one year ago, the patient feels his mental health is {better worse same:61544}.    Reviewed chart for potential of high risk medication in the elderly: {Response;Yes/No/NA:[id number]::\"yes\"}  Reviewed chart for potential of harmful drug interactions in the elderly:{Response;Yes/No/NA:[id number]::\"yes\"}    Objective         Vitals:    08/10/20 1438   Height: 170.2 cm (67\")       Body mass index is 24.43 kg/m².  Discussed the patient's BMI with him. The BMI {BMI plan ([facility] measure 421):72484}.    Physical Exam    Lab Results   Component Value Date    HGBA1C 6.00 (H) 07/07/2020        Assessment/Plan   Medicare Risks and Personalized Health Plan  CMS Preventative Services Quick Reference  {Medicare Wellness Risk Factors and Personalized Health Plan:34205}    The above risks/problems have been discussed with the patient.  Pertinent information has been shared with the patient in the After Visit Summary.  Follow up plans " and orders are seen below in the Assessment/Plan Section.    There are no diagnoses linked to this encounter.  Follow Up:  No follow-ups on file.     An After Visit Summary and PPPS were given to the patient.

## 2020-08-10 NOTE — PATIENT INSTRUCTIONS

## 2020-08-10 NOTE — PROGRESS NOTES
"Subjective   Felicita Mike is a 69 y.o. male.     History of Present Illness   Felicita Mike 69 y.o. male /70 (BP Location: Left arm, Patient Position: Sitting, Cuff Size: Adult)   Pulse 83   Temp 97.9 °F (36.6 °C) (Skin)   Resp 20   Ht 170.2 cm (67\")   Wt 74.4 kg (164 lb)   SpO2 97%   BMI 25.69 kg/m²  who presents today for f/u anxiety and not sleeping.  I do not want to restart his Lithium.  I have asked him for years to see psychiatrist and refused.  He was already on Lithium when I started to see him and did regular labs for checking dose. He is on Seroquel and cont. This.  He is still not sleeping. Has tremor again.  Was so much improved last visit. He was also not smoking and is smoking again. He denies SI/HI.  He admits to feeling depressed and very anxious.   Needs note for jury duty. Cannot serve; on continuous oxygen. --COPD and has CAD.   Concern about dizzy when stands and bp 110/60s with home health nurse and here today. Lower dose Amlodipine   Home health nurse asked him to see me today. I agree with her that he needs to see psychiatrist.   he has a history of   Patient Active Problem List   Diagnosis   • Elevated prostate specific antigen (PSA)   • Coronary arteriosclerosis in native artery   • Bipolar affective disorder (CMS/HCC)   • Chronic obstructive pulmonary disease (CMS/HCC)   • Colon polyp   • Constipation   • Type 2 diabetes mellitus without complication, with long-term current use of insulin (CMS/HCC)   • GERD (gastroesophageal reflux disease)   • Fatigue   • Hearing loss   • Hyperlipidemia   • Peripheral vascular disease (CMS/HCC)   • Proteinuria   • Muscle pain   • Low back pain   • Leukocytosis   • Seborrheic eczema   • Sleep apnea   • Tinea corporis   • Tremor   • Hypertension   • Anxiety   • Benign prostatic hyperplasia with lower urinary tract symptoms   • Tobacco abuse   • Normocytic anemia   • Thyroid nodule   • Iron deficiency anemia   • Adenomatous polyp of colon   • " "Family history of malignant neoplasm of colon   • Gastroesophageal reflux disease   • COPD with acute exacerbation (CMS/HCC)   • Influenza A   • SWAPNIL (acute kidney injury) (CMS/HCC)   • Essential tremor   • Magnesium deficiency   • Long-term current use of lithium   • Chronic respiratory failure with hypoxia (CMS/HCC)   • Acute diastolic CHF (congestive heart failure) (CMS/[facility])   he is on PPI and doing fine with once daily dosing---was on BID; no GERD      More edgy off prozac and lithium  Sees pulm for COPD and on O2  Lab Results   Component Value Date    HGBA1C 6.00 (H) 07/07/2020     Lab Results   Component Value Date    CHLPL 118 12/05/2019    TRIG 123 12/05/2019    HDL 38 (L) 12/05/2019    LDL 55 12/05/2019     Felicita Mike male 69 y.o., /60   Pulse 83   Temp 97.9 °F (36.6 °C) (Skin)   Resp 20   Ht 170.2 cm (67\")   Wt 74.4 kg (164 lb)   SpO2 97%   BMI 25.69 kg/m²   who presents today for follow up of Depression, Insomnia and Anxiety.  He reports depressed mood, difficulty falling asleep, anxiety, anhedonia, impaired concentration, excessive worry, unable to relax, panic feeling, irritable and sleep disturbance. Onset of symptoms was approximately several years and worse in last 3 weeks.  He denies current suicidal and homicidal ideation. Risk factors are family history of anxiety and or depression, chronic illness and lives alone.  Previous treatment includes current Rx.  He complains of the following medication side effects: none.  He is having daily break through anxiety and depression.  I cannot restart Lithium.  This was toxic when last inpatient.  Must see psychiatry.  Saw him inpatient.  Dr Packer wants medication changes to be made by psychiatrist..  The patient declines to go to counseling..        Also watching bp and note Lisinopril lowered   The following portions of the patient's history were reviewed and updated as appropriate: allergies, current medications, past family history, past " medical history, past social history, past surgical history and problem list.    Review of Systems   Constitutional: Positive for fatigue. Negative for activity change, appetite change and unexpected weight change.   HENT: Negative for nosebleeds and trouble swallowing.    Eyes: Negative for pain and visual disturbance.   Respiratory: Positive for cough, shortness of breath and wheezing. Negative for chest tightness.    Cardiovascular: Negative for chest pain and palpitations.   Gastrointestinal: Negative for abdominal pain and blood in stool.   Endocrine: Negative.    Genitourinary: Negative for difficulty urinating and hematuria.   Musculoskeletal: Negative for joint swelling.   Skin: Negative for color change and rash.   Allergic/Immunologic: Negative.    Neurological: Negative for syncope and speech difficulty.   Hematological: Negative for adenopathy.   Psychiatric/Behavioral: Positive for dysphoric mood and sleep disturbance. Negative for agitation and confusion. The patient is nervous/anxious.    All other systems reviewed and are negative.      Objective   Physical Exam   Constitutional: He is oriented to person, place, and time. He appears well-developed and well-nourished. No distress.   HENT:   Head: Normocephalic and atraumatic.   Eyes: Pupils are equal, round, and reactive to light. Conjunctivae and EOM are normal. Right eye exhibits no discharge. Left eye exhibits no discharge. No scleral icterus.   Neck: Normal range of motion. Neck supple. No tracheal deviation present. No thyromegaly present.   Cardiovascular: Normal rate, regular rhythm, normal heart sounds, intact distal pulses and normal pulses. Exam reveals no gallop.   No murmur heard.  Pulmonary/Chest: Effort normal and breath sounds normal. No stridor. No respiratory distress. He has no wheezes. He has no rales.   Finger clubbing   Musculoskeletal: Normal range of motion.   Neurological: He is alert and oriented to person, place, and time.  Coordination (tremor hands) abnormal.   Skin: Skin is warm. No rash noted. He is not diaphoretic. No erythema. No pallor.   Psychiatric: He has a normal mood and affect. His behavior is normal. Judgment and thought content normal.   anxious   Nursing note and vitals reviewed.      Assessment/Plan   Felicita was seen today for hypertension and medicare wellness-subsequent.    Diagnoses and all orders for this visit:    Pulmonary emphysema, unspecified emphysema type (CMS/[facility])    Tremor    Essential hypertension    Tobacco abuse    Type 2 diabetes mellitus without complication, with long-term current use of insulin (CMS/[facility])    Coronary arteriosclerosis in native artery    Peripheral vascular disease (CMS/[facility])    Gastroesophageal reflux disease without esophagitis    Mixed hyperlipidemia    Other orders  -     lisinopril (PRINIVIL,ZESTRIL) 10 MG tablet; Take 1 tablet by mouth Daily. For BP and heart    ok PPI once daily  Need him to see psychiatrist for anxiety and depression; also trouble sleeping----saw Dr Florian inpatient. Will try to set up appt--Alexandrea MEREDITH tried to call and had to leave message; needs appt soon and have home health nurse to help draw map.  Has appt neurologist tomorrow and is on Primidone for tremor and not helping.  Stop smoking  F/u cardio and pulm--on O2 and nebulizer  [employer]ury duty note  Lower Amlodipine dose d/t low bp; some orthostasis  Plan, Felicita Mike, was seen today.  he was seen for Hyperlipidemia and will continue current medication, CAD and is under care of their Cardiologist for medical management, Vitamin D deficiency and will update labs  and DMII controlled on med, HTN--lowering dose Rx.  Cont. b12  Labs Dec

## 2020-08-11 ENCOUNTER — OFFICE VISIT (OUTPATIENT)
Dept: NEUROLOGY | Facility: CLINIC | Age: 70
End: 2020-08-11

## 2020-08-11 ENCOUNTER — EPISODE CHANGES (OUTPATIENT)
Dept: CASE MANAGEMENT | Facility: OTHER | Age: 70
End: 2020-08-11

## 2020-08-11 VITALS
WEIGHT: 173 LBS | HEIGHT: 67 IN | OXYGEN SATURATION: 99 % | DIASTOLIC BLOOD PRESSURE: 80 MMHG | HEART RATE: 95 BPM | BODY MASS INDEX: 27.15 KG/M2 | SYSTOLIC BLOOD PRESSURE: 118 MMHG

## 2020-08-11 DIAGNOSIS — G25.0 ESSENTIAL TREMOR: Primary | ICD-10-CM

## 2020-08-11 DIAGNOSIS — Z72.0 TOBACCO ABUSE: ICD-10-CM

## 2020-08-11 PROCEDURE — 99214 OFFICE O/P EST MOD 30 MIN: CPT | Performed by: NURSE PRACTITIONER

## 2020-08-11 RX ORDER — PRIMIDONE 50 MG/1
50 TABLET ORAL 2 TIMES DAILY
Qty: 180 TABLET | Refills: 3 | Status: SHIPPED | OUTPATIENT
Start: 2020-08-11 | End: 2021-08-11

## 2020-08-14 ENCOUNTER — TELEPHONE (OUTPATIENT)
Dept: FAMILY MEDICINE CLINIC | Facility: CLINIC | Age: 70
End: 2020-08-14

## 2020-08-14 NOTE — TELEPHONE ENCOUNTER
Siri called requesting verbal order to extend visit (2 more visits).  Ok - f/u PCP 14 days.    Per Siri pt fell Tuesday but no injuries.  Siri states pt's BP has been low - pt taking carvedilol.

## 2020-09-16 ENCOUNTER — PATIENT OUTREACH (OUTPATIENT)
Dept: CASE MANAGEMENT | Facility: OTHER | Age: 70
End: 2020-09-16

## 2020-09-16 NOTE — OUTREACH NOTE
Care Plan Note      Responses   Lifestyle Goals  Routine follow-up with doctor(s), Attend diabetes education, Medication management, Plan meals, Quit smoking, Routine eye exam, Routine foot care, Self monitor blood pressure, Self monitor blood sugar   Barriers  Disease education   Self Management  Daily Journaling, Home BP Monitoring, Home Glucose Monitoring, Medication Adherence   Annual Wellness Visit:   Patient Has Completed   Specific Disease Process Teaching  Diabetes [It's important to check BS frequently to ensure current medication, diet, and exercise is effective. ]   Other Patient Education/Resources   24/7 [facility] Nurse Call Line   24/7 Nurse Call Line Education Method  Send Materials   Does patient have depression diagnosis?  No [Bipolar]   Advanced Directives:  Send Materials [Patient plans to have daughter as health care surrogate. [facility] to send materials for patient to review. ]   Ed Visits past 12 months:  None   Hospitalizations past 12 months  2 or 3   Discharge destination:  Home   Medication Adherence  Medications understood   Goal Progress  Making Progress Toward Goal(s)   Readiness Scale  5   Confidence Scale  5   Health Literacy  Moderate        The main concerns and/or symptoms the patient would like to address are: Spoke with patient regarding health and wellness. Patient states he is doing well. He had several medication adjustments during last hospitalization and is feeling much better. Patient's medications for tremors, Mysoline, is working very well. Patient feels more steady than he has in several years. Patient states he also feels improvement in his stomach. Patient states the nurses that visit at home during home health visits and the physicians check blood work to monitor his blood sugar and blood pressure. Patient states he does not have the technology for [employer] access. Patient states his daughter would be his health care surrogate, but no Living Will is written at this  time. .    Education/instruction provided by Care Coordinator: Introduced self, explained ACM RN role and provided contact information. Reviewed patient's follow up appointments. Patient has recently had his tremor medication adjusted and he is happy with the stability of his tremors at this time. ACM reviewed the importance of the patient reviewing his own blood sugar and blood pressure to monitor the effectiveness of his daily routine and medications. Patient verbalized understanding, but does not have a B/P cuff or a glucometer. Patient declined MyChart. Patient is open to information on [facility]. Materials sent to patient via mail. Request for glucometer script sent to Alexandrea Jimenez.     Follow Up Outreach Due: 2 weeks  Ludivina Isbell RN  Ambulatory     9/16/2020, 14:08 EDT

## 2020-09-16 NOTE — OUTREACH NOTE
Care Coordination Note    Left message with Alexandrea Jimenez' PA nurse. Request for glucometer order to [address] and diabetic education referral if possible.     Ludivina Isbell RN  Ambulatory     9/16/2020, 14:16 EDT

## 2020-09-17 DIAGNOSIS — Z79.4 TYPE 2 DIABETES MELLITUS WITHOUT COMPLICATION, WITH LONG-TERM CURRENT USE OF INSULIN (HCC): Primary | ICD-10-CM

## 2020-09-17 DIAGNOSIS — E11.9 TYPE 2 DIABETES MELLITUS WITHOUT COMPLICATION, WITH LONG-TERM CURRENT USE OF INSULIN (HCC): Primary | ICD-10-CM

## 2020-09-17 RX ORDER — BLOOD-GLUCOSE METER
1 KIT MISCELLANEOUS 2 TIMES DAILY
Qty: 1 EACH | Refills: 0 | Status: SHIPPED | OUTPATIENT
Start: 2020-09-17 | End: 2021-03-26 | Stop reason: SDUPTHER

## 2020-09-17 NOTE — TELEPHONE ENCOUNTER
Lynsey asking for Rx for glucometer kit to [facility] and referral to diabetic educator.

## 2020-09-22 ENCOUNTER — HOSPITAL ENCOUNTER (EMERGENCY)
Facility: HOSPITAL | Age: 70
Discharge: HOME | End: 2020-09-22
Attending: EMERGENCY MEDICINE | Admitting: EMERGENCY MEDICINE

## 2020-09-22 ENCOUNTER — APPOINTMENT (OUTPATIENT)
Dept: CT IMAGING | Facility: HOSPITAL | Age: 70
End: 2020-09-22

## 2020-09-22 ENCOUNTER — TELEPHONE (OUTPATIENT)
Dept: FAMILY MEDICINE CLINIC | Facility: CLINIC | Age: 70
End: 2020-09-22

## 2020-09-22 VITALS
HEART RATE: 88 BPM | BODY MASS INDEX: 22.96 KG/M2 | OXYGEN SATURATION: 92 % | WEIGHT: 155 LBS | DIASTOLIC BLOOD PRESSURE: 76 MMHG | RESPIRATION RATE: 18 BRPM | HEIGHT: 69 IN | SYSTOLIC BLOOD PRESSURE: 151 MMHG | TEMPERATURE: 98 F

## 2020-09-22 DIAGNOSIS — R51.9 CHRONIC DAILY HEADACHE: Primary | ICD-10-CM

## 2020-09-22 LAB
ALBUMIN SERPL-MCNC: 4 G/DL (ref 3.5–5.2)
ALBUMIN/GLOB SERPL: 1.9 G/DL
ALP SERPL-CCNC: 83 U/L (ref 39–117)
ALT SERPL W P-5'-P-CCNC: 11 U/L (ref 1–41)
ANION GAP SERPL CALCULATED.3IONS-SCNC: 10.6 MMOL/L (ref 5–15)
AST SERPL-CCNC: 10 U/L (ref 1–40)
BACTERIA UR QL AUTO: NORMAL /HPF
BASOPHILS # BLD AUTO: 0.03 10*3/MM3 (ref 0–0.2)
BASOPHILS NFR BLD AUTO: 0.4 % (ref 0–1.5)
BILIRUB SERPL-MCNC: 0.2 MG/DL (ref 0–1.2)
BILIRUB UR QL STRIP: NEGATIVE
BUN SERPL-MCNC: 16 MG/DL (ref 8–23)
BUN/CREAT SERPL: 20.8 (ref 7–25)
CALCIUM SPEC-SCNC: 8.6 MG/DL (ref 8.6–10.5)
CHLORIDE SERPL-SCNC: 105 MMOL/L (ref 98–107)
CLARITY UR: CLEAR
CO2 SERPL-SCNC: 21.4 MMOL/L (ref 22–29)
COLOR UR: YELLOW
CREAT SERPL-MCNC: 0.77 MG/DL (ref 0.76–1.27)
DEPRECATED RDW RBC AUTO: 43.7 FL (ref 37–54)
EOSINOPHIL # BLD AUTO: 0.1 10*3/MM3 (ref 0–0.4)
EOSINOPHIL NFR BLD AUTO: 1.4 % (ref 0.3–6.2)
ERYTHROCYTE [DISTWIDTH] IN BLOOD BY AUTOMATED COUNT: 14.2 % (ref 12.3–15.4)
GFR SERPL CREATININE-BSD FRML MDRD: 101 ML/MIN/1.73
GLOBULIN UR ELPH-MCNC: 2.1 GM/DL
GLUCOSE SERPL-MCNC: 119 MG/DL (ref 65–99)
GLUCOSE UR STRIP-MCNC: NEGATIVE MG/DL
HCT VFR BLD AUTO: 32.3 % (ref 37.5–51)
HGB BLD-MCNC: 10.8 G/DL (ref 13–17.7)
HGB UR QL STRIP.AUTO: NEGATIVE
HYALINE CASTS UR QL AUTO: NORMAL /LPF
IMM GRANULOCYTES # BLD AUTO: 0.02 10*3/MM3 (ref 0–0.05)
IMM GRANULOCYTES NFR BLD AUTO: 0.3 % (ref 0–0.5)
KETONES UR QL STRIP: NEGATIVE
LEUKOCYTE ESTERASE UR QL STRIP.AUTO: NEGATIVE
LYMPHOCYTES # BLD AUTO: 2.08 10*3/MM3 (ref 0.7–3.1)
LYMPHOCYTES NFR BLD AUTO: 28.5 % (ref 19.6–45.3)
MCH RBC QN AUTO: 28.4 PG (ref 26.6–33)
MCHC RBC AUTO-ENTMCNC: 33.4 G/DL (ref 31.5–35.7)
MCV RBC AUTO: 85 FL (ref 79–97)
MONOCYTES # BLD AUTO: 0.61 10*3/MM3 (ref 0.1–0.9)
MONOCYTES NFR BLD AUTO: 8.4 % (ref 5–12)
NEUTROPHILS NFR BLD AUTO: 4.46 10*3/MM3 (ref 1.7–7)
NEUTROPHILS NFR BLD AUTO: 61 % (ref 42.7–76)
NITRITE UR QL STRIP: NEGATIVE
NRBC BLD AUTO-RTO: 0 /100 WBC (ref 0–0.2)
PH UR STRIP.AUTO: 6 [PH] (ref 5–8)
PLATELET # BLD AUTO: 167 10*3/MM3 (ref 140–450)
PMV BLD AUTO: 11.5 FL (ref 6–12)
POTASSIUM SERPL-SCNC: 3.7 MMOL/L (ref 3.5–5.2)
PROT SERPL-MCNC: 6.1 G/DL (ref 6–8.5)
PROT UR QL STRIP: ABNORMAL
RBC # BLD AUTO: 3.8 10*6/MM3 (ref 4.14–5.8)
RBC # UR: NORMAL /HPF
REF LAB TEST METHOD: NORMAL
SODIUM SERPL-SCNC: 137 MMOL/L (ref 136–145)
SP GR UR STRIP: 1.01 (ref 1–1.03)
SQUAMOUS #/AREA URNS HPF: NORMAL /HPF
UROBILINOGEN UR QL STRIP: ABNORMAL
WBC # BLD AUTO: 7.3 10*3/MM3 (ref 3.4–10.8)
WBC UR QL AUTO: NORMAL /HPF

## 2020-09-22 PROCEDURE — 25010000002 KETOROLAC TROMETHAMINE PER 15 MG: Performed by: NURSE PRACTITIONER

## 2020-09-22 PROCEDURE — 85025 COMPLETE CBC W/AUTO DIFF WBC: CPT | Performed by: NURSE PRACTITIONER

## 2020-09-22 PROCEDURE — 96374 THER/PROPH/DIAG INJ IV PUSH: CPT

## 2020-09-22 PROCEDURE — 81001 URINALYSIS AUTO W/SCOPE: CPT | Performed by: NURSE PRACTITIONER

## 2020-09-22 PROCEDURE — 70450 CT HEAD/BRAIN W/O DYE: CPT

## 2020-09-22 PROCEDURE — 80053 COMPREHEN METABOLIC PANEL: CPT | Performed by: NURSE PRACTITIONER

## 2020-09-22 PROCEDURE — 99284 EMERGENCY DEPT VISIT MOD MDM: CPT

## 2020-09-22 RX ORDER — KETOROLAC TROMETHAMINE 30 MG/ML
30 INJECTION, SOLUTION INTRAMUSCULAR; INTRAVENOUS ONCE
Status: DISCONTINUED | OUTPATIENT
Start: 2020-09-22 | End: 2020-09-22

## 2020-09-22 RX ORDER — SODIUM CHLORIDE 0.9 % (FLUSH) 0.9 %
10 SYRINGE (ML) INJECTION AS NEEDED
Status: DISCONTINUED | OUTPATIENT
Start: 2020-09-22 | End: 2020-09-23 | Stop reason: HOSPADM

## 2020-09-22 RX ORDER — KETOROLAC TROMETHAMINE 30 MG/ML
30 INJECTION, SOLUTION INTRAMUSCULAR; INTRAVENOUS ONCE
Status: COMPLETED | OUTPATIENT
Start: 2020-09-22 | End: 2020-09-22

## 2020-09-22 RX ADMIN — KETOROLAC TROMETHAMINE 30 MG: 30 INJECTION, SOLUTION INTRAMUSCULAR at 21:06

## 2020-09-22 NOTE — TELEPHONE ENCOUNTER
Called the on-call answering service stating he had had a headache for 2 to 3 weeks and wanted to talk to me about it.  I asked the answering service to tell him to make an appointment.     I did call his home phone and left message to see me about his headaches. If worse h/a ever---go to ER.   5:47 PM

## 2020-09-22 NOTE — ED TRIAGE NOTES
Complains of headache for a few months. Was told by PCP to come to be seen. This rn wearing mask and goggles. Pt wearing mask during encounter.

## 2020-09-23 NOTE — DISCHARGE INSTRUCTIONS
Drink plenty of water    Avoid alcohol    Get extra rest and some fresh air    Your CT scan is normal today    Return Precautions    Although you are being discharged from the ED today, I encourage you to return for worsening symptoms.  Things can, and do, change such that treatment at home with medication may not be adequate.      Specifically, return for any of the following:    Chest pain, shortness of breath, pain or nausea and vomiting not controlled by medications provided.    Please make a follow up with your Primary Care Provider for a blood pressure recheck.

## 2020-09-23 NOTE — ED PROVIDER NOTES
"EMERGENCY DEPARTMENT ENCOUNTER    Room Number:  11/11  Date seen:  9/22/2020  Time seen: 20:05 EDT  PCP: Alexandrea Jimenez PA-C  Historian: patient, PCP office notes, prior medical records    HPI:  Chief complaint:headache  A complete HPI/ROS/PMH/PSH/SH/FH are unobtainable due to: n/a  Context:Felicita Mike is a 69 y.o. male who presents to the ED with c/o moderate daily headaches for the past month that are only made tolerable by \"drinking a few shots\".  Over the past month he has taken an entire bottle of Tylenol and 1/2 bottle of Aleve and neither help.  He states the headache today is left sided but it varies and denies that the headache is acute in onset or the worst headache of his life.  There is no photo/phonophobia, no n/v, no fever/chills.  Denies neck pain, no limited ROM neck.      Patient was placed in face mask in first look. Patient was wearing facemask when I entered the room and throughout our encounter. I wore full protective equipment throughout this patient encounter including a face mask, eye shield and gloves. Hand hygiene/washing of hands was performed before donning protective equipment and after removal when leaving the room.    MEDICAL RECORD REVIEW    ALLERGIES  Clopidogrel    PAST MEDICAL HISTORY  Active Ambulatory Problems     Diagnosis Date Noted   • Elevated prostate specific antigen (PSA) 02/25/2016   • Coronary arteriosclerosis in native artery 02/25/2016   • Bipolar affective disorder (CMS/[facility]) 02/25/2016   • Chronic obstructive pulmonary disease (CMS/[facility]) 02/25/2016   • Colon polyp 02/25/2016   • Constipation 02/25/2016   • Type 2 diabetes mellitus without complication, with long-term current use of insulin (CMS/[facility]) 02/25/2016   • GERD (gastroesophageal reflux disease) 02/25/2016   • Fatigue 02/25/2016   • Hearing loss 02/25/2016   • Hyperlipidemia 02/25/2016   • Peripheral vascular disease (CMS/[facility]) 02/25/2016   • Proteinuria 02/25/2016   • Muscle pain 02/25/2016   • Low back pain " 02/25/2016   • Leukocytosis 02/25/2016   • Seborrheic eczema 02/25/2016   • Sleep apnea 02/25/2016   • Tinea corporis 02/25/2016   • Tremor 02/25/2016   • Hypertension 02/25/2016   • Anxiety 07/18/2017   • Benign prostatic hyperplasia with lower urinary tract symptoms 07/18/2017   • Tobacco abuse 11/21/2017   • Normocytic anemia 08/21/2018   • Thyroid nodule 09/11/2018   • Iron deficiency anemia 12/18/2018   • Adenomatous polyp of colon 09/10/2019   • Family history of malignant neoplasm of colon 09/10/2019   • Gastroesophageal reflux disease 09/10/2019   • COPD with acute exacerbation (CMS/[facility]) 12/23/2019   • Influenza A 12/23/2019   • SWAPNIL (acute kidney injury) (CMS/[facility]) 12/25/2019   • Essential tremor 03/03/2020   • Magnesium deficiency 04/22/2020   • Long-term current use of lithium 04/22/2020   • Chronic respiratory failure with hypoxia (CMS/[facility]) 07/11/2020   • Acute diastolic CHF (congestive heart failure) (CMS/[facility]) 07/13/2020     Resolved Ambulatory Problems     Diagnosis Date Noted   • Benign essential hypertension 02/25/2016   • Tension headache 08/28/2017   • Acute respiratory failure with hypoxia (CMS/[facility]) 08/08/2019   • Acute kidney injury (CMS/[facility]) 07/06/2020   • Hyponatremia 07/06/2020   • Lithium toxicity 07/06/2020     Past Medical History:   Diagnosis Date   • Abnormal PSA    • Acute myocardial infarction (CMS/[facility]) 2004   • Apnea, sleep    • Atherosclerotic heart disease    • Chronic pain    • COPD (chronic obstructive pulmonary disease) (CMS/[facility])    • Coronary artery disease    • Depressed bipolar II disorder (CMS/[facility])    • Diabetes mellitus (CMS/[facility])    • H/O transfusion of packed red blood cells    • Health care maintenance    • History of back pain    • Hypercholesterolemia    • Osteoarthritis    • PVD (peripheral vascular disease) (CMS/[facility])    • Sebaceous cyst    • Seborrheic dermatitis    • Tobacco use        PAST SURGICAL HISTORY  Past Surgical History:   Procedure Laterality Date   •  ANGIOPLASTY      Cath Stent Placement   • COLONOSCOPY  01/22/2014    ta polyps   • COLONOSCOPY N/A 11/29/2016    Procedure: COLONOSCOPY TO CECUM AND TERMINAL ILEUM WITH HOT POLYPECTOMIES;  Surgeon: Ivette Franoc MD;  Location: [facility] ENDOSCOPY;  Service:    • COLONOSCOPY N/A 12/16/2019    Procedure: COLONOSCOPY to cecum with hot snare, cold biopsy polypectomies with clip placement x2;  Surgeon: Ivette Franco MD;  Location: [facility] ENDOSCOPY;  Service: Gastroenterology   • CORONARY STENT PLACEMENT  2004   • ENDOSCOPY N/A 12/16/2019    Procedure: ESOPHAGOGASTRODUODENOSCOPY with biopsies;  Surgeon: Ivette Franco MD;  Location: [facility] ENDOSCOPY;  Service: Gastroenterology   • EYE SURGERY      Cataract Surgery   • HAND SURGERY         FAMILY HISTORY  Family History   Problem Relation Age of Onset   • Cancer Mother    • Diabetes Mother    • Hypertension Mother    • Colon cancer Mother    • Heart disease Father    • Cancer Brother    • Stroke Brother        SOCIAL HISTORY  Social History     Socioeconomic History   • Marital status:      Spouse name: Not on file   • Number of children: Not on file   • Years of education: Not on file   • Highest education level: Not on file   Occupational History     Employer: DISABLED   Tobacco Use   • Smoking status: Current Every Day Smoker     Packs/day: 1.00     Years: 47.00     Pack years: 47.00     Types: Cigarettes   • Smokeless tobacco: Never Used   • Tobacco comment: NO CAFFEINE USE   50 years 1 ppd    Substance and Sexual Activity   • Alcohol use: Yes     Comment: very seldom   • Drug use: No   • Sexual activity: Defer       REVIEW OF SYSTEMS  Review of Systems    All systems reviewed and negative except for those discussed in HPI.     PHYSICAL EXAM    ED Triage Vitals [09/22/20 1943]   Temp Heart Rate Resp BP SpO2   98 °F (36.7 °C) 85 18 -- 98 %      Temp src Heart Rate Source Patient Position BP Location FiO2 (%)   -- -- -- -- --     Physical Exam    I have  reviewed the triage vital signs and nursing notes.      GENERAL: not distressed  HENT: nares patent, mm moist, TM's normal bilaterally, turbinates are not severely inflamed, no posterior oral pharynx erythema, edema or exudates.   EYES: no scleral icterus, left eye with pupil irregularity from a prior injury.  Right eye is equal and reactive to light  NECK: no ROM limitations  CV: regular rhythm, regular rate, no murmur, no rubs, no gallups  RESPIRATORY: normal effort, CTAB  ABDOMEN: soft  : deferred  MUSCULOSKELETAL: no deformity  NEURO: alert, moves all extremities, follows commands  SKIN: warm, dry    LAB RESULTS  Recent Results (from the past 24 hour(s))   Comprehensive Metabolic Panel    Collection Time: 09/22/20  8:19 PM    Specimen: Blood   Result Value Ref Range    Glucose 119 (H) 65 - 99 mg/dL    BUN 16 8 - 23 mg/dL    Creatinine 0.77 0.76 - 1.27 mg/dL    Sodium 137 136 - 145 mmol/L    Potassium 3.7 3.5 - 5.2 mmol/L    Chloride 105 98 - 107 mmol/L    CO2 21.4 (L) 22.0 - 29.0 mmol/L    Calcium 8.6 8.6 - 10.5 mg/dL    Total Protein 6.1 6.0 - 8.5 g/dL    Albumin 4.00 3.50 - 5.20 g/dL    ALT (SGPT) 11 1 - 41 U/L    AST (SGOT) 10 1 - 40 U/L    Alkaline Phosphatase 83 39 - 117 U/L    Total Bilirubin 0.2 0.0 - 1.2 mg/dL    eGFR Non African Amer 101 >60 mL/min/1.73    Globulin 2.1 gm/dL    A/G Ratio 1.9 g/dL    BUN/Creatinine Ratio 20.8 7.0 - 25.0    Anion Gap 10.6 5.0 - 15.0 mmol/L   CBC Auto Differential    Collection Time: 09/22/20  8:19 PM    Specimen: Blood   Result Value Ref Range    WBC 7.30 3.40 - 10.80 10*3/mm3    RBC 3.80 (L) 4.14 - 5.80 10*6/mm3    Hemoglobin 10.8 (L) 13.0 - 17.7 g/dL    Hematocrit 32.3 (L) 37.5 - 51.0 %    MCV 85.0 79.0 - 97.0 fL    MCH 28.4 26.6 - 33.0 pg    MCHC 33.4 31.5 - 35.7 g/dL    RDW 14.2 12.3 - 15.4 %    RDW-SD 43.7 37.0 - 54.0 fl    MPV 11.5 6.0 - 12.0 fL    Platelets 167 140 - 450 10*3/mm3    Neutrophil % 61.0 42.7 - 76.0 %    Lymphocyte % 28.5 19.6 - 45.3 %    Monocyte %  8.4 5.0 - 12.0 %    Eosinophil % 1.4 0.3 - 6.2 %    Basophil % 0.4 0.0 - 1.5 %    Immature Grans % 0.3 0.0 - 0.5 %    Neutrophils, Absolute 4.46 1.70 - 7.00 10*3/mm3    Lymphocytes, Absolute 2.08 0.70 - 3.10 10*3/mm3    Monocytes, Absolute 0.61 0.10 - 0.90 10*3/mm3    Eosinophils, Absolute 0.10 0.00 - 0.40 10*3/mm3    Basophils, Absolute 0.03 0.00 - 0.20 10*3/mm3    Immature Grans, Absolute 0.02 0.00 - 0.05 10*3/mm3    nRBC 0.0 0.0 - 0.2 /100 WBC   Urinalysis With Microscopic If Indicated (No Culture) - Urine, Clean Catch    Collection Time: 09/22/20  8:45 PM    Specimen: Urine, Clean Catch   Result Value Ref Range    Color, UA Yellow Yellow, Straw    Appearance, UA Clear Clear    pH, UA 6.0 5.0 - 8.0    Specific Gravity, UA 1.008 1.005 - 1.030    Glucose, UA Negative Negative    Ketones, UA Negative Negative    Bilirubin, UA Negative Negative    Blood, UA Negative Negative    Protein,  mg/dL (2+) (A) Negative    Leuk Esterase, UA Negative Negative    Nitrite, UA Negative Negative    Urobilinogen, UA 0.2 E.U./dL 0.2 - 1.0 E.U./dL   Urinalysis, Microscopic Only - Urine, Clean Catch    Collection Time: 09/22/20  8:45 PM    Specimen: Urine, Clean Catch   Result Value Ref Range    RBC, UA 0-2 None Seen, 0-2 /HPF    WBC, UA 0-2 None Seen, 0-2 /HPF    Bacteria, UA None Seen None Seen /HPF    Squamous Epithelial Cells, UA None Seen None Seen, 0-2 /HPF    Hyaline Casts, UA 0-2 None Seen /LPF    Methodology Manual Light Microscopy          RADIOLOGY RESULTS  CT Head Without Contrast   Final Result       1. No acute intracranial findings.   2. Partial opacification of the left mastoid air cells. This is a new   finding when compared to prior exam. Correlation with any evidence of   mastoiditis is suggested. There is also some mucosal thickening noted   within the maxillary sinuses which appears to be new when compared to   prior study.       Radiation dose reduction techniques were utilized, including automated    exposure control and exposure modulation based on body size.       This report was finalized on 9/22/2020 9:37 PM by Dr. Kitty Del Castillo M.D.                PROGRESS, DATA ANALYSIS, CONSULTS AND MEDICAL DECISION MAKING  All labs have been independently reviewed by me.  All radiology studies have been reviewed by me and discussed with radiologist dictating the report.  EKG's independently viewed and interpreted by me unless stated otherwise. Discussion below represents my analysis of pertinent findings related to patient's condition, differential diagnosis, treatment plan and final disposition.     ED Course as of Sep 22 2152   Tue Sep 22, 2020   2006 From phone call to his PCP, Alexandrea Jimenez PA-C today:  Called the on-call answering service stating he had had a headache for 2 to 3 weeks and wanted to talk to me about it.  I asked the answering service to tell him to make an appointment.      I did call his home phone and left message to see me about his headaches. If worse h/a ever---go to ER.   5:47 PM       [EW]   2128 Stable from 2 months ago   Hemoglobin(!): 10.8 [EW]   2141 MDM: CT head normal, labs WNL.  No focal neurological deficits.  Will d/c home to follow up with PCP and I can give Neurology referral.     [EW]      ED Course User Index  [EW] Meryl Love APRN     DDX: daily headache, seasonal allergies, alcohol use    MDM: Based on the patient's history and physical there is very low clinical suspicion for significant intracranial pathology. The headache was NOT sudden onset, NOT maximal at onset, there are NO neurologic findings, the patient does NOT have a fever, the patient does NOT have any jaw claudication, the patient does NOT endorse a clotting disorder, patient DENIES any trauma or eye pain and the headache is NOT associated with dizziness or ataxia. Will treatment the patient symptomatically and reassess.    2145: Reviewed pt's history and workup with Dr. Ross.  After a bedside  "evaluation, Dr. Ross agrees with the plan of care.    The patient's history, physical exam, and lab findings were discussed with the physician, who also performed a face to face history and physical exam.  I discussed all results and noted any abnormalities with patient.  Discussed absoute need to recheck abnormalities with their family physician.  I answered any of the patient's questions.  Discussed plan for discharge, as there is no emergent indication for admission.  Pt is agreeable and understands need for follow up and repeat testing.  Pt is aware that discharge does not mean that nothing is wrong but it indicates no emergency is present and they must continue care with their family physician.  Pt is discharged with instructions to follow up with primary care doctor to have their blood pressure rechecked.           Disposition vitals:  /83   Pulse 85   Temp 98 °F (36.7 °C)   Resp 18   Ht 175.3 cm (69\")   Wt 70.3 kg (155 lb)   SpO2 98%   BMI 22.89 kg/m²       DIAGNOSIS  Final diagnoses:   Chronic daily headache       FOLLOW UP   Alexandrea Jimenez, PA-C  [id number] [facility]  [email or web address]  [place] [address] [phone]    Schedule an appointment as soon as possible for a visit in 1 week      Sam Hughes,   [address] [facility] [id number]  [place] [id number] [phone]    Schedule an appointment as soon as possible for a visit            Meryl Love, GARIMA  09/22/20 3401    " negative...

## 2020-10-01 ENCOUNTER — OFFICE VISIT (OUTPATIENT)
Dept: FAMILY MEDICINE CLINIC | Facility: CLINIC | Age: 70
End: 2020-10-01

## 2020-10-01 VITALS
HEIGHT: 69 IN | BODY MASS INDEX: 21.77 KG/M2 | TEMPERATURE: 99.2 F | RESPIRATION RATE: 16 BRPM | DIASTOLIC BLOOD PRESSURE: 90 MMHG | SYSTOLIC BLOOD PRESSURE: 180 MMHG | WEIGHT: 147 LBS | HEART RATE: 96 BPM | OXYGEN SATURATION: 97 %

## 2020-10-01 DIAGNOSIS — Z79.4 TYPE 2 DIABETES MELLITUS WITHOUT COMPLICATION, WITH LONG-TERM CURRENT USE OF INSULIN (HCC): ICD-10-CM

## 2020-10-01 DIAGNOSIS — Z72.0 TOBACCO ABUSE: ICD-10-CM

## 2020-10-01 DIAGNOSIS — I10 ESSENTIAL HYPERTENSION: ICD-10-CM

## 2020-10-01 DIAGNOSIS — H74.92 DISORDER OF LEFT MASTOID: ICD-10-CM

## 2020-10-01 DIAGNOSIS — E11.9 TYPE 2 DIABETES MELLITUS WITHOUT COMPLICATION, WITH LONG-TERM CURRENT USE OF INSULIN (HCC): ICD-10-CM

## 2020-10-01 DIAGNOSIS — Z09 HOSPITAL DISCHARGE FOLLOW-UP: Primary | ICD-10-CM

## 2020-10-01 DIAGNOSIS — Z23 NEED FOR INFLUENZA VACCINATION: ICD-10-CM

## 2020-10-01 DIAGNOSIS — D64.9 NORMOCYTIC ANEMIA: ICD-10-CM

## 2020-10-01 DIAGNOSIS — E78.2 MIXED HYPERLIPIDEMIA: ICD-10-CM

## 2020-10-01 PROBLEM — I50.31 ACUTE DIASTOLIC CHF (CONGESTIVE HEART FAILURE) (HCC): Status: RESOLVED | Noted: 2020-07-13 | Resolved: 2020-10-01

## 2020-10-01 PROBLEM — J96.11 CHRONIC RESPIRATORY FAILURE WITH HYPOXIA (HCC): Status: RESOLVED | Noted: 2020-07-11 | Resolved: 2020-10-01

## 2020-10-01 PROCEDURE — 99214 OFFICE O/P EST MOD 30 MIN: CPT | Performed by: PHYSICIAN ASSISTANT

## 2020-10-01 PROCEDURE — 90694 VACC AIIV4 NO PRSRV 0.5ML IM: CPT | Performed by: PHYSICIAN ASSISTANT

## 2020-10-01 PROCEDURE — G0008 ADMIN INFLUENZA VIRUS VAC: HCPCS | Performed by: PHYSICIAN ASSISTANT

## 2020-10-01 RX ORDER — OMEPRAZOLE 20 MG/1
CAPSULE, DELAYED RELEASE ORAL
COMMUNITY
Start: 2020-09-15 | End: 2020-10-01

## 2020-10-01 RX ORDER — LISINOPRIL AND HYDROCHLOROTHIAZIDE 20; 12.5 MG/1; MG/1
TABLET ORAL
Qty: 90 TABLET | Refills: 1 | Status: SHIPPED | OUTPATIENT
Start: 2020-10-01 | End: 2020-10-26

## 2020-10-01 NOTE — PROGRESS NOTES
Subjective   Felicita Mike is a 69 y.o. male.     History of Present Illness     Felicita Mike 69 y.o. male presents today for Emergency Room follow up.  he was treated 9-22-20 for headache in concern about recent fall and head trauma.  I reviewed all of the labs and diagnostic testing and noted: CT head and negative for subdural hematoma  The patient's medications were not changed:    Lab Results   Component Value Date    WBC 7.30 09/22/2020    HGB 10.8 (L) 09/22/2020    HCT 32.3 (L) 09/22/2020    MCV 85.0 09/22/2020     09/22/2020     Lab Results   Component Value Date    GLUCOSE 119 (H) 09/22/2020    BUN 16 09/22/2020    CREATININE 0.77 09/22/2020    EGFRIFNONA 101 09/22/2020    EGFRIFAFRI 92 12/05/2019    BCR 20.8 09/22/2020    K 3.7 09/22/2020    CO2 21.4 (L) 09/22/2020    CALCIUM 8.6 09/22/2020    PROTENTOTREF 6.7 12/05/2019    ALBUMIN 4.00 09/22/2020    LABIL2 2.2 12/05/2019    AST 10 09/22/2020    ALT 11 09/22/2020   CT  HISTORY: Daily headaches     COMPARISON: 07/24/2017     TECHNIQUE: Axial CT imaging was obtained through the brain. No IV  contrast was administered.     FINDINGS:  No acute intracranial hemorrhage is identified. There is mild atrophy.  There is no midline shift or mass effect. No calvarial fracture is seen.  There is some thickening noted within the ethmoid and maxillary sinuses.  There is partial opacification of the left mastoid air cells. This is  new when compared to a prior study from 07/24/2017.     IMPRESSION:     1. No acute intracranial findings.  2. Partial opacification of the left mastoid air cells. This is a new  finding when compared to prior exam. Correlation with any evidence of  mastoiditis is suggested. There is also some mucosal thickening noted  within the maxillary sinuses which appears to be new when compared to  prior study.     Radiation dose reduction techniques were utilized, including automated  exposure control and exposure modulation based on body size.    "  This report was finalized on 9/22/2020 9:37 PM by Dr. Kitty Del Castillo M.D.        Refer to ENT d/t the new mastoid opacification    Current outpatient and discharge medications have been reconciled for the patient.  Reviewed by: Alexandrea Jimenez PA-C  No new congestion. Usual COPD wheezing  he does not have a follow up appointment with a specialist:  Pt does not want to see any other specialist  He remains off lithium and understands I will not prescribe that for him and he needs to see psychiatry for his anxiety and depression and patient refuses.  We will continue the 100 mg Seroquel at night due to it helps him sleep.  Nothing else works     Since the last visit, he has overall felt tired.  He has Essential Hypertension not at goal, plan to add/adjust medication, DMII well controlled on medication and will continue regimen, GERD controlled on PPI Rx, Hyperlipidemia with goals met with current Rx and CAD and remains under care of their Cardiologist for mangement.  he has been compliant with current medications have reviewed them.  The patient denies medication side effects.  Will refill medications. /90   Pulse 96   Temp 99.2 °F (37.3 °C) (Temporal)   Resp 16   Ht 175.3 cm (69\")   Wt 66.7 kg (147 lb)   SpO2 97%   BMI 21.71 kg/m²     I will need f/u labs on f/u visit in 2 weeks. Lab already closed today  Note pt was to see hematologist 12-13-19 DR Perdue and was no show. He did see him 6-12-19 and diagnosed normocytic anemia with evidence of borderline iron deficiency.  he has seen GI ---work up for low hgb---Dr Franco and had scopes 12-16-20--no bleeding      Results for orders placed or performed during the hospital encounter of 09/22/20   Comprehensive Metabolic Panel    Specimen: Blood   Result Value Ref Range    Glucose 119 (H) 65 - 99 mg/dL    BUN 16 8 - 23 mg/dL    Creatinine 0.77 0.76 - 1.27 mg/dL    Sodium 137 136 - 145 mmol/L    Potassium 3.7 3.5 - 5.2 mmol/L    Chloride 105 98 - 107 mmol/L "    CO2 21.4 (L) 22.0 - 29.0 mmol/L    Calcium 8.6 8.6 - 10.5 mg/dL    Total Protein 6.1 6.0 - 8.5 g/dL    Albumin 4.00 3.50 - 5.20 g/dL    ALT (SGPT) 11 1 - 41 U/L    AST (SGOT) 10 1 - 40 U/L    Alkaline Phosphatase 83 39 - 117 U/L    Total Bilirubin 0.2 0.0 - 1.2 mg/dL    eGFR Non African Amer 101 >60 mL/min/1.73    Globulin 2.1 gm/dL    A/G Ratio 1.9 g/dL    BUN/Creatinine Ratio 20.8 7.0 - 25.0    Anion Gap 10.6 5.0 - 15.0 mmol/L   Urinalysis With Microscopic If Indicated (No Culture) - Urine, Clean Catch    Specimen: Urine, Clean Catch   Result Value Ref Range    Color, UA Yellow Yellow, Straw    Appearance, UA Clear Clear    pH, UA 6.0 5.0 - 8.0    Specific Gravity, UA 1.008 1.005 - 1.030    Glucose, UA Negative Negative    Ketones, UA Negative Negative    Bilirubin, UA Negative Negative    Blood, UA Negative Negative    Protein,  mg/dL (2+) (A) Negative    Leuk Esterase, UA Negative Negative    Nitrite, UA Negative Negative    Urobilinogen, UA 0.2 E.U./dL 0.2 - 1.0 E.U./dL   CBC Auto Differential    Specimen: Blood   Result Value Ref Range    WBC 7.30 3.40 - 10.80 10*3/mm3    RBC 3.80 (L) 4.14 - 5.80 10*6/mm3    Hemoglobin 10.8 (L) 13.0 - 17.7 g/dL    Hematocrit 32.3 (L) 37.5 - 51.0 %    MCV 85.0 79.0 - 97.0 fL    MCH 28.4 26.6 - 33.0 pg    MCHC 33.4 31.5 - 35.7 g/dL    RDW 14.2 12.3 - 15.4 %    RDW-SD 43.7 37.0 - 54.0 fl    MPV 11.5 6.0 - 12.0 fL    Platelets 167 140 - 450 10*3/mm3    Neutrophil % 61.0 42.7 - 76.0 %    Lymphocyte % 28.5 19.6 - 45.3 %    Monocyte % 8.4 5.0 - 12.0 %    Eosinophil % 1.4 0.3 - 6.2 %    Basophil % 0.4 0.0 - 1.5 %    Immature Grans % 0.3 0.0 - 0.5 %    Neutrophils, Absolute 4.46 1.70 - 7.00 10*3/mm3    Lymphocytes, Absolute 2.08 0.70 - 3.10 10*3/mm3    Monocytes, Absolute 0.61 0.10 - 0.90 10*3/mm3    Eosinophils, Absolute 0.10 0.00 - 0.40 10*3/mm3    Basophils, Absolute 0.03 0.00 - 0.20 10*3/mm3    Immature Grans, Absolute 0.02 0.00 - 0.05 10*3/mm3    nRBC 0.0 0.0 - 0.2 /100  WBC   Urinalysis, Microscopic Only - Urine, Clean Catch    Specimen: Urine, Clean Catch   Result Value Ref Range    RBC, UA 0-2 None Seen, 0-2 /HPF    WBC, UA 0-2 None Seen, 0-2 /HPF    Bacteria, UA None Seen None Seen /HPF    Squamous Epithelial Cells, UA None Seen None Seen, 0-2 /HPF    Hyaline Casts, UA 0-2 None Seen /LPF    Methodology Manual Light Microscopy            192/98, 193/104, 188/92,190/93---did best on Lisinopril 20.12.5mg in past and he is on Coreg,. Norvasc, Lisinopril at 10mg for now.  180/90 today    The following portions of the patient's history were reviewed and updated as appropriate: allergies, current medications, past family history, past medical history, past social history, past surgical history and problem list.    Review of Systems   Constitutional: Positive for fatigue. Negative for activity change, appetite change and unexpected weight change.   HENT: Negative for nosebleeds and trouble swallowing.    Eyes: Negative for pain and visual disturbance.   Respiratory: Positive for cough and wheezing. Negative for chest tightness and shortness of breath.    Cardiovascular: Negative for chest pain and palpitations.   Gastrointestinal: Negative for abdominal pain and blood in stool.   Endocrine: Negative.    Genitourinary: Negative for difficulty urinating and hematuria.   Musculoskeletal: Negative for joint swelling.   Skin: Negative for color change and rash.   Allergic/Immunologic: Negative.    Neurological: Positive for tremors and headaches. Negative for dizziness, syncope and speech difficulty.   Hematological: Negative for adenopathy.   Psychiatric/Behavioral: Positive for decreased concentration. Negative for agitation and confusion. The patient is nervous/anxious.    All other systems reviewed and are negative.      Objective   Physical Exam  Vitals signs and nursing note reviewed.   Constitutional:       General: He is not in acute distress.     Appearance: He is well-developed.       Comments: Finger clubbing   HENT:      Head: Normocephalic and atraumatic.      Nose: Nose normal.   Eyes:      General: No scleral icterus.        Right eye: No discharge.         Left eye: No discharge.      Conjunctiva/sclera: Conjunctivae normal.      Pupils: Pupils are equal, round, and reactive to light.   Neck:      Musculoskeletal: Normal range of motion and neck supple.      Thyroid: No thyromegaly.      Trachea: No tracheal deviation.   Cardiovascular:      Rate and Rhythm: Normal rate and regular rhythm.      Pulses: Normal pulses.      Heart sounds: Normal heart sounds. No murmur. No gallop.    Pulmonary:      Effort: Pulmonary effort is normal. No respiratory distress.      Breath sounds: Wheezing present. No rales.   Musculoskeletal: Normal range of motion.   Skin:     General: Skin is warm.      Coloration: Skin is not pale.      Findings: No erythema or rash.   Neurological:      Mental Status: He is alert and oriented to person, place, and time.      Motor: No abnormal muscle tone.      Coordination: Coordination normal.   Psychiatric:         Behavior: Behavior normal.         Thought Content: Thought content normal.         Judgment: Judgment normal.         Assessment/Plan   Felicita was seen today for headache and blood pressure.    Diagnoses and all orders for this visit:    Hospital discharge follow-up  Comments:  9-22-20 [facility] ER for h/a    Need for influenza vaccination  -     Fluad Quad >65 years    Normocytic anemia    Type 2 diabetes mellitus without complication, with long-term current use of insulin (CMS/[facility])    Tobacco abuse    Essential hypertension    Mixed hyperlipidemia    Disorder of left mastoid  Comments:  Opacification that was new on CT scan 9/22/2020    Other orders  -     lisinopril-hydrochlorothiazide (PRINZIDE,ZESTORETIC) 20-12.5 MG per tablet; One PO daily for bp and stop Lisinoril 10mg  -     magnesium oxide (MAGnesium-Oxide) 400 (241.3 Mg) MG tablet tablet; Take 1 tablet  by mouth Daily.      ER CT --mastoid opacification new---refer ENT  I will call him Sunday and check BP  See me 2 weeks f/u bp  Going back to prior Rx Lisinopril HCT 20.12.5mg----once daily and took this in past; sent note to pharmacy---make sure dose is Norvasc dose is 5mg, Coreg 25mg BID, --stop Lisinopril 10mg  Refill Mg

## 2020-10-01 NOTE — PATIENT INSTRUCTIONS
"Hypertension, Adult  High blood pressure (hypertension) is when the force of blood pumping through the arteries is too strong. The arteries are the blood vessels that carry blood from the heart throughout the body. Hypertension forces the heart to work harder to pump blood and may cause arteries to become narrow or stiff. Untreated or uncontrolled hypertension can cause a heart attack, heart failure, a stroke, kidney disease, and other problems.  A blood pressure reading consists of a higher number over a lower number. Ideally, your blood pressure should be below 120/80. The first (\"top\") number is called the systolic pressure. It is a measure of the pressure in your arteries as your heart beats. The second (\"bottom\") number is called the diastolic pressure. It is a measure of the pressure in your arteries as the heart relaxes.  What are the causes?  The exact cause of this condition is not known. There are some conditions that result in or are related to high blood pressure.  What increases the risk?  Some risk factors for high blood pressure are under your control. The following factors may make you more likely to develop this condition:  · Smoking.  · Having type 2 diabetes mellitus, high cholesterol, or both.  · Not getting enough exercise or physical activity.  · Being overweight.  · Having too much fat, sugar, calories, or salt (sodium) in your diet.  · Drinking too much alcohol.  Some risk factors for high blood pressure may be difficult or impossible to change. Some of these factors include:  · Having chronic kidney disease.  · Having a family history of high blood pressure.  · Age. Risk increases with age.  · Race. You may be at higher risk if you are .  · Gender. Men are at higher risk than women before age 45. After age 65, women are at higher risk than men.  · Having obstructive sleep apnea.  · Stress.  What are the signs or symptoms?  High blood pressure may not cause symptoms. Very high " blood pressure (hypertensive crisis) may cause:  · Headache.  · Anxiety.  · Shortness of breath.  · Nosebleed.  · Nausea and vomiting.  · Vision changes.  · Severe chest pain.  · Seizures.  How is this diagnosed?  This condition is diagnosed by measuring your blood pressure while you are seated, with your arm resting on a flat surface, your legs uncrossed, and your feet flat on the floor. The cuff of the blood pressure monitor will be placed directly against the skin of your upper arm at the level of your heart. It should be measured at least twice using the same arm. Certain conditions can cause a difference in blood pressure between your right and left arms.  Certain factors can cause blood pressure readings to be lower or higher than normal for a short period of time:  · When your blood pressure is higher when you are in a health care provider's office than when you are at home, this is called white coat hypertension. Most people with this condition do not need medicines.  · When your blood pressure is higher at home than when you are in a health care provider's office, this is called masked hypertension. Most people with this condition may need medicines to control blood pressure.  If you have a high blood pressure reading during one visit or you have normal blood pressure with other risk factors, you may be asked to:  · Return on a different day to have your blood pressure checked again.  · Monitor your blood pressure at home for 1 week or longer.  If you are diagnosed with hypertension, you may have other blood or imaging tests to help your health care provider understand your overall risk for other conditions.  How is this treated?  This condition is treated by making healthy lifestyle changes, such as eating healthy foods, exercising more, and reducing your alcohol intake. Your health care provider may prescribe medicine if lifestyle changes are not enough to get your blood pressure under control, and  if:  · Your systolic blood pressure is above 130.  · Your diastolic blood pressure is above 80.  Your personal target blood pressure may vary depending on your medical conditions, your age, and other factors.  Follow these instructions at home:  Eating and drinking    · Eat a diet that is high in fiber and potassium, and low in sodium, added sugar, and fat. An example eating plan is called the DASH (Dietary Approaches to Stop Hypertension) diet. To eat this way:  ? Eat plenty of fresh fruits and vegetables. Try to fill one half of your plate at each meal with fruits and vegetables.  ? Eat whole grains, such as whole-wheat pasta, brown rice, or whole-grain bread. Fill about one fourth of your plate with whole grains.  ? Eat or drink low-fat dairy products, such as skim milk or low-fat yogurt.  ? Avoid fatty cuts of meat, processed or cured meats, and poultry with skin. Fill about one fourth of your plate with lean proteins, such as fish, chicken without skin, beans, eggs, or tofu.  ? Avoid pre-made and processed foods. These tend to be higher in sodium, added sugar, and fat.  · Reduce your daily sodium intake. Most people with hypertension should eat less than 1,500 mg of sodium a day.  · Do not drink alcohol if:  ? Your health care provider tells you not to drink.  ? You are pregnant, may be pregnant, or are planning to become pregnant.  · If you drink alcohol:  ? Limit how much you use to:  § 0-1 drink a day for women.  § 0-2 drinks a day for men.  ? Be aware of how much alcohol is in your drink. In the [place] one drink equals one 12 oz bottle of beer (355 mL), one 5 oz glass of wine (148 mL), or one 1½ oz glass of hard liquor (44 mL).  Lifestyle    · Work with your health care provider to maintain a healthy body weight or to lose weight. Ask what an ideal weight is for you.  · Get at least 30 minutes of exercise most days of the week. Activities may include walking, swimming, or biking.  · Include exercise to  strengthen your muscles (resistance exercise), such as Pilates or lifting weights, as part of your weekly exercise routine. Try to do these types of exercises for 30 minutes at least 3 days a week.  · Do not use any products that contain nicotine or tobacco, such as cigarettes, e-cigarettes, and chewing tobacco. If you need help quitting, ask your health care provider.  · Monitor your blood pressure at home as told by your health care provider.  · Keep all follow-up visits as told by your health care provider. This is important.  Medicines  · Take over-the-counter and prescription medicines only as told by your health care provider. Follow directions carefully. Blood pressure medicines must be taken as prescribed.  · Do not skip doses of blood pressure medicine. Doing this puts you at risk for problems and can make the medicine less effective.  · Ask your health care provider about side effects or reactions to medicines that you should watch for.  Contact a health care provider if you:  · Think you are having a reaction to a medicine you are taking.  · Have headaches that keep coming back (recurring).  · Feel dizzy.  · Have swelling in your ankles.  · Have trouble with your vision.  Get help right away if you:  · Develop a severe headache or confusion.  · Have unusual weakness or numbness.  · Feel faint.  · Have severe pain in your chest or abdomen.  · Vomit repeatedly.  · Have trouble breathing.  Summary  · Hypertension is when the force of blood pumping through your arteries is too strong. If this condition is not controlled, it may put you at risk for serious complications.  · Your personal target blood pressure may vary depending on your medical conditions, your age, and other factors. For most people, a normal blood pressure is less than 120/80.  · Hypertension is treated with lifestyle changes, medicines, or a combination of both. Lifestyle changes include losing weight, eating a healthy, low-sodium diet,  "exercising more, and limiting alcohol.  This information is not intended to replace advice given to you by your health care provider. Make sure you discuss any questions you have with your health care provider.  Document Released: 12/18/2006 Document Revised: 08/28/2019 Document Reviewed: 08/28/2019  [employer] Patient Education © 2020 [employer]      Hypertension, Adult  High blood pressure (hypertension) is when the force of blood pumping through the arteries is too strong. The arteries are the blood vessels that carry blood from the heart throughout the body. Hypertension forces the heart to work harder to pump blood and may cause arteries to become narrow or stiff. Untreated or uncontrolled hypertension can cause a heart attack, heart failure, a stroke, kidney disease, and other problems.  A blood pressure reading consists of a higher number over a lower number. Ideally, your blood pressure should be below 120/80. The first (\"top\") number is called the systolic pressure. It is a measure of the pressure in your arteries as your heart beats. The second (\"bottom\") number is called the diastolic pressure. It is a measure of the pressure in your arteries as the heart relaxes.  What are the causes?  The exact cause of this condition is not known. There are some conditions that result in or are related to high blood pressure.  What increases the risk?  Some risk factors for high blood pressure are under your control. The following factors may make you more likely to develop this condition:  · Smoking.  · Having type 2 diabetes mellitus, high cholesterol, or both.  · Not getting enough exercise or physical activity.  · Being overweight.  · Having too much fat, sugar, calories, or salt (sodium) in your diet.  · Drinking too much alcohol.  Some risk factors for high blood pressure may be difficult or impossible to change. Some of these factors include:  · Having chronic kidney disease.  · Having a family history of high " blood pressure.  · Age. Risk increases with age.  · Race. You may be at higher risk if you are .  · Gender. Men are at higher risk than women before age 45. After age 65, women are at higher risk than men.  · Having obstructive sleep apnea.  · Stress.  What are the signs or symptoms?  High blood pressure may not cause symptoms. Very high blood pressure (hypertensive crisis) may cause:  · Headache.  · Anxiety.  · Shortness of breath.  · Nosebleed.  · Nausea and vomiting.  · Vision changes.  · Severe chest pain.  · Seizures.  How is this diagnosed?  This condition is diagnosed by measuring your blood pressure while you are seated, with your arm resting on a flat surface, your legs uncrossed, and your feet flat on the floor. The cuff of the blood pressure monitor will be placed directly against the skin of your upper arm at the level of your heart. It should be measured at least twice using the same arm. Certain conditions can cause a difference in blood pressure between your right and left arms.  Certain factors can cause blood pressure readings to be lower or higher than normal for a short period of time:  · When your blood pressure is higher when you are in a health care provider's office than when you are at home, this is called white coat hypertension. Most people with this condition do not need medicines.  · When your blood pressure is higher at home than when you are in a health care provider's office, this is called masked hypertension. Most people with this condition may need medicines to control blood pressure.  If you have a high blood pressure reading during one visit or you have normal blood pressure with other risk factors, you may be asked to:  · Return on a different day to have your blood pressure checked again.  · Monitor your blood pressure at home for 1 week or longer.  If you are diagnosed with hypertension, you may have other blood or imaging tests to help your health care provider  understand your overall risk for other conditions.  How is this treated?  This condition is treated by making healthy lifestyle changes, such as eating healthy foods, exercising more, and reducing your alcohol intake. Your health care provider may prescribe medicine if lifestyle changes are not enough to get your blood pressure under control, and if:  · Your systolic blood pressure is above 130.  · Your diastolic blood pressure is above 80.  Your personal target blood pressure may vary depending on your medical conditions, your age, and other factors.  Follow these instructions at home:  Eating and drinking    · Eat a diet that is high in fiber and potassium, and low in sodium, added sugar, and fat. An example eating plan is called the DASH (Dietary Approaches to Stop Hypertension) diet. To eat this way:  ? Eat plenty of fresh fruits and vegetables. Try to fill one half of your plate at each meal with fruits and vegetables.  ? Eat whole grains, such as whole-wheat pasta, brown rice, or whole-grain bread. Fill about one fourth of your plate with whole grains.  ? Eat or drink low-fat dairy products, such as skim milk or low-fat yogurt.  ? Avoid fatty cuts of meat, processed or cured meats, and poultry with skin. Fill about one fourth of your plate with lean proteins, such as fish, chicken without skin, beans, eggs, or tofu.  ? Avoid pre-made and processed foods. These tend to be higher in sodium, added sugar, and fat.  · Reduce your daily sodium intake. Most people with hypertension should eat less than 1,500 mg of sodium a day.  · Do not drink alcohol if:  ? Your health care provider tells you not to drink.  ? You are pregnant, may be pregnant, or are planning to become pregnant.  · If you drink alcohol:  ? Limit how much you use to:  § 0-1 drink a day for women.  § 0-2 drinks a day for men.  ? Be aware of how much alcohol is in your drink. In the [place] one drink equals one 12 oz bottle of beer (355 mL), one 5 oz  glass of wine (148 mL), or one 1½ oz glass of hard liquor (44 mL).  Lifestyle    · Work with your health care provider to maintain a healthy body weight or to lose weight. Ask what an ideal weight is for you.  · Get at least 30 minutes of exercise most days of the week. Activities may include walking, swimming, or biking.  · Include exercise to strengthen your muscles (resistance exercise), such as Pilates or lifting weights, as part of your weekly exercise routine. Try to do these types of exercises for 30 minutes at least 3 days a week.  · Do not use any products that contain nicotine or tobacco, such as cigarettes, e-cigarettes, and chewing tobacco. If you need help quitting, ask your health care provider.  · Monitor your blood pressure at home as told by your health care provider.  · Keep all follow-up visits as told by your health care provider. This is important.  Medicines  · Take over-the-counter and prescription medicines only as told by your health care provider. Follow directions carefully. Blood pressure medicines must be taken as prescribed.  · Do not skip doses of blood pressure medicine. Doing this puts you at risk for problems and can make the medicine less effective.  · Ask your health care provider about side effects or reactions to medicines that you should watch for.  Contact a health care provider if you:  · Think you are having a reaction to a medicine you are taking.  · Have headaches that keep coming back (recurring).  · Feel dizzy.  · Have swelling in your ankles.  · Have trouble with your vision.  Get help right away if you:  · Develop a severe headache or confusion.  · Have unusual weakness or numbness.  · Feel faint.  · Have severe pain in your chest or abdomen.  · Vomit repeatedly.  · Have trouble breathing.  Summary  · Hypertension is when the force of blood pumping through your arteries is too strong. If this condition is not controlled, it may put you at risk for serious  "complications.  · Your personal target blood pressure may vary depending on your medical conditions, your age, and other factors. For most people, a normal blood pressure is less than 120/80.  · Hypertension is treated with lifestyle changes, medicines, or a combination of both. Lifestyle changes include losing weight, eating a healthy, low-sodium diet, exercising more, and limiting alcohol.  This information is not intended to replace advice given to you by your health care provider. Make sure you discuss any questions you have with your health care provider.  Document Released: 12/18/2006 Document Revised: 08/28/2019 Document Reviewed: 08/28/2019  [employer] Patient Education © 2020 [employer]      Hypertension, Adult  High blood pressure (hypertension) is when the force of blood pumping through the arteries is too strong. The arteries are the blood vessels that carry blood from the heart throughout the body. Hypertension forces the heart to work harder to pump blood and may cause arteries to become narrow or stiff. Untreated or uncontrolled hypertension can cause a heart attack, heart failure, a stroke, kidney disease, and other problems.  A blood pressure reading consists of a higher number over a lower number. Ideally, your blood pressure should be below 120/80. The first (\"top\") number is called the systolic pressure. It is a measure of the pressure in your arteries as your heart beats. The second (\"bottom\") number is called the diastolic pressure. It is a measure of the pressure in your arteries as the heart relaxes.  What are the causes?  The exact cause of this condition is not known. There are some conditions that result in or are related to high blood pressure.  What increases the risk?  Some risk factors for high blood pressure are under your control. The following factors may make you more likely to develop this condition:  · Smoking.  · Having type 2 diabetes mellitus, high cholesterol, or " both.  · Not getting enough exercise or physical activity.  · Being overweight.  · Having too much fat, sugar, calories, or salt (sodium) in your diet.  · Drinking too much alcohol.  Some risk factors for high blood pressure may be difficult or impossible to change. Some of these factors include:  · Having chronic kidney disease.  · Having a family history of high blood pressure.  · Age. Risk increases with age.  · Race. You may be at higher risk if you are .  · Gender. Men are at higher risk than women before age 45. After age 65, women are at higher risk than men.  · Having obstructive sleep apnea.  · Stress.  What are the signs or symptoms?  High blood pressure may not cause symptoms. Very high blood pressure (hypertensive crisis) may cause:  · Headache.  · Anxiety.  · Shortness of breath.  · Nosebleed.  · Nausea and vomiting.  · Vision changes.  · Severe chest pain.  · Seizures.  How is this diagnosed?  This condition is diagnosed by measuring your blood pressure while you are seated, with your arm resting on a flat surface, your legs uncrossed, and your feet flat on the floor. The cuff of the blood pressure monitor will be placed directly against the skin of your upper arm at the level of your heart. It should be measured at least twice using the same arm. Certain conditions can cause a difference in blood pressure between your right and left arms.  Certain factors can cause blood pressure readings to be lower or higher than normal for a short period of time:  · When your blood pressure is higher when you are in a health care provider's office than when you are at home, this is called white coat hypertension. Most people with this condition do not need medicines.  · When your blood pressure is higher at home than when you are in a health care provider's office, this is called masked hypertension. Most people with this condition may need medicines to control blood pressure.  If you have a high  blood pressure reading during one visit or you have normal blood pressure with other risk factors, you may be asked to:  · Return on a different day to have your blood pressure checked again.  · Monitor your blood pressure at home for 1 week or longer.  If you are diagnosed with hypertension, you may have other blood or imaging tests to help your health care provider understand your overall risk for other conditions.  How is this treated?  This condition is treated by making healthy lifestyle changes, such as eating healthy foods, exercising more, and reducing your alcohol intake. Your health care provider may prescribe medicine if lifestyle changes are not enough to get your blood pressure under control, and if:  · Your systolic blood pressure is above 130.  · Your diastolic blood pressure is above 80.  Your personal target blood pressure may vary depending on your medical conditions, your age, and other factors.  Follow these instructions at home:  Eating and drinking    · Eat a diet that is high in fiber and potassium, and low in sodium, added sugar, and fat. An example eating plan is called the DASH (Dietary Approaches to Stop Hypertension) diet. To eat this way:  ? Eat plenty of fresh fruits and vegetables. Try to fill one half of your plate at each meal with fruits and vegetables.  ? Eat whole grains, such as whole-wheat pasta, brown rice, or whole-grain bread. Fill about one fourth of your plate with whole grains.  ? Eat or drink low-fat dairy products, such as skim milk or low-fat yogurt.  ? Avoid fatty cuts of meat, processed or cured meats, and poultry with skin. Fill about one fourth of your plate with lean proteins, such as fish, chicken without skin, beans, eggs, or tofu.  ? Avoid pre-made and processed foods. These tend to be higher in sodium, added sugar, and fat.  · Reduce your daily sodium intake. Most people with hypertension should eat less than 1,500 mg of sodium a day.  · Do not drink alcohol  if:  ? Your health care provider tells you not to drink.  ? You are pregnant, may be pregnant, or are planning to become pregnant.  · If you drink alcohol:  ? Limit how much you use to:  § 0-1 drink a day for women.  § 0-2 drinks a day for men.  ? Be aware of how much alcohol is in your drink. In the [place] one drink equals one 12 oz bottle of beer (355 mL), one 5 oz glass of wine (148 mL), or one 1½ oz glass of hard liquor (44 mL).  Lifestyle    · Work with your health care provider to maintain a healthy body weight or to lose weight. Ask what an ideal weight is for you.  · Get at least 30 minutes of exercise most days of the week. Activities may include walking, swimming, or biking.  · Include exercise to strengthen your muscles (resistance exercise), such as Pilates or lifting weights, as part of your weekly exercise routine. Try to do these types of exercises for 30 minutes at least 3 days a week.  · Do not use any products that contain nicotine or tobacco, such as cigarettes, e-cigarettes, and chewing tobacco. If you need help quitting, ask your health care provider.  · Monitor your blood pressure at home as told by your health care provider.  · Keep all follow-up visits as told by your health care provider. This is important.  Medicines  · Take over-the-counter and prescription medicines only as told by your health care provider. Follow directions carefully. Blood pressure medicines must be taken as prescribed.  · Do not skip doses of blood pressure medicine. Doing this puts you at risk for problems and can make the medicine less effective.  · Ask your health care provider about side effects or reactions to medicines that you should watch for.  Contact a health care provider if you:  · Think you are having a reaction to a medicine you are taking.  · Have headaches that keep coming back (recurring).  · Feel dizzy.  · Have swelling in your ankles.  · Have trouble with your vision.  Get help right away if  you:  · Develop a severe headache or confusion.  · Have unusual weakness or numbness.  · Feel faint.  · Have severe pain in your chest or abdomen.  · Vomit repeatedly.  · Have trouble breathing.  Summary  · Hypertension is when the force of blood pumping through your arteries is too strong. If this condition is not controlled, it may put you at risk for serious complications.  · Your personal target blood pressure may vary depending on your medical conditions, your age, and other factors. For most people, a normal blood pressure is less than 120/80.  · Hypertension is treated with lifestyle changes, medicines, or a combination of both. Lifestyle changes include losing weight, eating a healthy, low-sodium diet, exercising more, and limiting alcohol.  This information is not intended to replace advice given to you by your health care provider. Make sure you discuss any questions you have with your health care provider.  Document Released: 12/18/2006 Document Revised: 08/28/2019 Document Reviewed: 08/28/2019  Elsevier Patient Education © 2020 [employer]

## 2020-10-05 ENCOUNTER — TELEPHONE (OUTPATIENT)
Dept: FAMILY MEDICINE CLINIC | Facility: CLINIC | Age: 70
End: 2020-10-05

## 2020-10-05 NOTE — TELEPHONE ENCOUNTER
Patient is requesting information regarding ENT referral? I don't see one that has been placed; please advise patient

## 2020-10-05 NOTE — TELEPHONE ENCOUNTER
Partial opacification of the left mastoid air cells. This is a new  finding when compared to prior exam. Correlation with any evidence of  mastoiditis is suggested. There is also some mucosal thickening noted  within the maxillary sinuses which appears to be new when compared to  prior study.

## 2020-10-26 ENCOUNTER — OFFICE VISIT (OUTPATIENT)
Dept: FAMILY MEDICINE CLINIC | Facility: CLINIC | Age: 70
End: 2020-10-26

## 2020-10-26 VITALS
WEIGHT: 170 LBS | HEART RATE: 90 BPM | BODY MASS INDEX: 25.18 KG/M2 | SYSTOLIC BLOOD PRESSURE: 176 MMHG | DIASTOLIC BLOOD PRESSURE: 91 MMHG | TEMPERATURE: 97.3 F | HEIGHT: 69 IN

## 2020-10-26 DIAGNOSIS — E11.9 TYPE 2 DIABETES MELLITUS WITHOUT COMPLICATION, WITH LONG-TERM CURRENT USE OF INSULIN (HCC): ICD-10-CM

## 2020-10-26 DIAGNOSIS — Z79.4 TYPE 2 DIABETES MELLITUS WITHOUT COMPLICATION, WITH LONG-TERM CURRENT USE OF INSULIN (HCC): ICD-10-CM

## 2020-10-26 DIAGNOSIS — E78.2 MIXED HYPERLIPIDEMIA: ICD-10-CM

## 2020-10-26 DIAGNOSIS — I25.10 CORONARY ARTERIOSCLEROSIS IN NATIVE ARTERY: ICD-10-CM

## 2020-10-26 DIAGNOSIS — Z72.0 TOBACCO ABUSE: ICD-10-CM

## 2020-10-26 DIAGNOSIS — I10 ESSENTIAL HYPERTENSION: Primary | ICD-10-CM

## 2020-10-26 PROCEDURE — 99213 OFFICE O/P EST LOW 20 MIN: CPT | Performed by: PHYSICIAN ASSISTANT

## 2020-10-26 RX ORDER — LISINOPRIL AND HYDROCHLOROTHIAZIDE 20; 12.5 MG/1; MG/1
2 TABLET ORAL 2 TIMES DAILY
Qty: 180 TABLET | Refills: 0 | Status: SHIPPED | OUTPATIENT
Start: 2020-10-26 | End: 2020-11-03 | Stop reason: SDUPTHER

## 2020-10-26 NOTE — PROGRESS NOTES
"Stella Mike is a 69 y.o. male.     History of Present Illness    Since the last visit, he has overall felt tired still at time; less h/a. bp not as high but not at goal..  He has Essential Hypertension and well controlled on current medication, DMII well controlled on medication and will continue regimen, GERD controlled on PPI Rx, Hyperlipidemia with goals met with current Rx, CAD and remains under care of their Cardiologist for mangement and Vitamin D deficiency and will update labs for continued management.  he has been compliant with current medications have reviewed them.  The patient denies medication side effects.  Will refill medications. /91   Pulse 90   Temp 97.3 °F (36.3 °C)   Ht 175.3 cm (69.02\")   Wt 77.1 kg (170 lb)   BMI 25.09 kg/m²     Results for orders placed or performed during the hospital encounter of 09/22/20   Comprehensive Metabolic Panel    Specimen: Blood   Result Value Ref Range    Glucose 119 (H) 65 - 99 mg/dL    BUN 16 8 - 23 mg/dL    Creatinine 0.77 0.76 - 1.27 mg/dL    Sodium 137 136 - 145 mmol/L    Potassium 3.7 3.5 - 5.2 mmol/L    Chloride 105 98 - 107 mmol/L    CO2 21.4 (L) 22.0 - 29.0 mmol/L    Calcium 8.6 8.6 - 10.5 mg/dL    Total Protein 6.1 6.0 - 8.5 g/dL    Albumin 4.00 3.50 - 5.20 g/dL    ALT (SGPT) 11 1 - 41 U/L    AST (SGOT) 10 1 - 40 U/L    Alkaline Phosphatase 83 39 - 117 U/L    Total Bilirubin 0.2 0.0 - 1.2 mg/dL    eGFR Non African Amer 101 >60 mL/min/1.73    Globulin 2.1 gm/dL    A/G Ratio 1.9 g/dL    BUN/Creatinine Ratio 20.8 7.0 - 25.0    Anion Gap 10.6 5.0 - 15.0 mmol/L   Urinalysis With Microscopic If Indicated (No Culture) - Urine, Clean Catch    Specimen: Urine, Clean Catch   Result Value Ref Range    Color, UA Yellow Yellow, Straw    Appearance, UA Clear Clear    pH, UA 6.0 5.0 - 8.0    Specific Gravity, UA 1.008 1.005 - 1.030    Glucose, UA Negative Negative    Ketones, UA Negative Negative    Bilirubin, UA Negative Negative    Blood, " UA Negative Negative    Protein,  mg/dL (2+) (A) Negative    Leuk Esterase, UA Negative Negative    Nitrite, UA Negative Negative    Urobilinogen, UA 0.2 E.U./dL 0.2 - 1.0 E.U./dL   CBC Auto Differential    Specimen: Blood   Result Value Ref Range    WBC 7.30 3.40 - 10.80 10*3/mm3    RBC 3.80 (L) 4.14 - 5.80 10*6/mm3    Hemoglobin 10.8 (L) 13.0 - 17.7 g/dL    Hematocrit 32.3 (L) 37.5 - 51.0 %    MCV 85.0 79.0 - 97.0 fL    MCH 28.4 26.6 - 33.0 pg    MCHC 33.4 31.5 - 35.7 g/dL    RDW 14.2 12.3 - 15.4 %    RDW-SD 43.7 37.0 - 54.0 fl    MPV 11.5 6.0 - 12.0 fL    Platelets 167 140 - 450 10*3/mm3    Neutrophil % 61.0 42.7 - 76.0 %    Lymphocyte % 28.5 19.6 - 45.3 %    Monocyte % 8.4 5.0 - 12.0 %    Eosinophil % 1.4 0.3 - 6.2 %    Basophil % 0.4 0.0 - 1.5 %    Immature Grans % 0.3 0.0 - 0.5 %    Neutrophils, Absolute 4.46 1.70 - 7.00 10*3/mm3    Lymphocytes, Absolute 2.08 0.70 - 3.10 10*3/mm3    Monocytes, Absolute 0.61 0.10 - 0.90 10*3/mm3    Eosinophils, Absolute 0.10 0.00 - 0.40 10*3/mm3    Basophils, Absolute 0.03 0.00 - 0.20 10*3/mm3    Immature Grans, Absolute 0.02 0.00 - 0.05 10*3/mm3    nRBC 0.0 0.0 - 0.2 /100 WBC   Urinalysis, Microscopic Only - Urine, Clean Catch    Specimen: Urine, Clean Catch   Result Value Ref Range    RBC, UA 0-2 None Seen, 0-2 /HPF    WBC, UA 0-2 None Seen, 0-2 /HPF    Bacteria, UA None Seen None Seen /HPF    Squamous Epithelial Cells, UA None Seen None Seen, 0-2 /HPF    Hyaline Casts, UA 0-2 None Seen /LPF    Methodology Manual Light Microscopy      Went from Lisinopril 10mg to 20/12.5mg daily since last visit and was having uncontrolled hypertension along with headaches.  Patient does have diabetes and well controlled with Metformin.  Labs are due again in December and ordered.    Did see ENT and treated for sinus infx and check mastoid fluid---- Augmentin and Pred; did have f/u appt; less dizzy;  He is having testing for vertigo.  He remains off lithium and understands I will not  prescribe that for him and he needs to see psychiatry for his anxiety and depression and patient refuses.  Home bp 170-180/91-80  The following portions of the patient's history were reviewed and updated as appropriate: allergies, current medications, past family history, past medical history, past social history, past surgical history and problem list.    Review of Systems   Constitutional: Positive for fatigue. Negative for activity change, appetite change and unexpected weight change.   HENT: Negative for nosebleeds and trouble swallowing.    Eyes: Negative for pain and visual disturbance.   Respiratory: Positive for cough and wheezing. Negative for chest tightness and shortness of breath.    Cardiovascular: Negative for chest pain and palpitations.   Gastrointestinal: Negative for abdominal pain, blood in stool and vomiting.   Endocrine: Negative.    Genitourinary: Negative for difficulty urinating and hematuria.   Musculoskeletal: Negative for joint swelling.   Skin: Negative for color change and rash.   Allergic/Immunologic: Negative.    Neurological: Positive for tremors and headaches. Negative for dizziness, syncope and speech difficulty.   Hematological: Negative for adenopathy.   Psychiatric/Behavioral: Positive for decreased concentration. Negative for agitation and confusion. The patient is nervous/anxious.    All other systems reviewed and are negative.      Objective   Physical Exam  Vitals signs and nursing note reviewed.   Constitutional:       General: He is not in acute distress.     Appearance: He is well-developed. He is not ill-appearing, toxic-appearing or diaphoretic.      Comments: Finger clubbing   HENT:      Head: Normocephalic and atraumatic.      Right Ear: External ear normal.      Left Ear: External ear normal.      Nose: Nose normal.   Eyes:      General: No scleral icterus.        Right eye: No discharge.         Left eye: No discharge.      Conjunctiva/sclera: Conjunctivae normal.       Pupils: Pupils are equal, round, and reactive to light.   Neck:      Musculoskeletal: Normal range of motion and neck supple.      Thyroid: No thyromegaly.      Trachea: No tracheal deviation.   Cardiovascular:      Rate and Rhythm: Normal rate and regular rhythm.      Pulses: Normal pulses.      Heart sounds: Normal heart sounds. No murmur. No gallop.       Comments: Clubbing of fingers  Pulmonary:      Effort: Pulmonary effort is normal. No respiratory distress.      Breath sounds: No wheezing or rales.   Musculoskeletal: Normal range of motion.   Skin:     General: Skin is warm.      Coloration: Skin is not pale.      Findings: No erythema or rash.   Neurological:      Mental Status: He is alert and oriented to person, place, and time.      Motor: No abnormal muscle tone.      Coordination: Coordination normal.   Psychiatric:         Judgment: Judgment normal.         Assessment/Plan   Diagnoses and all orders for this visit:    1. Essential hypertension (Primary)    2. Mixed hyperlipidemia    3. Coronary arteriosclerosis in native artery    4. Type 2 diabetes mellitus without complication, with long-term current use of insulin (CMS/[facility])    5. Tobacco abuse    Other orders  -     lisinopril-hydrochlorothiazide (PRINZIDE,ZESTORETIC) 20-12.5 MG per tablet; Take 2 tablets by mouth 2 (two) times a day. New dose for BP  Dispense: 180 tablet; Refill: 0        He is just taking Coreg 12.5mg BID  Do want him to increase Lisinopril HCT to 40/25mg  DMII--continue Metformin well-controlled  CAD and does have an appointment with cardiology in December  Follow-up blood pressure check in 1 month and if blood pressure still not controlled will consult with cardiology about raising the carvedilol dose  Stop smoking

## 2020-10-29 ENCOUNTER — TELEPHONE (OUTPATIENT)
Dept: FAMILY MEDICINE CLINIC | Facility: CLINIC | Age: 70
End: 2020-10-29

## 2020-10-29 DIAGNOSIS — I10 ESSENTIAL HYPERTENSION: ICD-10-CM

## 2020-10-29 RX ORDER — CARVEDILOL 25 MG/1
25 TABLET ORAL 2 TIMES DAILY WITH MEALS
Qty: 180 TABLET | Refills: 3 | Status: SHIPPED | OUTPATIENT
Start: 2020-10-29 | End: 2020-11-03 | Stop reason: SDUPTHER

## 2020-10-29 NOTE — TELEPHONE ENCOUNTER
Ask him to increase his dose of carvedilol to 25 mg twice a day tell him to take a full tablet and not a half a tablet twice a day and still follow-up blood pressure check

## 2020-10-29 NOTE — TELEPHONE ENCOUNTER
Patient has an appointment to follow up on the 3rd for his blood pressure but it is still staying elevated; anything he needs to do before then?

## 2020-11-03 ENCOUNTER — OFFICE VISIT (OUTPATIENT)
Dept: FAMILY MEDICINE CLINIC | Facility: CLINIC | Age: 70
End: 2020-11-03

## 2020-11-03 VITALS
RESPIRATION RATE: 16 BRPM | HEART RATE: 92 BPM | DIASTOLIC BLOOD PRESSURE: 64 MMHG | TEMPERATURE: 97.7 F | HEIGHT: 69 IN | SYSTOLIC BLOOD PRESSURE: 133 MMHG | OXYGEN SATURATION: 97 % | BODY MASS INDEX: 27.64 KG/M2 | WEIGHT: 186.6 LBS

## 2020-11-03 DIAGNOSIS — I10 ESSENTIAL HYPERTENSION: ICD-10-CM

## 2020-11-03 PROCEDURE — 99213 OFFICE O/P EST LOW 20 MIN: CPT | Performed by: PHYSICIAN ASSISTANT

## 2020-11-03 RX ORDER — LISINOPRIL AND HYDROCHLOROTHIAZIDE 20; 12.5 MG/1; MG/1
2 TABLET ORAL 2 TIMES DAILY
Qty: 180 TABLET | Refills: 1 | Status: SHIPPED | OUTPATIENT
Start: 2020-11-03 | End: 2021-01-04

## 2020-11-03 RX ORDER — SULFAMETHOXAZOLE AND TRIMETHOPRIM 800; 160 MG/1; MG/1
TABLET ORAL
COMMUNITY
Start: 2020-10-30 | End: 2021-01-04

## 2020-11-03 RX ORDER — CARVEDILOL 25 MG/1
12.5 TABLET ORAL 2 TIMES DAILY WITH MEALS
Qty: 180 TABLET | Refills: 3 | Status: SHIPPED | OUTPATIENT
Start: 2020-11-03 | End: 2021-01-04

## 2020-11-03 NOTE — PROGRESS NOTES
"Stella Mike is a 70 y.o. male.     History of Present Illness    Since the last visit, he has overall felt fairly well.  He has Essential Hypertension and well controlled on current medication, DMII well controlled on medication and will continue regimen, GERD controlled on PPI Rx, Hyperlipidemia with goals met with current Rx, CAD and remains under care of their Cardiologist for mangement and Vitamin D deficiency and labs are at goal >30 ng/mL.  Today's visit is to follow-up on increasing his lisinopril hydrochlorothiazide dose to total daily dose of 40-25 mg daily.  He is taking 2 of his 2012.5 mg tabs and blood pressure at home is improved.  He had called on 29 October to report his blood pressure was still high and I asked him to increase the carvedilol and patient did not do this due to his blood pressure started to come down after 5 days on the new regimen.  Made note to pharmacy that he is still on half a tab twice a day of the carvedilol.  His labs are due again December 1 in order made.  he has been compliant with current medications have reviewed them.  The patient denies medication side effects.  Will refill medications. /75 (BP Location: Right arm, Patient Position: Sitting, Cuff Size: Adult)   Pulse 92   Temp 97.7 °F (36.5 °C) (Temporal)   Resp 16   Ht 175.3 cm (69.02\")   Wt 84.6 kg (186 lb 9.6 oz)   SpO2 97%   BMI 27.54 kg/m²     Results for orders placed or performed during the hospital encounter of 09/22/20   Comprehensive Metabolic Panel    Specimen: Blood   Result Value Ref Range    Glucose 119 (H) 65 - 99 mg/dL    BUN 16 8 - 23 mg/dL    Creatinine 0.77 0.76 - 1.27 mg/dL    Sodium 137 136 - 145 mmol/L    Potassium 3.7 3.5 - 5.2 mmol/L    Chloride 105 98 - 107 mmol/L    CO2 21.4 (L) 22.0 - 29.0 mmol/L    Calcium 8.6 8.6 - 10.5 mg/dL    Total Protein 6.1 6.0 - 8.5 g/dL    Albumin 4.00 3.50 - 5.20 g/dL    ALT (SGPT) 11 1 - 41 U/L    AST (SGOT) 10 1 - 40 U/L    Alkaline " Phosphatase 83 39 - 117 U/L    Total Bilirubin 0.2 0.0 - 1.2 mg/dL    eGFR Non African Amer 101 >60 mL/min/1.73    Globulin 2.1 gm/dL    A/G Ratio 1.9 g/dL    BUN/Creatinine Ratio 20.8 7.0 - 25.0    Anion Gap 10.6 5.0 - 15.0 mmol/L   Urinalysis With Microscopic If Indicated (No Culture) - Urine, Clean Catch    Specimen: Urine, Clean Catch   Result Value Ref Range    Color, UA Yellow Yellow, Straw    Appearance, UA Clear Clear    pH, UA 6.0 5.0 - 8.0    Specific Gravity, UA 1.008 1.005 - 1.030    Glucose, UA Negative Negative    Ketones, UA Negative Negative    Bilirubin, UA Negative Negative    Blood, UA Negative Negative    Protein,  mg/dL (2+) (A) Negative    Leuk Esterase, UA Negative Negative    Nitrite, UA Negative Negative    Urobilinogen, UA 0.2 E.U./dL 0.2 - 1.0 E.U./dL   CBC Auto Differential    Specimen: Blood   Result Value Ref Range    WBC 7.30 3.40 - 10.80 10*3/mm3    RBC 3.80 (L) 4.14 - 5.80 10*6/mm3    Hemoglobin 10.8 (L) 13.0 - 17.7 g/dL    Hematocrit 32.3 (L) 37.5 - 51.0 %    MCV 85.0 79.0 - 97.0 fL    MCH 28.4 26.6 - 33.0 pg    MCHC 33.4 31.5 - 35.7 g/dL    RDW 14.2 12.3 - 15.4 %    RDW-SD 43.7 37.0 - 54.0 fl    MPV 11.5 6.0 - 12.0 fL    Platelets 167 140 - 450 10*3/mm3    Neutrophil % 61.0 42.7 - 76.0 %    Lymphocyte % 28.5 19.6 - 45.3 %    Monocyte % 8.4 5.0 - 12.0 %    Eosinophil % 1.4 0.3 - 6.2 %    Basophil % 0.4 0.0 - 1.5 %    Immature Grans % 0.3 0.0 - 0.5 %    Neutrophils, Absolute 4.46 1.70 - 7.00 10*3/mm3    Lymphocytes, Absolute 2.08 0.70 - 3.10 10*3/mm3    Monocytes, Absolute 0.61 0.10 - 0.90 10*3/mm3    Eosinophils, Absolute 0.10 0.00 - 0.40 10*3/mm3    Basophils, Absolute 0.03 0.00 - 0.20 10*3/mm3    Immature Grans, Absolute 0.02 0.00 - 0.05 10*3/mm3    nRBC 0.0 0.0 - 0.2 /100 WBC   Urinalysis, Microscopic Only - Urine, Clean Catch    Specimen: Urine, Clean Catch   Result Value Ref Range    RBC, UA 0-2 None Seen, 0-2 /HPF    WBC, UA 0-2 None Seen, 0-2 /HPF    Bacteria, UA None  Seen None Seen /HPF    Squamous Epithelial Cells, UA None Seen None Seen, 0-2 /HPF    Hyaline Casts, UA 0-2 None Seen /LPF    Methodology Manual Light Microscopy      To see lung doc in Jan--COPD  Stop smoking    Lab Results   Component Value Date    HGBA1C 6.00 (H) 07/07/2020       Had f/u ENT and may need tube--on antibiotics again    He did not go up on the Coreg to full tab PO BID  Home bp 150/75-85  I have labs ordered Dec 1  He is no longer having headaches  He does have CAD and has an appointment with cardiology  The following portions of the patient's history were reviewed and updated as appropriate: allergies, current medications, past family history, past medical history, past social history, past surgical history and problem list.    Review of Systems   Constitutional: Positive for fatigue. Negative for activity change, appetite change and unexpected weight change.   HENT: Negative for nosebleeds and trouble swallowing.    Eyes: Negative for pain and visual disturbance.   Respiratory: Positive for cough and wheezing. Negative for chest tightness and shortness of breath.    Cardiovascular: Negative for chest pain and palpitations.   Gastrointestinal: Negative for abdominal pain, blood in stool and vomiting.   Endocrine: Negative.    Genitourinary: Negative for difficulty urinating and hematuria.   Musculoskeletal: Negative for joint swelling.   Skin: Negative for color change and rash.   Allergic/Immunologic: Negative.    Neurological: Positive for tremors. Negative for dizziness, syncope, speech difficulty and headaches.   Hematological: Negative for adenopathy.   Psychiatric/Behavioral: Positive for decreased concentration. Negative for agitation and confusion. The patient is nervous/anxious.    All other systems reviewed and are negative.      Objective   Physical Exam  Vitals signs and nursing note reviewed.   Constitutional:       General: He is not in acute distress.     Appearance: Normal  appearance. He is well-developed. He is not ill-appearing, toxic-appearing or diaphoretic.      Comments: Finger clubbing   HENT:      Head: Normocephalic and atraumatic.      Right Ear: External ear normal.      Left Ear: External ear normal.      Nose: Nose normal.   Eyes:      General: No scleral icterus.        Right eye: No discharge.         Left eye: No discharge.      Conjunctiva/sclera: Conjunctivae normal.      Pupils: Pupils are equal, round, and reactive to light.   Neck:      Musculoskeletal: Normal range of motion and neck supple.      Thyroid: No thyromegaly.      Vascular: No carotid bruit.      Trachea: No tracheal deviation.   Cardiovascular:      Rate and Rhythm: Normal rate and regular rhythm.      Pulses: Normal pulses.      Heart sounds: Normal heart sounds. No murmur. No gallop.       Comments: Clubbing of fingers  Pulmonary:      Effort: Pulmonary effort is normal. No respiratory distress.      Breath sounds: Wheezing present. No rales.   Musculoskeletal: Normal range of motion.      Right lower leg: No edema.      Left lower leg: No edema.   Skin:     General: Skin is warm.      Coloration: Skin is not pale.      Findings: No erythema or rash.   Neurological:      Mental Status: He is alert and oriented to person, place, and time.      Motor: No abnormal muscle tone.      Coordination: Coordination abnormal.   Psychiatric:         Thought Content: Thought content normal.         Judgment: Judgment normal.         Assessment/Plan   Diagnoses and all orders for this visit:    1. Essential hypertension  -     carvedilol (COREG) 25 MG tablet; Take 0.5 tablets by mouth 2 (Two) Times a Day With Meals. For heart and BP---he is still on 1/2 tab BID for heart  Dispense: 180 tablet; Refill: 3    Other orders  -     lisinopril-hydrochlorothiazide (PRINZIDE,ZESTORETIC) 20-12.5 MG per tablet; Take 2 tablets by mouth 2 (two) times a day. New dose for BP  Dispense: 180 tablet; Refill: 1    Stop  smoking  Plan, Felicita Mike, was seen today.  he was seen for HTN and continue medication, DMII and refilled medications, GERD and will continue on PPI medication, Hyperlipidemia and will continue current medication, CAD and is under care of their Cardiologist for medical management and Vitamin D deficiency and supplemented.    Continue lisinopril hydrochlorothiazide at total daily dose of 40/25mg, and keep the carvedilol at half a pill twice a day as before  Labs due in December we will follow-up on his diabetes continue Metformin for now  See me in 6 months  Follow-up with all specialists

## 2020-11-06 ENCOUNTER — TELEPHONE (OUTPATIENT)
Dept: FAMILY MEDICINE CLINIC | Facility: CLINIC | Age: 70
End: 2020-11-06

## 2020-11-06 NOTE — TELEPHONE ENCOUNTER
Pharmacy called wants to make sure ok for pt to take the increased dose of Lisinipril/HCTZ with the Lithium.

## 2020-12-01 ENCOUNTER — RESULTS ENCOUNTER (OUTPATIENT)
Dept: FAMILY MEDICINE CLINIC | Facility: CLINIC | Age: 70
End: 2020-12-01

## 2020-12-01 DIAGNOSIS — I73.9 PERIPHERAL VASCULAR DISEASE (HCC): ICD-10-CM

## 2020-12-01 DIAGNOSIS — Z79.4 TYPE 2 DIABETES MELLITUS WITHOUT COMPLICATION, WITH LONG-TERM CURRENT USE OF INSULIN (HCC): ICD-10-CM

## 2020-12-01 DIAGNOSIS — K21.9 GASTROESOPHAGEAL REFLUX DISEASE WITHOUT ESOPHAGITIS: ICD-10-CM

## 2020-12-01 DIAGNOSIS — J43.9 PULMONARY EMPHYSEMA, UNSPECIFIED EMPHYSEMA TYPE (HCC): ICD-10-CM

## 2020-12-01 DIAGNOSIS — E78.2 MIXED HYPERLIPIDEMIA: ICD-10-CM

## 2020-12-01 DIAGNOSIS — R25.1 TREMOR: ICD-10-CM

## 2020-12-01 DIAGNOSIS — I10 ESSENTIAL HYPERTENSION: ICD-10-CM

## 2020-12-01 DIAGNOSIS — E55.9 VITAMIN D DEFICIENCY: ICD-10-CM

## 2020-12-01 DIAGNOSIS — Z72.0 TOBACCO ABUSE: ICD-10-CM

## 2020-12-01 DIAGNOSIS — F41.9 ANXIETY: ICD-10-CM

## 2020-12-01 DIAGNOSIS — E11.9 TYPE 2 DIABETES MELLITUS WITHOUT COMPLICATION, WITH LONG-TERM CURRENT USE OF INSULIN (HCC): ICD-10-CM

## 2020-12-01 DIAGNOSIS — I25.10 CORONARY ARTERIOSCLEROSIS IN NATIVE ARTERY: ICD-10-CM

## 2020-12-01 DIAGNOSIS — F33.1 MODERATE EPISODE OF RECURRENT MAJOR DEPRESSIVE DISORDER (HCC): ICD-10-CM

## 2021-01-04 ENCOUNTER — OFFICE VISIT (OUTPATIENT)
Dept: CARDIOLOGY | Facility: CLINIC | Age: 71
End: 2021-01-04

## 2021-01-04 VITALS
HEART RATE: 101 BPM | HEIGHT: 69 IN | DIASTOLIC BLOOD PRESSURE: 84 MMHG | OXYGEN SATURATION: 96 % | SYSTOLIC BLOOD PRESSURE: 168 MMHG | WEIGHT: 169.6 LBS | RESPIRATION RATE: 20 BRPM | BODY MASS INDEX: 25.12 KG/M2

## 2021-01-04 DIAGNOSIS — Z72.0 TOBACCO ABUSE: ICD-10-CM

## 2021-01-04 DIAGNOSIS — I10 ESSENTIAL HYPERTENSION: ICD-10-CM

## 2021-01-04 DIAGNOSIS — I25.10 CORONARY ARTERY DISEASE INVOLVING NATIVE CORONARY ARTERY OF NATIVE HEART WITHOUT ANGINA PECTORIS: Primary | ICD-10-CM

## 2021-01-04 DIAGNOSIS — E78.2 MIXED HYPERLIPIDEMIA: ICD-10-CM

## 2021-01-04 PROCEDURE — 99214 OFFICE O/P EST MOD 30 MIN: CPT | Performed by: NURSE PRACTITIONER

## 2021-01-04 PROCEDURE — 93000 ELECTROCARDIOGRAM COMPLETE: CPT | Performed by: NURSE PRACTITIONER

## 2021-01-04 RX ORDER — CARVEDILOL 25 MG/1
25 TABLET ORAL 2 TIMES DAILY WITH MEALS
Qty: 180 TABLET | Refills: 3 | Status: SHIPPED | OUTPATIENT
Start: 2021-01-04

## 2021-01-04 RX ORDER — LISINOPRIL 40 MG/1
40 TABLET ORAL DAILY
Qty: 90 TABLET | Refills: 3 | Status: SHIPPED | OUTPATIENT
Start: 2021-01-04

## 2021-01-04 RX ORDER — CHLORTHALIDONE 25 MG/1
25 TABLET ORAL DAILY
Qty: 90 TABLET | Refills: 3 | Status: SHIPPED | OUTPATIENT
Start: 2021-01-04

## 2021-01-04 RX ORDER — OMEPRAZOLE 20 MG/1
20 CAPSULE, DELAYED RELEASE ORAL 2 TIMES DAILY
COMMUNITY
Start: 2020-12-31 | End: 2021-01-06

## 2021-01-04 NOTE — PROGRESS NOTES
Date of Office Visit: 2021  Encounter Provider: GARIMA Ferro  Place of Service: [facility] CARDIOLOGY  Patient Name: Felicita Mike  :1950    Chief Complaint   Patient presents with   • Coronary Artery Disease     1 YR FOLLOW UP   :     HPI: Felciita Mike is a 70 y.o. male who presents today for follow-up.  Old records have been obtained and reviewed by me.  He is a patient of Dr. Anne's with a past cardiac history significant for hypertension and coronary artery disease (history of stenting of the circumflex and RCA).  He has advanced COPD and unfortunately continues to smoke.  He was last seen in the office by Dr. Anne on 2019 at which time he was doing well with no complaints of angina or heart failure.  Dr. Anne was worried about his future health due to his multiple comorbidities and continued tobacco abuse.  No changes were made to his medical regimen, and he was advised to follow-up in 1 year.   Overall, he is doing well.  He states the increased dose of lisinopril-hydrochlorothiazide is making him sick to his stomach.  His blood pressure has still been elevated, in the 140s and 160s systolic.  He has stable shortness of breath due to his COPD that is unchanged.  Evidently he is supposed to wear oxygen but does not.  He has occasional fleeting pain in his chest at rest.  He is still smoking 1/2 packs/day.    Past Medical History:   Diagnosis Date   • Abnormal PSA    • Acute myocardial infarction (CMS/HCC) 2004    Stents   • Apnea, sleep    • Atherosclerotic heart disease    • Chronic pain     [facility] ,  L3 compression deformity, L2 degenerative disc disease and L5-S1 degenerative disc disease.   • Colon polyp    • Constipation    • COPD (chronic obstructive pulmonary disease) (CMS/HCC)    • Coronary artery disease    • Depressed bipolar II disorder (CMS/HCC)    • Diabetes mellitus (CMS/HCC)    • Essential tremor    •  Fatigue    • GERD (gastroesophageal reflux disease)    • H/O transfusion of packed red blood cells    • Health care maintenance    • Hearing loss    • History of back pain    • Hypercholesterolemia    • Hyperlipidemia    • Hypertension    • Leukocytosis    • Osteoarthritis    • Proteinuria    • PVD (peripheral vascular disease) (CMS/HCC)    • Sebaceous cyst    • Seborrheic dermatitis    • Sleep apnea    • Tinea corporis    • Tobacco use    • Tremor        Past Surgical History:   Procedure Laterality Date   • ANGIOPLASTY      Cath Stent Placement   • COLONOSCOPY  01/22/2014    ta polyps   • COLONOSCOPY N/A 11/29/2016    Procedure: COLONOSCOPY TO CECUM AND TERMINAL ILEUM WITH HOT POLYPECTOMIES;  Surgeon: Ivette Franco MD;  Location: [facility] ENDOSCOPY;  Service:    • COLONOSCOPY N/A 12/16/2019    Procedure: COLONOSCOPY to cecum with hot snare, cold biopsy polypectomies with clip placement x2;  Surgeon: Ivette Franco MD;  Location: [facility] ENDOSCOPY;  Service: Gastroenterology   • CORONARY STENT PLACEMENT  2004   • ENDOSCOPY N/A 12/16/2019    Procedure: ESOPHAGOGASTRODUODENOSCOPY with biopsies;  Surgeon: Ivette Franco MD;  Location: [facility] ENDOSCOPY;  Service: Gastroenterology   • EYE SURGERY      Cataract Surgery   • HAND SURGERY         Social History     Socioeconomic History   • Marital status:      Spouse name: Not on file   • Number of children: Not on file   • Years of education: Not on file   • Highest education level: Not on file   Occupational History     Employer: DISABLED   Tobacco Use   • Smoking status: Current Every Day Smoker     Packs/day: 0.50     Years: 47.00     Pack years: 23.50     Types: Cigarettes   • Smokeless tobacco: Never Used   • Tobacco comment: CAFFEINE USE: 4 CANS DIET [place] DAILY   Substance and Sexual Activity   • Alcohol use: Yes     Comment: very seldom   • Drug use: No   • Sexual activity: Defer       Family History   Problem Relation Age of Onset   •  Cancer Mother    • Diabetes Mother    • Hypertension Mother    • Colon cancer Mother    • Heart disease Father    • Cancer Brother    • Stroke Brother        ROS  Lungs: Positive for SOA.  Negative for PND, orthopnea  Heart: Positive for occasional fleeting chest pain, negative for edema    Allergies   Allergen Reactions   • Clopidogrel Itching         Current Outpatient Medications:   •  acetaminophen (TYLENOL) 325 MG tablet, Take 2 tablets by mouth Every 6 (Six) Hours As Needed for Mild Pain ., Disp: , Rfl:   •  albuterol (PROVENTIL) (5 MG/ML) 0.5% nebulizer solution, Take 2.5 mg by nebulization Every 6 (Six) Hours As Needed for Wheezing., Disp: , Rfl:   •  amLODIPine (NORVASC) 5 MG tablet, One PO daily for BP--new dose and (put on file), Disp: 90 tablet, Rfl: 1  •  aspirin 81 MG tablet, Take 1 tablet by mouth Daily., Disp: , Rfl:   •  atorvastatin (Lipitor) 80 MG tablet, Take 1 tablet by mouth Daily. For cholesterol, Disp: 90 tablet, Rfl: 3  •  carvedilol (COREG) 25 MG tablet, Take 1 tablet by mouth 2 (Two) Times a Day With Meals. For heart and BP---, Disp: 180 tablet, Rfl: 3  •  Cholecalciferol (Vitamin D3) 50 MCG (2000 UT) tablet, Take 1 tablet by mouth Daily., Disp: , Rfl:   •  Cyanocobalamin (B-12) 1000 MCG capsule, Take 1 tablet by mouth Daily., Disp: , Rfl:   •  glucose monitor monitoring kit, 1 each 2 (two) times a day. Please fill brand covered by pt's insurance.  Dx: E11.9, Disp: 1 each, Rfl: 0  •  magnesium oxide (MAGnesium-Oxide) 400 (241.3 Mg) MG tablet tablet, Take 1 tablet by mouth Daily., Disp: 90 tablet, Rfl: 1  •  metFORMIN (GLUCOPHAGE) 1000 MG tablet, Take 1 tablet by mouth 2 (Two) Times a Day. For DMII (Patient taking differently: Take 500 mg by mouth 2 (Two) Times a Day. For DMII), Disp: 180 tablet, Rfl: 1  •  omeprazole (priLOSEC) 40 MG capsule, Take 40 mg by mouth Daily., Disp: , Rfl:   •  polyethylene glycol (MIRALAX) packet, Take 17 g by mouth Daily As Needed., Disp: , Rfl:   •  primidone  "(MYSOLINE) 50 MG tablet, Take 1 tablet by mouth 2 (two) times a day., Disp: 180 tablet, Rfl: 3  •  promethazine (PHENERGAN) 25 MG tablet, Take 1 tablet by mouth Every 8 (Eight) Hours As Needed for Nausea., Disp: 180 tablet, Rfl: 3  •  QUEtiapine (SEROquel) 100 MG tablet, Take 1 tablet by mouth Every Night. Sleep and mood, Disp: 90 tablet, Rfl: 1  •  tamsulosin (FLOMAX) 0.4 MG capsule 24 hr capsule, Take 1 capsule by mouth Daily. For urine flow, Disp: 90 capsule, Rfl: 3  •  Vitamin E 180 MG capsule, Take  by mouth., Disp: , Rfl:   •  wheat dextrin (BENEFIBER ON THE GO) powder powder, Take 1 each by mouth Daily As Needed., Disp: , Rfl:   •  chlorthalidone (HYGROTON) 25 MG tablet, Take 1 tablet by mouth Daily., Disp: 90 tablet, Rfl: 3  •  lisinopril (PRINIVIL,ZESTRIL) 40 MG tablet, Take 1 tablet by mouth Daily., Disp: 90 tablet, Rfl: 3  •  omeprazole (priLOSEC) 20 MG capsule, Take 20 mg by mouth 2 (two) times a day., Disp: , Rfl:       Objective:     Vitals:    01/04/21 1356 01/04/21 1413   BP: 168/86 168/84   BP Location: Right arm Left arm   Patient Position: Sitting Sitting   Pulse: 101    Resp: 20    SpO2: 96%    Weight: 76.9 kg (169 lb 9.6 oz)    Height: 175.3 cm (69\")      Body mass index is 25.05 kg/m².    PHYSICAL EXAM:  Lungs: positive for wheezes  Heart. RRR, no MRGs, no edema        ECG 12 Lead    Date/Time: 1/4/2021 2:25 PM  Performed by: Jonelle Alonso APRN  Authorized by: Jonelle Alonso APRN   Comparison: compared with previous ECG from 12/23/2019  Similar to previous ECG  Rhythm: sinus rhythm  Rate: normal  BPM: 93  Q waves: II, III and aVF      Clinical impression: abnormal EKG  Comments: Indication: CAD              Assessment:       Diagnosis Plan   1. Coronary artery disease involving native coronary artery of native heart without angina pectoris  ECG 12 Lead   2. Essential hypertension  carvedilol (COREG) 25 MG tablet    Basic Metabolic Panel   3. Mixed hyperlipidemia       Orders Placed " This Encounter   Procedures   • Basic Metabolic Panel     Standing Status:   Future     Standing Expiration Date:   1/4/2022   • ECG 12 Lead     This order was created via procedure documentation          Plan:       1.  Coronary artery disease.  History of stenting to the circumflex and RCA.  He denies any angina.  I do not think the fleeting pain in his chest is anginal.  His EKG is stable.  Continue aspirin, statin, and beta-blocker.      2.  Hypertension.  Blood pressure is poorly controlled.  Currently taking amlodipine 5 mg, carvedilol 12.5 mg twice daily, and lisinopril-hydrochlorothiazide 40-25 mg daily.  Will increase the carvedilol to 25 mg twice daily.  I am also going to stop the lisinopril-HCTZ and instead start lisinopril in addition to chlorthalidone and check a BMP in 10 to 14 days.  Discussed with the patient that we would like to achieve a blood pressure goal of <140/90.  He will return in 1 week for a blood pressure check.  If his blood pressure is still not controlled, I will increase the amlodipine.      3.  Hyperlipidemia.  I do not see a lipid panel since December 2019.  He is on high intensity statin therapy with atorvastatin 80 mg daily.  We will check a lipid panel and LFTs.      4.  Tobacco abuse.  Felicita Mike  reports that he has been smoking cigarettes. He has a 23.50 pack-year smoking history. He has never used smokeless tobacco.. I have educated him on the risk of diseases from using tobacco products such as cancer, COPD and heart disease.     I advised him to quit and he is not willing to quit.    I spent 3  minutes counseling the patient.       Overall I think he is stable.  He denies any symptoms of angina or heart failure.  Further recommendations will be made when he returns for his blood pressure check and labs.      As always, it has been a pleasure to participate in your patient's care.      Sincerely,         GARIMA Neumann

## 2021-01-06 ENCOUNTER — TELEPHONE (OUTPATIENT)
Dept: FAMILY MEDICINE CLINIC | Facility: CLINIC | Age: 71
End: 2021-01-06

## 2021-01-06 DIAGNOSIS — Z79.4 TYPE 2 DIABETES MELLITUS WITHOUT COMPLICATION, WITH LONG-TERM CURRENT USE OF INSULIN (HCC): ICD-10-CM

## 2021-01-06 DIAGNOSIS — F33.1 MODERATE EPISODE OF RECURRENT MAJOR DEPRESSIVE DISORDER (HCC): ICD-10-CM

## 2021-01-06 DIAGNOSIS — J43.9 PULMONARY EMPHYSEMA, UNSPECIFIED EMPHYSEMA TYPE (HCC): ICD-10-CM

## 2021-01-06 DIAGNOSIS — E11.9 TYPE 2 DIABETES MELLITUS WITHOUT COMPLICATION, WITH LONG-TERM CURRENT USE OF INSULIN (HCC): ICD-10-CM

## 2021-01-06 DIAGNOSIS — I73.9 PERIPHERAL VASCULAR DISEASE (HCC): ICD-10-CM

## 2021-01-06 RX ORDER — OMEPRAZOLE 40 MG/1
40 CAPSULE, DELAYED RELEASE ORAL 2 TIMES DAILY
Qty: 180 CAPSULE | Refills: 1 | Status: SHIPPED | OUTPATIENT
Start: 2021-01-06 | End: 2021-01-07

## 2021-01-06 NOTE — TELEPHONE ENCOUNTER
PATIENT NEEDS CLARIFICATION ON HOW MUCH MEDICATION HE SHOULD BE TAKING. HE WAS ON omeprazole (priLOSEC) 40 MG capsule TWICE A DAY, BUT NOW THE PRESCRIPTION SAYS 20MG ONE TIME A DAY AND HE IS GETTING SICK TO HIS STOMACH. PLEASE ADVISE.     PATIENT CALL BACK: [phone]    He can increase to 40mg twice daily.  He sees GI----need follow up with Dr. Franco--he can set up

## 2021-01-07 RX ORDER — OMEPRAZOLE 20 MG/1
20 CAPSULE, DELAYED RELEASE ORAL 2 TIMES DAILY
Qty: 180 CAPSULE | Refills: 3 | Status: SHIPPED | OUTPATIENT
Start: 2021-01-07 | End: 2021-02-24 | Stop reason: HOSPADM

## 2021-01-11 ENCOUNTER — CLINICAL SUPPORT (OUTPATIENT)
Dept: CARDIOLOGY | Facility: CLINIC | Age: 71
End: 2021-01-11

## 2021-01-11 NOTE — PROGRESS NOTES
Procedure blood pressure check  Procedures   patient is taking all current medications and is feeling normal today. His last blood pressure was taken on 01/04/2021 and recorded as 168/84.  Patients blood pressure today was 142/72 in left arm and 142/70 in the right arm.

## 2021-01-13 ENCOUNTER — LAB (OUTPATIENT)
Dept: LAB | Facility: HOSPITAL | Age: 71
End: 2021-01-13

## 2021-01-13 DIAGNOSIS — E78.2 MIXED HYPERLIPIDEMIA: ICD-10-CM

## 2021-01-13 DIAGNOSIS — I10 ESSENTIAL HYPERTENSION: ICD-10-CM

## 2021-01-13 LAB
ALBUMIN SERPL-MCNC: 4.8 G/DL (ref 3.5–5.2)
ALP SERPL-CCNC: 106 U/L (ref 39–117)
ALT SERPL W P-5'-P-CCNC: 18 U/L (ref 1–41)
ANION GAP SERPL CALCULATED.3IONS-SCNC: 17.1 MMOL/L (ref 5–15)
AST SERPL-CCNC: 16 U/L (ref 1–40)
BILIRUB CONJ SERPL-MCNC: <0.2 MG/DL (ref 0–0.3)
BILIRUB INDIRECT SERPL-MCNC: NORMAL MG/DL
BILIRUB SERPL-MCNC: 0.6 MG/DL (ref 0–1.2)
BUN SERPL-MCNC: 22 MG/DL (ref 8–23)
BUN/CREAT SERPL: 18 (ref 7–25)
CALCIUM SPEC-SCNC: 9.8 MG/DL (ref 8.6–10.5)
CHLORIDE SERPL-SCNC: 101 MMOL/L (ref 98–107)
CHOLEST SERPL-MCNC: 139 MG/DL (ref 0–200)
CO2 SERPL-SCNC: 19.9 MMOL/L (ref 22–29)
CREAT SERPL-MCNC: 1.22 MG/DL (ref 0.76–1.27)
GFR SERPL CREATININE-BSD FRML MDRD: 59 ML/MIN/1.73
GLUCOSE SERPL-MCNC: 165 MG/DL (ref 65–99)
HDLC SERPL-MCNC: 37 MG/DL (ref 40–60)
LDLC SERPL CALC-MCNC: 74 MG/DL (ref 0–100)
LDLC/HDLC SERPL: 1.89 {RATIO}
POTASSIUM SERPL-SCNC: 5.1 MMOL/L (ref 3.5–5.2)
PROT SERPL-MCNC: 7.1 G/DL (ref 6–8.5)
SODIUM SERPL-SCNC: 138 MMOL/L (ref 136–145)
TRIGL SERPL-MCNC: 160 MG/DL (ref 0–150)
VLDLC SERPL-MCNC: 28 MG/DL (ref 5–40)

## 2021-01-13 PROCEDURE — 80076 HEPATIC FUNCTION PANEL: CPT

## 2021-01-13 PROCEDURE — 80048 BASIC METABOLIC PNL TOTAL CA: CPT

## 2021-01-13 PROCEDURE — 80061 LIPID PANEL: CPT

## 2021-01-13 PROCEDURE — 36415 COLL VENOUS BLD VENIPUNCTURE: CPT

## 2021-01-14 ENCOUNTER — TELEPHONE (OUTPATIENT)
Dept: CARDIOLOGY | Facility: CLINIC | Age: 71
End: 2021-01-14

## 2021-02-03 ENCOUNTER — TRANSCRIBE ORDERS (OUTPATIENT)
Dept: ADMINISTRATIVE | Facility: HOSPITAL | Age: 71
End: 2021-02-03

## 2021-02-03 DIAGNOSIS — Z12.2 ENCOUNTER FOR SCREENING FOR LUNG CANCER: Primary | ICD-10-CM

## 2021-02-10 ENCOUNTER — APPOINTMENT (OUTPATIENT)
Dept: CT IMAGING | Facility: HOSPITAL | Age: 71
End: 2021-02-10

## 2021-02-12 RX ORDER — AMLODIPINE BESYLATE 5 MG/1
TABLET ORAL
Qty: 90 TABLET | Refills: 1 | Status: SHIPPED | OUTPATIENT
Start: 2021-02-12

## 2021-02-20 ENCOUNTER — TELEPHONE (OUTPATIENT)
Dept: FAMILY MEDICINE CLINIC | Facility: CLINIC | Age: 71
End: 2021-02-20

## 2021-02-20 DIAGNOSIS — I73.9 PERIPHERAL VASCULAR DISEASE (HCC): ICD-10-CM

## 2021-02-20 DIAGNOSIS — J43.9 PULMONARY EMPHYSEMA, UNSPECIFIED EMPHYSEMA TYPE (HCC): ICD-10-CM

## 2021-02-20 DIAGNOSIS — K29.00 ACUTE GASTRITIS WITHOUT HEMORRHAGE, UNSPECIFIED GASTRITIS TYPE: Primary | ICD-10-CM

## 2021-02-20 DIAGNOSIS — F33.1 MODERATE EPISODE OF RECURRENT MAJOR DEPRESSIVE DISORDER (HCC): ICD-10-CM

## 2021-02-20 DIAGNOSIS — E11.9 TYPE 2 DIABETES MELLITUS WITHOUT COMPLICATION, WITH LONG-TERM CURRENT USE OF INSULIN (HCC): ICD-10-CM

## 2021-02-20 DIAGNOSIS — Z79.4 TYPE 2 DIABETES MELLITUS WITHOUT COMPLICATION, WITH LONG-TERM CURRENT USE OF INSULIN (HCC): ICD-10-CM

## 2021-02-20 RX ORDER — SUCRALFATE 1 G/1
1 TABLET ORAL 4 TIMES DAILY
Qty: 120 TABLET | Refills: 2 | Status: SHIPPED | OUTPATIENT
Start: 2021-02-20 | End: 2021-04-10 | Stop reason: HOSPADM

## 2021-02-20 NOTE — TELEPHONE ENCOUNTER
"Pt called me on Sat afternoon on my personal cell phone.  He has never had permission to do that.   I must have called him from my phone in the past.  I had blocked him on my cell.  He has been having abd pain for 1 mo.  Just now letting me know, :\"it is killing him\".  The pain.  I advised that he needs to see his GI doc, Dr Franco.  I will send Carafate for now.  Will need to stop ASA if on it.   "

## 2021-02-22 ENCOUNTER — HOSPITAL ENCOUNTER (OUTPATIENT)
Facility: HOSPITAL | Age: 71
Discharge: HOME | End: 2021-02-24
Attending: EMERGENCY MEDICINE | Admitting: INTERNAL MEDICINE

## 2021-02-22 ENCOUNTER — APPOINTMENT (OUTPATIENT)
Dept: CT IMAGING | Facility: HOSPITAL | Age: 71
End: 2021-02-22

## 2021-02-22 ENCOUNTER — PREP FOR SURGERY (OUTPATIENT)
Dept: OTHER | Facility: HOSPITAL | Age: 71
End: 2021-02-22

## 2021-02-22 DIAGNOSIS — E87.1 HYPONATREMIA: ICD-10-CM

## 2021-02-22 DIAGNOSIS — K52.9 ENTERITIS: ICD-10-CM

## 2021-02-22 DIAGNOSIS — Z79.4 TYPE 2 DIABETES MELLITUS WITHOUT COMPLICATION, WITH LONG-TERM CURRENT USE OF INSULIN (HCC): Primary | ICD-10-CM

## 2021-02-22 DIAGNOSIS — A04.72 C. DIFFICILE COLITIS: ICD-10-CM

## 2021-02-22 DIAGNOSIS — R10.84 GENERALIZED ABDOMINAL PAIN: Primary | ICD-10-CM

## 2021-02-22 DIAGNOSIS — R11.2 NAUSEA AND VOMITING, INTRACTABILITY OF VOMITING NOT SPECIFIED, UNSPECIFIED VOMITING TYPE: ICD-10-CM

## 2021-02-22 DIAGNOSIS — R11.2 NAUSEA AND VOMITING, INTRACTABILITY OF VOMITING NOT SPECIFIED, UNSPECIFIED VOMITING TYPE: Primary | ICD-10-CM

## 2021-02-22 DIAGNOSIS — E11.9 TYPE 2 DIABETES MELLITUS WITHOUT COMPLICATION, WITH LONG-TERM CURRENT USE OF INSULIN (HCC): Primary | ICD-10-CM

## 2021-02-22 DIAGNOSIS — E87.6 HYPOKALEMIA: ICD-10-CM

## 2021-02-22 LAB
ALBUMIN SERPL-MCNC: 4.4 G/DL (ref 3.5–5.2)
ALBUMIN/GLOB SERPL: 2 G/DL
ALP SERPL-CCNC: 96 U/L (ref 39–117)
ALT SERPL W P-5'-P-CCNC: 15 U/L (ref 1–41)
ANION GAP SERPL CALCULATED.3IONS-SCNC: 16.6 MMOL/L (ref 5–15)
AST SERPL-CCNC: 16 U/L (ref 1–40)
BACTERIA UR QL AUTO: NORMAL /HPF
BASOPHILS # BLD AUTO: 0.03 10*3/MM3 (ref 0–0.2)
BASOPHILS NFR BLD AUTO: 0.2 % (ref 0–1.5)
BILIRUB SERPL-MCNC: 0.5 MG/DL (ref 0–1.2)
BILIRUB UR QL STRIP: NEGATIVE
BUN SERPL-MCNC: 11 MG/DL (ref 8–23)
BUN/CREAT SERPL: 10.5 (ref 7–25)
CALCIUM SPEC-SCNC: 8 MG/DL (ref 8.6–10.5)
CHLORIDE SERPL-SCNC: 92 MMOL/L (ref 98–107)
CLARITY UR: CLEAR
CO2 SERPL-SCNC: 21.4 MMOL/L (ref 22–29)
COLOR UR: YELLOW
CREAT SERPL-MCNC: 1.05 MG/DL (ref 0.76–1.27)
D-LACTATE SERPL-SCNC: 1.6 MMOL/L (ref 0.5–2)
DEPRECATED RDW RBC AUTO: 41.7 FL (ref 37–54)
EOSINOPHIL # BLD AUTO: 0.09 10*3/MM3 (ref 0–0.4)
EOSINOPHIL NFR BLD AUTO: 0.7 % (ref 0.3–6.2)
ERYTHROCYTE [DISTWIDTH] IN BLOOD BY AUTOMATED COUNT: 12.9 % (ref 12.3–15.4)
GFR SERPL CREATININE-BSD FRML MDRD: 70 ML/MIN/1.73
GLOBULIN UR ELPH-MCNC: 2.2 GM/DL
GLUCOSE SERPL-MCNC: 228 MG/DL (ref 65–99)
GLUCOSE UR STRIP-MCNC: ABNORMAL MG/DL
HBA1C MFR BLD: 7.3 % (ref 4.8–5.6)
HCT VFR BLD AUTO: 38.6 % (ref 37.5–51)
HGB BLD-MCNC: 13.1 G/DL (ref 13–17.7)
HGB UR QL STRIP.AUTO: NEGATIVE
HYALINE CASTS UR QL AUTO: NORMAL /LPF
IMM GRANULOCYTES # BLD AUTO: 0.08 10*3/MM3 (ref 0–0.05)
IMM GRANULOCYTES NFR BLD AUTO: 0.6 % (ref 0–0.5)
KETONES UR QL STRIP: NEGATIVE
LEUKOCYTE ESTERASE UR QL STRIP.AUTO: NEGATIVE
LIPASE SERPL-CCNC: 45 U/L (ref 13–60)
LYMPHOCYTES # BLD AUTO: 1.86 10*3/MM3 (ref 0.7–3.1)
LYMPHOCYTES NFR BLD AUTO: 14.3 % (ref 19.6–45.3)
MCH RBC QN AUTO: 29.8 PG (ref 26.6–33)
MCHC RBC AUTO-ENTMCNC: 33.9 G/DL (ref 31.5–35.7)
MCV RBC AUTO: 87.7 FL (ref 79–97)
MONOCYTES # BLD AUTO: 0.96 10*3/MM3 (ref 0.1–0.9)
MONOCYTES NFR BLD AUTO: 7.4 % (ref 5–12)
NEUTROPHILS NFR BLD AUTO: 76.8 % (ref 42.7–76)
NEUTROPHILS NFR BLD AUTO: 9.97 10*3/MM3 (ref 1.7–7)
NITRITE UR QL STRIP: NEGATIVE
NRBC BLD AUTO-RTO: 0 /100 WBC (ref 0–0.2)
PH UR STRIP.AUTO: 6.5 [PH] (ref 5–8)
PLATELET # BLD AUTO: 205 10*3/MM3 (ref 140–450)
PMV BLD AUTO: 12.4 FL (ref 6–12)
POTASSIUM SERPL-SCNC: 2.9 MMOL/L (ref 3.5–5.2)
PROT SERPL-MCNC: 6.6 G/DL (ref 6–8.5)
PROT UR QL STRIP: ABNORMAL
RBC # BLD AUTO: 4.4 10*6/MM3 (ref 4.14–5.8)
RBC # UR: NORMAL /HPF
REF LAB TEST METHOD: NORMAL
SARS-COV-2 RNA RESP QL NAA+PROBE: NOT DETECTED
SODIUM SERPL-SCNC: 130 MMOL/L (ref 136–145)
SP GR UR STRIP: 1.01 (ref 1–1.03)
SQUAMOUS #/AREA URNS HPF: NORMAL /HPF
UROBILINOGEN UR QL STRIP: ABNORMAL
WBC # BLD AUTO: 12.99 10*3/MM3 (ref 3.4–10.8)
WBC UR QL AUTO: NORMAL /HPF

## 2021-02-22 PROCEDURE — G0378 HOSPITAL OBSERVATION PER HR: HCPCS

## 2021-02-22 PROCEDURE — U0003 INFECTIOUS AGENT DETECTION BY NUCLEIC ACID (DNA OR RNA); SEVERE ACUTE RESPIRATORY SYNDROME CORONAVIRUS 2 (SARS-COV-2) (CORONAVIRUS DISEASE [COVID-19]), AMPLIFIED PROBE TECHNIQUE, MAKING USE OF HIGH THROUGHPUT TECHNOLOGIES AS DESCRIBED BY CMS-2020-01-R: HCPCS | Performed by: PHYSICIAN ASSISTANT

## 2021-02-22 PROCEDURE — 96361 HYDRATE IV INFUSION ADD-ON: CPT

## 2021-02-22 PROCEDURE — 87040 BLOOD CULTURE FOR BACTERIA: CPT | Performed by: PHYSICIAN ASSISTANT

## 2021-02-22 PROCEDURE — 25010000002 ONDANSETRON PER 1 MG: Performed by: PHYSICIAN ASSISTANT

## 2021-02-22 PROCEDURE — 96375 TX/PRO/DX INJ NEW DRUG ADDON: CPT

## 2021-02-22 PROCEDURE — 25010000002 IOPAMIDOL 61 % SOLUTION: Performed by: EMERGENCY MEDICINE

## 2021-02-22 PROCEDURE — 99284 EMERGENCY DEPT VISIT MOD MDM: CPT

## 2021-02-22 PROCEDURE — 83605 ASSAY OF LACTIC ACID: CPT | Performed by: PHYSICIAN ASSISTANT

## 2021-02-22 PROCEDURE — 74177 CT ABD & PELVIS W/CONTRAST: CPT

## 2021-02-22 PROCEDURE — 81001 URINALYSIS AUTO W/SCOPE: CPT | Performed by: PHYSICIAN ASSISTANT

## 2021-02-22 PROCEDURE — 83036 HEMOGLOBIN GLYCOSYLATED A1C: CPT | Performed by: PHYSICIAN ASSISTANT

## 2021-02-22 PROCEDURE — 99214 OFFICE O/P EST MOD 30 MIN: CPT | Performed by: INTERNAL MEDICINE

## 2021-02-22 PROCEDURE — 85025 COMPLETE CBC W/AUTO DIFF WBC: CPT | Performed by: PHYSICIAN ASSISTANT

## 2021-02-22 PROCEDURE — 25010000002 ONDANSETRON PER 1 MG: Performed by: INTERNAL MEDICINE

## 2021-02-22 PROCEDURE — 96374 THER/PROPH/DIAG INJ IV PUSH: CPT

## 2021-02-22 PROCEDURE — 83690 ASSAY OF LIPASE: CPT | Performed by: PHYSICIAN ASSISTANT

## 2021-02-22 PROCEDURE — 80053 COMPREHEN METABOLIC PANEL: CPT | Performed by: PHYSICIAN ASSISTANT

## 2021-02-22 PROCEDURE — 25010000002 HYDROMORPHONE PER 4 MG: Performed by: INTERNAL MEDICINE

## 2021-02-22 PROCEDURE — 96376 TX/PRO/DX INJ SAME DRUG ADON: CPT

## 2021-02-22 PROCEDURE — 25010000002 HYDROMORPHONE PER 4 MG: Performed by: EMERGENCY MEDICINE

## 2021-02-22 PROCEDURE — 25810000003 SODIUM CHLORIDE 0.9 % WITH KCL 20 MEQ 20-0.9 MEQ/L-% SOLUTION: Performed by: INTERNAL MEDICINE

## 2021-02-22 PROCEDURE — 25010000002 MORPHINE PER 10 MG: Performed by: EMERGENCY MEDICINE

## 2021-02-22 PROCEDURE — U0005 INFEC AGEN DETEC AMPLI PROBE: HCPCS | Performed by: PHYSICIAN ASSISTANT

## 2021-02-22 PROCEDURE — 36415 COLL VENOUS BLD VENIPUNCTURE: CPT | Performed by: PHYSICIAN ASSISTANT

## 2021-02-22 RX ORDER — ACETAMINOPHEN 160 MG/5ML
650 SOLUTION ORAL EVERY 4 HOURS PRN
Status: DISCONTINUED | OUTPATIENT
Start: 2021-02-22 | End: 2021-02-24 | Stop reason: HOSPADM

## 2021-02-22 RX ORDER — HYDROMORPHONE HYDROCHLORIDE 1 MG/ML
0.5 INJECTION, SOLUTION INTRAMUSCULAR; INTRAVENOUS; SUBCUTANEOUS EVERY 4 HOURS PRN
Status: DISCONTINUED | OUTPATIENT
Start: 2021-02-22 | End: 2021-02-24 | Stop reason: HOSPADM

## 2021-02-22 RX ORDER — ATORVASTATIN CALCIUM 20 MG/1
80 TABLET, FILM COATED ORAL DAILY
Status: DISCONTINUED | OUTPATIENT
Start: 2021-02-22 | End: 2021-02-24 | Stop reason: HOSPADM

## 2021-02-22 RX ORDER — SODIUM CHLORIDE AND POTASSIUM CHLORIDE 150; 900 MG/100ML; MG/100ML
50 INJECTION, SOLUTION INTRAVENOUS CONTINUOUS
Status: DISPENSED | OUTPATIENT
Start: 2021-02-22 | End: 2021-02-23

## 2021-02-22 RX ORDER — QUETIAPINE FUMARATE 100 MG/1
100 TABLET, FILM COATED ORAL NIGHTLY
Status: DISCONTINUED | OUTPATIENT
Start: 2021-02-22 | End: 2021-02-24 | Stop reason: HOSPADM

## 2021-02-22 RX ORDER — ACETAMINOPHEN 650 MG/1
650 SUPPOSITORY RECTAL EVERY 4 HOURS PRN
Status: DISCONTINUED | OUTPATIENT
Start: 2021-02-22 | End: 2021-02-24 | Stop reason: HOSPADM

## 2021-02-22 RX ORDER — ONDANSETRON 2 MG/ML
4 INJECTION INTRAMUSCULAR; INTRAVENOUS ONCE
Status: COMPLETED | OUTPATIENT
Start: 2021-02-22 | End: 2021-02-22

## 2021-02-22 RX ORDER — ACETAMINOPHEN 325 MG/1
650 TABLET ORAL EVERY 4 HOURS PRN
Status: DISCONTINUED | OUTPATIENT
Start: 2021-02-22 | End: 2021-02-24 | Stop reason: HOSPADM

## 2021-02-22 RX ORDER — PRIMIDONE 50 MG/1
50 TABLET ORAL EVERY 12 HOURS SCHEDULED
Status: DISCONTINUED | OUTPATIENT
Start: 2021-02-22 | End: 2021-02-24 | Stop reason: HOSPADM

## 2021-02-22 RX ORDER — AMLODIPINE BESYLATE 5 MG/1
5 TABLET ORAL
Status: DISCONTINUED | OUTPATIENT
Start: 2021-02-22 | End: 2021-02-23

## 2021-02-22 RX ORDER — CHLORTHALIDONE 25 MG/1
25 TABLET ORAL DAILY
Status: DISCONTINUED | OUTPATIENT
Start: 2021-02-22 | End: 2021-02-24 | Stop reason: HOSPADM

## 2021-02-22 RX ORDER — LISINOPRIL 40 MG/1
40 TABLET ORAL DAILY
Status: DISCONTINUED | OUTPATIENT
Start: 2021-02-22 | End: 2021-02-23

## 2021-02-22 RX ORDER — TAMSULOSIN HYDROCHLORIDE 0.4 MG/1
0.4 CAPSULE ORAL DAILY
Status: DISCONTINUED | OUTPATIENT
Start: 2021-02-22 | End: 2021-02-24 | Stop reason: HOSPADM

## 2021-02-22 RX ORDER — POTASSIUM CHLORIDE 750 MG/1
40 TABLET, EXTENDED RELEASE ORAL ONCE
Status: COMPLETED | OUTPATIENT
Start: 2021-02-22 | End: 2021-02-22

## 2021-02-22 RX ORDER — HYDROMORPHONE HYDROCHLORIDE 1 MG/ML
0.5 INJECTION, SOLUTION INTRAMUSCULAR; INTRAVENOUS; SUBCUTANEOUS ONCE
Status: COMPLETED | OUTPATIENT
Start: 2021-02-22 | End: 2021-02-22

## 2021-02-22 RX ORDER — SUCRALFATE 1 G/1
1 TABLET ORAL 4 TIMES DAILY
Status: DISCONTINUED | OUTPATIENT
Start: 2021-02-22 | End: 2021-02-24 | Stop reason: HOSPADM

## 2021-02-22 RX ORDER — ONDANSETRON 2 MG/ML
4 INJECTION INTRAMUSCULAR; INTRAVENOUS EVERY 6 HOURS PRN
Status: DISCONTINUED | OUTPATIENT
Start: 2021-02-22 | End: 2021-02-24 | Stop reason: HOSPADM

## 2021-02-22 RX ORDER — MELATONIN
1000 DAILY
Status: DISCONTINUED | OUTPATIENT
Start: 2021-02-22 | End: 2021-02-24 | Stop reason: HOSPADM

## 2021-02-22 RX ORDER — CHOLECALCIFEROL (VITAMIN D3) 125 MCG
1000 CAPSULE ORAL DAILY
Status: DISCONTINUED | OUTPATIENT
Start: 2021-02-22 | End: 2021-02-24 | Stop reason: HOSPADM

## 2021-02-22 RX ORDER — MORPHINE SULFATE 2 MG/ML
4 INJECTION, SOLUTION INTRAMUSCULAR; INTRAVENOUS ONCE
Status: COMPLETED | OUTPATIENT
Start: 2021-02-22 | End: 2021-02-22

## 2021-02-22 RX ORDER — PANTOPRAZOLE SODIUM 40 MG/1
40 TABLET, DELAYED RELEASE ORAL EVERY MORNING
Status: DISCONTINUED | OUTPATIENT
Start: 2021-02-23 | End: 2021-02-22

## 2021-02-22 RX ORDER — CARVEDILOL 25 MG/1
25 TABLET ORAL 2 TIMES DAILY WITH MEALS
Status: DISCONTINUED | OUTPATIENT
Start: 2021-02-22 | End: 2021-02-24 | Stop reason: HOSPADM

## 2021-02-22 RX ADMIN — PRIMIDONE 50 MG: 50 TABLET ORAL at 23:37

## 2021-02-22 RX ADMIN — POTASSIUM CHLORIDE AND SODIUM CHLORIDE 100 ML/HR: 900; 150 INJECTION, SOLUTION INTRAVENOUS at 18:01

## 2021-02-22 RX ADMIN — POTASSIUM CHLORIDE 40 MEQ: 750 TABLET, EXTENDED RELEASE ORAL at 12:45

## 2021-02-22 RX ADMIN — IOPAMIDOL 85 ML: 612 INJECTION, SOLUTION INTRAVENOUS at 10:51

## 2021-02-22 RX ADMIN — QUETIAPINE FUMARATE 100 MG: 100 TABLET ORAL at 23:41

## 2021-02-22 RX ADMIN — HYDROMORPHONE HYDROCHLORIDE 0.5 MG: 1 INJECTION, SOLUTION INTRAMUSCULAR; INTRAVENOUS; SUBCUTANEOUS at 12:02

## 2021-02-22 RX ADMIN — HYDROMORPHONE HYDROCHLORIDE 0.5 MG: 1 INJECTION, SOLUTION INTRAMUSCULAR; INTRAVENOUS; SUBCUTANEOUS at 20:31

## 2021-02-22 RX ADMIN — ONDANSETRON 4 MG: 2 INJECTION INTRAMUSCULAR; INTRAVENOUS at 09:42

## 2021-02-22 RX ADMIN — VITAMIN E CAP 100 UNIT 100 UNITS: 100 CAP at 23:37

## 2021-02-22 RX ADMIN — LISINOPRIL 40 MG: 40 TABLET ORAL at 23:40

## 2021-02-22 RX ADMIN — Medication 1000 MCG: at 23:40

## 2021-02-22 RX ADMIN — Medication 1000 UNITS: at 23:41

## 2021-02-22 RX ADMIN — AMLODIPINE BESYLATE 5 MG: 5 TABLET ORAL at 23:41

## 2021-02-22 RX ADMIN — MORPHINE SULFATE 4 MG: 2 INJECTION, SOLUTION INTRAMUSCULAR; INTRAVENOUS at 09:43

## 2021-02-22 RX ADMIN — ONDANSETRON 4 MG: 2 INJECTION INTRAMUSCULAR; INTRAVENOUS at 20:31

## 2021-02-22 RX ADMIN — CHLORTHALIDONE 25 MG: 25 TABLET ORAL at 23:40

## 2021-02-22 RX ADMIN — MAGNESIUM OXIDE 400 MG (241.3 MG MAGNESIUM) TABLET 400 MG: TABLET at 23:40

## 2021-02-22 RX ADMIN — ATORVASTATIN CALCIUM 80 MG: 20 TABLET, FILM COATED ORAL at 20:31

## 2021-02-22 RX ADMIN — CARVEDILOL 25 MG: 25 TABLET, FILM COATED ORAL at 23:41

## 2021-02-22 RX ADMIN — HYDROMORPHONE HYDROCHLORIDE 0.5 MG: 1 INJECTION, SOLUTION INTRAMUSCULAR; INTRAVENOUS; SUBCUTANEOUS at 16:13

## 2021-02-22 RX ADMIN — SODIUM CHLORIDE 1000 ML: 9 INJECTION, SOLUTION INTRAVENOUS at 09:36

## 2021-02-22 RX ADMIN — TAMSULOSIN HYDROCHLORIDE 0.4 MG: 0.4 CAPSULE ORAL at 23:40

## 2021-02-22 RX ADMIN — SUCRALFATE 1 G: 1 TABLET ORAL at 23:40

## 2021-02-23 ENCOUNTER — ANESTHESIA (OUTPATIENT)
Dept: GASTROENTEROLOGY | Facility: HOSPITAL | Age: 71
End: 2021-02-23

## 2021-02-23 ENCOUNTER — ANESTHESIA EVENT (OUTPATIENT)
Dept: GASTROENTEROLOGY | Facility: HOSPITAL | Age: 71
End: 2021-02-23

## 2021-02-23 PROBLEM — K02.9 DENTAL CARIES: Status: ACTIVE | Noted: 2021-02-23

## 2021-02-23 PROBLEM — A04.72 C. DIFFICILE COLITIS: Status: ACTIVE | Noted: 2021-02-23

## 2021-02-23 PROBLEM — K29.00 ACUTE GASTRITIS WITHOUT HEMORRHAGE: Status: ACTIVE | Noted: 2021-02-23

## 2021-02-23 LAB
ADV 40+41 DNA STL QL NAA+NON-PROBE: NOT DETECTED
ANION GAP SERPL CALCULATED.3IONS-SCNC: 12.7 MMOL/L (ref 5–15)
ASTRO TYP 1-8 RNA STL QL NAA+NON-PROBE: NOT DETECTED
BASOPHILS # BLD AUTO: 0.03 10*3/MM3 (ref 0–0.2)
BASOPHILS NFR BLD AUTO: 0.4 % (ref 0–1.5)
BUN SERPL-MCNC: 9 MG/DL (ref 8–23)
BUN/CREAT SERPL: 10 (ref 7–25)
C CAYETANENSIS DNA STL QL NAA+NON-PROBE: NOT DETECTED
C DIFF TOX GENS STL QL NAA+PROBE: POSITIVE
CALCIUM SPEC-SCNC: 7.4 MG/DL (ref 8.6–10.5)
CAMPY SP DNA.DIARRHEA STL QL NAA+PROBE: NOT DETECTED
CHLORIDE SERPL-SCNC: 106 MMOL/L (ref 98–107)
CO2 SERPL-SCNC: 22.3 MMOL/L (ref 22–29)
CREAT SERPL-MCNC: 0.9 MG/DL (ref 0.76–1.27)
CRYPTOSP STL CULT: NOT DETECTED
DEPRECATED RDW RBC AUTO: 41.4 FL (ref 37–54)
E HISTOLYT AG STL-ACNC: NOT DETECTED
EAEC PAA PLAS AGGR+AATA ST NAA+NON-PRB: NOT DETECTED
EC STX1 + STX2 GENES STL NAA+PROBE: NOT DETECTED
EOSINOPHIL # BLD AUTO: 0.08 10*3/MM3 (ref 0–0.4)
EOSINOPHIL NFR BLD AUTO: 1 % (ref 0.3–6.2)
EPEC EAE GENE STL QL NAA+NON-PROBE: NOT DETECTED
ERYTHROCYTE [DISTWIDTH] IN BLOOD BY AUTOMATED COUNT: 13 % (ref 12.3–15.4)
ETEC LTA+ST1A+ST1B TOX ST NAA+NON-PROBE: NOT DETECTED
G LAMBLIA DNA SPEC QL NAA+PROBE: NOT DETECTED
GFR SERPL CREATININE-BSD FRML MDRD: 84 ML/MIN/1.73
GLUCOSE BLDC GLUCOMTR-MCNC: 117 MG/DL (ref 70–130)
GLUCOSE BLDC GLUCOMTR-MCNC: 157 MG/DL (ref 70–130)
GLUCOSE BLDC GLUCOMTR-MCNC: 188 MG/DL (ref 70–130)
GLUCOSE BLDC GLUCOMTR-MCNC: 267 MG/DL (ref 70–130)
GLUCOSE SERPL-MCNC: 160 MG/DL (ref 65–99)
HCT VFR BLD AUTO: 31.2 % (ref 37.5–51)
HGB BLD-MCNC: 10.6 G/DL (ref 13–17.7)
IMM GRANULOCYTES # BLD AUTO: 0.04 10*3/MM3 (ref 0–0.05)
IMM GRANULOCYTES NFR BLD AUTO: 0.5 % (ref 0–0.5)
LYMPHOCYTES # BLD AUTO: 2.26 10*3/MM3 (ref 0.7–3.1)
LYMPHOCYTES NFR BLD AUTO: 27.8 % (ref 19.6–45.3)
MCH RBC QN AUTO: 29.9 PG (ref 26.6–33)
MCHC RBC AUTO-ENTMCNC: 34 G/DL (ref 31.5–35.7)
MCV RBC AUTO: 87.9 FL (ref 79–97)
MONOCYTES # BLD AUTO: 0.9 10*3/MM3 (ref 0.1–0.9)
MONOCYTES NFR BLD AUTO: 11.1 % (ref 5–12)
NEUTROPHILS NFR BLD AUTO: 4.81 10*3/MM3 (ref 1.7–7)
NEUTROPHILS NFR BLD AUTO: 59.2 % (ref 42.7–76)
NOROVIRUS GI+II RNA STL QL NAA+NON-PROBE: NOT DETECTED
NRBC BLD AUTO-RTO: 0 /100 WBC (ref 0–0.2)
P SHIGELLOIDES DNA STL QL NAA+PROBE: NOT DETECTED
PLATELET # BLD AUTO: 146 10*3/MM3 (ref 140–450)
PMV BLD AUTO: 12.9 FL (ref 6–12)
POTASSIUM SERPL-SCNC: 4.1 MMOL/L (ref 3.5–5.2)
RBC # BLD AUTO: 3.55 10*6/MM3 (ref 4.14–5.8)
RV RNA STL NAA+PROBE: NOT DETECTED
SALMONELLA DNA SPEC QL NAA+PROBE: NOT DETECTED
SAPO I+II+IV+V RNA STL QL NAA+NON-PROBE: NOT DETECTED
SHIGELLA SP+EIEC IPAH STL QL NAA+PROBE: NOT DETECTED
SODIUM SERPL-SCNC: 141 MMOL/L (ref 136–145)
V CHOLERAE DNA SPEC QL NAA+PROBE: NOT DETECTED
VIBRIO DNA SPEC NAA+PROBE: NOT DETECTED
WBC # BLD AUTO: 8.12 10*3/MM3 (ref 3.4–10.8)
YERSINIA STL CULT: NOT DETECTED

## 2021-02-23 PROCEDURE — A9270 NON-COVERED ITEM OR SERVICE: HCPCS | Performed by: INTERNAL MEDICINE

## 2021-02-23 PROCEDURE — 87177 OVA AND PARASITES SMEARS: CPT | Performed by: PHYSICIAN ASSISTANT

## 2021-02-23 PROCEDURE — 63710000001 FAMOTIDINE 20 MG TABLET: Performed by: INTERNAL MEDICINE

## 2021-02-23 PROCEDURE — 25010000002 HYDROMORPHONE PER 4 MG: Performed by: INTERNAL MEDICINE

## 2021-02-23 PROCEDURE — 63710000001 VANCOMYCIN 50 MG/ML RECONSTITUTED SOLUTION: Performed by: INTERNAL MEDICINE

## 2021-02-23 PROCEDURE — 87209 SMEAR COMPLEX STAIN: CPT | Performed by: PHYSICIAN ASSISTANT

## 2021-02-23 PROCEDURE — 63710000001 SUCRALFATE 1 G TABLET: Performed by: INTERNAL MEDICINE

## 2021-02-23 PROCEDURE — 85025 COMPLETE CBC W/AUTO DIFF WBC: CPT | Performed by: INTERNAL MEDICINE

## 2021-02-23 PROCEDURE — 25010000002 ONDANSETRON PER 1 MG: Performed by: INTERNAL MEDICINE

## 2021-02-23 PROCEDURE — 63710000001 CARVEDILOL 25 MG TABLET: Performed by: INTERNAL MEDICINE

## 2021-02-23 PROCEDURE — 43239 EGD BIOPSY SINGLE/MULTIPLE: CPT | Performed by: INTERNAL MEDICINE

## 2021-02-23 PROCEDURE — 63710000001 INSULIN LISPRO (HUMAN) PER 5 UNITS: Performed by: INTERNAL MEDICINE

## 2021-02-23 PROCEDURE — 96376 TX/PRO/DX INJ SAME DRUG ADON: CPT

## 2021-02-23 PROCEDURE — 82962 GLUCOSE BLOOD TEST: CPT

## 2021-02-23 PROCEDURE — 63710000001 AMOXICILLIN 875 MG TABLET: Performed by: INTERNAL MEDICINE

## 2021-02-23 PROCEDURE — 25810000003 SODIUM CHLORIDE 0.9 % WITH KCL 20 MEQ 20-0.9 MEQ/L-% SOLUTION: Performed by: INTERNAL MEDICINE

## 2021-02-23 PROCEDURE — 0097U HC BIOFIRE FILMARRAY GI PANEL: CPT | Performed by: PHYSICIAN ASSISTANT

## 2021-02-23 PROCEDURE — G0378 HOSPITAL OBSERVATION PER HR: HCPCS

## 2021-02-23 PROCEDURE — 88305 TISSUE EXAM BY PATHOLOGIST: CPT | Performed by: INTERNAL MEDICINE

## 2021-02-23 PROCEDURE — 25010000002 ENOXAPARIN PER 10 MG: Performed by: INTERNAL MEDICINE

## 2021-02-23 PROCEDURE — 87493 C DIFF AMPLIFIED PROBE: CPT | Performed by: PHYSICIAN ASSISTANT

## 2021-02-23 PROCEDURE — 63710000001 PRIMIDONE 50 MG TABLET: Performed by: INTERNAL MEDICINE

## 2021-02-23 PROCEDURE — 63710000001 QUETIAPINE 100 MG TABLET: Performed by: INTERNAL MEDICINE

## 2021-02-23 PROCEDURE — 96361 HYDRATE IV INFUSION ADD-ON: CPT

## 2021-02-23 PROCEDURE — 63710000001 INSULIN GLARGINE PER 5 UNITS: Performed by: INTERNAL MEDICINE

## 2021-02-23 PROCEDURE — 80048 BASIC METABOLIC PNL TOTAL CA: CPT | Performed by: INTERNAL MEDICINE

## 2021-02-23 PROCEDURE — 63710000001 HYDROCODONE-ACETAMINOPHEN 7.5-325 MG TABLET: Performed by: INTERNAL MEDICINE

## 2021-02-23 PROCEDURE — 25010000002 PROPOFOL 10 MG/ML EMULSION: Performed by: ANESTHESIOLOGY

## 2021-02-23 PROCEDURE — 87427 SHIGA-LIKE TOXIN AG IA: CPT | Performed by: PHYSICIAN ASSISTANT

## 2021-02-23 RX ORDER — FAMOTIDINE 20 MG/1
20 TABLET, FILM COATED ORAL
Status: DISCONTINUED | OUTPATIENT
Start: 2021-02-23 | End: 2021-02-24 | Stop reason: HOSPADM

## 2021-02-23 RX ORDER — NICOTINE POLACRILEX 4 MG
15 LOZENGE BUCCAL
Status: DISCONTINUED | OUTPATIENT
Start: 2021-02-23 | End: 2021-02-24 | Stop reason: HOSPADM

## 2021-02-23 RX ORDER — INSULIN GLARGINE 100 [IU]/ML
4 INJECTION, SOLUTION SUBCUTANEOUS EVERY MORNING
Status: DISCONTINUED | OUTPATIENT
Start: 2021-02-23 | End: 2021-02-24 | Stop reason: HOSPADM

## 2021-02-23 RX ORDER — PROPOFOL 10 MG/ML
VIAL (ML) INTRAVENOUS AS NEEDED
Status: DISCONTINUED | OUTPATIENT
Start: 2021-02-23 | End: 2021-02-23 | Stop reason: SURG

## 2021-02-23 RX ORDER — PROPOFOL 10 MG/ML
VIAL (ML) INTRAVENOUS CONTINUOUS PRN
Status: DISCONTINUED | OUTPATIENT
Start: 2021-02-23 | End: 2021-02-23 | Stop reason: SURG

## 2021-02-23 RX ORDER — SODIUM CHLORIDE 9 MG/ML
30 INJECTION, SOLUTION INTRAVENOUS CONTINUOUS PRN
Status: DISCONTINUED | OUTPATIENT
Start: 2021-02-23 | End: 2021-02-24 | Stop reason: HOSPADM

## 2021-02-23 RX ORDER — HYDROCODONE BITARTRATE AND ACETAMINOPHEN 7.5; 325 MG/1; MG/1
1 TABLET ORAL EVERY 4 HOURS PRN
Status: DISCONTINUED | OUTPATIENT
Start: 2021-02-23 | End: 2021-02-24 | Stop reason: HOSPADM

## 2021-02-23 RX ORDER — NICOTINE 21 MG/24HR
1 PATCH, TRANSDERMAL 24 HOURS TRANSDERMAL
Status: DISCONTINUED | OUTPATIENT
Start: 2021-02-23 | End: 2021-02-24 | Stop reason: HOSPADM

## 2021-02-23 RX ORDER — LISINOPRIL 20 MG/1
20 TABLET ORAL DAILY
Status: DISCONTINUED | OUTPATIENT
Start: 2021-02-23 | End: 2021-02-24 | Stop reason: HOSPADM

## 2021-02-23 RX ORDER — DEXTROSE MONOHYDRATE 25 G/50ML
25 INJECTION, SOLUTION INTRAVENOUS
Status: DISCONTINUED | OUTPATIENT
Start: 2021-02-23 | End: 2021-02-24 | Stop reason: HOSPADM

## 2021-02-23 RX ORDER — INSULIN LISPRO 100 [IU]/ML
2 INJECTION, SOLUTION INTRAVENOUS; SUBCUTANEOUS
Status: DISCONTINUED | OUTPATIENT
Start: 2021-02-23 | End: 2021-02-24 | Stop reason: HOSPADM

## 2021-02-23 RX ORDER — LIDOCAINE HYDROCHLORIDE 20 MG/ML
INJECTION, SOLUTION INFILTRATION; PERINEURAL AS NEEDED
Status: DISCONTINUED | OUTPATIENT
Start: 2021-02-23 | End: 2021-02-23 | Stop reason: SURG

## 2021-02-23 RX ORDER — AMOXICILLIN 875 MG/1
875 TABLET, COATED ORAL EVERY 12 HOURS SCHEDULED
Status: DISCONTINUED | OUTPATIENT
Start: 2021-02-23 | End: 2021-02-24 | Stop reason: HOSPADM

## 2021-02-23 RX ORDER — INSULIN LISPRO 100 [IU]/ML
0-7 INJECTION, SOLUTION INTRAVENOUS; SUBCUTANEOUS
Status: DISCONTINUED | OUTPATIENT
Start: 2021-02-23 | End: 2021-02-24 | Stop reason: HOSPADM

## 2021-02-23 RX ADMIN — SUCRALFATE 1 G: 1 TABLET ORAL at 17:27

## 2021-02-23 RX ADMIN — SUCRALFATE 1 G: 1 TABLET ORAL at 09:17

## 2021-02-23 RX ADMIN — HYDROCODONE BITARTRATE AND ACETAMINOPHEN 1 TABLET: 7.5; 325 TABLET ORAL at 20:18

## 2021-02-23 RX ADMIN — VITAMIN E CAP 100 UNIT 100 UNITS: 100 CAP at 09:16

## 2021-02-23 RX ADMIN — MAGNESIUM OXIDE 400 MG (241.3 MG MAGNESIUM) TABLET 400 MG: TABLET at 09:17

## 2021-02-23 RX ADMIN — ONDANSETRON 4 MG: 2 INJECTION INTRAMUSCULAR; INTRAVENOUS at 04:59

## 2021-02-23 RX ADMIN — SUCRALFATE 1 G: 1 TABLET ORAL at 21:44

## 2021-02-23 RX ADMIN — ENOXAPARIN SODIUM 40 MG: 40 INJECTION SUBCUTANEOUS at 14:11

## 2021-02-23 RX ADMIN — INSULIN LISPRO 2 UNITS: 100 INJECTION, SOLUTION INTRAVENOUS; SUBCUTANEOUS at 09:16

## 2021-02-23 RX ADMIN — PROPOFOL 60 MG: 10 INJECTION, EMULSION INTRAVENOUS at 12:29

## 2021-02-23 RX ADMIN — VANCOMYCIN 125 MG: KIT at 20:18

## 2021-02-23 RX ADMIN — FAMOTIDINE 20 MG: 20 TABLET, FILM COATED ORAL at 17:27

## 2021-02-23 RX ADMIN — PRIMIDONE 50 MG: 50 TABLET ORAL at 20:18

## 2021-02-23 RX ADMIN — FAMOTIDINE 20 MG: 20 TABLET, FILM COATED ORAL at 09:17

## 2021-02-23 RX ADMIN — Medication 1 PATCH: at 09:17

## 2021-02-23 RX ADMIN — VANCOMYCIN 125 MG: KIT at 14:11

## 2021-02-23 RX ADMIN — INSULIN LISPRO 4 UNITS: 100 INJECTION, SOLUTION INTRAVENOUS; SUBCUTANEOUS at 17:27

## 2021-02-23 RX ADMIN — HYDROMORPHONE HYDROCHLORIDE 0.5 MG: 1 INJECTION, SOLUTION INTRAMUSCULAR; INTRAVENOUS; SUBCUTANEOUS at 01:12

## 2021-02-23 RX ADMIN — LISINOPRIL 20 MG: 20 TABLET ORAL at 09:17

## 2021-02-23 RX ADMIN — INSULIN GLARGINE 4 UNITS: 100 INJECTION, SOLUTION SUBCUTANEOUS at 09:16

## 2021-02-23 RX ADMIN — INSULIN LISPRO 2 UNITS: 100 INJECTION, SOLUTION INTRAVENOUS; SUBCUTANEOUS at 17:27

## 2021-02-23 RX ADMIN — PRIMIDONE 50 MG: 50 TABLET ORAL at 09:16

## 2021-02-23 RX ADMIN — Medication 1000 UNITS: at 09:17

## 2021-02-23 RX ADMIN — Medication 1000 MCG: at 09:16

## 2021-02-23 RX ADMIN — CARVEDILOL 25 MG: 25 TABLET, FILM COATED ORAL at 17:27

## 2021-02-23 RX ADMIN — HYDROMORPHONE HYDROCHLORIDE 0.5 MG: 1 INJECTION, SOLUTION INTRAMUSCULAR; INTRAVENOUS; SUBCUTANEOUS at 04:56

## 2021-02-23 RX ADMIN — ATORVASTATIN CALCIUM 80 MG: 20 TABLET, FILM COATED ORAL at 09:22

## 2021-02-23 RX ADMIN — HYDROMORPHONE HYDROCHLORIDE 0.5 MG: 1 INJECTION, SOLUTION INTRAMUSCULAR; INTRAVENOUS; SUBCUTANEOUS at 17:31

## 2021-02-23 RX ADMIN — AMOXICILLIN 875 MG: 875 TABLET, FILM COATED ORAL at 20:18

## 2021-02-23 RX ADMIN — TAMSULOSIN HYDROCHLORIDE 0.4 MG: 0.4 CAPSULE ORAL at 09:16

## 2021-02-23 RX ADMIN — CHLORTHALIDONE 25 MG: 25 TABLET ORAL at 09:17

## 2021-02-23 RX ADMIN — PROPOFOL 100 MCG/KG/MIN: 10 INJECTION, EMULSION INTRAVENOUS at 12:29

## 2021-02-23 RX ADMIN — VANCOMYCIN 125 MG: KIT at 09:15

## 2021-02-23 RX ADMIN — INSULIN LISPRO 2 UNITS: 100 INJECTION, SOLUTION INTRAVENOUS; SUBCUTANEOUS at 09:22

## 2021-02-23 RX ADMIN — POTASSIUM CHLORIDE AND SODIUM CHLORIDE 100 ML/HR: 900; 150 INJECTION, SOLUTION INTRAVENOUS at 04:21

## 2021-02-23 RX ADMIN — QUETIAPINE FUMARATE 100 MG: 100 TABLET ORAL at 20:18

## 2021-02-23 RX ADMIN — LIDOCAINE HYDROCHLORIDE 60 MG: 20 INJECTION, SOLUTION INFILTRATION; PERINEURAL at 12:29

## 2021-02-23 RX ADMIN — CARVEDILOL 25 MG: 25 TABLET, FILM COATED ORAL at 09:17

## 2021-02-23 RX ADMIN — AMOXICILLIN 875 MG: 875 TABLET, FILM COATED ORAL at 14:12

## 2021-02-23 NOTE — ANESTHESIA POSTPROCEDURE EVALUATION
Patient: Felicita Mike    Procedure Summary     Date: 02/23/21 Room / Location:  [facility] /  [facility]    Anesthesia Start: 1223 Anesthesia Stop: 1246    Procedure: ESOPHAGOGASTRODUODENOSCOPY (N/A Esophagus) Diagnosis:       Nausea and vomiting, intractability of vomiting not specified, unspecified vomiting type      (Nausea and vomiting, intractability of vomiting not specified, unspecified vomiting type [R11.2])    Surgeon: Collins Nolasco MD Provider: Taylor Sue MD    Anesthesia Type: MAC ASA Status: 4          Anesthesia Type: MAC    Vitals  No vitals data found for the desired time range.          Post Anesthesia Care and Evaluation    Patient location during evaluation: bedside  Patient participation: complete - patient participated  Level of consciousness: awake  Pain management: adequate  Airway patency: patent  Anesthetic complications: No anesthetic complications    Cardiovascular status: acceptable  Respiratory status: acceptable  Hydration status: acceptable

## 2021-02-23 NOTE — ANESTHESIA PREPROCEDURE EVALUATION
Anesthesia Evaluation     Patient summary reviewed and Nursing notes reviewed   no history of anesthetic complications:  NPO Solid Status: > 8 hours  NPO Liquid Status: > 8 hours           Airway   Mallampati: II  TM distance: >3 FB  Neck ROM: full  No difficulty expected  Dental    (+) poor dentition        Pulmonary - normal exam   (+) a smoker Current, COPD moderate, home oxygen, shortness of breath, sleep apnea,   (-) asthma  Cardiovascular - normal exam  Exercise tolerance: good (4-7 METS)    ECG reviewed  Patient on routine beta blocker and Beta blocker given within 24 hours of surgery    (+) hypertension 2 medications or greater, past MI  >12 months, CAD, CHF Diastolic >=55%, PVD, hyperlipidemia,   (-) pacemaker, valvular problems/murmurs, dysrhythmias, angina, DORANTES, cardiac stents, DVT    ROS comment: · Estimated EF = 66%.  · Left ventricular systolic function is normal.  · Left ventricular wall thickness is consistent with borderline concentric hypertrophy.       Neuro/Psych  (+) tremors, psychiatric history,     (-) seizures, TIA, CVA, weakness, numbness, dementia  GI/Hepatic/Renal/Endo    (+)  GERD,  renal disease, diabetes mellitus, thyroid problem thyroid nodules  (-)  obesity, morbid obesity, liver disease    Musculoskeletal     (-) back pain, neck pain, neck stiffness, chronic pain  Abdominal  - normal exam   Substance History   (-) alcohol use, drug use     OB/GYN    (-)  Pregnant        Other   arthritis,      (-) blood dyscrasia, history of cancer, autoimmune disease, chronic steroid use                Anesthesia Plan    ASA 4     MAC     intravenous induction     Anesthetic plan, all risks, benefits, and alternatives have been provided, discussed and informed consent has been obtained with: patient.

## 2021-02-24 ENCOUNTER — READMISSION MANAGEMENT (OUTPATIENT)
Dept: CALL CENTER | Facility: HOSPITAL | Age: 71
End: 2021-02-24

## 2021-02-24 VITALS
BODY MASS INDEX: 25.76 KG/M2 | TEMPERATURE: 96.3 F | HEIGHT: 68 IN | RESPIRATION RATE: 18 BRPM | HEART RATE: 82 BPM | SYSTOLIC BLOOD PRESSURE: 148 MMHG | WEIGHT: 170 LBS | DIASTOLIC BLOOD PRESSURE: 78 MMHG | OXYGEN SATURATION: 93 %

## 2021-02-24 LAB
ANION GAP SERPL CALCULATED.3IONS-SCNC: 9.9 MMOL/L (ref 5–15)
BUN SERPL-MCNC: 10 MG/DL (ref 8–23)
BUN/CREAT SERPL: 9.3 (ref 7–25)
CALCIUM SPEC-SCNC: 7.7 MG/DL (ref 8.6–10.5)
CHLORIDE SERPL-SCNC: 107 MMOL/L (ref 98–107)
CO2 SERPL-SCNC: 26.1 MMOL/L (ref 22–29)
CREAT SERPL-MCNC: 1.07 MG/DL (ref 0.76–1.27)
DEPRECATED RDW RBC AUTO: 41.2 FL (ref 37–54)
ERYTHROCYTE [DISTWIDTH] IN BLOOD BY AUTOMATED COUNT: 13 % (ref 12.3–15.4)
GFR SERPL CREATININE-BSD FRML MDRD: 69 ML/MIN/1.73
GLUCOSE BLDC GLUCOMTR-MCNC: 116 MG/DL (ref 70–130)
GLUCOSE BLDC GLUCOMTR-MCNC: 247 MG/DL (ref 70–130)
GLUCOSE SERPL-MCNC: 118 MG/DL (ref 65–99)
HCT VFR BLD AUTO: 32.7 % (ref 37.5–51)
HGB BLD-MCNC: 11.1 G/DL (ref 13–17.7)
LAB AP CASE REPORT: NORMAL
LAB AP DIAGNOSIS COMMENT: NORMAL
MCH RBC QN AUTO: 29.8 PG (ref 26.6–33)
MCHC RBC AUTO-ENTMCNC: 33.9 G/DL (ref 31.5–35.7)
MCV RBC AUTO: 87.7 FL (ref 79–97)
PATH REPORT.FINAL DX SPEC: NORMAL
PATH REPORT.GROSS SPEC: NORMAL
PLATELET # BLD AUTO: 146 10*3/MM3 (ref 140–450)
PMV BLD AUTO: 12.4 FL (ref 6–12)
POTASSIUM SERPL-SCNC: 3.9 MMOL/L (ref 3.5–5.2)
RBC # BLD AUTO: 3.73 10*6/MM3 (ref 4.14–5.8)
SODIUM SERPL-SCNC: 143 MMOL/L (ref 136–145)
WBC # BLD AUTO: 7.46 10*3/MM3 (ref 3.4–10.8)

## 2021-02-24 PROCEDURE — A9270 NON-COVERED ITEM OR SERVICE: HCPCS | Performed by: INTERNAL MEDICINE

## 2021-02-24 PROCEDURE — 63710000001 VANCOMYCIN 50 MG/ML RECONSTITUTED SOLUTION: Performed by: INTERNAL MEDICINE

## 2021-02-24 PROCEDURE — 63710000001 INSULIN LISPRO (HUMAN) PER 5 UNITS: Performed by: INTERNAL MEDICINE

## 2021-02-24 PROCEDURE — 80048 BASIC METABOLIC PNL TOTAL CA: CPT | Performed by: INTERNAL MEDICINE

## 2021-02-24 PROCEDURE — 63710000001 PRIMIDONE 50 MG TABLET: Performed by: INTERNAL MEDICINE

## 2021-02-24 PROCEDURE — 63710000001 AMOXICILLIN 875 MG TABLET: Performed by: INTERNAL MEDICINE

## 2021-02-24 PROCEDURE — G0378 HOSPITAL OBSERVATION PER HR: HCPCS

## 2021-02-24 PROCEDURE — 82962 GLUCOSE BLOOD TEST: CPT

## 2021-02-24 PROCEDURE — 63710000001 CARVEDILOL 25 MG TABLET: Performed by: INTERNAL MEDICINE

## 2021-02-24 PROCEDURE — 63710000001 ATORVASTATIN 20 MG TABLET: Performed by: INTERNAL MEDICINE

## 2021-02-24 PROCEDURE — 63710000001 TAMSULOSIN 0.4 MG CAPSULE: Performed by: INTERNAL MEDICINE

## 2021-02-24 PROCEDURE — 63710000001 FAMOTIDINE 20 MG TABLET: Performed by: INTERNAL MEDICINE

## 2021-02-24 PROCEDURE — 63710000001 INSULIN GLARGINE PER 5 UNITS: Performed by: INTERNAL MEDICINE

## 2021-02-24 PROCEDURE — 63710000001 CHOLECALCIFEROL 25 MCG (1000 UT) TABLET: Performed by: INTERNAL MEDICINE

## 2021-02-24 PROCEDURE — 85027 COMPLETE CBC AUTOMATED: CPT | Performed by: INTERNAL MEDICINE

## 2021-02-24 PROCEDURE — 63710000001 CHLORTHALIDONE 25 MG TABLET: Performed by: INTERNAL MEDICINE

## 2021-02-24 PROCEDURE — 63710000001 VITAMIN B-12 500 MCG TABLET: Performed by: INTERNAL MEDICINE

## 2021-02-24 PROCEDURE — 63710000001 NICOTINE 14 MG/24HR PATCH 24 HOUR: Performed by: INTERNAL MEDICINE

## 2021-02-24 PROCEDURE — 63710000001 VITAMIN E 100 UNIT CAPSULE: Performed by: INTERNAL MEDICINE

## 2021-02-24 PROCEDURE — 63710000001 MAGNESIUM OXIDE 400 MG TABLET: Performed by: INTERNAL MEDICINE

## 2021-02-24 PROCEDURE — 63710000001 SUCRALFATE 1 G TABLET: Performed by: INTERNAL MEDICINE

## 2021-02-24 PROCEDURE — 63710000001 LISINOPRIL 20 MG TABLET: Performed by: INTERNAL MEDICINE

## 2021-02-24 PROCEDURE — 99214 OFFICE O/P EST MOD 30 MIN: CPT | Performed by: INTERNAL MEDICINE

## 2021-02-24 PROCEDURE — 25010000002 ONDANSETRON PER 1 MG: Performed by: INTERNAL MEDICINE

## 2021-02-24 RX ORDER — ONDANSETRON 4 MG/1
4 TABLET, FILM COATED ORAL EVERY 8 HOURS PRN
Qty: 12 TABLET | Refills: 0 | Status: SHIPPED | OUTPATIENT
Start: 2021-02-24 | End: 2021-03-04 | Stop reason: SDUPTHER

## 2021-02-24 RX ORDER — VANCOMYCIN HYDROCHLORIDE 125 MG/1
125 CAPSULE ORAL 4 TIMES DAILY
Qty: 40 CAPSULE | Refills: 0 | Status: SHIPPED | OUTPATIENT
Start: 2021-02-24 | End: 2021-03-06

## 2021-02-24 RX ORDER — ACETAMINOPHEN 325 MG/1
650 TABLET ORAL EVERY 6 HOURS PRN
Start: 2021-02-24

## 2021-02-24 RX ORDER — FAMOTIDINE 20 MG/1
20 TABLET, FILM COATED ORAL
Qty: 14 TABLET | Refills: 0 | Status: SHIPPED | OUTPATIENT
Start: 2021-02-24 | End: 2021-02-26

## 2021-02-24 RX ADMIN — SUCRALFATE 1 G: 1 TABLET ORAL at 07:58

## 2021-02-24 RX ADMIN — TAMSULOSIN HYDROCHLORIDE 0.4 MG: 0.4 CAPSULE ORAL at 08:00

## 2021-02-24 RX ADMIN — ONDANSETRON 4 MG: 2 INJECTION INTRAMUSCULAR; INTRAVENOUS at 07:55

## 2021-02-24 RX ADMIN — VANCOMYCIN 125 MG: KIT at 10:18

## 2021-02-24 RX ADMIN — Medication 1000 UNITS: at 08:00

## 2021-02-24 RX ADMIN — INSULIN GLARGINE 4 UNITS: 100 INJECTION, SOLUTION SUBCUTANEOUS at 06:02

## 2021-02-24 RX ADMIN — LISINOPRIL 20 MG: 20 TABLET ORAL at 08:00

## 2021-02-24 RX ADMIN — VANCOMYCIN 125 MG: KIT at 06:01

## 2021-02-24 RX ADMIN — VITAMIN E CAP 100 UNIT 100 UNITS: 100 CAP at 08:00

## 2021-02-24 RX ADMIN — MAGNESIUM OXIDE 400 MG (241.3 MG MAGNESIUM) TABLET 400 MG: TABLET at 08:00

## 2021-02-24 RX ADMIN — Medication 1000 MCG: at 08:00

## 2021-02-24 RX ADMIN — Medication 1 PATCH: at 08:02

## 2021-02-24 RX ADMIN — ATORVASTATIN CALCIUM 80 MG: 20 TABLET, FILM COATED ORAL at 08:00

## 2021-02-24 RX ADMIN — CHLORTHALIDONE 25 MG: 25 TABLET ORAL at 08:00

## 2021-02-24 RX ADMIN — PRIMIDONE 50 MG: 50 TABLET ORAL at 08:00

## 2021-02-24 RX ADMIN — AMOXICILLIN 875 MG: 875 TABLET, FILM COATED ORAL at 08:00

## 2021-02-24 RX ADMIN — CARVEDILOL 25 MG: 25 TABLET, FILM COATED ORAL at 07:58

## 2021-02-24 RX ADMIN — FAMOTIDINE 20 MG: 20 TABLET, FILM COATED ORAL at 07:58

## 2021-02-24 NOTE — OUTREACH NOTE
Prep Survey      Responses   [facility] [place] patient discharged from?  [place]   Is LACE score < 7 ?  No   Emergency Room discharge w/ pulse ox?  No   Eligibility  [facility]   Date of Admission  02/22/21   Date of Discharge  02/24/21   Discharge Disposition  Home or Self Care   Discharge diagnosis  C. difficile colitis    Does the patient have one of the following disease processes/diagnoses(primary or secondary)?  Other   Does the patient have Home health ordered?  Yes   What is the Home health agency?   [facility]   Is there a DME ordered?  No   Prep survey completed?  Yes          Margot Reynolds RN

## 2021-02-25 ENCOUNTER — TRANSITIONAL CARE MANAGEMENT TELEPHONE ENCOUNTER (OUTPATIENT)
Dept: CALL CENTER | Facility: HOSPITAL | Age: 71
End: 2021-02-25

## 2021-02-25 ENCOUNTER — TELEPHONE (OUTPATIENT)
Dept: FAMILY MEDICINE CLINIC | Facility: CLINIC | Age: 71
End: 2021-02-25

## 2021-02-25 LAB — E COLI SXT STL QL IA: NEGATIVE

## 2021-02-25 NOTE — TELEPHONE ENCOUNTER
Caller: FLORENCIO [facility]    Relationship:     Best call back number: [phone]    What orders are you requesting (i.e. lab or imaging): HOME HEALTH NURSE    In what timeframe would the patient need to come in: Monday 03/01/21    Where will you receive your lab/imaging services: [facility]ENTS HOME     Additional notes: THE HOME HEALTH NURSE THAT YARED WOO HAD SENT TO THE PATIENTS HOUSE AFTER HIS HOSPITAL VISIT BELIEVES THAT THE PATIENT NEEDS TO HAVE SENT  A HOME HEALTH NURSE TWICE A WEEK STARTING NEXT WEEK 03/01/21 AND ONCE A WEEK FOR THE TWO WEEKS FOLLOWING THAT.

## 2021-02-25 NOTE — OUTREACH NOTE
Call Center TCM Note      Responses   [facility] patient discharged from?  [place]   Does the patient have one of the following disease processes/diagnoses(primary or secondary)?  Other   TCM attempt successful?  Yes   Discharge diagnosis  C. difficile colitis    Meds reviewed with patient/caregiver?  Yes   Is the patient having any side effects they believe may be caused by any medication additions or changes?  No   Does the patient have all medications ordered at discharge?  Yes   Is the patient taking all medications as directed (includes completed medication regime)?  Yes   Does the patient have a primary care provider?   Yes   Does the patient have an appointment with their PCP within 7 days of discharge?  Yes   Comments regarding PCP  YARED WOO PA-C   TCM [facility] is 03/04/2021   Has the patient kept scheduled appointments due by today?  Yes   What is the Home health agency?   [facility]    Has home health visited the patient within 72 hours of discharge?  Yes   Home health comments   arrived to see pt during call   Psychosocial comments  Pt seems overwhelmed by his health issues, meds, and multiple appts to be made. Pt states he has early onset Alzheimers, and that he lives alone and no one to ask for help. I have LM for   to call me or reach out to pt for possible assist.   TCM call completed?  Yes   Wrap up additional comments  Pt having some nausea, but states meds in place and he is taking Zofran. Pt seems overwhelmed by his health issues, meds, and multiple appts to be made. Pt states he has early onset Alzheimers, and that he lives alone and no one to ask for help. There is dtr listed as emergency contact and on hippa but I was unablet reach her x 2 attempts. I have [facility] for   to call me or reach out to pt for possible assist.Also I see  has requested addtional visits for pt assessment with PCP. TCM [facility] is 03/04/2021.          Rosa Elena Godfrey MA    2/25/2021, 12:38 EST

## 2021-02-26 ENCOUNTER — TELEPHONE (OUTPATIENT)
Dept: GASTROENTEROLOGY | Facility: CLINIC | Age: 71
End: 2021-02-26

## 2021-02-26 ENCOUNTER — TELEPHONE (OUTPATIENT)
Dept: FAMILY MEDICINE CLINIC | Facility: CLINIC | Age: 71
End: 2021-02-26

## 2021-02-26 LAB
O+P SPEC MICRO: NORMAL
O+P STL CONC: NORMAL

## 2021-02-26 RX ORDER — OMEPRAZOLE 20 MG/1
20 CAPSULE, DELAYED RELEASE ORAL 2 TIMES DAILY
Qty: 60 CAPSULE | Refills: 11
Start: 2021-02-26 | End: 2021-04-10 | Stop reason: HOSPADM

## 2021-02-27 LAB
BACTERIA SPEC AEROBE CULT: NORMAL
BACTERIA SPEC AEROBE CULT: NORMAL

## 2021-03-02 ENCOUNTER — OFFICE VISIT (OUTPATIENT)
Dept: GASTROENTEROLOGY | Facility: CLINIC | Age: 71
End: 2021-03-02

## 2021-03-02 VITALS — BODY MASS INDEX: 24.89 KG/M2 | HEIGHT: 68 IN | WEIGHT: 164.2 LBS | TEMPERATURE: 97.3 F

## 2021-03-02 DIAGNOSIS — K22.711 BARRETT'S ESOPHAGUS WITH HIGH GRADE DYSPLASIA: ICD-10-CM

## 2021-03-02 DIAGNOSIS — R10.84 GENERALIZED ABDOMINAL PAIN: Primary | ICD-10-CM

## 2021-03-02 PROCEDURE — 99213 OFFICE O/P EST LOW 20 MIN: CPT | Performed by: INTERNAL MEDICINE

## 2021-03-02 RX ORDER — PROMETHAZINE HYDROCHLORIDE 25 MG/1
25 TABLET ORAL EVERY 8 HOURS PRN
Qty: 180 TABLET | Refills: 3 | Status: SHIPPED | OUTPATIENT
Start: 2021-03-02 | End: 2021-03-04

## 2021-03-02 NOTE — PROGRESS NOTES
Chief Complaint   Patient presents with   • Abdominal Pain   • Nausea       Felicita Mike is a  70 y.o. male here for a follow up visit for Rice's esophagus.    HPI     Patient 70-year-old male with history of hyperlipidemia and hypertension recently admitted to the hospital for abdominal pain.  Patient found with C. difficile colitis underwent EGD with an irregular Z-line biopsy positive for Rice's with high-grade dysplasia.  Patient reported on discussion that he had an ulcer though review of the report shows no such ulcer present.  Patient continues to complain of severe left upper quadrant pain with nausea.    Past Medical History:   Diagnosis Date   • Abnormal PSA    • Acute myocardial infarction (CMS/HCC) 2004    Stents   • Apnea, sleep    • Atherosclerotic heart disease    • Chronic pain     Chronic low back paoin, MRI 12/11,  L3 compression deformity, L2 degenerative disc disease and L5-S1 degenerative disc disease.   • Colon polyp    • Constipation    • COPD (chronic obstructive pulmonary disease) (CMS/HCC)    • Coronary artery disease    • Depressed bipolar II disorder (CMS/HCC)    • Diabetes mellitus (CMS/HCC)    • Essential tremor    • Fatigue    • GERD (gastroesophageal reflux disease)    • H/O transfusion of packed red blood cells    • Health care maintenance    • Hearing loss    • History of back pain    • Hypercholesterolemia    • Hyperlipidemia    • Hypertension    • Leukocytosis    • Osteoarthritis    • Proteinuria    • PVD (peripheral vascular disease) (CMS/HCC)    • Sebaceous cyst    • Seborrheic dermatitis    • Sleep apnea    • Tinea corporis    • Tobacco use    • Tremor          Current Outpatient Medications:   •  acetaminophen (TYLENOL) 325 MG tablet, Take 2 tablets by mouth Every 6 (Six) Hours As Needed for Mild Pain ., Disp: , Rfl:   •  albuterol (PROVENTIL) (5 MG/ML) 0.5% nebulizer solution, Take 2.5 mg by nebulization Every 6 (Six) Hours As Needed for Wheezing., Disp: , Rfl:   •   amLODIPine (NORVASC) 5 MG tablet, TAKE 1 TABLET BY MOUTH DAILY FOR BLOOD PRESSURE, Disp: 90 tablet, Rfl: 1  •  atorvastatin (Lipitor) 80 MG tablet, Take 1 tablet by mouth Daily. For cholesterol, Disp: 90 tablet, Rfl: 3  •  carvedilol (COREG) 25 MG tablet, Take 1 tablet by mouth 2 (Two) Times a Day With Meals. For heart and BP---, Disp: 180 tablet, Rfl: 3  •  chlorthalidone (HYGROTON) 25 MG tablet, Take 1 tablet by mouth Daily., Disp: 90 tablet, Rfl: 3  •  Cholecalciferol (Vitamin D3) 50 MCG (2000 UT) tablet, Take 1 tablet by mouth Daily., Disp: , Rfl:   •  Cyanocobalamin (B-12) 1000 MCG capsule, Take 1 tablet by mouth Daily., Disp: , Rfl:   •  glucose monitor monitoring kit, 1 each 2 (two) times a day. Please fill brand covered by pt's insurance.  Dx: E11.9, Disp: 1 each, Rfl: 0  •  lisinopril (PRINIVIL,ZESTRIL) 40 MG tablet, Take 1 tablet by mouth Daily., Disp: 90 tablet, Rfl: 3  •  metFORMIN (GLUCOPHAGE) 1000 MG tablet, Take 1 tablet by mouth 2 (Two) Times a Day. For DMII (Patient taking differently: Take 500 mg by mouth 2 (Two) Times a Day. For DMII), Disp: 180 tablet, Rfl: 1  •  omeprazole (priLOSEC) 20 MG capsule, Take 1 capsule by mouth 2 (Two) Times a Day. (Patient taking differently: Take 40 mg by mouth 2 (Two) Times a Day.), Disp: 60 capsule, Rfl: 11  •  primidone (MYSOLINE) 50 MG tablet, Take 1 tablet by mouth 2 (two) times a day., Disp: 180 tablet, Rfl: 3  •  QUEtiapine (SEROquel) 100 MG tablet, Take 1 tablet by mouth Every Night. Sleep and mood, Disp: 90 tablet, Rfl: 1  •  sucralfate (Carafate) 1 g tablet, Take 1 tablet by mouth 4 (Four) Times a Day. For Gastritis and stop Aspirin, Disp: 120 tablet, Rfl: 2  •  tamsulosin (FLOMAX) 0.4 MG capsule 24 hr capsule, Take 1 capsule by mouth Daily. For urine flow, Disp: 90 capsule, Rfl: 3  •  Vitamin E 180 MG capsule, Take  by mouth., Disp: , Rfl:   •  acetaminophen (TYLENOL) 325 MG tablet, Take 2 tablets by mouth Every 6 (Six) Hours As Needed for Mild Pain .,  Disp: , Rfl:   •  magnesium oxide (MAGnesium-Oxide) 400 (241.3 Mg) MG tablet tablet, Take 1 tablet by mouth Daily., Disp: 90 tablet, Rfl: 1  •  ondansetron (Zofran) 4 MG tablet, Take 1 tablet by mouth Every 8 (Eight) Hours As Needed for Nausea or Vomiting., Disp: 12 tablet, Rfl: 0  •  polyethylene glycol (MIRALAX) packet, Take 17 g by mouth Daily As Needed., Disp: , Rfl:   •  promethazine (PHENERGAN) 25 MG tablet, Take 1 tablet by mouth Every 8 (Eight) Hours As Needed for Nausea., Disp: 180 tablet, Rfl: 3  •  vancomycin (Vancocin HCl) 125 MG capsule, Take 1 capsule by mouth 4 (Four) Times a Day for 10 days., Disp: 40 capsule, Rfl: 0  •  vancomycin 50 MG/ML reconstituted solution oral solution reconstituted, Take 2.5 mL by mouth Every 6 (Six) Hours for 35 doses. HOME INFUSION TO PROVIDE  Indications: Clostridium Difficile Infection, Disp: 87.5 mL, Rfl: 0    Allergies   Allergen Reactions   • Clopidogrel Itching       Social History     Socioeconomic History   • Marital status:      Spouse name: Not on file   • Number of children: Not on file   • Years of education: Not on file   • Highest education level: Not on file   Occupational History     Employer: DISABLED   Tobacco Use   • Smoking status: Current Every Day Smoker     Packs/day: 0.50     Years: 47.00     Pack years: 23.50     Types: Cigarettes   • Smokeless tobacco: Never Used   • Tobacco comment: CAFFEINE USE: 4 CANS DIET [place] DAILY   Substance and Sexual Activity   • Alcohol use: Yes     Comment: very seldom   • Drug use: No   • Sexual activity: Defer       Family History   Problem Relation Age of Onset   • Cancer Mother    • Diabetes Mother    • Hypertension Mother    • Colon cancer Mother    • Heart disease Father    • Cancer Brother    • Stroke Brother        Review of Systems   Constitutional: Negative.    Respiratory: Negative.    Cardiovascular: Negative.    Gastrointestinal: Positive for abdominal pain and nausea. Negative for  abdominal distention, anal bleeding, blood in stool, constipation, diarrhea, rectal pain and vomiting.   Musculoskeletal: Negative.    Skin: Negative.    Hematological: Negative.        Vitals:    03/02/21 1322   Temp: 97.3 °F (36.3 °C)       Physical Exam  Vitals signs reviewed.   Constitutional:       Appearance: He is well-developed.   HENT:      Head: Normocephalic and atraumatic.   Eyes:      General: No scleral icterus.     Pupils: Pupils are equal, round, and reactive to light.   Cardiovascular:      Rate and Rhythm: Normal rate and regular rhythm.      Heart sounds: Normal heart sounds.   Pulmonary:      Effort: Pulmonary effort is normal. No respiratory distress.      Breath sounds: Normal breath sounds.   Abdominal:      General: Bowel sounds are normal. There is distension.      Palpations: Abdomen is soft. There is no mass.      Tenderness: There is abdominal tenderness.      Hernia: No hernia is present.   Skin:     General: Skin is warm and dry.      Coloration: Skin is not jaundiced.      Findings: No rash.   Neurological:      General: No focal deficit present.      Mental Status: He is alert and oriented to person, place, and time.   Psychiatric:         Behavior: Behavior normal.         Thought Content: Thought content normal.         Judgment: Judgment normal.         Admission on 02/22/2021, Discharged on 02/24/2021   Component Date Value Ref Range Status   • Glucose 02/22/2021 228* 65 - 99 mg/dL Final   • BUN 02/22/2021 11  8 - 23 mg/dL Final   • Creatinine 02/22/2021 1.05  0.76 - 1.27 mg/dL Final   • Sodium 02/22/2021 130* 136 - 145 mmol/L Final   • Potassium 02/22/2021 2.9* 3.5 - 5.2 mmol/L Final   • Chloride 02/22/2021 92* 98 - 107 mmol/L Final   • CO2 02/22/2021 21.4* 22.0 - 29.0 mmol/L Final   • Calcium 02/22/2021 8.0* 8.6 - 10.5 mg/dL Final   • Total Protein 02/22/2021 6.6  6.0 - 8.5 g/dL Final   • Albumin 02/22/2021 4.40  3.50 - 5.20 g/dL Final   • ALT (SGPT) 02/22/2021 15  1 - 41 U/L  Final   • AST (SGOT) 02/22/2021 16  1 - 40 U/L Final   • Alkaline Phosphatase 02/22/2021 96  39 - 117 U/L Final   • Total Bilirubin 02/22/2021 0.5  0.0 - 1.2 mg/dL Final   • eGFR Non  Amer 02/22/2021 70  >60 mL/min/1.73 Final   • Globulin 02/22/2021 2.2  gm/dL Final   • A/G Ratio 02/22/2021 2.0  g/dL Final   • BUN/Creatinine Ratio 02/22/2021 10.5  7.0 - 25.0 Final   • Anion Gap 02/22/2021 16.6* 5.0 - 15.0 mmol/L Final   • Lipase 02/22/2021 45  13 - 60 U/L Final   • Color, UA 02/22/2021 Yellow  Yellow, Straw Final   • Appearance, UA 02/22/2021 Clear  Clear Final   • pH, UA 02/22/2021 6.5  5.0 - 8.0 Final   • Specific Gravity, UA 02/22/2021 1.011  1.005 - 1.030 Final   • Glucose, UA 02/22/2021 100 mg/dL (Trace)* Negative Final   • Ketones, UA 02/22/2021 Negative  Negative Final   • Bilirubin, UA 02/22/2021 Negative  Negative Final   • Blood, UA 02/22/2021 Negative  Negative Final   • Protein, UA 02/22/2021 100 mg/dL (2+)* Negative Final   • Leuk Esterase, UA 02/22/2021 Negative  Negative Final   • Nitrite, UA 02/22/2021 Negative  Negative Final   • Urobilinogen, UA 02/22/2021 0.2 E.U./dL  0.2 - 1.0 E.U./dL Final   • WBC 02/22/2021 12.99* 3.40 - 10.80 10*3/mm3 Final   • RBC 02/22/2021 4.40  4.14 - 5.80 10*6/mm3 Final   • Hemoglobin 02/22/2021 13.1  13.0 - 17.7 g/dL Final   • Hematocrit 02/22/2021 38.6  37.5 - 51.0 % Final   • MCV 02/22/2021 87.7  79.0 - 97.0 fL Final   • MCH 02/22/2021 29.8  26.6 - 33.0 pg Final   • MCHC 02/22/2021 33.9  31.5 - 35.7 g/dL Final   • RDW 02/22/2021 12.9  12.3 - 15.4 % Final   • RDW-SD 02/22/2021 41.7  37.0 - 54.0 fl Final   • MPV 02/22/2021 12.4* 6.0 - 12.0 fL Final   • Platelets 02/22/2021 205  140 - 450 10*3/mm3 Final   • Neutrophil % 02/22/2021 76.8* 42.7 - 76.0 % Final   • Lymphocyte % 02/22/2021 14.3* 19.6 - 45.3 % Final   • Monocyte % 02/22/2021 7.4  5.0 - 12.0 % Final   • Eosinophil % 02/22/2021 0.7  0.3 - 6.2 % Final   • Basophil % 02/22/2021 0.2  0.0 - 1.5 % Final   •  Immature Grans % 02/22/2021 0.6* 0.0 - 0.5 % Final   • Neutrophils, Absolute 02/22/2021 9.97* 1.70 - 7.00 10*3/mm3 Final   • Lymphocytes, Absolute 02/22/2021 1.86  0.70 - 3.10 10*3/mm3 Final   • Monocytes, Absolute 02/22/2021 0.96* 0.10 - 0.90 10*3/mm3 Final   • Eosinophils, Absolute 02/22/2021 0.09  0.00 - 0.40 10*3/mm3 Final   • Basophils, Absolute 02/22/2021 0.03  0.00 - 0.20 10*3/mm3 Final   • Immature Grans, Absolute 02/22/2021 0.08* 0.00 - 0.05 10*3/mm3 Final   • nRBC 02/22/2021 0.0  0.0 - 0.2 /100 WBC Final   • Ova + Parasite Exam 02/23/2021 Final report   Final   • Ova + Parasite Result 1 02/23/2021 Comment   Final   • E coli, Shiga toxin Assay 02/23/2021 Negative  Negative Final   • C. Difficile Toxins by PCR 02/23/2021 Positive* Negative Final   • Campylobacter 02/23/2021 Not Detected  Not Detected Final   • Plesiomonas shigelloides 02/23/2021 Not Detected  Not Detected Final   • Salmonella 02/23/2021 Not Detected  Not Detected Final   • Vibrio 02/23/2021 Not Detected  Not Detected Final   • Vibrio cholerae 02/23/2021 Not Detected  Not Detected Final   • Yersinia enterocolitica 02/23/2021 Not Detected  Not Detected Final   • Enteroaggregative E. coli (EAEC) 02/23/2021 Not Detected  Not Detected Final   • Enteropathogenic E. coli (EPEC) 02/23/2021 Not Detected  Not Detected Final   • Enterotoxigenic E. coli (ETEC) lt/* 02/23/2021 Not Detected  Not Detected Final   • Shiga-like toxin-producing E. coli* 02/23/2021 Not Detected  Not Detected Final   • Shigella/Enteroinvasive E. coli (E* 02/23/2021 Not Detected  Not Detected Final   • Cryptosporidium 02/23/2021 Not Detected  Not Detected Final   • Cyclospora cayetanensis 02/23/2021 Not Detected  Not Detected Final   • Entamoeba histolytica 02/23/2021 Not Detected  Not Detected Final   • Giardia lamblia 02/23/2021 Not Detected  Not Detected Final   • Adenovirus F40/41 02/23/2021 Not Detected  Not Detected Final   • Astrovirus 02/23/2021 Not Detected  Not  Detected Final   • Norovirus GI/GII 02/23/2021 Not Detected  Not Detected Final   • Rotavirus A 02/23/2021 Not Detected  Not Detected Final   • Sapovirus (I, II, IV or V) 02/23/2021 Not Detected  Not Detected Final   • RBC, UA 02/22/2021 0-2  None Seen, 0-2 /HPF Final   • WBC, UA 02/22/2021 0-2  None Seen, 0-2 /HPF Final   • Bacteria, UA 02/22/2021 None Seen  None Seen /HPF Final   • Squamous Epithelial Cells, UA 02/22/2021 0-2  None Seen, 0-2 /HPF Final   • Hyaline Casts, UA 02/22/2021 0-2  None Seen /LPF Final   • Methodology 02/22/2021 Automated Microscopy   Final   • COVID19 02/22/2021 Not Detected  Not Detected - Ref. Range Final   • Blood Culture 02/22/2021 No growth at 5 days   Final   • Blood Culture 02/22/2021 No growth at 5 days   Final   • Lactate 02/22/2021 1.6  0.5 - 2.0 mmol/L Final   • Glucose 02/23/2021 160* 65 - 99 mg/dL Final   • BUN 02/23/2021 9  8 - 23 mg/dL Final   • Creatinine 02/23/2021 0.90  0.76 - 1.27 mg/dL Final   • Sodium 02/23/2021 141  136 - 145 mmol/L Final   • Potassium 02/23/2021 4.1  3.5 - 5.2 mmol/L Final   • Chloride 02/23/2021 106  98 - 107 mmol/L Final   • CO2 02/23/2021 22.3  22.0 - 29.0 mmol/L Final   • Calcium 02/23/2021 7.4* 8.6 - 10.5 mg/dL Final   • eGFR Non  Amer 02/23/2021 84  >60 mL/min/1.73 Final   • BUN/Creatinine Ratio 02/23/2021 10.0  7.0 - 25.0 Final   • Anion Gap 02/23/2021 12.7  5.0 - 15.0 mmol/L Final   • WBC 02/23/2021 8.12  3.40 - 10.80 10*3/mm3 Final   • RBC 02/23/2021 3.55* 4.14 - 5.80 10*6/mm3 Final   • Hemoglobin 02/23/2021 10.6* 13.0 - 17.7 g/dL Final   • Hematocrit 02/23/2021 31.2* 37.5 - 51.0 % Final   • MCV 02/23/2021 87.9  79.0 - 97.0 fL Final   • MCH 02/23/2021 29.9  26.6 - 33.0 pg Final   • MCHC 02/23/2021 34.0  31.5 - 35.7 g/dL Final   • RDW 02/23/2021 13.0  12.3 - 15.4 % Final   • RDW-SD 02/23/2021 41.4  37.0 - 54.0 fl Final   • MPV 02/23/2021 12.9* 6.0 - 12.0 fL Final   • Platelets 02/23/2021 146  140 - 450 10*3/mm3 Final   • Neutrophil %  02/23/2021 59.2  42.7 - 76.0 % Final   • Lymphocyte % 02/23/2021 27.8  19.6 - 45.3 % Final   • Monocyte % 02/23/2021 11.1  5.0 - 12.0 % Final   • Eosinophil % 02/23/2021 1.0  0.3 - 6.2 % Final   • Basophil % 02/23/2021 0.4  0.0 - 1.5 % Final   • Immature Grans % 02/23/2021 0.5  0.0 - 0.5 % Final   • Neutrophils, Absolute 02/23/2021 4.81  1.70 - 7.00 10*3/mm3 Final   • Lymphocytes, Absolute 02/23/2021 2.26  0.70 - 3.10 10*3/mm3 Final   • Monocytes, Absolute 02/23/2021 0.90  0.10 - 0.90 10*3/mm3 Final   • Eosinophils, Absolute 02/23/2021 0.08  0.00 - 0.40 10*3/mm3 Final   • Basophils, Absolute 02/23/2021 0.03  0.00 - 0.20 10*3/mm3 Final   • Immature Grans, Absolute 02/23/2021 0.04  0.00 - 0.05 10*3/mm3 Final   • nRBC 02/23/2021 0.0  0.0 - 0.2 /100 WBC Final   • Glucose 02/23/2021 188* 70 - 130 mg/dL Final   • Case Report 02/23/2021    Final                    Value:Surgical Pathology Report                         Case: [id number]                                  Authorizing Provider:  Collins Nolasco MD    Collected:           02/23/2021 12:34 PM          Ordering Location:     [facility]  Received:            02/23/2021 01:19 PM                                 ENDO SUITES                                                                  Pathologist:           Margot Gates MD                                                          Specimens:   1) - Small Intestine, Duodenum                                                                      2) - Stomach, RANDOM GASTRIC BX'S                                                                   3) - GE Junction, IRREGULAR Z-LINE                                                        • Final Diagnosis 02/23/2021    Final                    Value:This result contains rich text formatting which cannot be displayed here.   • Comment 02/23/2021    Final                    Value:This result contains rich text formatting which cannot be displayed here.    • Gross Description 02/23/2021    Final                    Value:This result contains rich text formatting which cannot be displayed here.   • Glucose 02/23/2021 117  70 - 130 mg/dL Final   • Glucose 02/23/2021 267* 70 - 130 mg/dL Final   • Glucose 02/23/2021 157* 70 - 130 mg/dL Final   • WBC 02/24/2021 7.46  3.40 - 10.80 10*3/mm3 Final   • RBC 02/24/2021 3.73* 4.14 - 5.80 10*6/mm3 Final   • Hemoglobin 02/24/2021 11.1* 13.0 - 17.7 g/dL Final   • Hematocrit 02/24/2021 32.7* 37.5 - 51.0 % Final   • MCV 02/24/2021 87.7  79.0 - 97.0 fL Final   • MCH 02/24/2021 29.8  26.6 - 33.0 pg Final   • MCHC 02/24/2021 33.9  31.5 - 35.7 g/dL Final   • RDW 02/24/2021 13.0  12.3 - 15.4 % Final   • RDW-SD 02/24/2021 41.2  37.0 - 54.0 fl Final   • MPV 02/24/2021 12.4* 6.0 - 12.0 fL Final   • Platelets 02/24/2021 146  140 - 450 10*3/mm3 Final   • Glucose 02/24/2021 118* 65 - 99 mg/dL Final   • BUN 02/24/2021 10  8 - 23 mg/dL Final   • Creatinine 02/24/2021 1.07  0.76 - 1.27 mg/dL Final   • Sodium 02/24/2021 143  136 - 145 mmol/L Final   • Potassium 02/24/2021 3.9  3.5 - 5.2 mmol/L Final   • Chloride 02/24/2021 107  98 - 107 mmol/L Final   • CO2 02/24/2021 26.1  22.0 - 29.0 mmol/L Final   • Calcium 02/24/2021 7.7* 8.6 - 10.5 mg/dL Final   • eGFR Non  Amer 02/24/2021 69  >60 mL/min/1.73 Final   • BUN/Creatinine Ratio 02/24/2021 9.3  7.0 - 25.0 Final   • Anion Gap 02/24/2021 9.9  5.0 - 15.0 mmol/L Final   • Glucose 02/24/2021 116  70 - 130 mg/dL Final   • Glucose 02/24/2021 247* 70 - 130 mg/dL Final   Orders Only on 02/22/2021   Component Date Value Ref Range Status   • Hemoglobin A1C 02/22/2021 7.30* 4.80 - 5.60 % Final   Lab on 01/13/2021   Component Date Value Ref Range Status   • Glucose 01/13/2021 165* 65 - 99 mg/dL Final   • BUN 01/13/2021 22  8 - 23 mg/dL Final   • Creatinine 01/13/2021 1.22  0.76 - 1.27 mg/dL Final   • Sodium 01/13/2021 138  136 - 145 mmol/L Final   • Potassium 01/13/2021 5.1  3.5 - 5.2 mmol/L Final   •  Chloride 01/13/2021 101  98 - 107 mmol/L Final   • CO2 01/13/2021 19.9* 22.0 - 29.0 mmol/L Final   • Calcium 01/13/2021 9.8  8.6 - 10.5 mg/dL Final   • eGFR Non African Amer 01/13/2021 59* >60 mL/min/1.73 Final   • BUN/Creatinine Ratio 01/13/2021 18.0  7.0 - 25.0 Final   • Anion Gap 01/13/2021 17.1* 5.0 - 15.0 mmol/L Final   • Total Cholesterol 01/13/2021 139  0 - 200 mg/dL Final   • Triglycerides 01/13/2021 160* 0 - 150 mg/dL Final   • HDL Cholesterol 01/13/2021 37* 40 - 60 mg/dL Final   • LDL Cholesterol  01/13/2021 74  0 - 100 mg/dL Final   • VLDL Cholesterol 01/13/2021 28  5 - 40 mg/dL Final   • LDL/HDL Ratio 01/13/2021 1.89   Final   • Total Protein 01/13/2021 7.1  6.0 - 8.5 g/dL Final   • Albumin 01/13/2021 4.80  3.50 - 5.20 g/dL Final   • ALT (SGPT) 01/13/2021 18  1 - 41 U/L Final   • AST (SGOT) 01/13/2021 16  1 - 40 U/L Final   • Alkaline Phosphatase 01/13/2021 106  39 - 117 U/L Final   • Total Bilirubin 01/13/2021 0.6  0.0 - 1.2 mg/dL Final   • Bilirubin, Direct 01/13/2021 <0.2  0.0 - 0.3 mg/dL Final   • Bilirubin, Indirect 01/13/2021    Final       Diagnoses and all orders for this visit:    1. Generalized abdominal pain (Primary)    2. Rice's esophagus with high grade dysplasia    Other orders  -     promethazine (PHENERGAN) 25 MG tablet; Take 1 tablet by mouth Every 8 (Eight) Hours As Needed for Nausea.  Dispense: 180 tablet; Refill: 3      Patient 70-year-old male with history of hypertension, hyperlipidemia and recent C. difficile colitis complaining of recurrent left upper quadrant pain.  Patient reports was hospitalized for this last month.  Patient underwent EGD and though he reports he had an ulcer review of endoscopy report showed some gastritis and Rice's esophagus with high-grade dysplasia.  Patient referred ostensibly for possible radiofrequency ablation but currently saying he feels terrible with severe pain that recurred since hospitalization in the left upper quadrant.  Patient reports  ongoing nausea and most of his medication for symptoms has finished from the hospital.  For now we will refill his Phenergan for his nausea and pain continue the omeprazole and Carafate and follow-up with Dr. Aponte for the symptoms.  Once patient stable and feeling improved will arrange radiofrequency ablation of the abnormal segment of high-grade dysplasia from his Rice's esophagus.

## 2021-03-04 ENCOUNTER — LAB (OUTPATIENT)
Dept: LAB | Facility: HOSPITAL | Age: 71
End: 2021-03-04

## 2021-03-04 ENCOUNTER — HOSPITAL ENCOUNTER (OUTPATIENT)
Dept: CT IMAGING | Facility: HOSPITAL | Age: 71
Discharge: HOME | End: 2021-03-04

## 2021-03-04 ENCOUNTER — OFFICE VISIT (OUTPATIENT)
Dept: FAMILY MEDICINE CLINIC | Facility: CLINIC | Age: 71
End: 2021-03-04

## 2021-03-04 VITALS
DIASTOLIC BLOOD PRESSURE: 70 MMHG | SYSTOLIC BLOOD PRESSURE: 171 MMHG | TEMPERATURE: 97.5 F | OXYGEN SATURATION: 95 % | HEART RATE: 83 BPM | RESPIRATION RATE: 18 BRPM | HEIGHT: 68 IN | WEIGHT: 161 LBS | BODY MASS INDEX: 24.4 KG/M2

## 2021-03-04 DIAGNOSIS — A04.72 C. DIFFICILE COLITIS: ICD-10-CM

## 2021-03-04 DIAGNOSIS — R10.32 LLQ PAIN: ICD-10-CM

## 2021-03-04 DIAGNOSIS — F33.1 MODERATE EPISODE OF RECURRENT MAJOR DEPRESSIVE DISORDER (HCC): ICD-10-CM

## 2021-03-04 DIAGNOSIS — E11.29 TYPE 2 DIABETES MELLITUS WITH MICROALBUMINURIA, WITHOUT LONG-TERM CURRENT USE OF INSULIN (HCC): ICD-10-CM

## 2021-03-04 DIAGNOSIS — J43.9 PULMONARY EMPHYSEMA, UNSPECIFIED EMPHYSEMA TYPE (HCC): ICD-10-CM

## 2021-03-04 DIAGNOSIS — Z09 HOSPITAL DISCHARGE FOLLOW-UP: Primary | ICD-10-CM

## 2021-03-04 DIAGNOSIS — R80.9 TYPE 2 DIABETES MELLITUS WITH MICROALBUMINURIA, WITHOUT LONG-TERM CURRENT USE OF INSULIN (HCC): ICD-10-CM

## 2021-03-04 DIAGNOSIS — I73.9 PERIPHERAL VASCULAR DISEASE (HCC): ICD-10-CM

## 2021-03-04 DIAGNOSIS — E87.6 HYPOKALEMIA: ICD-10-CM

## 2021-03-04 LAB
ALBUMIN SERPL-MCNC: 4.2 G/DL (ref 3.5–5.2)
ALBUMIN/GLOB SERPL: 1.6 G/DL
ALP SERPL-CCNC: 100 U/L (ref 39–117)
ALT SERPL W P-5'-P-CCNC: 14 U/L (ref 1–41)
ANION GAP SERPL CALCULATED.3IONS-SCNC: 13.5 MMOL/L (ref 5–15)
AST SERPL-CCNC: 14 U/L (ref 1–40)
BASOPHILS # BLD AUTO: 0.04 10*3/MM3 (ref 0–0.2)
BASOPHILS NFR BLD AUTO: 0.4 % (ref 0–1.5)
BILIRUB SERPL-MCNC: 0.5 MG/DL (ref 0–1.2)
BUN SERPL-MCNC: 8 MG/DL (ref 8–23)
BUN/CREAT SERPL: 10.7 (ref 7–25)
CALCIUM SPEC-SCNC: 8.7 MG/DL (ref 8.6–10.5)
CHLORIDE SERPL-SCNC: 92 MMOL/L (ref 98–107)
CO2 SERPL-SCNC: 23.5 MMOL/L (ref 22–29)
CREAT BLDA-MCNC: 0.8 MG/DL (ref 0.6–1.3)
CREAT SERPL-MCNC: 0.75 MG/DL (ref 0.76–1.27)
DEPRECATED RDW RBC AUTO: 40.5 FL (ref 37–54)
EOSINOPHIL # BLD AUTO: 0.07 10*3/MM3 (ref 0–0.4)
EOSINOPHIL NFR BLD AUTO: 0.6 % (ref 0.3–6.2)
ERYTHROCYTE [DISTWIDTH] IN BLOOD BY AUTOMATED COUNT: 12.9 % (ref 12.3–15.4)
GFR SERPL CREATININE-BSD FRML MDRD: 104 ML/MIN/1.73
GLOBULIN UR ELPH-MCNC: 2.7 GM/DL
GLUCOSE SERPL-MCNC: 141 MG/DL (ref 65–99)
HCT VFR BLD AUTO: 36 % (ref 37.5–51)
HGB BLD-MCNC: 12.4 G/DL (ref 13–17.7)
IMM GRANULOCYTES # BLD AUTO: 0.05 10*3/MM3 (ref 0–0.05)
IMM GRANULOCYTES NFR BLD AUTO: 0.4 % (ref 0–0.5)
LYMPHOCYTES # BLD AUTO: 2.03 10*3/MM3 (ref 0.7–3.1)
LYMPHOCYTES NFR BLD AUTO: 18.1 % (ref 19.6–45.3)
MCH RBC QN AUTO: 30.1 PG (ref 26.6–33)
MCHC RBC AUTO-ENTMCNC: 34.4 G/DL (ref 31.5–35.7)
MCV RBC AUTO: 87.4 FL (ref 79–97)
MONOCYTES # BLD AUTO: 0.88 10*3/MM3 (ref 0.1–0.9)
MONOCYTES NFR BLD AUTO: 7.9 % (ref 5–12)
NEUTROPHILS NFR BLD AUTO: 72.6 % (ref 42.7–76)
NEUTROPHILS NFR BLD AUTO: 8.14 10*3/MM3 (ref 1.7–7)
NRBC BLD AUTO-RTO: 0 /100 WBC (ref 0–0.2)
PLATELET # BLD AUTO: 203 10*3/MM3 (ref 140–450)
PMV BLD AUTO: 12.3 FL (ref 6–12)
POTASSIUM SERPL-SCNC: 3.2 MMOL/L (ref 3.5–5.2)
PROT SERPL-MCNC: 6.9 G/DL (ref 6–8.5)
RBC # BLD AUTO: 4.12 10*6/MM3 (ref 4.14–5.8)
SODIUM SERPL-SCNC: 129 MMOL/L (ref 136–145)
WBC # BLD AUTO: 11.21 10*3/MM3 (ref 3.4–10.8)

## 2021-03-04 PROCEDURE — 36415 COLL VENOUS BLD VENIPUNCTURE: CPT | Performed by: PHYSICIAN ASSISTANT

## 2021-03-04 PROCEDURE — 80053 COMPREHEN METABOLIC PANEL: CPT | Performed by: PHYSICIAN ASSISTANT

## 2021-03-04 PROCEDURE — 82565 ASSAY OF CREATININE: CPT

## 2021-03-04 PROCEDURE — 74177 CT ABD & PELVIS W/CONTRAST: CPT

## 2021-03-04 PROCEDURE — 99495 TRANSJ CARE MGMT MOD F2F 14D: CPT | Performed by: PHYSICIAN ASSISTANT

## 2021-03-04 PROCEDURE — 85025 COMPLETE CBC W/AUTO DIFF WBC: CPT | Performed by: PHYSICIAN ASSISTANT

## 2021-03-04 PROCEDURE — 25010000002 IOPAMIDOL 61 % SOLUTION: Performed by: PHYSICIAN ASSISTANT

## 2021-03-04 RX ORDER — TRAMADOL HYDROCHLORIDE 50 MG/1
50 TABLET ORAL EVERY 8 HOURS PRN
Qty: 90 TABLET | Refills: 1 | Status: SHIPPED | OUTPATIENT
Start: 2021-03-04

## 2021-03-04 RX ORDER — ONDANSETRON 4 MG/1
4 TABLET, FILM COATED ORAL EVERY 8 HOURS PRN
Qty: 90 TABLET | Refills: 3 | Status: SHIPPED | OUTPATIENT
Start: 2021-03-04 | End: 2021-03-11

## 2021-03-04 RX ORDER — QUETIAPINE FUMARATE 100 MG/1
100 TABLET, FILM COATED ORAL NIGHTLY
Qty: 90 TABLET | Refills: 1 | Status: SHIPPED | OUTPATIENT
Start: 2021-03-04

## 2021-03-04 RX ORDER — POTASSIUM CHLORIDE 750 MG/1
TABLET, FILM COATED, EXTENDED RELEASE ORAL
Qty: 20 TABLET | Refills: 0 | Status: SHIPPED | OUTPATIENT
Start: 2021-03-04 | End: 2021-03-25

## 2021-03-04 RX ADMIN — IOPAMIDOL 85 ML: 612 INJECTION, SOLUTION INTRAVENOUS at 15:58

## 2021-03-04 NOTE — PATIENT INSTRUCTIONS
"https://www.cdc.gov/cdiff/what-is.html\">   Clostridioides Difficile Infection  Clostridioides difficile infection, also known as C. difficile or C. diff infection, happens when too much C. diff bacteria grows and causes inflammation of the colon (colitis). It is linked to recent use of antibiotic medicine.  This infection can be passed from person to person (is contagious). You may also be exposed to the bacteria in the environment, such as from contact with food, water, or surfaces that have the bacteria on them.  What are the causes?  Certain bacteria live in the colon and help to digest food. This infection develops when the balance of helpful bacteria in the colon changes and the C. diff bacteria grow out of control. This is caused by taking antibiotics.  What increases the risk?  The following factors may make you more likely to develop this condition:  · Taking certain antibiotics that kill many types of bacteria or taking antibiotics for a long time.  · Having an extended stay in health care settings, such as hospitals and long-term care facilities.  · Being older than age 65.  · Having had a C. diff infection before or a known exposure to C. diff bacteria.  · Having a weak disease-fighting system (immune system).  · Taking a medicine to reduce stomach acid, such as a proton pump inhibitor, for a long time.  · Having a serious underlying condition, such as colon cancer or inflammatory bowel disease (IBD).  · Having had a gastrointestinal (GI) tract procedure or surgery.  Some people develop this infection even though they do not have any known risk factors.  What are the signs or symptoms?  Symptoms of this condition include:  · Diarrhea (three or more times a day) for several days.  · Fever.  · Tiredness (fatigue).  · Loss of appetite.  · Nausea.  · Swelling, pain, cramping, or tenderness in the abdomen.  How is this diagnosed?  This condition is diagnosed with:  · Your medical history and a physical " exam.  · Tests, which may include:  ? A test for C. diff in your stool (feces).  ? Blood tests.  ? Imaging tests, such as a CT scan of your abdomen.  · A procedure in which your health care provider can look inside your colon. This is rare.  How is this treated?  Treatment for this condition may include:  · Stopping the antibiotics that you were taking when the C. diff infection began. Do this only as told by your health care provider.  · Taking certain antibiotics to stop C. diff growth.  · Taking donor stool from a healthy person and placing it into the colon (fecal transplant). This may be done if the infection keeps coming back.  · Having surgery to remove the infected part of the colon. This is rare.  Follow these instructions at home:  Medicines  · Take over-the-counter and prescription medicines only as told by your health care provider.  · Take your antibiotic medicine as told by your health care provider. Do not stop taking the antibiotic even if you start to feel better.  · Do not treat diarrhea with medicines unless your health care provider tells you to.  Eating and drinking    · Follow instructions from your health care provider about eating or drinking restrictions.  · Eat foods that are bland and easy to digest in small amounts as you can. These foods include bananas, applesauce, rice, lean meats, toast, and crackers.  · Follow instructions on how to replace body fluid that has been lost (rehydrate). This may include:  ? Drinking clear fluids, such as water, clear fruit juice, and low-calorie sports drinks.  ? Sucking on ice chips.  ? Taking an oral rehydration solution (ORS). This drink is sold at pharmacies and retail stores.  · Avoid milk, caffeine, and alcohol.  · Drink enough fluid to keep your urine pale yellow.  Activity  · Rest as told by your health care provider.  · Return to your normal activities as told by your health care provider. Ask your health care provider what activities are safe  for you.  General instructions  · Wash your hands often with soap and water for at least 20 seconds. Bathe using soap and water daily.  · Be sure your home is clean before you leave the hospital or clinic to go home. Continue daily cleaning for at least a week after going home.  · Keep all follow-up visits as told by your health care provider. This is important.  How is this prevented?  Hand hygiene    · Wash your hands thoroughly with soap and water for at least 20 seconds before preparing food and after using the bathroom. Make sure the people you live with also wash their hands often with soap and water for at least 20 seconds.  · If you are being treated at a hospital or clinic, make sure that all health care providers and visitors wash their hands with soap and water before touching you.  Contact precautions  · If you develop diarrhea while in the hospital or a long-term care facility, tell your health care team right away.  · When visiting someone in the hospital or a long-term care facility, follow guidelines for wearing a gown, gloves, or other protective equipment.  · If possible, avoid contact with people who have diarrhea.  · If you are sick and live with other people, use a separate bathroom, if possible.  Clean environment  · Clean surfaces that are touched often every day. C. diff bacteria are very resistant to cleaning and can live for months on surfaces. The bacteria are killed only by cleaning products containing 10%-solution chlorine bleach. Be sure to:  ? Read the 's instructions or read online resources to find out if the product you are using will work for the surface you are cleaning.  ? Clean frequently touched surfaces, such as toilets (remember the flush handle), bathtubs, sinks, door knobs, and work surfaces.  · If you are in the hospital, make sure that staff members clean the surfaces in your room daily. Let a staff person know right away if body fluids have splashed or  spilled.  Washing clothes and linens  · Use a laundry detergent containing chlorine bleach. Be sure to use powder detergent instead of liquid. Only some liquid detergents have bleaching agents that contain chlorine bleach. Powder detergents contain chlorine bleach in low levels to help kill any bacteria.  · Run your washing machine on the hot setting once a month without clothes or linens but with enough detergent for washing at full capacity. This will kill any remaining C. diff bacteria.  Contact a health care provider if:  · Your symptoms do not get better, or they get worse, even with treatment.  · Your symptoms go away and then come back.  · You have a fever.  · You develop new symptoms.  Get help right away if:  · You have more pain or tenderness in your abdomen.  · You have stool that is mostly bloody, or your stool looks dark black and tarry.  · You cannot eat or drink without vomiting.  · You have signs or symptoms of dehydration, such as:  ? Dark urine, very little urine, or no urine.  ? Cracked lips or dry mouth.  ? Not making tears when you cry.  ? Sunken eyes.  ? Sleepiness.  ? Weakness or dizziness.  Summary  · Clostridioides difficile, or C. diff, infection happens when too much C. diff bacteria grows and causes inflammation of the colon (colitis) after antibiotic use.  · Symptoms of this infection include diarrhea, fever, tiredness (fatigue), loss of appetite, nausea, and symptoms affecting the abdomen.  · This infection may be treated by stopping the antibiotics you were using when the infection began, and then taking certain antibiotics to stop C. diff from growing. Sometimes, fecal transplant or surgery is done.  · Handwashing, contact precautions, a clean environment, and washing clothes and linens can help prevent or limit spread of this infection.  This information is not intended to replace advice given to you by your health care provider. Make sure you discuss any questions you have with your  health care provider.  Document Revised: 05/13/2020 Document Reviewed: 05/13/2020  Elsevier Patient Education © 2020 [employer]

## 2021-03-04 NOTE — NURSING NOTE
Stat hold & call CT of A&P with contrast results called to MD. Per Dr Jimenez have patient go to outpatient lab for blood and urine tests. Patient taken VIA wheelchair per NA. Patient and nurse with appropriate PPE in place. No problems or concerns noted at this time. IV d/'c'd intact.

## 2021-03-05 ENCOUNTER — LAB REQUISITION (OUTPATIENT)
Dept: LAB | Facility: HOSPITAL | Age: 71
End: 2021-03-05

## 2021-03-05 DIAGNOSIS — Z00.00 ENCOUNTER FOR GENERAL ADULT MEDICAL EXAMINATION WITHOUT ABNORMAL FINDINGS: ICD-10-CM

## 2021-03-05 LAB
ANION GAP SERPL CALCULATED.3IONS-SCNC: 14 MMOL/L (ref 5–15)
BACTERIA UR QL AUTO: NORMAL /HPF
BILIRUB UR QL STRIP: NEGATIVE
BUN SERPL-MCNC: 7 MG/DL (ref 8–23)
BUN/CREAT SERPL: 7.8 (ref 7–25)
CALCIUM SPEC-SCNC: 8.7 MG/DL (ref 8.6–10.5)
CHLORIDE SERPL-SCNC: 93 MMOL/L (ref 98–107)
CLARITY UR: CLEAR
CO2 SERPL-SCNC: 25 MMOL/L (ref 22–29)
COLOR UR: YELLOW
CREAT SERPL-MCNC: 0.9 MG/DL (ref 0.76–1.27)
GFR SERPL CREATININE-BSD FRML MDRD: 84 ML/MIN/1.73
GLUCOSE SERPL-MCNC: 165 MG/DL (ref 65–99)
GLUCOSE UR STRIP-MCNC: NEGATIVE MG/DL
HGB UR QL STRIP.AUTO: NEGATIVE
HYALINE CASTS UR QL AUTO: NORMAL /LPF
KETONES UR QL STRIP: NEGATIVE
LEUKOCYTE ESTERASE UR QL STRIP.AUTO: NEGATIVE
NITRITE UR QL STRIP: NEGATIVE
PH UR STRIP.AUTO: 8 [PH] (ref 5–8)
POTASSIUM SERPL-SCNC: 3.3 MMOL/L (ref 3.5–5.2)
PROT UR QL STRIP: ABNORMAL
RBC # UR: NORMAL /HPF
REF LAB TEST METHOD: NORMAL
SODIUM SERPL-SCNC: 132 MMOL/L (ref 136–145)
SP GR UR STRIP: 1.01 (ref 1–1.03)
SQUAMOUS #/AREA URNS HPF: NORMAL /HPF
UROBILINOGEN UR QL STRIP: ABNORMAL
WBC UR QL AUTO: NORMAL /HPF

## 2021-03-05 PROCEDURE — 80048 BASIC METABOLIC PNL TOTAL CA: CPT | Performed by: PHYSICIAN ASSISTANT

## 2021-03-05 PROCEDURE — 87086 URINE CULTURE/COLONY COUNT: CPT | Performed by: PHYSICIAN ASSISTANT

## 2021-03-05 PROCEDURE — 81001 URINALYSIS AUTO W/SCOPE: CPT | Performed by: PHYSICIAN ASSISTANT

## 2021-03-06 ENCOUNTER — READMISSION MANAGEMENT (OUTPATIENT)
Dept: CALL CENTER | Facility: HOSPITAL | Age: 71
End: 2021-03-06

## 2021-03-06 LAB — BACTERIA SPEC AEROBE CULT: NO GROWTH

## 2021-03-06 NOTE — OUTREACH NOTE
Medical Week 2 Survey      Responses   [facility] patient discharged from?  [place]   Does the patient have one of the following disease processes/diagnoses(primary or secondary)?  Other   Week 2 attempt successful?  Yes   Call start time  1415   Discharge diagnosis  C. difficile colitis    Call end time  1416   Meds reviewed with patient/caregiver?  Yes   Is the patient having any side effects they believe may be caused by any medication additions or changes?  No   Does the patient have all medications ordered at discharge?  Yes   Is the patient taking all medications as directed (includes completed medication regime)?  Yes   Does the patient have a primary care provider?   Yes   Does the patient have an appointment with their PCP within 7 days of discharge?  Yes   Has the patient kept scheduled appointments due by today?  Yes   Psychosocial issues?  No   Did the patient receive a copy of their discharge instructions?  Yes   Nursing interventions  Reviewed instructions with patient, Educated on MyChart   What is the patient's perception of their health status since discharge?  Same   Is the patient/caregiver able to teach back signs and symptoms related to disease process for when to call PCP?  Yes   Is the patient/caregiver able to teach back signs and symptoms related to disease process for when to call 911?  Yes   Is the patient/caregiver able to teach back the hierarchy of who to call/visit for symptoms/problems? PCP, Specialist, Home health nurse, Urgent Care, ED, 911  Yes   If the patient is a current smoker, are they able to teach back resources for cessation?  Smoking cessation medications   Week 2 Call Completed?  Yes          Sarah Segundo RN

## 2021-03-07 DIAGNOSIS — E87.6 LOW BLOOD POTASSIUM: Primary | ICD-10-CM

## 2021-03-07 NOTE — PROGRESS NOTES
I called him and not taking K+.  Concern about GI upset.  I do want him to try to take at least one KCl a day with food.  Abd cramping resolved, but still has the pain left side abdomen.  Ultram helps.  His BMP was hemolyzed.  Need repeat BMP Tues or Wed.  Urine studies negative and went over them.

## 2021-03-11 ENCOUNTER — TELEPHONE (OUTPATIENT)
Dept: FAMILY MEDICINE CLINIC | Facility: CLINIC | Age: 71
End: 2021-03-11

## 2021-03-11 RX ORDER — PROCHLORPERAZINE MALEATE 10 MG
10 TABLET ORAL EVERY 6 HOURS PRN
Qty: 30 TABLET | Refills: 1 | Status: SHIPPED | OUTPATIENT
Start: 2021-03-11 | End: 2021-04-01 | Stop reason: SDUPTHER

## 2021-03-15 ENCOUNTER — READMISSION MANAGEMENT (OUTPATIENT)
Dept: CALL CENTER | Facility: HOSPITAL | Age: 71
End: 2021-03-15

## 2021-03-15 NOTE — OUTREACH NOTE
Medical Week 3 Survey      Responses   [facility] patient discharged from?  [place]   Does the patient have one of the following disease processes/diagnoses(primary or secondary)?  Other   Week 3 attempt successful?  Yes   Call start time  0851   Call end time  0902   General alerts for this patient  Pt does not read or write well he stated   Discharge diagnosis  C. difficile colitis    Meds reviewed with patient/caregiver?  Yes   Is the patient taking all medications as directed (includes completed medication regime)?  Yes   Medication comments  states he finished vanc. Compazine for nausea   Has the patient kept scheduled appointments due by today?  Yes   Comments  Hs been to PCP and has another f/u in May.    Psychosocial issues?  Yes   Psychosocial comments  Pt states he does not read or write well.    Notified Case Management  Psychosocial (Non Urgent)   What is the patient's perception of their health status since discharge?  Same   Is the patient/caregiver able to teach back signs and symptoms related to disease process for when to call PCP?  Yes   Is the patient/caregiver able to teach back signs and symptoms related to disease process for when to call 911?  Yes   Is the patient/caregiver able to teach back the hierarchy of who to call/visit for symptoms/problems? PCP, Specialist, Home health nurse, Urgent Care, ED, 911  Yes   Additional teach back comments  Enc pt to ask PharmD for help with probiotics. Enc pt to eat yogurt and kefir.    Week 3 Call Completed?  Yes   Wrap up additional comments  Enc return to MD if he feels he needs to be seen.           Ese Middleton RN

## 2021-03-17 ENCOUNTER — TELEPHONE (OUTPATIENT)
Dept: FAMILY MEDICINE CLINIC | Facility: CLINIC | Age: 71
End: 2021-03-17

## 2021-03-23 ENCOUNTER — READMISSION MANAGEMENT (OUTPATIENT)
Dept: CALL CENTER | Facility: HOSPITAL | Age: 71
End: 2021-03-23

## 2021-03-23 NOTE — OUTREACH NOTE
Medical Week 4 Survey      Responses   [facility] patient discharged from?  [place]   Does the patient have one of the following disease processes/diagnoses(primary or secondary)?  Other   Week 4 attempt successful?  Yes   Call start time  1536   Call end time  1545   Discharge diagnosis  C. difficile colitis    Is patient permission given to speak with other caregiver?  No   Meds reviewed with patient/caregiver?  Yes   Is the patient having any side effects they believe may be caused by any medication additions or changes?  No   Is the patient taking all medications as directed (includes completed medication regime)?  Yes   Has the patient kept scheduled appointments due by today?  Yes   Is the patient still receiving Home Health Services?  Yes   Psychosocial issues?  Yes   Psychosocial comments  Pt states he does not read or write well.    What is the patient's perception of their health status since discharge?  Improving   Is the patient/caregiver able to teach back signs and symptoms related to disease process for when to call PCP?  Yes   Is the patient/caregiver able to teach back signs and symptoms related to disease process for when to call 911?  Yes   Is the patient/caregiver able to teach back the hierarchy of who to call/visit for symptoms/problems? PCP, Specialist, Home health nurse, Urgent Care, ED, 911  Yes   Week 4 Call Completed?  Yes   Would the patient like one additional call?  No   Graduated  Yes   Is the patient interested in additional calls from an ambulatory ?  NOTE:  applies to high risk patients requiring additional follow-up.  No   Did the patient feel the follow up calls were helpful during their recovery period?  Yes   Was the number of calls appropriate?  Yes          Rosa Elena Ahumada RN

## 2021-03-25 ENCOUNTER — OFFICE VISIT (OUTPATIENT)
Dept: FAMILY MEDICINE CLINIC | Facility: CLINIC | Age: 71
End: 2021-03-25

## 2021-03-25 ENCOUNTER — PATIENT OUTREACH (OUTPATIENT)
Dept: CASE MANAGEMENT | Facility: OTHER | Age: 71
End: 2021-03-25

## 2021-03-25 VITALS
TEMPERATURE: 97.5 F | DIASTOLIC BLOOD PRESSURE: 60 MMHG | HEART RATE: 92 BPM | BODY MASS INDEX: 24.1 KG/M2 | SYSTOLIC BLOOD PRESSURE: 125 MMHG | WEIGHT: 159 LBS | OXYGEN SATURATION: 97 % | HEIGHT: 68 IN | RESPIRATION RATE: 16 BRPM

## 2021-03-25 DIAGNOSIS — E87.6 LOW BLOOD POTASSIUM: Primary | ICD-10-CM

## 2021-03-25 DIAGNOSIS — R80.9 TYPE 2 DIABETES MELLITUS WITH MICROALBUMINURIA, WITHOUT LONG-TERM CURRENT USE OF INSULIN (HCC): ICD-10-CM

## 2021-03-25 DIAGNOSIS — E11.29 TYPE 2 DIABETES MELLITUS WITH MICROALBUMINURIA, WITHOUT LONG-TERM CURRENT USE OF INSULIN (HCC): ICD-10-CM

## 2021-03-25 DIAGNOSIS — Z09 HOSPITAL DISCHARGE FOLLOW-UP: ICD-10-CM

## 2021-03-25 DIAGNOSIS — A04.72 C. DIFFICILE COLITIS: ICD-10-CM

## 2021-03-25 PROCEDURE — 99214 OFFICE O/P EST MOD 30 MIN: CPT | Performed by: PHYSICIAN ASSISTANT

## 2021-03-25 NOTE — OUTREACH NOTE
Care Plan Note      Responses   Lifestyle Goals  Routine follow-up with doctor(s), Eat a healthy diet, Fewer ER/urgent care visits, Fewer exacerbations, Medication management, Routine foot care, Routine eye exam, Quit smoking, Plan meals, Self monitor blood sugar, Self monitor blood pressure, Record weight daily   Barriers  Disease education, Financial   Self Management  Home BP Monitoring, Home Glucose Monitoring, Daily Journaling, Check Weight Daily, Medication Adherence, Dietary Changes - Eat More Fruits/Vegetables, Decrease smoking   Suggested Appointments  Make an Appointment with  [Patient states Yuliana with [facility] visited Wednesday and is working to assist patient by applying for Medicaid and Meals on Wheels. ]   Annual Wellness Visit:   Patient Has Completed   Specific Disease Process Teaching  Diabetes, Heart Failure, Hypertension [Patient states he does not check his blood sugar or weight frequently. Home [facility] has been monitoring for patient. Patient striving to eat with little appetite. ]   Other Patient Education/Resources   Nutrition/Diet [High Fiber foods. ]   Does patient have depression diagnosis?  No [bipolar]   Ed Visits past 12 months:  1   Hospitalizations past 12 months  1   Discharge destination:  Home Health [[facility] current with patient. ]   Medication Adherence  Medications understood [Patient is taking one half Compazine twice daily for nausea and feels better. ]   Goal Progress  Making Progress Toward Goal(s)   Readiness Scale  5   Confidence Scale  5   Health Literacy  Moderate   Patient Outreach Summary (AVS)  Send Outreach Summary        The main concerns and/or symptoms the patient would like to address are: Spoke with patient regarding health and wellness after graduating [facility] post Hospitalization with [facility]. Patient states he is still seeing Home Health. Home Health is monitoring B/P and weight during home visit. Patient also states that the   Yuliana from [facility] visited Wednesday and is assisting patient with application to Medicaid and Meal assistance. Patient states he has little left over for food and necessities after paying bills. Patient is still experiencing diarrhea but it is much improved. Patient is eating high protein yogurt and attempting to eat though he has little appetite. Patient states he is taking the Carafate 1/2 tablet twice daily and it is helping with the nausea. It is working much better than the Zofran. Patient states he now weights 157 lbs and when the diarrhea first started he weighed 170 lbs. Patient also states he stopped his diabetic medications and was told not to restart until diarrhea improves. ACM recommended patient discuss with Alexandrea PAN. ACM also recommended patient check his BS especially if he is not taking his diabetic medications.    Education/instruction provided by Care Coordinator: Introduced self, explained ACM RN role and provided contact information. [facility] Patient is attempting to eat to maintain weight. He is focusing on the foods recommended to decrease diarrhea. Patient is eating foods such as yogurt. ACM sending Care Plan summary with high Fiber foods list to patient via mail. Pictures in education to assist with patient's reported low literacy level. Patient is taking Compazine for nausea and it is working. Patient requested ACM communicate with Yuliana to check on Medicaid application and food assistance application. Follow up scheduled for next week.     Follow Up Outreach Due: 1 week    Ludivina Isbell RN  Ambulatory     3/25/2021, 11:39 EDT

## 2021-03-25 NOTE — PROGRESS NOTES
"Subjective   Felicita Mike is a 70 y.o. male.     History of Present Illness   Felicita Mike 70 y.o. male /60 (BP Location: Left arm, Patient Position: Sitting, Cuff Size: Adult)   Pulse 92   Temp 97.5 °F (36.4 °C)   Resp 16   Ht 172.7 cm (67.99\")   Wt 72.1 kg (159 lb)   SpO2 97%   BMI 24.18 kg/m²  who presents today for f/u from last visit.  I sent him to get stat CT last time.  He was in so much abd pain.  Still has area of concern distal ureter.  Urine studies added and neg.  His Na was low and he lowered fluid intake and f/u lab improved.    Lab Results   Component Value Date    GLUCOSE 165 (H) 03/05/2021    CALCIUM 8.7 03/05/2021     (L) 03/05/2021    K 3.3 (L) 03/05/2021    CO2 25.0 03/05/2021    CL 93 (L) 03/05/2021    BUN 7 (L) 03/05/2021    CREATININE 0.90 03/05/2021    EGFRIFAFRI 92 12/05/2019    EGFRIFNONA 84 03/05/2021    BCR 7.8 03/05/2021    ANIONGAP 14.0 03/05/2021       he has a history of   Patient Active Problem List   Diagnosis   • Elevated prostate specific antigen (PSA)   • Coronary artery disease involving native coronary artery of native heart without angina pectoris   • Bipolar affective disorder (CMS/HCC)   • Chronic obstructive pulmonary disease (CMS/HCC)   • Colon polyp   • Constipation   • Type 2 diabetes mellitus with microalbuminuria, without long-term current use of insulin (CMS/HCC)   • GERD (gastroesophageal reflux disease)   • Fatigue   • Hearing loss   • Hyperlipidemia   • Peripheral vascular disease (CMS/HCC)   • Proteinuria   • Muscle pain   • Low back pain   • Leukocytosis   • Seborrheic eczema   • Sleep apnea   • Tinea corporis   • Tremor   • Essential hypertension   • Anxiety   • Benign prostatic hyperplasia with lower urinary tract symptoms   • Tobacco abuse   • Normocytic anemia   • Thyroid nodule   • Iron deficiency anemia   • Adenomatous polyp of colon   • Family history of malignant neoplasm of colon   • Gastroesophageal reflux disease   • Influenza A "   • SWAPNIL (acute kidney injury) (CMS/HCC)   • Essential tremor   • Magnesium deficiency   • Long-term current use of lithium   • Generalized abdominal pain   • Nausea and vomiting   • Acute gastritis without hemorrhage   • C. difficile colitis   • Dental caries   • Rice's esophagus with high grade dysplasia   .    He was not able to take KCl and has been eating bananas.  I will update labs today.  He finally responded to Compazine for nausea---he has tapered down to 1/2 twice a day.  He is down to Carafate 1/2 tab BID    Stools are still soft ---some days of form---I need him to eat full meals to start back on Metformin.  Trying to get meal assistance.        He was seen by urologist and does need f/u with him and possible cystoscopy. Dr Martinez---Subtle irregularity on CT of distal L ureter, neg ua, no hydro.     The following portions of the patient's history were reviewed and updated as appropriate: allergies, current medications, past family history, past medical history, past social history, past surgical history and problem list.    Review of Systems   Constitutional: Positive for activity change, appetite change and fatigue.   Respiratory: Positive for wheezing.    Gastrointestinal: Positive for abdominal pain and nausea. Negative for vomiting.       Objective   Physical Exam  Vitals and nursing note reviewed.   Constitutional:       General: He is not in acute distress.     Appearance: Normal appearance. He is well-developed. He is not ill-appearing or diaphoretic.   HENT:      Head: Normocephalic.      Right Ear: External ear normal.      Left Ear: External ear normal.   Eyes:      General: No scleral icterus.     Conjunctiva/sclera: Conjunctivae normal.      Pupils: Pupils are equal, round, and reactive to light.   Cardiovascular:      Rate and Rhythm: Normal rate and regular rhythm.      Pulses: Normal pulses.      Heart sounds: Normal heart sounds. No murmur heard.     Pulmonary:      Effort:  Pulmonary effort is normal.      Breath sounds: Wheezing present.      Comments: Nail clubbing  Abdominal:      Palpations: Abdomen is soft.      Tenderness: There is abdominal tenderness.      Comments: Much less tender LUQ to LLQ---so much better today   Musculoskeletal:         General: Normal range of motion.      Cervical back: Normal range of motion and neck supple.      Right lower leg: No edema.      Left lower leg: No edema.   Skin:     General: Skin is warm and dry.      Coloration: Skin is not jaundiced.      Findings: No rash.   Neurological:      General: No focal deficit present.      Mental Status: He is alert and oriented to person, place, and time.   Psychiatric:         Mood and Affect: Affect is not inappropriate.         Behavior: Behavior normal.         Thought Content: Thought content normal.         Judgment: Judgment normal.         Assessment/Plan   Diagnoses and all orders for this visit:    1. Low blood potassium (Primary)  -     CBC & Differential  -     Comprehensive Metabolic Panel    2. Hospital discharge follow-up  -     CBC & Differential  -     Comprehensive Metabolic Panel    3. Type 2 diabetes mellitus with microalbuminuria, without long-term current use of insulin (CMS/[facility])  -     CBC & Differential  -     Comprehensive Metabolic Panel    4. C. difficile colitis  -     CBC & Differential  -     Comprehensive Metabolic Panel        Fine to taper off Compazine;  Wait to restart Metformin until able to eat full meal  He knows to f/u urologist DR [facility] for possible cystoscopy d/t abn area on CT abd/pelvis---wants on hold for now---He can let me know when to set up  Stop smoking  Has f/u GI---abn EGD---  Labs today;--K+ and Na+ update,. And CBC

## 2021-03-25 NOTE — PATIENT INSTRUCTIONS
Clostridioides Difficile Infection  Clostridioides difficile, or C. diff, infection is caused by germs (bacteria). It causes irritation and swelling of the colon (colitis).  This infection can spread from person to person (is contagious). You may also get C. diff from food or water, or from touching surfaces that have the germs on them.  What are the causes?  Certain germs live in the colon and help to digest food. This infection starts when the balance of helpful germs in the colon changes, and the C. diff germs grow out of control. This is caused by taking antibiotics.  What increases the risk?  Your risk is higher if you:  · Take certain antibiotics that kill many types of germs.  · Take antibiotics for a long time.  · Stay for a long time in a health care setting, such as:  ? A hospital.  ? A long-term care facility.  · Are older than age 65.  Your risk is somewhat higher if you:  · Have had C. diff infection before or had contact with C. diff germs.  · Have a weak body defense system (immune system).  · Take a medicine for a long time that reduces stomach acid. This includes proton pump inhibitors.  · Have serious health problems, such as:  ? Colon cancer.  ? Inflammatory bowel disease (IBD).  · Have had a procedure or surgery on your gastrointestinal (GI) tract.  Some people develop C. diff even though they are not clearly at risk.  What are the signs or symptoms?  · Watery poop (diarrhea).  · Fever.  · Tiredness (fatigue).  · Loss of appetite.  · Nausea.  · Swelling, pain, cramping, or tenderness in your belly (abdomen).  How is this treated?  · Stopping the antibiotics that you were taking when the C. diff infection began. Do this only as told by your doctor.  · Taking certain antibiotics to stop C. diff from growing.  · Taking donor poop (stool) from a healthy person and placing it into the colon. This may be done if the infection keeps coming back.  · Having surgery to remove the infected part of the  colon. This is rare.  Follow these instructions at home:  Medicines  · Take over-the-counter and prescription medicines only as told by your doctor.  · Take your antibiotic medicine as told by your doctor. Do not stop taking the antibiotic even if you start to feel better.  · Do not treat watery poop with medicines unless your doctor tells you to.  Eating and drinking    · Follow instructions from your doctor about eating or drinking.  · Eat bland foods in small amounts as you are able. These foods include:  ? Bananas.  ? Applesauce.  ? Rice.  ? Low-fat (lean) meats.  ? Toast.  ? Crackers.  · Follow your doctor's instructions on how to get enough fluids into your body. You may need to:  ? Drink clear fluids. This includes water, fruit juice you have added water to, or low-calorie sports drinks.  ? Suck on ice chips.  ? Take an ORS (oral rehydration solution).  · Avoid milk, caffeine, and alcohol.  · Drink enough fluid to keep your pee (urine) pale yellow.  Activity  · Rest as told by your doctor.  · Return to your normal activities as told by your doctor. Ask your doctor what activities are safe for you.  General instructions  · Wash your hands often with soap and water. Do this for at least 20 seconds. Bathe using soap and water daily.  · Be sure your home is clean before you leave the hospital or clinic to go home.  · Continue daily cleaning for at least a week after going home.  · Keep all follow-up visits as told by your doctor. This is important.  How is this prevented?  Hand hygiene    · Wash your hands well before you cook and after you use the bathroom. Use soap and water for at least 20 seconds. Make sure that people who live with you also wash their hands often.  · If you are being treated at a hospital or clinic, make sure that:  ? All doctors and nurses wash their hands with soap and water before touching you.  ? All visitors wash their hands with soap and water before touching you.  Contact  precautions  · If you get watery poop while you are in the hospital or a long-term care facility, let your doctor know right away.  · When you visit someone in the hospital or a long-term care facility, follow the rules for wearing a gown, gloves, or other protective equipment.  · If possible, avoid contact with people who have watery poop.  · If you are sick and live with other people, use a separate bathroom, if you can.  Clean environment  · Clean surfaces that are touched often every day. Use a product that has chlorine bleach in it. The bleach should be 10% solution. Be sure to:  ? Read the instructions to find out if the product you are using will work on what you are cleaning.  ? Clean toilets, bathtubs, sinks, door knobs, and work surfaces.  · If you are in the hospital, make sure that the staff cleans the surfaces in your room each day. Tell someone right away if body fluids have splashed or spilled.  Washing clothes and linens  · Use laundry soap that has chlorine bleach in it to wash clothes and linens. Be sure to:  ? Use powder soap instead of liquid.  ? Run your washing machine on the hot setting with nothing but soap in it. Do this once a month.  Contact a doctor if:  · Your symptoms do not get better or they get worse.  · Your symptoms go away and then come back.  · You have a fever.  · You have new symptoms.  Get help right away if:  · You have more pain or tenderness in your belly.  · Your poop is mostly bloody.  · Your poop looks dark black and tarry.  · You cannot eat or drink without vomiting.  · You have signs of not having enough fluids in your body. These include:  ? Dark pee, very little pee, or no pee.  ? Cracked lips or dry mouth.  ? No tears when you cry.  ? Sunken eyes.  ? Feeling sleepy.  ? Feeling weak or dizzy.  Summary  · C. diff infection may happen after taking antibiotic medicines.  · Symptoms include watery poop, fever, tiredness, loss of appetite, nausea, and belly  problems.  · Treatment starts with stopping the antibiotics you were using when the infection began. Certain antibiotics are then used to stop C. diff from growing.  · This infection is sometimes treated by placing donor poop into the colon or doing surgery.  · Washing hands and cleaning every day can help keep C. diff from spreading.  This information is not intended to replace advice given to you by your health care provider. Make sure you discuss any questions you have with your health care provider.  Document Revised: 05/13/2020 Document Reviewed: 05/13/2020  [employer] Patient Education © 2021 [employer]

## 2021-03-25 NOTE — PATIENT INSTRUCTIONS
Fiber Content in Foods  Fiber is a substance that is found in plant foods, such as fruits, vegetables, whole grains, nuts, seeds, and beans. As part of your treatment and recovery plan, your health care provider may recommend that you eat foods that have specific amounts of fiber. Some conditions may require a high-fiber diet and others may require a low-fiber diet.  This sheet gives you information about the dietary fiber content of some common foods. Your health care provider will tell you how much fiber you need in your diet. If ou have problems or questions, contact your health care provider or dietitian.  What foods have high fiber?                High-fiber foods contain 4 grams (g) or more of fiber per serving. They include:  Fruits  · Blackberries or raspberries (fresh) -- ½ cup (75 g) has 4 g of fiber.  · Pear (fresh) -- 1 medium (180 g) has 5.1 g of fiber.  · Prunes (dried) -- 6 to 8 pieces (57-76 g) has 5 g of fiber.  Vegetables  · Peas (frozen) -- ½ cup (80 g) has 4.4 g of fiber.  · Artichoke (fresh) -- 1 medium (340 g) has 10.3 g of fiber.  · Potato with skin (baked) -- 1 medium (173 g) has 4.4 g of fiber.  · Pumpkin (canned) -- ½ cup (122 g) has 5 g of fiber.  · [place] sprouts (cooked) -- ½ cup (78 g) has 4 g of fiber.  Grains  · Bran cereal -- ½ cup (31 g) has 8.6 g of fiber.  · Bulgur (cooked) -- ½ cup (70 g) has 4 g of fiber.  · Quinoa (cooked) -- 1 cup (185 g) has 5.2 g of fiber.  Meats and other proteins  · Mehta beans (canned) -- ½ cup (90 g) has 6.9 g of fiber.  · Mehta beans (dried and cooked) -- ½ cup (90 g) has 8 g of fiber.  · Lentils (cooked) -- ½ cup (90 g) has 7.1 g of fiber.  · Kidney beans (canned) -- ½ cup (92.5 g) has 7.1 g of fiber.  · Soybeans (canned, frozen, or fresh) -- ½ cup (92.5 g) has 5.2 g of fiber.  · Baked beans, plain or vegetarian (canned) -- ½ cup (130 g) has 5.2 g of fiber.  · Garbanzo beans or chickpeas (canned) -- ½ cup (90 g) has 6.6 g of fiber.  The items listed  above may not be a complete list of foods with high fiber. Actual amounts of fiber may be different depending on processing. Contact a dietitian for more information.  What foods have moderate fiber?                       Moderate-fiber foods contain 1-4 g of fiber per serving. They include:  Fruits  · Banana -- 1 medium (126 g) has 3.3 g of fiber.  · Melon -- 1 cup (155 g) has 1.4 g of fiber.  · Orange -- 1 small (154 g) has 3.7 g of fiber.  · Raisins -- ¼ cup (40 g) has 1.8 g of fiber.  · Applesauce, sweetened -- ½ cup (125 g) has 1.5 g of fiber.  · Blueberries (fresh) -- ½ cup (75 g) has 1.8 g of fiber.  · Strawberries (fresh, sliced) -- 1 cup (150 g) has 3 g of fiber.  Vegetables  · Broccoli (cooked) -- ½ cup (77.5 g) has 2.1 g of fiber.  · Carrots (cooked) -- ½ cup (77.5 g) has 2.2 g of fiber.  · Corn (canned or frozen) -- ½ cup (82.5 g) has 2.1 g of fiber.  · Potatoes, mashed -- ½ cup (105 g) has 1.6 g of fiber.  · Tomato -- 1 medium (62 g) has 1.5 g of fiber.  · Green beans (canned) -- ½ cup (83 g) has 2 g of fiber.  · Squash, winter -- ½ cup (58 g) has 1 g of fiber.  · Sweet potato, baked -- 1 medium (150 g) has 3 g of fiber.  Grains  · Long-grain brown rice (cooked) -- 1 cup (196 g) has 3.5 g of fiber.  · Bagel, plain -- one 4-inch (10-cm) bagel has 2 g of fiber.  · Instant oatmeal -- ½ cup (120 g) has about 2 g of fiber.  · Macaroni noodles, enriched (cooked) -- 1 cup (140 g) has 2.5 g of fiber.  · Multigrain cereal -- ½ cup (15 g) has about 2-4 g of fiber.  · Whole-wheat bread -- 1 slice (26 g) has 2 g of fiber.  · Whole-wheat spaghetti noodles -- ½ cup (70 g) has 3.2 g of fiber.  · Corn tortilla -- one 6-inch (15-cm) tortilla has 1.5 g of fiber.  Meats and other proteins  · Almonds -- ¼ cup or 1 oz (28 g) has 3.5 g of fiber.  · Sunflower seeds, in shell -- ¼ cup or ½ oz (11.5 g) has 1.1 g of fiber.  · Vegetable or soy bri -- 1 bri (70 g) has 3.4 g of fiber.  · Walnuts -- ¼ cup or 1 oz (30 g) has 2 g  of fiber.  The items listed above may not be a complete list of foods that have moderate amounts of fiber. Actual amounts of fiber may be different depending on processing. Contact a dietitian for more information.  What foods have low fiber?                        Low-fiber foods contain less than 1 g of fiber per serving. They include:  Fruits  · Fruit juice -- ½ cup or 4 fl oz (118 mL).  Vegetables  · Lettuce -- 1 cup (35 g).  · Spinach (raw) -- 1 cup (25 g).  · Cucumber (slices) -- ½ cup (60 g).  · Celery -- 1 stalk (40 g).  Grains  · Flour tortilla -- one 6-inch (15-cm) tortilla.  · White bread -- 1 slice (28 g).  · White rice (cooked) -- ½ cup (81.5 g).  Meats and other proteins  · Egg -- 1 large.  · Meat, poultry, or fish -- 3 oz (85 g).  Dairy  · Milk -- 1 cup or 8 fl oz (237 mL).  · Yogurt -- 1 cup (245 g).  The items listed above may not be a complete list of foods that are low in fiber. Actual amounts of fiber may be different depending on processing. Contact a dietitian for more information.  Summary  · Fiber is a substance that is found in plant foods, such as fruits, vegetables, whole grains, nuts, seeds, and beans.  · As part of your treatment and recovery plan, your health care provider may recommend that you eat foods that have specific amounts of fiber.  · High-fiber foods contain 4 grams (g) or more of fiber per serving.  This information is not intended to replace advice given to you by your health care provider. Make sure you discuss any questions you have with your health care provider.  Document Revised: 12/21/2020 Document Reviewed: 12/21/2020  Elsevier Patient Education © 2021 [employer] Inc.

## 2021-03-26 ENCOUNTER — PATIENT OUTREACH (OUTPATIENT)
Dept: CASE MANAGEMENT | Facility: OTHER | Age: 71
End: 2021-03-26

## 2021-03-26 DIAGNOSIS — Z79.4 TYPE 2 DIABETES MELLITUS WITHOUT COMPLICATION, WITH LONG-TERM CURRENT USE OF INSULIN (HCC): ICD-10-CM

## 2021-03-26 DIAGNOSIS — E11.9 TYPE 2 DIABETES MELLITUS WITHOUT COMPLICATION, WITH LONG-TERM CURRENT USE OF INSULIN (HCC): ICD-10-CM

## 2021-03-26 LAB
ALBUMIN SERPL-MCNC: 4.5 G/DL (ref 3.5–5.2)
ALBUMIN/GLOB SERPL: 2.1 G/DL
ALP SERPL-CCNC: 113 U/L (ref 39–117)
ALT SERPL-CCNC: 20 U/L (ref 1–41)
AST SERPL-CCNC: 15 U/L (ref 1–40)
BASOPHILS # BLD AUTO: 0.04 10*3/MM3 (ref 0–0.2)
BASOPHILS NFR BLD AUTO: 0.4 % (ref 0–1.5)
BILIRUB SERPL-MCNC: 0.6 MG/DL (ref 0–1.2)
BUN SERPL-MCNC: 14 MG/DL (ref 8–23)
BUN/CREAT SERPL: 12.5 (ref 7–25)
CALCIUM SERPL-MCNC: 9.8 MG/DL (ref 8.6–10.5)
CHLORIDE SERPL-SCNC: 94 MMOL/L (ref 98–107)
CO2 SERPL-SCNC: 25.9 MMOL/L (ref 22–29)
CREAT SERPL-MCNC: 1.12 MG/DL (ref 0.76–1.27)
EOSINOPHIL # BLD AUTO: 0.07 10*3/MM3 (ref 0–0.4)
EOSINOPHIL NFR BLD AUTO: 0.6 % (ref 0.3–6.2)
ERYTHROCYTE [DISTWIDTH] IN BLOOD BY AUTOMATED COUNT: 12.2 % (ref 12.3–15.4)
GLOBULIN SER CALC-MCNC: 2.1 GM/DL
GLUCOSE SERPL-MCNC: 248 MG/DL (ref 65–99)
HCT VFR BLD AUTO: 37.2 % (ref 37.5–51)
HGB BLD-MCNC: 12.5 G/DL (ref 13–17.7)
IMM GRANULOCYTES # BLD AUTO: 0.05 10*3/MM3 (ref 0–0.05)
IMM GRANULOCYTES NFR BLD AUTO: 0.5 % (ref 0–0.5)
LYMPHOCYTES # BLD AUTO: 1.82 10*3/MM3 (ref 0.7–3.1)
LYMPHOCYTES NFR BLD AUTO: 16.9 % (ref 19.6–45.3)
MCH RBC QN AUTO: 28.9 PG (ref 26.6–33)
MCHC RBC AUTO-ENTMCNC: 33.6 G/DL (ref 31.5–35.7)
MCV RBC AUTO: 85.9 FL (ref 79–97)
MONOCYTES # BLD AUTO: 0.78 10*3/MM3 (ref 0.1–0.9)
MONOCYTES NFR BLD AUTO: 7.2 % (ref 5–12)
NEUTROPHILS # BLD AUTO: 8.03 10*3/MM3 (ref 1.7–7)
NEUTROPHILS NFR BLD AUTO: 74.4 % (ref 42.7–76)
PLATELET # BLD AUTO: 176 10*3/MM3 (ref 140–450)
POTASSIUM SERPL-SCNC: 4.8 MMOL/L (ref 3.5–5.2)
PROT SERPL-MCNC: 6.6 G/DL (ref 6–8.5)
RBC # BLD AUTO: 4.33 10*6/MM3 (ref 4.14–5.8)
SODIUM SERPL-SCNC: 131 MMOL/L (ref 136–145)
WBC # BLD AUTO: 10.79 10*3/MM3 (ref 3.4–10.8)

## 2021-03-26 RX ORDER — GLIPIZIDE 2.5 MG/1
2.5 TABLET, EXTENDED RELEASE ORAL DAILY
Qty: 30 TABLET | Refills: 5 | Status: SHIPPED | OUTPATIENT
Start: 2021-03-26 | End: 2021-03-29

## 2021-03-26 RX ORDER — BLOOD SUGAR DIAGNOSTIC
STRIP MISCELLANEOUS
Qty: 50 EACH | Refills: 11 | OUTPATIENT
Start: 2021-03-26

## 2021-03-26 RX ORDER — BLOOD-GLUCOSE METER
1 KIT MISCELLANEOUS 2 TIMES DAILY
Qty: 1 EACH | Refills: 0 | OUTPATIENT
Start: 2021-03-26

## 2021-03-26 NOTE — OUTREACH NOTE
Care Coordination Note    Emmett, Clinical Manager at [facility] contacted by AC with request for Home Health RN, Siri Arias, to review blood sugar monitoring with patients. Specific guidelines are to check fasting glucose at least twice weekly and to monitor blood sugar 2 hours postprandial (after eating) per Alexandrea Jimenez' PA recommendations. Emmett to communicate instructions to Siri.     Ludivina Isbell RN  Ambulatory     3/26/2021, 11:39 EDT

## 2021-03-26 NOTE — PROGRESS NOTES
His sodium is still low need him to restrict his water intake more.  His glucose is elevated and I know he is not able to take the Metformin due to the GI upset and recent diarrhea.  I do want to start him on a low-dose oral sulfonylurea--- need to find out if home health can work with him on checking his glucose as needed and few times a week fasting and 2 hours postprandial.  Does he have a glucometer does he needs supplies?  I know that this medication can cause hypoglycemia his blood sugar was 248 yesterday.  Want BMP in 1 week with home health

## 2021-03-26 NOTE — OUTREACH NOTE
Patient Outreach Note    Spoke with patient regarding his elevated blood sugar in his most recent labs. Relayed Alexandrea Jimenez' request to have patient monitor blood sugars. Patient states he does not have a glucometer or the equipment. Patient also states he would not know how to use it if he had one. ACM reassured the patient his home health RN will educate him on how to use the glucometer machine. Patient agreeable to Alexandrea Jimenez' ordering a glucometer and supplies to his preferred pharmacy. Patient instructed to call patient if he has difficulty with transportation to pharmacy to  the device and supplies. Patient verbalized understand. ACM contact reviewed with patient. Follow up call scheduled for 4/2/21 for continued assessment of progress and needs.     Ludivina Isbell RN  Ambulatory     3/26/2021, 11:45 EDT

## 2021-03-29 ENCOUNTER — TELEPHONE (OUTPATIENT)
Dept: FAMILY MEDICINE CLINIC | Facility: CLINIC | Age: 71
End: 2021-03-29

## 2021-03-29 NOTE — TELEPHONE ENCOUNTER
I have made a note to the pharmacy that we stop glipizide due to the shaking in clamminess and sent Tradjenta to see if his insurance would cover this and this works differently and will not drop his blood sugar

## 2021-03-29 NOTE — TELEPHONE ENCOUNTER
PATIENT STATES HE IS HAVING ALLERGIC REACTION TO glipizide (GLUCOTROL XL) 2.5 MG 24 hr tablet PATIENT GETS VERY COLD AND SHAKES UNCONTROLLABLY. PATIENT STATES IT IS VERY UNNERVING.    PATIENT REQUEST NEW MEDICATION TO REPLACE glipizide (GLUCOTROL XL) 2.5 MG 24 hr tablet.    [id number] - [place], [place] - [address] AT [facility] [address] [facility] - [phone] [facility] [phone]   [phone]    PLEASE CALL PATIENT: [phone]

## 2021-03-31 ENCOUNTER — TELEPHONE (OUTPATIENT)
Dept: FAMILY MEDICINE CLINIC | Facility: CLINIC | Age: 71
End: 2021-03-31

## 2021-04-01 RX ORDER — PROCHLORPERAZINE MALEATE 10 MG
10 TABLET ORAL EVERY 6 HOURS PRN
Qty: 30 TABLET | Refills: 1 | Status: SHIPPED | OUTPATIENT
Start: 2021-04-01

## 2021-04-01 RX ORDER — GLIPIZIDE 2.5 MG/1
2.5 TABLET, EXTENDED RELEASE ORAL DAILY
Qty: 30 TABLET | Refills: 5
Start: 2021-04-01

## 2021-04-01 NOTE — TELEPHONE ENCOUNTER
Pt states he is still having loose bowels, pt states he is still very sick at his stomach also.  Pt ok with taking glimepiride and he states he will try his best to eat.  Thanks

## 2021-04-07 ENCOUNTER — APPOINTMENT (OUTPATIENT)
Dept: CT IMAGING | Facility: HOSPITAL | Age: 71
End: 2021-04-07

## 2021-04-07 ENCOUNTER — HOSPITAL ENCOUNTER (INPATIENT)
Facility: HOSPITAL | Age: 71
LOS: 2 days | Discharge: HOME | End: 2021-04-10
Attending: EMERGENCY MEDICINE | Admitting: INTERNAL MEDICINE

## 2021-04-07 DIAGNOSIS — R74.8 ELEVATED LIPASE: ICD-10-CM

## 2021-04-07 DIAGNOSIS — N12 PYELONEPHRITIS: ICD-10-CM

## 2021-04-07 DIAGNOSIS — D72.828 OTHER ELEVATED WHITE BLOOD CELL (WBC) COUNT: ICD-10-CM

## 2021-04-07 DIAGNOSIS — E87.1 HYPONATREMIA: ICD-10-CM

## 2021-04-07 DIAGNOSIS — R10.9 ABDOMINAL PAIN, UNSPECIFIED ABDOMINAL LOCATION: Primary | ICD-10-CM

## 2021-04-07 LAB
ALBUMIN SERPL-MCNC: 4.2 G/DL (ref 3.5–5.2)
ALBUMIN/GLOB SERPL: 1.7 G/DL
ALP SERPL-CCNC: 100 U/L (ref 39–117)
ALT SERPL W P-5'-P-CCNC: 17 U/L (ref 1–41)
ANION GAP SERPL CALCULATED.3IONS-SCNC: 17 MMOL/L (ref 5–15)
AST SERPL-CCNC: 16 U/L (ref 1–40)
BASOPHILS # BLD AUTO: 0.03 10*3/MM3 (ref 0–0.2)
BASOPHILS NFR BLD AUTO: 0.2 % (ref 0–1.5)
BILIRUB SERPL-MCNC: 0.5 MG/DL (ref 0–1.2)
BILIRUB UR QL STRIP: NEGATIVE
BUN SERPL-MCNC: 14 MG/DL (ref 8–23)
BUN/CREAT SERPL: 13.3 (ref 7–25)
CALCIUM SPEC-SCNC: 8.6 MG/DL (ref 8.6–10.5)
CHLORIDE SERPL-SCNC: 87 MMOL/L (ref 98–107)
CLARITY UR: CLEAR
CO2 SERPL-SCNC: 21 MMOL/L (ref 22–29)
COLOR UR: YELLOW
CREAT SERPL-MCNC: 1.05 MG/DL (ref 0.76–1.27)
D-LACTATE SERPL-SCNC: 1.3 MMOL/L (ref 0.5–2)
DEPRECATED RDW RBC AUTO: 36.9 FL (ref 37–54)
EOSINOPHIL # BLD AUTO: 0.08 10*3/MM3 (ref 0–0.4)
EOSINOPHIL NFR BLD AUTO: 0.7 % (ref 0.3–6.2)
ERYTHROCYTE [DISTWIDTH] IN BLOOD BY AUTOMATED COUNT: 12.1 % (ref 12.3–15.4)
GFR SERPL CREATININE-BSD FRML MDRD: 70 ML/MIN/1.73
GLOBULIN UR ELPH-MCNC: 2.5 GM/DL
GLUCOSE BLDC GLUCOMTR-MCNC: 233 MG/DL (ref 70–130)
GLUCOSE SERPL-MCNC: 235 MG/DL (ref 65–99)
GLUCOSE UR STRIP-MCNC: ABNORMAL MG/DL
HCT VFR BLD AUTO: 35.6 % (ref 37.5–51)
HGB BLD-MCNC: 12.8 G/DL (ref 13–17.7)
HGB UR QL STRIP.AUTO: NEGATIVE
HOLD SPECIMEN: NORMAL
HOLD SPECIMEN: NORMAL
IMM GRANULOCYTES # BLD AUTO: 0.07 10*3/MM3 (ref 0–0.05)
IMM GRANULOCYTES NFR BLD AUTO: 0.6 % (ref 0–0.5)
KETONES UR QL STRIP: NEGATIVE
LEUKOCYTE ESTERASE UR QL STRIP.AUTO: NEGATIVE
LIPASE SERPL-CCNC: 79 U/L (ref 13–60)
LYMPHOCYTES # BLD AUTO: 1.87 10*3/MM3 (ref 0.7–3.1)
LYMPHOCYTES NFR BLD AUTO: 15.3 % (ref 19.6–45.3)
MCH RBC QN AUTO: 30.5 PG (ref 26.6–33)
MCHC RBC AUTO-ENTMCNC: 36 G/DL (ref 31.5–35.7)
MCV RBC AUTO: 85 FL (ref 79–97)
MONOCYTES # BLD AUTO: 1.06 10*3/MM3 (ref 0.1–0.9)
MONOCYTES NFR BLD AUTO: 8.7 % (ref 5–12)
NEUTROPHILS NFR BLD AUTO: 74.5 % (ref 42.7–76)
NEUTROPHILS NFR BLD AUTO: 9.1 10*3/MM3 (ref 1.7–7)
NITRITE UR QL STRIP: NEGATIVE
NRBC BLD AUTO-RTO: 0 /100 WBC (ref 0–0.2)
PH UR STRIP.AUTO: 7 [PH] (ref 5–8)
PLATELET # BLD AUTO: 203 10*3/MM3 (ref 140–450)
PMV BLD AUTO: 12.2 FL (ref 6–12)
POTASSIUM SERPL-SCNC: 3.1 MMOL/L (ref 3.5–5.2)
PROT SERPL-MCNC: 6.7 G/DL (ref 6–8.5)
PROT UR QL STRIP: ABNORMAL
RBC # BLD AUTO: 4.19 10*6/MM3 (ref 4.14–5.8)
SARS-COV-2 ORF1AB RESP QL NAA+PROBE: NOT DETECTED
SODIUM SERPL-SCNC: 125 MMOL/L (ref 136–145)
SP GR UR STRIP: 1.01 (ref 1–1.03)
UROBILINOGEN UR QL STRIP: ABNORMAL
WBC # BLD AUTO: 12.21 10*3/MM3 (ref 3.4–10.8)
WHOLE BLOOD HOLD SPECIMEN: NORMAL
WHOLE BLOOD HOLD SPECIMEN: NORMAL

## 2021-04-07 PROCEDURE — 83605 ASSAY OF LACTIC ACID: CPT | Performed by: EMERGENCY MEDICINE

## 2021-04-07 PROCEDURE — 25010000002 PROCHLORPERAZINE 10 MG/2ML SOLUTION: Performed by: INTERNAL MEDICINE

## 2021-04-07 PROCEDURE — 25010000002 CEFTRIAXONE PER 250 MG: Performed by: EMERGENCY MEDICINE

## 2021-04-07 PROCEDURE — 36415 COLL VENOUS BLD VENIPUNCTURE: CPT

## 2021-04-07 PROCEDURE — G0378 HOSPITAL OBSERVATION PER HR: HCPCS

## 2021-04-07 PROCEDURE — 80053 COMPREHEN METABOLIC PANEL: CPT | Performed by: EMERGENCY MEDICINE

## 2021-04-07 PROCEDURE — 25010000002 ENOXAPARIN PER 10 MG: Performed by: INTERNAL MEDICINE

## 2021-04-07 PROCEDURE — 25810000003 SODIUM CHLORIDE 0.9 % WITH KCL 20 MEQ 20-0.9 MEQ/L-% SOLUTION: Performed by: INTERNAL MEDICINE

## 2021-04-07 PROCEDURE — U0005 INFEC AGEN DETEC AMPLI PROBE: HCPCS | Performed by: EMERGENCY MEDICINE

## 2021-04-07 PROCEDURE — 83690 ASSAY OF LIPASE: CPT | Performed by: EMERGENCY MEDICINE

## 2021-04-07 PROCEDURE — 74177 CT ABD & PELVIS W/CONTRAST: CPT

## 2021-04-07 PROCEDURE — 87086 URINE CULTURE/COLONY COUNT: CPT | Performed by: EMERGENCY MEDICINE

## 2021-04-07 PROCEDURE — 25010000002 ONDANSETRON PER 1 MG: Performed by: EMERGENCY MEDICINE

## 2021-04-07 PROCEDURE — 81003 URINALYSIS AUTO W/O SCOPE: CPT | Performed by: EMERGENCY MEDICINE

## 2021-04-07 PROCEDURE — 99284 EMERGENCY DEPT VISIT MOD MDM: CPT

## 2021-04-07 PROCEDURE — U0004 COV-19 TEST NON-CDC HGH THRU: HCPCS | Performed by: EMERGENCY MEDICINE

## 2021-04-07 PROCEDURE — 25010000002 FENTANYL CITRATE (PF) 100 MCG/2ML SOLUTION: Performed by: EMERGENCY MEDICINE

## 2021-04-07 PROCEDURE — 25010000002 IOPAMIDOL 61 % SOLUTION: Performed by: EMERGENCY MEDICINE

## 2021-04-07 PROCEDURE — 85025 COMPLETE CBC W/AUTO DIFF WBC: CPT

## 2021-04-07 PROCEDURE — 25010000002 ONDANSETRON PER 1 MG: Performed by: INTERNAL MEDICINE

## 2021-04-07 PROCEDURE — 25010000002 MORPHINE PER 10 MG: Performed by: EMERGENCY MEDICINE

## 2021-04-07 PROCEDURE — 82962 GLUCOSE BLOOD TEST: CPT

## 2021-04-07 RX ORDER — ATORVASTATIN CALCIUM 80 MG/1
80 TABLET, FILM COATED ORAL DAILY
Status: DISCONTINUED | OUTPATIENT
Start: 2021-04-07 | End: 2021-04-10 | Stop reason: HOSPADM

## 2021-04-07 RX ORDER — MORPHINE SULFATE 2 MG/ML
4 INJECTION, SOLUTION INTRAMUSCULAR; INTRAVENOUS ONCE
Status: COMPLETED | OUTPATIENT
Start: 2021-04-07 | End: 2021-04-07

## 2021-04-07 RX ORDER — CEFTRIAXONE SODIUM 1 G/50ML
1 INJECTION, SOLUTION INTRAVENOUS ONCE
Status: COMPLETED | OUTPATIENT
Start: 2021-04-07 | End: 2021-04-07

## 2021-04-07 RX ORDER — ONDANSETRON 4 MG/1
4 TABLET, FILM COATED ORAL EVERY 6 HOURS PRN
Status: DISCONTINUED | OUTPATIENT
Start: 2021-04-07 | End: 2021-04-10 | Stop reason: HOSPADM

## 2021-04-07 RX ORDER — ONDANSETRON 2 MG/ML
4 INJECTION INTRAMUSCULAR; INTRAVENOUS ONCE
Status: COMPLETED | OUTPATIENT
Start: 2021-04-07 | End: 2021-04-07

## 2021-04-07 RX ORDER — TAMSULOSIN HYDROCHLORIDE 0.4 MG/1
0.4 CAPSULE ORAL DAILY
Status: DISCONTINUED | OUTPATIENT
Start: 2021-04-07 | End: 2021-04-10 | Stop reason: HOSPADM

## 2021-04-07 RX ORDER — PROCHLORPERAZINE EDISYLATE 5 MG/ML
10 INJECTION INTRAMUSCULAR; INTRAVENOUS EVERY 6 HOURS PRN
Status: DISCONTINUED | OUTPATIENT
Start: 2021-04-07 | End: 2021-04-10 | Stop reason: HOSPADM

## 2021-04-07 RX ORDER — SODIUM CHLORIDE 0.9 % (FLUSH) 0.9 %
10 SYRINGE (ML) INJECTION AS NEEDED
Status: DISCONTINUED | OUTPATIENT
Start: 2021-04-07 | End: 2021-04-10 | Stop reason: HOSPADM

## 2021-04-07 RX ORDER — PRIMIDONE 50 MG/1
50 TABLET ORAL EVERY 12 HOURS SCHEDULED
Status: DISCONTINUED | OUTPATIENT
Start: 2021-04-07 | End: 2021-04-10 | Stop reason: HOSPADM

## 2021-04-07 RX ORDER — QUETIAPINE FUMARATE 100 MG/1
100 TABLET, FILM COATED ORAL NIGHTLY
Status: DISCONTINUED | OUTPATIENT
Start: 2021-04-07 | End: 2021-04-10 | Stop reason: HOSPADM

## 2021-04-07 RX ORDER — CEFTRIAXONE SODIUM 2 G/50ML
2 INJECTION, SOLUTION INTRAVENOUS EVERY 24 HOURS
Status: DISCONTINUED | OUTPATIENT
Start: 2021-04-08 | End: 2021-04-08

## 2021-04-07 RX ORDER — PANTOPRAZOLE SODIUM 40 MG/1
40 TABLET, DELAYED RELEASE ORAL EVERY MORNING
Refills: 11 | Status: DISCONTINUED | OUTPATIENT
Start: 2021-04-08 | End: 2021-04-10 | Stop reason: HOSPADM

## 2021-04-07 RX ORDER — NICOTINE 21 MG/24HR
1 PATCH, TRANSDERMAL 24 HOURS TRANSDERMAL
Status: DISCONTINUED | OUTPATIENT
Start: 2021-04-08 | End: 2021-04-10 | Stop reason: HOSPADM

## 2021-04-07 RX ORDER — ACETAMINOPHEN 325 MG/1
650 TABLET ORAL EVERY 4 HOURS PRN
Status: DISCONTINUED | OUTPATIENT
Start: 2021-04-07 | End: 2021-04-10 | Stop reason: HOSPADM

## 2021-04-07 RX ORDER — AMLODIPINE BESYLATE 5 MG/1
5 TABLET ORAL
Status: DISCONTINUED | OUTPATIENT
Start: 2021-04-07 | End: 2021-04-10 | Stop reason: HOSPADM

## 2021-04-07 RX ORDER — INSULIN LISPRO 100 [IU]/ML
0-9 INJECTION, SOLUTION INTRAVENOUS; SUBCUTANEOUS
Status: DISCONTINUED | OUTPATIENT
Start: 2021-04-08 | End: 2021-04-10 | Stop reason: HOSPADM

## 2021-04-07 RX ORDER — FENTANYL CITRATE 50 UG/ML
50 INJECTION, SOLUTION INTRAMUSCULAR; INTRAVENOUS ONCE
Status: COMPLETED | OUTPATIENT
Start: 2021-04-07 | End: 2021-04-07

## 2021-04-07 RX ORDER — ONDANSETRON 2 MG/ML
4 INJECTION INTRAMUSCULAR; INTRAVENOUS EVERY 6 HOURS PRN
Status: DISCONTINUED | OUTPATIENT
Start: 2021-04-07 | End: 2021-04-10 | Stop reason: HOSPADM

## 2021-04-07 RX ORDER — SODIUM CHLORIDE AND POTASSIUM CHLORIDE 150; 900 MG/100ML; MG/100ML
100 INJECTION, SOLUTION INTRAVENOUS CONTINUOUS
Status: DISCONTINUED | OUTPATIENT
Start: 2021-04-07 | End: 2021-04-08

## 2021-04-07 RX ORDER — CHOLECALCIFEROL (VITAMIN D3) 125 MCG
1000 CAPSULE ORAL DAILY
Status: DISCONTINUED | OUTPATIENT
Start: 2021-04-07 | End: 2021-04-10 | Stop reason: HOSPADM

## 2021-04-07 RX ORDER — DEXTROSE MONOHYDRATE 25 G/50ML
25 INJECTION, SOLUTION INTRAVENOUS
Status: DISCONTINUED | OUTPATIENT
Start: 2021-04-07 | End: 2021-04-10 | Stop reason: HOSPADM

## 2021-04-07 RX ORDER — MELATONIN
1000 DAILY
Status: DISCONTINUED | OUTPATIENT
Start: 2021-04-07 | End: 2021-04-10 | Stop reason: HOSPADM

## 2021-04-07 RX ORDER — CARVEDILOL 25 MG/1
25 TABLET ORAL 2 TIMES DAILY WITH MEALS
Status: DISCONTINUED | OUTPATIENT
Start: 2021-04-07 | End: 2021-04-10 | Stop reason: HOSPADM

## 2021-04-07 RX ORDER — NITROGLYCERIN 0.4 MG/1
0.4 TABLET SUBLINGUAL
Status: DISCONTINUED | OUTPATIENT
Start: 2021-04-07 | End: 2021-04-10 | Stop reason: HOSPADM

## 2021-04-07 RX ORDER — LISINOPRIL 40 MG/1
40 TABLET ORAL DAILY
Status: DISCONTINUED | OUTPATIENT
Start: 2021-04-07 | End: 2021-04-10 | Stop reason: HOSPADM

## 2021-04-07 RX ORDER — PROCHLORPERAZINE EDISYLATE 5 MG/ML
10 INJECTION INTRAMUSCULAR; INTRAVENOUS ONCE
Status: COMPLETED | OUTPATIENT
Start: 2021-04-07 | End: 2021-04-07

## 2021-04-07 RX ORDER — NICOTINE POLACRILEX 4 MG
15 LOZENGE BUCCAL
Status: DISCONTINUED | OUTPATIENT
Start: 2021-04-07 | End: 2021-04-10 | Stop reason: HOSPADM

## 2021-04-07 RX ORDER — L.ACID,PARA/B.BIFIDUM/S.THERM 8B CELL
1 CAPSULE ORAL 2 TIMES DAILY
Status: DISCONTINUED | OUTPATIENT
Start: 2021-04-07 | End: 2021-04-10 | Stop reason: HOSPADM

## 2021-04-07 RX ORDER — UREA 10 %
3 LOTION (ML) TOPICAL NIGHTLY PRN
Status: DISCONTINUED | OUTPATIENT
Start: 2021-04-07 | End: 2021-04-10 | Stop reason: HOSPADM

## 2021-04-07 RX ADMIN — ACETAMINOPHEN 650 MG: 325 TABLET, FILM COATED ORAL at 19:53

## 2021-04-07 RX ADMIN — ENOXAPARIN SODIUM 40 MG: 40 INJECTION SUBCUTANEOUS at 18:52

## 2021-04-07 RX ADMIN — FENTANYL CITRATE 50 MCG: 50 INJECTION, SOLUTION INTRAMUSCULAR; INTRAVENOUS at 14:50

## 2021-04-07 RX ADMIN — MORPHINE SULFATE 4 MG: 2 INJECTION, SOLUTION INTRAMUSCULAR; INTRAVENOUS at 13:33

## 2021-04-07 RX ADMIN — Medication 1 CAPSULE: at 21:19

## 2021-04-07 RX ADMIN — IOPAMIDOL 85 ML: 612 INJECTION, SOLUTION INTRAVENOUS at 13:42

## 2021-04-07 RX ADMIN — PRIMIDONE 50 MG: 50 TABLET ORAL at 21:19

## 2021-04-07 RX ADMIN — ONDANSETRON 4 MG: 2 INJECTION INTRAMUSCULAR; INTRAVENOUS at 21:15

## 2021-04-07 RX ADMIN — SODIUM CHLORIDE, POTASSIUM CHLORIDE, SODIUM LACTATE AND CALCIUM CHLORIDE 500 ML: 600; 310; 30; 20 INJECTION, SOLUTION INTRAVENOUS at 13:31

## 2021-04-07 RX ADMIN — Medication 3 MG: at 21:19

## 2021-04-07 RX ADMIN — ONDANSETRON 4 MG: 2 INJECTION INTRAMUSCULAR; INTRAVENOUS at 13:32

## 2021-04-07 RX ADMIN — PROCHLORPERAZINE EDISYLATE 10 MG: 5 INJECTION INTRAMUSCULAR; INTRAVENOUS at 18:51

## 2021-04-07 RX ADMIN — ACETAMINOPHEN 650 MG: 325 TABLET, FILM COATED ORAL at 23:50

## 2021-04-07 RX ADMIN — POTASSIUM CHLORIDE AND SODIUM CHLORIDE 100 ML/HR: 900; 150 INJECTION, SOLUTION INTRAVENOUS at 18:47

## 2021-04-07 RX ADMIN — ONDANSETRON 4 MG: 2 INJECTION INTRAMUSCULAR; INTRAVENOUS at 15:59

## 2021-04-07 RX ADMIN — CEFTRIAXONE SODIUM 1 G: 1 INJECTION, SOLUTION INTRAVENOUS at 14:53

## 2021-04-07 RX ADMIN — TAMSULOSIN HYDROCHLORIDE 0.4 MG: 0.4 CAPSULE ORAL at 21:19

## 2021-04-07 RX ADMIN — NICOTINE 1 PATCH: 21 PATCH, EXTENDED RELEASE TRANSDERMAL at 23:25

## 2021-04-07 RX ADMIN — QUETIAPINE FUMARATE 100 MG: 100 TABLET ORAL at 21:19

## 2021-04-07 RX ADMIN — CARVEDILOL 25 MG: 25 TABLET, FILM COATED ORAL at 21:19

## 2021-04-07 RX ADMIN — ATORVASTATIN CALCIUM 80 MG: 80 TABLET, FILM COATED ORAL at 21:19

## 2021-04-07 NOTE — ED PROVIDER NOTES
Subjective   62-year-old male with past medical history of COPD, CAD, diabetes, dyslipidemia, PVD, OA here for chief complaint of left lower quadrant abdominal pain.  History is obtained from the patient.  The patient states that it starts on the side and radiates down to the left lower quadrant.  He denies any testicular pain.  He denies any penile discharge.  He denies any hematuria.  Patient denies any blood in stool or any dark black stools.  He states that the pain is sharp, nothing makes it better, nothing makes it worse, moderate. Pt states that he does have diarrhea that is on and off but this not different for him.  Pt complains of nausea but denies any vomiting.  Pt denies any fevers or chills.  Patient denies any chest pain or shortness of breath.          Review of Systems   All other systems reviewed and are negative.      Past Medical History:   Diagnosis Date   • Abnormal PSA    • Acute myocardial infarction (CMS/HCC) 2004    Stents   • Apnea, sleep    • Atherosclerotic heart disease    • Chronic pain     Chronic low back paoin, MRI 12/11,  L3 compression deformity, L2 degenerative disc disease and L5-S1 degenerative disc disease.   • Colon polyp    • Constipation    • COPD (chronic obstructive pulmonary disease) (CMS/HCC)    • Coronary artery disease    • Depressed bipolar II disorder (CMS/HCC)    • Diabetes mellitus (CMS/HCC)    • Essential tremor    • Fatigue    • GERD (gastroesophageal reflux disease)    • H/O transfusion of packed red blood cells    • Health care maintenance    • Hearing loss    • History of back pain    • Hypercholesterolemia    • Hyperlipidemia    • Hypertension    • Leukocytosis    • Osteoarthritis    • Proteinuria    • PVD (peripheral vascular disease) (CMS/HCC)    • Sebaceous cyst    • Seborrheic dermatitis    • Sleep apnea    • Tinea corporis    • Tobacco use    • Tremor        Allergies   Allergen Reactions   • Amoxicillin Diarrhea and Nausea Only     Severe nausea   •  Clopidogrel Itching       Past Surgical History:   Procedure Laterality Date   • ANGIOPLASTY      Cath Stent Placement   • COLONOSCOPY  01/22/2014    ta polyps   • COLONOSCOPY N/A 11/29/2016    Procedure: COLONOSCOPY TO CECUM AND TERMINAL ILEUM WITH HOT POLYPECTOMIES;  Surgeon: Ivette Franco MD;  Location[facility];  Service:    • COLONOSCOPY N/A 12/16/2019    Procedure: COLONOSCOPY to cecum with hot snare, cold biopsy polypectomies with clip placement x2;  Surgeon: Ivette Franco MD;  Location: [facility] END[facility];  Service: Gastroenterology   • CORONARY STENT PLACEMENT  2004   • ENDOSCOPY N/A 12/16/2019    Procedure: ESOPHAGOGASTRODUODENOSCOPY with biopsies;  Surgeon: Ivette Franco MD;  Location: [facility];  Service: Gastroenterology   • ENDOSCOPY N/A 2/23/2021    Procedure: ESOPHAGOGASTRODUODENOSCOPY;  Surgeon: Collins Nolasco MD;  Location: [facility] ENDOSCOPY;  Service: Gastroenterology;  Laterality: N/A;  PRE- EPIGASTRIC PAIN, NAUSEA  POST- IRREGULAR Z-LINE   • EYE SURGERY      Cataract Surgery   • HAND SURGERY         Family History   Problem Relation Age of Onset   • Cancer Mother    • Diabetes Mother    • Hypertension Mother    • Colon cancer Mother    • Heart disease Father    • Cancer Brother    • Stroke Brother        Social History     Socioeconomic History   • Marital status:      Spouse name: Not on file   • Number of children: Not on file   • Years of education: Not on file   • Highest education level: Not on file   Tobacco Use   • Smoking status: Current Every Day Smoker     Packs/day: 0.50     Years: 47.00     Pack years: 23.50     Types: Cigarettes   • Smokeless tobacco: Never Used   • Tobacco comment: CAFFEINE USE: 4 CANS DIET [place] [employer] DAILY   Vaping Use   • Vaping Use: Never used   Substance and Sexual Activity   • Alcohol use: Yes     Comment: very seldom   • Drug use: No   • Sexual activity: Defer           Objective   Physical Exam  Vitals reviewed.    Constitutional:       General: He is not in acute distress.     Appearance: He is not ill-appearing, toxic-appearing or diaphoretic.   HENT:      Head: Normocephalic and atraumatic.      Mouth/Throat:      Mouth: Mucous membranes are moist.      Pharynx: Oropharynx is clear. No pharyngeal swelling or oropharyngeal exudate.   Eyes:      General: No scleral icterus.     Extraocular Movements: Extraocular movements intact.      Pupils: Pupils are equal, round, and reactive to light.   Cardiovascular:      Rate and Rhythm: Regular rhythm.      Heart sounds: Normal heart sounds. No murmur heard.   No friction rub. No gallop.    Pulmonary:      Effort: Pulmonary effort is normal. No respiratory distress.      Breath sounds: Normal breath sounds. No stridor. No wheezing, rhonchi or rales.   Chest:      Chest wall: No tenderness.   Abdominal:      General: Abdomen is flat. Bowel sounds are normal. There is no distension or abdominal bruit. There are no signs of injury.      Palpations: Abdomen is soft. There is no shifting dullness, mass or pulsatile mass.      Tenderness: There is abdominal tenderness in the left lower quadrant. There is no right CVA tenderness, left CVA tenderness, guarding or rebound. Negative signs include Pineda's sign.      Hernia: No hernia is present.   Genitourinary:     Penis: Normal.       Testes: Normal. Cremasteric reflex is present.         Right: Mass, tenderness or swelling not present.         Left: Mass, tenderness or swelling not present.   Skin:     General: Skin is warm and dry.      Capillary Refill: Capillary refill takes less than 2 seconds.      Coloration: Skin is not cyanotic or jaundiced.   Neurological:      General: No focal deficit present.      Mental Status: He is alert and oriented to person, place, and time.      Motor: No weakness.   Psychiatric:         Mood and Affect: Mood normal.         Behavior: Behavior normal.         Procedures           ED Course  ED Course as  of Apr 07 1458   Wed Apr 07, 2021   1403 Lipase(!): 79 [KS]   1403 Glucose(!): 235 [KS]   1403 Sodium(!): 125 [KS]   1403 Anion Gap(!): 17.0 [KS]   1422 Glucose(!): 500 mg/dL (2+) [KS]   1454 WBC(!): 12.21 [KS]      ED Course User Index  [KS] Micheal Esquivel MD                                           [facility]  Number of Diagnoses or Management Options     Amount and/or Complexity of Data Reviewed  Clinical lab tests: ordered and reviewed  Tests in the radiology section of CPT®: ordered and reviewed  Decide to obtain previous medical records or to obtain history from someone other than the patient: yes    Risk of Complications, Morbidity, and/or Mortality  General comments: When I first saw the patient, the patient appeared nontoxic.  Patient was complaining of abdominal pain.  IV was started and labs were ordered.  Labs remarkable for hyponatremia, mild leukocytosis, and elevation in lipase.  Given the abdominal pain and elevated lipase, I did get a CT of the abdomen pelvis with contrast.  This is negative for pancreatitis, SBO, diverticulitis, appendicitis.  There was some concern for possible early versus chronic pyelonephritis.  Given this, patient was given 1 dose of IV ceftriaxone in the ED.  I did get a urine culture that is pending.  Patient's urine did not look grossly infected.  However given the CT scan results and the fact that the patient was having pain, I decided to give the patient 1 dose of antibiotics in the ED.  I did speak to the ED pharmacist prior to giving the patient antibiotics given that the patient is allergic to amoxicillin.  Per the pharmacist, patient was not truly allergic to amoxicillin since it caused nausea and was more intolerance.  She followed this not true allergy.  Patient was given 1 dose of IV morphine he continued complaint of pain.  Therefore, he was given 1 dose of IV fentanyl.  At this point, the patient was admitted to the hospitalist for further management.  I spoke to   Leonardo who is excepted the patient.  I defer all further management of this patient to the inpatient hospitalist.  Patient was stable in the ED.          Final diagnoses:   Abdominal pain, unspecified abdominal location   Elevated lipase   Hyponatremia   Other elevated white blood cell (WBC) count   Pyelonephritis       ED Disposition  ED Disposition     ED Disposition Condition Comment    Decision to Admit  Level of Care: Telemetry [5]   Diagnosis: Abdominal pain, unspecified abdominal location [[id number]]   Admitting Physician: SAIGE FOX [[id number]]   Attending Physician: SAIGE FOX [[id number]]            No follow-up provider specified.       Medication List      No changes were made to your prescriptions during this visit.          Micheal Esquivel MD  04/07/21 [id number]

## 2021-04-07 NOTE — PROGRESS NOTES
"Pharmacy Consult - Lovenox    Felicita Mike has been consulted for pharmacy to dose enoxaparin for VTE prophylaxis per Dr. Patterson's request.         Relevant clinical data and objective history reviewed:  70 y.o. male 172.7 cm (67.99\") 71.3 kg (157 lb 3 oz)    Home Anticoagulation: none    Past Medical History:   Diagnosis Date   • Abnormal PSA    • Acute myocardial infarction (CMS/HCC) 2004    Stents   • Apnea, sleep    • Atherosclerotic heart disease    • Chronic pain     Chronic low back paoin, MRI 12/11,  L3 compression deformity, L2 degenerative disc disease and L5-S1 degenerative disc disease.   • Colon polyp    • Constipation    • COPD (chronic obstructive pulmonary disease) (CMS/HCC)    • Coronary artery disease    • Depressed bipolar II disorder (CMS/HCC)    • Diabetes mellitus (CMS/HCC)    • Essential tremor    • Fatigue    • GERD (gastroesophageal reflux disease)    • H/O transfusion of packed red blood cells    • Health care maintenance    • Hearing loss    • History of back pain    • Hypercholesterolemia    • Hyperlipidemia    • Hypertension    • Leukocytosis    • Osteoarthritis    • Proteinuria    • PVD (peripheral vascular disease) (CMS/[facility])    • Sebaceous cyst    • Seborrheic dermatitis    • Sleep apnea    • Tinea corporis    • Tobacco use    • Tremor      is allergic to amoxicillin and clopidogrel.    Lab Results   Component Value Date    WBC 12.21 (H) 04/07/2021    HGB 12.8 (L) 04/07/2021    HCT 35.6 (L) 04/07/2021    MCV 85.0 04/07/2021     04/07/2021     Lab Results   Component Value Date    GLUCOSE 235 (H) 04/07/2021    CALCIUM 8.6 04/07/2021     (L) 04/07/2021    K 3.1 (L) 04/07/2021    CO2 21.0 (L) 04/07/2021    CL 87 (L) 04/07/2021    BUN 14 04/07/2021    CREATININE 1.05 04/07/2021    EGFRIFAFRI 79 03/25/2021    EGFRIFNONA 70 04/07/2021    BCR 13.3 04/07/2021    ANIONGAP 17.0 (H) 04/07/2021       Estimated Creatinine Clearance: 67.9 mL/min (by C-G formula based on SCr of 1.05 " mg/dL).    Active Inpatient Anticoagulation Orders: none    Assessment/Plan    Will start patient on 40 mg subcutaneous every 24 hours, adjusted for renal function. Pharmacy will continue to follow but the consult will be discontinued at this time.    Iesha Ortiz RP

## 2021-04-07 NOTE — ED NOTES
Pt noted to have mask on when this RN entered the room. This RN wore appropriate PPE throughout our encounter. Hand hygiene performed upon entering and exiting room.       Roxi Hernandez, RN  04/07/21 6829

## 2021-04-07 NOTE — ED TRIAGE NOTES
Left lower abd pain x 2 months.  He reports he was hospitalized and discharged but the pain has not gotten better    Patient was placed in face mask during first look triage.  Patient was wearing a face mask throughout encounter.  I wore personal protective equipment throughout the encounter.  Hand hygiene was performed before and after patient encounter.

## 2021-04-07 NOTE — H&P
HISTORY AND PHYSICAL   [facility]        Patient Identification:  Name: Felicita Mike  Age: 70 y.o.  Sex: male  :  1950  MRN: [id number]                     Primary Care Physician: Alexandrea Jimenez PA-C    Chief Complaint:  70 year old presented to the emergency room with abdominal pain, nausea and malaise; he says he has not been feeling well since he was given antibiotics by a dentist a few weeks ago; he has had occasional diarrhea but it has not been persistent; he denies sick contacts; he denies black or bloody stools    History of Present Illness:   As above    Past Medical History:  Past Medical History:   Diagnosis Date   • Abnormal PSA    • Acute myocardial infarction (CMS/HCC) 2004    Stents   • Apnea, sleep    • Atherosclerotic heart disease    • Chronic pain     Chronic low back paoin, MRI ,  L3 compression deformity, L2 degenerative disc disease and L5-S1 degenerative disc disease.   • Colon polyp    • Constipation    • COPD (chronic obstructive pulmonary disease) (CMS/HCC)    • Coronary artery disease    • Depressed bipolar II disorder (CMS/HCC)    • Diabetes mellitus (CMS/HCC)    • Essential tremor    • Fatigue    • GERD (gastroesophageal reflux disease)    • H/O transfusion of packed red blood cells    • Health care maintenance    • Hearing loss    • History of back pain    • Hypercholesterolemia    • Hyperlipidemia    • Hypertension    • Leukocytosis    • Osteoarthritis    • Proteinuria    • PVD (peripheral vascular disease) (CMS/HCC)    • Sebaceous cyst    • Seborrheic dermatitis    • Sleep apnea    • Tinea corporis    • Tobacco use    • Tremor      Past Surgical History:  Past Surgical History:   Procedure Laterality Date   • ANGIOPLASTY      Cath Stent Placement   • COLONOSCOPY  2014    ta polyps   • COLONOSCOPY N/A 2016    Procedure: COLONOSCOPY TO CECUM AND TERMINAL ILEUM WITH HOT POLYPECTOMIES;  Surgeon: Ivette Franco MD;  Location: [facility] ENDOSCOPY;   Service:    • COLONOSCOPY N/A 12/16/2019    Procedure: COLONOSCOPY to cecum with hot snare, cold biopsy polypectomies with clip placement x2;  Surgeon: Ivette Franco MD;  Location: [facility] ENDOSCOPY;  Service: Gastroenterology   • CORONARY STENT PLACEMENT  2004   • ENDOSCOPY N/A 12/16/2019    Procedure: ESOPHAGOGASTRODUODENOSCOPY with biopsies;  Surgeon: Ivette Franco MD;  Location: [facility] ENDOSCOPY;  Service: Gastroenterology   • ENDOSCOPY N/A 2/23/2021    Procedure: ESOPHAGOGASTRODUODENOSCOPY;  Surgeon: Collins Nolasco MD;  Location: [facility] ENDOSCOPY;  Service: Gastroenterology;  Laterality: N/A;  PRE- EPIGASTRIC PAIN, NAUSEA  POST- IRREGULAR Z-LINE   • EYE SURGERY      Cataract Surgery   • HAND SURGERY        Home Meds:  Medications Prior to Admission   Medication Sig Dispense Refill Last Dose   • acetaminophen (TYLENOL) 325 MG tablet Take 2 tablets by mouth Every 6 (Six) Hours As Needed for Mild Pain .      • albuterol (PROVENTIL) (5 MG/ML) 0.5% nebulizer solution Take 2.5 mg by nebulization Every 6 (Six) Hours As Needed for Wheezing.      • amLODIPine (NORVASC) 5 MG tablet TAKE 1 TABLET BY MOUTH DAILY FOR BLOOD PRESSURE 90 tablet 1    • atorvastatin (Lipitor) 80 MG tablet Take 1 tablet by mouth Daily. For cholesterol 90 tablet 3    • carvedilol (COREG) 25 MG tablet Take 1 tablet by mouth 2 (Two) Times a Day With Meals. For heart and BP--- 180 tablet 3    • chlorthalidone (HYGROTON) 25 MG tablet Take 1 tablet by mouth Daily. 90 tablet 3    • Cholecalciferol (Vitamin D3) 50 MCG (2000 UT) tablet Take 1 tablet by mouth Daily.      • Cyanocobalamin (B-12) 1000 MCG capsule Take 1 tablet by mouth Daily.      • glipizide (GLUCOTROL XL) 2.5 MG 24 hr tablet Take 1 tablet by mouth Daily. For DMII while Metformin on hold and must take with food 30 tablet 5    • lisinopril (PRINIVIL,ZESTRIL) 40 MG tablet Take 1 tablet by mouth Daily. 90 tablet 3    • magnesium oxide (MAGnesium-Oxide) 400 (241.3 Mg) MG tablet  tablet Take 1 tablet by mouth Daily. 90 tablet 1    • metFORMIN (GLUCOPHAGE) 1000 MG tablet Take 1 tablet by mouth 2 (Two) Times a Day. For DMII 180 tablet 1    • omeprazole (priLOSEC) 20 MG capsule Take 1 capsule by mouth 2 (Two) Times a Day. (Patient taking differently: Take 40 mg by mouth 2 (Two) Times a Day.) 60 capsule 11    • polyethylene glycol (MIRALAX) packet Take 17 g by mouth Daily As Needed.      • primidone (MYSOLINE) 50 MG tablet Take 1 tablet by mouth 2 (two) times a day. 180 tablet 3    • prochlorperazine (COMPAZINE) 10 MG tablet Take 1 tablet by mouth Every 6 (Six) Hours As Needed for Nausea or Vomiting. Stop Promethazine and generic Zofran 30 tablet 1    • QUEtiapine (SEROquel) 100 MG tablet Take 1 tablet by mouth Every Night. Sleep and mood 90 tablet 1    • tamsulosin (FLOMAX) 0.4 MG capsule 24 hr capsule Take 1 capsule by mouth Daily. For urine flow 90 capsule 3    • traMADol (Ultram) 50 MG tablet Take 1 tablet by mouth Every 8 (Eight) Hours As Needed for Moderate Pain . 90 tablet 1    • Vitamin E 180 MG capsule Take  by mouth.      • acetaminophen (TYLENOL) 325 MG tablet Take 2 tablets by mouth Every 6 (Six) Hours As Needed for Mild Pain .      • glucose blood (Glucose Meter Test) test strip Use as instructed once daily and PRN for DMII E11.9 50 each 11    • glucose monitor monitoring kit 1 each 2 (two) times a day. Please fill brand covered by pt's insurance.  Dx: E11.9 1 each 0    • sucralfate (Carafate) 1 g tablet Take 1 tablet by mouth 4 (Four) Times a Day. For Gastritis and stop Aspirin 120 tablet 2        Allergies:  Allergies   Allergen Reactions   • Amoxicillin Diarrhea and Nausea Only     Severe nausea, tolerated cephalosporins   • Clopidogrel Itching     Immunizations:  Immunization History   Administered Date(s) Administered   • Flu Vaccine Quad PF >18YRS 12/06/2016   • Fluad Quad 65+ 10/01/2020   • Influenza LAIV (Nasal) 11/11/2015   • Influenza TIV (IM) 12/01/2019   • Pneumococcal  Conjugate 13-Valent (PCV13) 2018   • Pneumococcal Polysaccharide (PPSV23) 2019   • Pneumococcal, Unspecified 2013   • Zostavax 2016     Social History:   Social History     Social History Narrative   • Not on file     Social History     Socioeconomic History   • Marital status:      Spouse name: Not on file   • Number of children: Not on file   • Years of education: Not on file   • Highest education level: Not on file   Tobacco Use   • Smoking status: Current Every Day Smoker     Packs/day: 0.50     Years: 47.00     Pack years: 23.50     Types: Cigarettes   • Smokeless tobacco: Never Used   • Tobacco comment: CAFFEINE USE: 4 CANS DIET [place] DAILY   Vaping Use   • Vaping Use: Never used   Substance and Sexual Activity   • Alcohol use: Yes     Comment: very seldom   • Drug use: No   • Sexual activity: Defer       Family History:  Family History   Problem Relation Age of Onset   • Cancer Mother    • Diabetes Mother    • Hypertension Mother    • Colon cancer Mother    • Heart disease Father    • Cancer Brother    • Stroke Brother         Review of Systems  See history of present illness and past medical history.  Patient denies headache, dizziness, syncope, falls, trauma, change in vision, change in hearing, change in taste, changes in weight, changes in appetite, focal weakness, numbness, or paresthesia.  Patient denies chest pain, palpitations, dyspnea, orthopnea, PND, cough, sinus pressure, rhinorrhea, epistaxis, hemoptysis, nausea, vomiting,hematemesis, diarrhea, constipation or hematchezia.  Denies cold or heat intolerance, polydipsia, polyuria, polyphagia. Denies hematuria, pyuria, dysuria, hesitancy, frequency or urgency. Denies consumption of raw and under cooked meats foods or change in water source.  Denies fever, chills, sweats, night sweats.  Denies missing any routine medications. Remainder of ROS is negative.    Objective:  T Max 24 hrs: Temp (24hrs), Av.1 °F  "(36.2 °C), Min:96.5 °F (35.8 °C), Max:97.6 °F (36.4 °C)    Vitals Ranges:   Temp:  [96.5 °F (35.8 °C)-97.6 °F (36.4 °C)] 97.6 °F (36.4 °C)  Heart Rate:  [68-90] 78  Resp:  [16] 16  BP: (112-135)/(76-79) 135/79      Exam:  /79 (BP Location: Left arm, Patient Position: Lying)   Pulse 78   Temp 97.6 °F (36.4 °C) (Oral)   Resp 16   Ht 172.7 cm (67.99\")   Wt 71.3 kg (157 lb 3 oz)   SpO2 93%   BMI 23.91 kg/m²     General Appearance:    Alert, cooperative, no distress, appears stated age   Head:    Normocephalic, without obvious abnormality, atraumatic   Eyes:    PERRL, conjunctivae/corneas clear, EOM's intact, both eyes   Ears:    Normal external ear canals, both ears   Nose:   Nares normal, septum midline, mucosa normal, no drainage    or sinus tenderness   Throat:   Lips, mucosa, and tongue normal   Neck:   Supple, symmetrical, trachea midline, no adenopathy;     thyroid:  no enlargement/tenderness/nodules; no carotid    bruit or JVD   Back:     Symmetric, no curvature, ROM normal, no CVA tenderness   Lungs:     Decreased breath sounds bilaterally, respirations unlabored   Chest Wall:    No tenderness or deformity    Heart:    Regular rate and rhythm, S1 and S2 normal, no murmur, rub   or gallop   Abdomen:     Soft, nontender, bowel sounds active all four quadrants,     no masses, no hepatomegaly, no splenomegaly   Extremities:   Extremities normal, atraumatic, no cyanosis or edema   Pulses:   2+ and symmetric all extremities   Skin:   Skin color, texture, turgor normal, no rashes or lesions   Lymph nodes:   Cervical, supraclavicular, and axillary nodes normal   Neurologic:   CNII-XII intact, normal strength, sensation intact throughout      .    Data Review:  Labs in chart were reviewed.  WBC   Date Value Ref Range Status   04/07/2021 12.21 (H) 3.40 - 10.80 10*3/mm3 Final     Hemoglobin   Date Value Ref Range Status   04/07/2021 12.8 (L) 13.0 - 17.7 g/dL Final     Hematocrit   Date Value Ref Range Status "   04/07/2021 35.6 (L) 37.5 - 51.0 % Final     Platelets   Date Value Ref Range Status   04/07/2021 203 140 - 450 10*3/mm3 Final     Sodium   Date Value Ref Range Status   04/07/2021 125 (L) 136 - 145 mmol/L Final     Potassium   Date Value Ref Range Status   04/07/2021 3.1 (L) 3.5 - 5.2 mmol/L Final     Chloride   Date Value Ref Range Status   04/07/2021 87 (L) 98 - 107 mmol/L Final     CO2   Date Value Ref Range Status   04/07/2021 21.0 (L) 22.0 - 29.0 mmol/L Final     BUN   Date Value Ref Range Status   04/07/2021 14 8 - 23 mg/dL Final     Creatinine   Date Value Ref Range Status   04/07/2021 1.05 0.76 - 1.27 mg/dL Final     Glucose   Date Value Ref Range Status   04/07/2021 235 (H) 65 - 99 mg/dL Final     Calcium   Date Value Ref Range Status   04/07/2021 8.6 8.6 - 10.5 mg/dL Final     AST (SGOT)   Date Value Ref Range Status   04/07/2021 16 1 - 40 U/L Final     ALT (SGPT)   Date Value Ref Range Status   04/07/2021 17 1 - 41 U/L Final     Alkaline Phosphatase   Date Value Ref Range Status   04/07/2021 100 39 - 117 U/L Final     No results found for: APTT, INR             Imaging Results (All)     Procedure Component Value Units Date/Time    CT Abdomen Pelvis With Contrast [[id number]] Collected: 04/07/21 1401     Updated: 04/07/21 1415    Narrative:      CT ABDOMEN PELVIS W CONTRAST-     CLINICAL HISTORY: Low abdomen pain.     TECHNIQUE: Spiral CT images were acquired through the abdomen and pelvis  with IV contrast only and were reconstructed in 3 mm thick axial slices.     Radiation dose reduction techniques were utilized, including automated  exposure control and exposure modulation based on body size.     COMPARISON: CT scan of the abdomen and pelvis dated 03/04/2021.     FINDINGS: The liver, spleen, pancreas, and adrenal glands appear within  normal limits. There are a few tiny bilateral simple cortical renal  cysts. The kidneys are otherwise unremarkable. There is no  hydronephrosis or hydroureter. Again  noted is mild nonspecific stranding  of the perirenal fat that is appears slightly less prominent. Improving  perirenal edema is suspected. The stomach and small and large bowel are  unremarkable. There is no bowel distention. No abnormal masses or fluid  collections are identified in the abdomen or pelvis. Extensive vascular  calcification is noted       Impression:      Tiny bilateral renal cysts. Probable bilateral perirenal  edema showing improvement since 03/04/2021. Otherwise unremarkable CT  scan of the abdomen and pelvis.     This report was finalized on 4/7/2021 2:12 PM by Dr. Brody Connell M.D.           Patient Active Problem List   Diagnosis Code   • Elevated prostate specific antigen (PSA) R97.20   • Coronary artery disease involving native coronary artery of native heart without angina pectoris I25.10   • Bipolar affective disorder (CMS/[facility]) F31.9   • Chronic obstructive pulmonary disease (CMS/[facility]) J44.9   • Colon polyp K63.5   • Constipation K59.00   • Type 2 diabetes mellitus with microalbuminuria, without long-term current use of insulin (CMS/[facility]) E11.29, R80.9   • GERD (gastroesophageal reflux disease) K21.9   • Fatigue R53.83   • Hearing loss H91.90   • Hyperlipidemia E78.5   • Peripheral vascular disease (CMS/[facility]) I73.9   • Proteinuria R80.9   • Muscle pain M79.10   • Low back pain M54.5   • Leukocytosis D72.829   • Seborrheic eczema L21.9   • Sleep apnea G47.30   • Tinea corporis B35.4   • Tremor R25.1   • Essential hypertension I10   • Anxiety F41.9   • Benign prostatic hyperplasia with lower urinary tract symptoms N40.1   • Tobacco abuse Z72.0   • Normocytic anemia D64.9   • Thyroid nodule E04.1   • Iron deficiency anemia D50.9   • Adenomatous polyp of colon D12.6   • Family history of malignant neoplasm of colon Z80.0   • Gastroesophageal reflux disease K21.9   • Influenza A J10.1   • SWAPNIL (acute kidney injury) (CMS/[facility]) N17.9   • Essential tremor G25.0   • Magnesium deficiency E61.2   •  Long-term current use of lithium Z79.899   • Generalized abdominal pain R10.84   • Nausea and vomiting R11.2   • Acute gastritis without hemorrhage K29.00   • C. difficile colitis A04.72   • Dental caries K02.9   • Rice's esophagus with high grade dysplasia K22.711   • Abdominal pain R10.9       Assessment:    Abdominal pain  leukocytosis  Nausea  Diabetes  Anemia  Hyponatremia  hypokalemia      Plan:  Replace potassium  Ask gi to see him  Continue antibiotics for now  Add probiotics  Urine culture is pending  Trend wbc and sodium  D.w patient and ED Provider      May Patterson MD  4/7/2021  19:30 EDT

## 2021-04-08 PROBLEM — R10.32 LEFT LOWER QUADRANT ABDOMINAL PAIN: Status: ACTIVE | Noted: 2021-04-08

## 2021-04-08 PROBLEM — E87.1 HYPONATREMIA: Status: ACTIVE | Noted: 2021-04-08

## 2021-04-08 PROBLEM — E87.6 HYPOKALEMIA: Status: ACTIVE | Noted: 2021-04-08

## 2021-04-08 LAB
ANION GAP SERPL CALCULATED.3IONS-SCNC: 14.1 MMOL/L (ref 5–15)
BACTERIA SPEC AEROBE CULT: NO GROWTH
BUN SERPL-MCNC: 12 MG/DL (ref 8–23)
BUN/CREAT SERPL: 12.6 (ref 7–25)
CALCIUM SPEC-SCNC: 7.9 MG/DL (ref 8.6–10.5)
CHLORIDE SERPL-SCNC: 97 MMOL/L (ref 98–107)
CO2 SERPL-SCNC: 21.9 MMOL/L (ref 22–29)
CREAT SERPL-MCNC: 0.95 MG/DL (ref 0.76–1.27)
DEPRECATED RDW RBC AUTO: 39.1 FL (ref 37–54)
ERYTHROCYTE [DISTWIDTH] IN BLOOD BY AUTOMATED COUNT: 12.4 % (ref 12.3–15.4)
GFR SERPL CREATININE-BSD FRML MDRD: 79 ML/MIN/1.73
GLUCOSE BLDC GLUCOMTR-MCNC: 165 MG/DL (ref 70–130)
GLUCOSE BLDC GLUCOMTR-MCNC: 183 MG/DL (ref 70–130)
GLUCOSE BLDC GLUCOMTR-MCNC: 191 MG/DL (ref 70–130)
GLUCOSE BLDC GLUCOMTR-MCNC: 201 MG/DL (ref 70–130)
GLUCOSE SERPL-MCNC: 151 MG/DL (ref 65–99)
HBA1C MFR BLD: 7.7 % (ref 4.8–5.6)
HCT VFR BLD AUTO: 32.7 % (ref 37.5–51)
HGB BLD-MCNC: 11.4 G/DL (ref 13–17.7)
MCH RBC QN AUTO: 30 PG (ref 26.6–33)
MCHC RBC AUTO-ENTMCNC: 34.9 G/DL (ref 31.5–35.7)
MCV RBC AUTO: 86.1 FL (ref 79–97)
PLATELET # BLD AUTO: 183 10*3/MM3 (ref 140–450)
PMV BLD AUTO: 12.4 FL (ref 6–12)
POTASSIUM SERPL-SCNC: 3.2 MMOL/L (ref 3.5–5.2)
RBC # BLD AUTO: 3.8 10*6/MM3 (ref 4.14–5.8)
SODIUM SERPL-SCNC: 133 MMOL/L (ref 136–145)
TROPONIN T SERPL-MCNC: <0.01 NG/ML (ref 0–0.03)
WBC # BLD AUTO: 7.75 10*3/MM3 (ref 3.4–10.8)

## 2021-04-08 PROCEDURE — 63710000001 INSULIN LISPRO (HUMAN) PER 5 UNITS: Performed by: INTERNAL MEDICINE

## 2021-04-08 PROCEDURE — 80048 BASIC METABOLIC PNL TOTAL CA: CPT | Performed by: INTERNAL MEDICINE

## 2021-04-08 PROCEDURE — 82962 GLUCOSE BLOOD TEST: CPT

## 2021-04-08 PROCEDURE — 25010000003 CEFTRIAXONE PER 250 MG: Performed by: INTERNAL MEDICINE

## 2021-04-08 PROCEDURE — 85027 COMPLETE CBC AUTOMATED: CPT | Performed by: INTERNAL MEDICINE

## 2021-04-08 PROCEDURE — 84484 ASSAY OF TROPONIN QUANT: CPT | Performed by: INTERNAL MEDICINE

## 2021-04-08 PROCEDURE — 99218 PR INITIAL OBSERVATION CARE/DAY 30 MINUTES: CPT | Performed by: INTERNAL MEDICINE

## 2021-04-08 PROCEDURE — 25010000002 ENOXAPARIN PER 10 MG: Performed by: INTERNAL MEDICINE

## 2021-04-08 PROCEDURE — 83036 HEMOGLOBIN GLYCOSYLATED A1C: CPT | Performed by: INTERNAL MEDICINE

## 2021-04-08 PROCEDURE — 25810000003 SODIUM CHLORIDE 0.9 % WITH KCL 20 MEQ 20-0.9 MEQ/L-% SOLUTION: Performed by: INTERNAL MEDICINE

## 2021-04-08 PROCEDURE — 25010000002 PROCHLORPERAZINE 10 MG/2ML SOLUTION: Performed by: INTERNAL MEDICINE

## 2021-04-08 PROCEDURE — 25010000002 ONDANSETRON PER 1 MG: Performed by: INTERNAL MEDICINE

## 2021-04-08 RX ORDER — HYDROCODONE BITARTRATE AND ACETAMINOPHEN 5; 325 MG/1; MG/1
1 TABLET ORAL EVERY 6 HOURS PRN
Status: DISCONTINUED | OUTPATIENT
Start: 2021-04-08 | End: 2021-04-10 | Stop reason: HOSPADM

## 2021-04-08 RX ORDER — POTASSIUM CHLORIDE 750 MG/1
40 TABLET, FILM COATED, EXTENDED RELEASE ORAL
Status: DISPENSED | OUTPATIENT
Start: 2021-04-08 | End: 2021-04-09

## 2021-04-08 RX ORDER — DICYCLOMINE HYDROCHLORIDE 10 MG/1
20 CAPSULE ORAL
Status: DISCONTINUED | OUTPATIENT
Start: 2021-04-08 | End: 2021-04-10 | Stop reason: HOSPADM

## 2021-04-08 RX ADMIN — INSULIN LISPRO 2 UNITS: 100 INJECTION, SOLUTION INTRAVENOUS; SUBCUTANEOUS at 09:02

## 2021-04-08 RX ADMIN — ATORVASTATIN CALCIUM 80 MG: 80 TABLET, FILM COATED ORAL at 21:22

## 2021-04-08 RX ADMIN — POTASSIUM CHLORIDE 40 MEQ: 750 TABLET, EXTENDED RELEASE ORAL at 14:29

## 2021-04-08 RX ADMIN — Medication 1000 MCG: at 09:02

## 2021-04-08 RX ADMIN — DICYCLOMINE HYDROCHLORIDE 20 MG: 10 CAPSULE ORAL at 14:29

## 2021-04-08 RX ADMIN — PRIMIDONE 50 MG: 50 TABLET ORAL at 21:21

## 2021-04-08 RX ADMIN — Medication 1 CAPSULE: at 09:02

## 2021-04-08 RX ADMIN — POTASSIUM CHLORIDE AND SODIUM CHLORIDE 100 ML/HR: 900; 150 INJECTION, SOLUTION INTRAVENOUS at 05:47

## 2021-04-08 RX ADMIN — PROCHLORPERAZINE EDISYLATE 10 MG: 5 INJECTION INTRAMUSCULAR; INTRAVENOUS at 12:49

## 2021-04-08 RX ADMIN — Medication 1000 UNITS: at 09:02

## 2021-04-08 RX ADMIN — ONDANSETRON 4 MG: 2 INJECTION INTRAMUSCULAR; INTRAVENOUS at 09:15

## 2021-04-08 RX ADMIN — HYDROCODONE BITARTRATE AND ACETAMINOPHEN 1 TABLET: 5; 325 TABLET ORAL at 02:12

## 2021-04-08 RX ADMIN — PROCHLORPERAZINE EDISYLATE 10 MG: 5 INJECTION INTRAMUSCULAR; INTRAVENOUS at 06:52

## 2021-04-08 RX ADMIN — HYDROCODONE BITARTRATE AND ACETAMINOPHEN 1 TABLET: 5; 325 TABLET ORAL at 14:29

## 2021-04-08 RX ADMIN — HYDROCODONE BITARTRATE AND ACETAMINOPHEN 1 TABLET: 5; 325 TABLET ORAL at 09:15

## 2021-04-08 RX ADMIN — CARVEDILOL 25 MG: 25 TABLET, FILM COATED ORAL at 09:02

## 2021-04-08 RX ADMIN — SODIUM CHLORIDE, PRESERVATIVE FREE 10 ML: 5 INJECTION INTRAVENOUS at 21:21

## 2021-04-08 RX ADMIN — TAMSULOSIN HYDROCHLORIDE 0.4 MG: 0.4 CAPSULE ORAL at 21:23

## 2021-04-08 RX ADMIN — ONDANSETRON 4 MG: 2 INJECTION INTRAMUSCULAR; INTRAVENOUS at 03:11

## 2021-04-08 RX ADMIN — AMLODIPINE BESYLATE 5 MG: 5 TABLET ORAL at 09:02

## 2021-04-08 RX ADMIN — ENOXAPARIN SODIUM 40 MG: 40 INJECTION SUBCUTANEOUS at 18:34

## 2021-04-08 RX ADMIN — POTASSIUM CHLORIDE 40 MEQ: 750 TABLET, EXTENDED RELEASE ORAL at 18:34

## 2021-04-08 RX ADMIN — INSULIN LISPRO 2 UNITS: 100 INJECTION, SOLUTION INTRAVENOUS; SUBCUTANEOUS at 18:34

## 2021-04-08 RX ADMIN — CEFTRIAXONE SODIUM 2 G: 2 INJECTION, SOLUTION INTRAVENOUS at 02:12

## 2021-04-08 RX ADMIN — INSULIN LISPRO 2 UNITS: 100 INJECTION, SOLUTION INTRAVENOUS; SUBCUTANEOUS at 12:45

## 2021-04-08 RX ADMIN — Medication 1 CAPSULE: at 21:21

## 2021-04-08 RX ADMIN — PRIMIDONE 50 MG: 50 TABLET ORAL at 09:01

## 2021-04-08 RX ADMIN — QUETIAPINE FUMARATE 100 MG: 100 TABLET ORAL at 21:21

## 2021-04-08 RX ADMIN — NICOTINE 1 PATCH: 21 PATCH, EXTENDED RELEASE TRANSDERMAL at 09:01

## 2021-04-08 RX ADMIN — LISINOPRIL 40 MG: 40 TABLET ORAL at 09:01

## 2021-04-08 RX ADMIN — PANTOPRAZOLE SODIUM 40 MG: 40 TABLET, DELAYED RELEASE ORAL at 06:41

## 2021-04-08 RX ADMIN — CARVEDILOL 25 MG: 25 TABLET, FILM COATED ORAL at 18:34

## 2021-04-08 RX ADMIN — MAGNESIUM OXIDE 400 MG (241.3 MG MAGNESIUM) TABLET 400 MG: TABLET at 09:01

## 2021-04-08 NOTE — PROGRESS NOTES
"Continued Stay Note  [facility]     Patient Name: Felicita Mike  MRN: [id number]  Today's Date: 4/8/2021    Admit Date: 4/7/2021    Discharge Plan     Row Name 04/08/21 1043       Plan    Plan  Plan home.   NANY Payton RN    Patient/Family in Agreement with Plan  yes    Plan Comments  FACE SHEET VERIFIED/ OBANDO SIGNED.   Spoke with pt at bedside.  Pt's PCP is MAXIM Rodríguez.   Pt lives alone in a single story house .  Pt is independent with ADLs.  Pt has nebulizer and home O2 provided by [facility].  Pt gets prescriptions at [facility]  ([facility]  & [facility]).  Pt denies any issues affording medications but state it is \"tight\".   Pt is not current with HH.  Pt has not been in SNF.  Pt states his daughter (Naheed Horn [phone]) assists him if needed.  Pt denies any discharge needs.  Plan home.  NANY Payton RN        Discharge Codes    No documentation.             Bea Payton RN    "

## 2021-04-08 NOTE — SIGNIFICANT NOTE
"Spoke with patient at bedside. Pt lives in [facility] with 1 step to enter. Reports PLOF as independent for short distances due to \"COPD\". Pt does not use AD at baseline but has them available if he needs them. He states he is at his functional baseline, and that he does have impaired balance but it is no worse than normal. States he \"furniture surfs/bounces around\" at baseline. Denies needs for acute PT and denies need for DME recs. Will DC from caseload. Encouraged to be [facility] for meals and ambulate in room 2-3 times a day with RN staff to maintain current level of function.  "

## 2021-04-08 NOTE — PLAN OF CARE
Goal Outcome Evaluation:  Plan of Care Reviewed With: patient  Progress: no change  Outcome Summary: continues with nausea, no emesis, c/o abdomen pain, narco given x 1 with relief stated, NSR on monitor, VSS, dozing only short intervals, resting quietly in room, will continue to monitor

## 2021-04-08 NOTE — PROGRESS NOTES
"    DAILY PROGRESS NOTE  [facility]    Patient Identification:  Name: Felicita Mike  Age: 70 y.o.  Sex: male  :  1950  MRN: [id number]         Primary Care Physician: Alexandrea Jimenez PA-C    Subjective:  Interval History: Patient claims left lower quadrant abdominal pain radiates to the groin.  He seems to state that it comes and goes with and without food.  While he has had chronic diarrhea he has not gone to day and he states that he had much loose stool yesterday though it is a predominant theme    Objective: Very nice gentleman.  Nearly in tears as he was asking for help with this abdominal discomfort and chronic diarrhea    Scheduled Meds:amLODIPine, 5 mg, Oral, Q24H  atorvastatin, 80 mg, Oral, Daily  carvedilol, 25 mg, Oral, BID With Meals  cholecalciferol, 1,000 Units, Oral, Daily  enoxaparin, 40 mg, Subcutaneous, Q24H  insulin lispro, 0-9 Units, Subcutaneous, TID With Meals  lactobacillus acidophilus, 1 capsule, Oral, BID  lisinopril, 40 mg, Oral, Daily  nicotine, 1 patch, Transdermal, Q24H  pantoprazole, 40 mg, Oral, QAM  potassium chloride, 40 mEq, Oral, TID With Meals  primidone, 50 mg, Oral, Q12H  QUEtiapine, 100 mg, Oral, Nightly  tamsulosin, 0.4 mg, Oral, Daily  vitamin B-12, 1,000 mcg, Oral, Daily      Continuous Infusions:     Vital signs in last 24 hours:  Temp:  [97.4 °F (36.3 °C)-97.6 °F (36.4 °C)] 97.6 °F (36.4 °C)  Heart Rate:  [67-78] 76  Resp:  [16-18] 16  BP: (107-147)/(66-79) 139/67    Intake/Output:    Intake/Output Summary (Last 24 hours) at 2021 1316  Last data filed at 2021 0547  Gross per 24 hour   Intake 1650 ml   Output --   Net 1650 ml       Exam:  /67 (BP Location: Left arm, Patient Position: Lying)   Pulse 76   Temp 97.6 °F (36.4 °C) (Oral)   Resp 16   Ht 172.7 cm (67.99\")   Wt 70.8 kg (156 lb)   SpO2 95%   BMI 23.73 kg/m²     General Appearance:    Alert, cooperative, nontoxic, AAOx3, no family at bedside                          Head:    " Normocephalic, without obvious abnormality, atraumatic                           Eyes:  Conjunctiva clear without icterus                         throat:   Oral mucosa pink and moist                           Neck:   Supple, symmetrical, trachea midline, no JVD                         Lungs:    Clear to auscultation bilaterally, respirations unlabored                 Chest Wall:    No tenderness or deformity                          Heart:    Regular rate and rhythm, S1 and S2 normal                 Abdomen:     Soft, left lower quadrant tenderness to palpation without rebound or guarding, bowel sounds active                 Extremities: Moving all, no cyanosis or edema                        Pulses:   Pulses palpable in lower extremities                  Neurologic:   CNII-XII intact, no focal deficits noted     Data Review:  Labs in chart were reviewed.    Assessment:  Active Hospital Problems    Diagnosis  POA   • **Left lower quadrant abdominal pain [R10.32]  Unknown   • Hyponatremia [E87.1]  Unknown   • Hypokalemia [E87.6]  Unknown   • Rice's esophagus with high grade dysplasia [K22.711]  Yes   • Iron deficiency anemia [D50.9]  Yes   • Tobacco abuse [Z72.0]  Yes   • Peripheral vascular disease (CMS/HCC) [I73.9]  Yes   • Hyperlipidemia [E78.5]  Yes   • GERD (gastroesophageal reflux disease) [K21.9]  Yes   • Essential hypertension [I10]  Yes   • Chronic obstructive pulmonary disease (CMS/HCC) [J44.9]  Yes      Resolved Hospital Problems   No resolved problems to display.       Plan:    Abdominal pain with nausea/leukocytosis   -GI consulted and rechecking C. difficile stool and considering further colonoscopy though CT without acute findings to explain the abdominal pain   -His pain to me sounds consistent with something like a kidney stone as it radiates to his groin.  Since CT does show perirenal edema and renal cyst I will get urology to see while here to give their input if they feel this discomfort has a  genitourinary etiology   -Agree with vascular evaluation and will order duplex and keep n.p.o. after midnight to obtain especially given longstanding tobacco use   -PPI with past history of Rice's -caution given past history of C. Difficile   -Antispasmodic w/ prn Bentyl started per GI   -Normal LFTs   -DC Rocephin as no clear source and I do not feel sepsis was present at admission   -DC chronic use of p.o. magnesium   -Saline lock IVF   -On probiotic    DM2 with an A1c of 7.7   -Hold glipizide for now   -Hold Metformin for now   -SSI    Anemia -mild -we will monitor but no plans for further work-up    Hyponatremia/hypokalemia    -Monitor/replace and check mag with next lab draw given GI losses    HTN stable -chlorthalidone on hold    Lovenox for DVT prophylaxis      Observation additional day    Salvatore Holder MD  4/8/2021  13:16 EDT

## 2021-04-08 NOTE — PROGRESS NOTES
"Adult Nutrition  Assessment/PES    Patient Name:  Felicita Mike  YOB: 1950  MRN: [id number]  Admit Date:  4/7/2021    Assessment Date:  4/8/2021    Comments:  Nutriton assessment initiated. MST 3 score. + weight loss of 15 lbs noted in the past 2 months. Issues with nausea, diarrhea. Pt currently nauseated. Cdiff is pending. Will follow up with him when feeling better to discuss po, weight hx.    Reason for Assessment     Row Name 04/08/21 1535          Reason for Assessment    Reason For Assessment  identified at risk by screening criteria     Diagnosis  gastrointestinal disease Abd Pain, diarrhea; Hx Rice's esophagus with high-grade dysplasia     Identified At Risk by Screening Criteria  MST SCORE 2+         Nutrition/Diet History     Row Name 04/08/21 1538          Nutrition/Diet History    Factors Affecting Nutritional Intake  nausea;diarrhea         Anthropometrics     Row Name 04/08/21 1538          Anthropometrics    Height  172.7 cm (68\")        Admit Weight    Admit Weight  70.7 kg (155 lb 13.8 oz)        Ideal Body Weight (IBW)    Ideal Body Weight (IBW) (kg)  70.89     % of Ideal Body Weight Assessment  -- 100% IBW        Usual Body Weight (UBW)    Usual Body Weight  77.1 kg (170 lb)     Weight Loss  unintentional     Weight Loss Time Frame  15lb in 2 months        Body Mass Index (BMI)    BMI Assessment  BMI 18.5-24.9: normal BMI 23.7         Labs/Tests/Procedures/Meds     Row Name 04/08/21 1539          Labs/Procedures/Meds    Lab Results Reviewed  reviewed, pertinent     Lab Results Comments  glu, Na+, K+, hgba1c, CDiff pending        Diagnostic Tests/Procedures    Diagnostic Test/Procedure Reviewed  reviewed, pertinent     Diagnostic Test/Procedures Comments  CT Abd        Medications    Pertinent Medications Reviewed  reviewed, pertinent     Pertinent Medications Comments  lipitor, vit D, lovenox, admelog, lactobacillus, protonix, B12, bentyl, zofran, compazine         Physical " Findings     Row Name 04/08/21 1541          Physical Findings    Overall Physical Appearance  underweight     Gastrointestinal  diarrhea;nausea     Skin  other (see comments) B=19         Nutrition Prescription Ordered     Row Name 04/08/21 1542          Nutrition Prescription PO    Common Modifiers  Cardiac         Evaluation of Received Nutrient/Fluid Intake     Row Name 04/08/21 1542          PO Evaluation    % PO Intake  minimal         Problem/Interventions:  Problem 1     Row Name 04/08/21 1543          Nutrition Diagnoses Problem 1    Problem 1  Altered GI Function     Etiology (related to)  Medical Diagnosis     Gastrointestinal  Diarrhea;Nausea Abd Pain     Signs/Symptoms (evidenced by)  Report of Mnimal PO Intake         Intervention Goal     Row Name 04/08/21 1545          Intervention Goal    General  Maintain nutrition;Reduce/improve symptoms;Improved nutrition related lab(s);Meet nutritional needs for age/condition;Disease management/therapy     PO  Tolerate PO;PO intake (%)     PO Intake %  80 %     Weight  Maintain weight         Nutrition Intervention     Row Name 04/08/21 1545          Nutrition Intervention    RD/Tech Action  Follow Tx progress;Care plan reviewd           Education/Evaluation     Row Name 04/08/21 1542          Education    Education  Will Instruct as appropriate        Monitor/Evaluation    Monitor  Per protocol;Skin status;Symptoms;I&O;PO intake;Pertinent labs;Weight           Electronically signed by:  Georgie Jimenez RD  04/08/21 15:46 EDT

## 2021-04-08 NOTE — PLAN OF CARE
Goal Outcome Evaluation:  Plan of Care Reviewed With: patient  Progress: no change  Outcome Summary: C/o nausea. No vomiting. Med prn for pain/nausea. IVF SL. NPO after midnight for mes. doppler.

## 2021-04-08 NOTE — PROGRESS NOTES
"Discharge Planning Assessment  [facility]     Patient Name: Felicita Mike  MRN: [id number]  Today's Date: 4/8/2021    Admit Date: 4/7/2021    Discharge Needs Assessment     Row Name 04/08/21 1041       Living Environment    Lives With  alone    Current Living Arrangements  home/apartment/condo    Primary Care Provided by  self    Provides Primary Care For  no one    Family Caregiver if Needed  child(jonnathan), adult    Family Caregiver Names  Daughter  ( Naheed Horn  [phone])    Quality of Family Relationships  involved;supportive    Able to Return to Prior Arrangements  yes    Living Arrangement Comments  Pt lives alone in a single story house.       Resource/Environmental Concerns    Resource/Environmental Concerns  none    Transportation Concerns  car, none       Transition Planning    Patient/Family Anticipates Transition to  home    Patient/Family Anticipated Services at Transition  none    Transportation Anticipated  car, drives self;family or friend will provide       Discharge Needs Assessment    Equipment Currently Used at Home  nebulizer;oxygen    Concerns to be Addressed  denies needs/concerns at this time    Anticipated Changes Related to Illness  none    Equipment Needed After Discharge  nebulizer;oxygen        Discharge Plan     Row Name 04/08/21 1043       Plan    Plan  Plan home.   NANY Payton RN    Patient/Family in Agreement with Plan  yes    Plan Comments  FACE SHEET VERIFIED/ OBANDO SIGNED.   Spoke with pt at bedside.  Pt's PCP is MAXIM Rodríguez.   Pt lives alone in a single story house .  Pt is independent with ADLs.  Pt has nebulizer and home O2 provided by [employer].  Pt [facility] prescriptions [facility]  ([facility]  Pt denies any issues affording medications but state it is \"tight\".   Pt is not current with HH.  Pt has not been in SNF.  Pt states his daughter (Naheed Horn [phone]) assists him if needed.  Pt denies any discharge needs.  Plan home.  NANY Payton, " RN        Continued Care and Services - Admitted Since 4/7/2021    Coordination has not been started for this encounter.     Selected Continued Care - Prior Encounters Includes selections from prior encounters from 1/7/2021 to 4/8/2021    Discharged on 2/24/2021 Admission date: 2/22/2021 - Discharge disposition: Home or Self Care    Home Medical Care     Service Provider Selected Services Address Phone Fax Patient Preferred    [facility] [address] [address] [phone] --                      Demographic Summary     Row Name 04/08/21 1040       General Information    Admission Type  observation    Arrived From  emergency department    Required Notices Provided  Observation Status Notice    Referral Source  admission list    Reason for Consult  discharge planning    Preferred Language  English     Used During This Interaction  no        Functional Status     Row Name 04/08/21 1041       Functional Status    Usual Activity Tolerance  moderate    Current Activity Tolerance  moderate       Functional Status, IADL    Medications  independent    Meal Preparation  assistive person    Housekeeping  assistive person    Laundry  assistive person    Shopping  assistive person       Mental Status    General Appearance WDL  WDL       Mental Status Summary    Recent Changes in Mental Status/Cognitive Functioning  no changes        Psychosocial    No documentation.       Abuse/Neglect    No documentation.       Legal    No documentation.       Substance Abuse    No documentation.       Patient Forms    No documentation.           Bea Payton RN

## 2021-04-08 NOTE — CONSULTS
[facility]  Initial Inpatient Consult Note    Referring Provider: A    Reason for Consultation: Abdominal pain    Subjective     History of present illness:    70 y.o. male, followed by me as an outpatient, that we are asked to see for abdominal pain,  nausea and malaise.    He was hospitalized in late February with C. difficile.  Prior to that hospitalization he had received antibiotics from a dentist.  He reports that he has not really felt well since that initial hospitalization and the symptoms he has are consistent with what he experienced at that time.  He complains of persistent left lower quadrant pain.  The pain is in his left lower quadrant and radiates to side.  He reports that nothing makes it better or worse.  He thinks that eating may make it worse and therefore he is afraid to eat.  He has lost over 15 pounds.  He has not seen any blood in his stool.  He is having intermittent diarrhea.  He has had no energy.  He has been nauseated but has not vomited.  Heartburn is well controlled with medication.    Blood cell count was mildly elevated on admission at 12.2.  His sodium was 125.  CT with contrast, reviewed by me shows no acute abdominal pathology.    EGD 2/23 with irregular Z-line-biopsies are positive for high-grade dysplasia.  He has been evaluated by Dr. Stevens as an outpatient for HALO.  He had poorly controlled acid reflux and procedure was postponed in the hopes of getting his symptoms better controlled first.    Last colonoscopy was in 2019-multiple polyps removed, adenomatous and hyperplastic.  Prior to that he had a colonoscopy in 2016, Again with multiple adenomatous polyps, tubular adenoma and tubulovillous.  2014 colonoscopy with multiple tubulovillous adenomas as well.    Past Medical History:  Past Medical History:   Diagnosis Date   • Abnormal PSA    • Acute myocardial infarction (CMS/HCC) 2004    Stents   • Apnea, sleep    • Atherosclerotic heart disease     • Chronic pain     [facility]  L3 compression deformity, L2 degenerative disc disease and L5-S1 degenerative disc disease.   • Colon polyp    • Constipation    • COPD (chronic obstructive pulmonary disease) (CMS/[facility])    • Coronary artery disease    • Depressed bipolar II disorder (CMS/HCC)    • Diabetes mellitus (CMS/HCC)    • Essential tremor    • Fatigue    • GERD (gastroesophageal reflux disease)    • H/O transfusion of packed red blood cells    • Health care maintenance    • Hearing loss    • History of back pain    • Hypercholesterolemia    • Hyperlipidemia    • Hypertension    • Leukocytosis    • Osteoarthritis    • Proteinuria    • PVD (peripheral vascular disease) (CMS/[facility])    • Sebaceous cyst    • Seborrheic dermatitis    • Sleep apnea    • Tinea corporis    • Tobacco use    • Tremor      Past Surgical History:  Past Surgical History:   Procedure Laterality Date   • ANGIOPLASTY      Cath Stent Placement   • COLONOSCOPY  01/22/2014    ta polyps   • COLONOSCOPY N/A 11/29/2016    Procedure: COLONOSCOPY TO CECUM AND TERMINAL ILEUM WITH HOT POLYPECTOMIES;  Surgeon: Ivette Franco MD;  Location: [facility] ENDOSCOPY;  Service:    • COLONOSCOPY N/A 12/16/2019    Procedure: COLONOSCOPY to cecum with hot snare, cold biopsy polypectomies with clip placement x2;  Surgeon: Ivette Franco MD;  Location: [facility] ENDOSCOPY;  Service: Gastroenterology   • CORONARY STENT PLACEMENT  2004   • ENDOSCOPY N/A 12/16/2019    Procedure: ESOPHAGOGASTRODUODENOSCOPY with biopsies;  Surgeon: Ivette Franco MD;  Location: [facility] ENDOSCOPY;  Service: Gastroenterology   • ENDOSCOPY N/A 2/23/2021    Procedure: ESOPHAGOGASTRODUODENOSCOPY;  Surgeon: Collins Nolasco MD;  Location: [facility] ENDOSCOPY;  Service: Gastroenterology;  Laterality: N/A;  PRE- EPIGASTRIC PAIN, NAUSEA  POST- IRREGULAR Z-LINE   • EYE SURGERY      Cataract Surgery   • HAND SURGERY        Social History:   Social History     Tobacco Use    • Smoking status: Current Every Day Smoker     Packs/day: 0.50     Years: 47.00     Pack years: 23.50     Types: Cigarettes   • Smokeless tobacco: Never Used   • Tobacco comment: CAFFEINE USE: 4 CANS DIET [place] DAILY   Substance Use Topics   • Alcohol use: Yes     Comment: very seldom      Family History:  Family History   Problem Relation Age of Onset   • Cancer Mother    • Diabetes Mother    • Hypertension Mother    • Colon cancer Mother    • Heart disease Father    • Cancer Brother    • Stroke Brother        Home Meds:  Medications Prior to Admission   Medication Sig Dispense Refill Last Dose   • acetaminophen (TYLENOL) 325 MG tablet Take 2 tablets by mouth Every 6 (Six) Hours As Needed for Mild Pain .      • albuterol (PROVENTIL) (5 MG/ML) 0.5% nebulizer solution Take 2.5 mg by nebulization Every 6 (Six) Hours As Needed for Wheezing.      • amLODIPine (NORVASC) 5 MG tablet TAKE 1 TABLET BY MOUTH DAILY FOR BLOOD PRESSURE 90 tablet 1    • atorvastatin (Lipitor) 80 MG tablet Take 1 tablet by mouth Daily. For cholesterol 90 tablet 3    • carvedilol (COREG) 25 MG tablet Take 1 tablet by mouth 2 (Two) Times a Day With Meals. For heart and BP--- 180 tablet 3    • chlorthalidone (HYGROTON) 25 MG tablet Take 1 tablet by mouth Daily. 90 tablet 3    • Cholecalciferol (Vitamin D3) 50 MCG (2000 UT) tablet Take 1 tablet by mouth Daily.      • Cyanocobalamin (B-12) 1000 MCG capsule Take 1 tablet by mouth Daily.      • glipizide (GLUCOTROL XL) 2.5 MG 24 hr tablet Take 1 tablet by mouth Daily. For DMII while Metformin on hold and must take with food 30 tablet 5    • lisinopril (PRINIVIL,ZESTRIL) 40 MG tablet Take 1 tablet by mouth Daily. 90 tablet 3    • magnesium oxide (MAGnesium-Oxide) 400 (241.3 Mg) MG tablet tablet Take 1 tablet by mouth Daily. 90 tablet 1    • metFORMIN (GLUCOPHAGE) 1000 MG tablet Take 1 tablet by mouth 2 (Two) Times a Day. For DMII 180 tablet 1    • omeprazole (priLOSEC) 20 MG capsule Take 1  capsule by mouth 2 (Two) Times a Day. (Patient taking differently: Take 40 mg by mouth 2 (Two) Times a Day.) 60 capsule 11    • polyethylene glycol (MIRALAX) packet Take 17 g by mouth Daily As Needed.      • primidone (MYSOLINE) 50 MG tablet Take 1 tablet by mouth 2 (two) times a day. 180 tablet 3    • prochlorperazine (COMPAZINE) 10 MG tablet Take 1 tablet by mouth Every 6 (Six) Hours As Needed for Nausea or Vomiting. Stop Promethazine and generic Zofran 30 tablet 1    • QUEtiapine (SEROquel) 100 MG tablet Take 1 tablet by mouth Every Night. Sleep and mood 90 tablet 1    • tamsulosin (FLOMAX) 0.4 MG capsule 24 hr capsule Take 1 capsule by mouth Daily. For urine flow 90 capsule 3    • traMADol (Ultram) 50 MG tablet Take 1 tablet by mouth Every 8 (Eight) Hours As Needed for Moderate Pain . 90 tablet 1    • Vitamin E 180 MG capsule Take  by mouth.      • acetaminophen (TYLENOL) 325 MG tablet Take 2 tablets by mouth Every 6 (Six) Hours As Needed for Mild Pain .      • glucose blood (Glucose Meter Test) test strip Use as instructed once daily and PRN for DMII E11.9 50 each 11    • glucose monitor monitoring kit 1 each 2 (two) times a day. Please fill brand covered by pt's insurance.  Dx: E11.9 1 each 0    • sucralfate (Carafate) 1 g tablet Take 1 tablet by mouth 4 (Four) Times a Day. For Gastritis and stop Aspirin 120 tablet 2      Current Meds:   amLODIPine, 5 mg, Oral, Q24H  atorvastatin, 80 mg, Oral, Daily  carvedilol, 25 mg, Oral, BID With Meals  cefTRIAXone, 2 g, Intravenous, Q24H  cholecalciferol, 1,000 Units, Oral, Daily  enoxaparin, 40 mg, Subcutaneous, Q24H  insulin lispro, 0-9 Units, Subcutaneous, TID With Meals  lactobacillus acidophilus, 1 capsule, Oral, BID  lisinopril, 40 mg, Oral, Daily  magnesium oxide, 400 mg, Oral, Daily  nicotine, 1 patch, Transdermal, Q24H  pantoprazole, 40 mg, Oral, QAM  primidone, 50 mg, Oral, Q12H  QUEtiapine, 100 mg, Oral, Nightly  tamsulosin, 0.4 mg, Oral, Daily  vitamin B-12,  1,000 mcg, Oral, Daily      Allergies:  Allergies   Allergen Reactions   • Amoxicillin Diarrhea and Nausea Only     Severe nausea, tolerated cephalosporins   • Clopidogrel Itching     Review of Systems  Pertinent items are noted in HPI, all other systems reviewed and negative     Objective     Vital Signs  Temp:  [96.5 °F (35.8 °C)-97.6 °F (36.4 °C)] 97.6 °F (36.4 °C)  Heart Rate:  [67-90] 73  Resp:  [16-18] 18  BP: (107-147)/(66-79) 147/79  Physical Exam:  General Appearance:    Alert, cooperative, tearful, appears uncomfortable   Head:    Normocephalic, without obvious abnormality, atraumatic   Eyes:          conjunctivae and sclerae normal, no   icterus   Throat:   no thrush, oral mucosa moist   Neck:   Supple, no adenopathy   Lungs:    Coarse breath sounds in the left base, decreased breath sounds bilaterally    Heart:    Regular rhythm and normal rate    Chest Wall:    No abnormalities observed   Abdomen:     Soft, nondistended, left lower quadrant tenderness without rebound or guarding; normal bowel sounds   Extremities:   no edema, no redness   Skin:   No bruising or rash   Psychiatric:  Tearful, oriented x3     Results Review:   I reviewed the patient's new clinical results.    Results from last 7 days   Lab Units 04/08/21  0611 04/07/21  1208   WBC 10*3/mm3 7.75 12.21*   HEMOGLOBIN g/dL 11.4* 12.8*   HEMATOCRIT % 32.7* 35.6*   PLATELETS 10*3/mm3 183 203     Results from last 7 days   Lab Units 04/08/21  0611 04/07/21  1208   SODIUM mmol/L 133* 125*   POTASSIUM mmol/L 3.2* 3.1*   CHLORIDE mmol/L 97* 87*   CO2 mmol/L 21.9* 21.0*   BUN mg/dL 12 14   CREATININE mg/dL 0.95 1.05   CALCIUM mg/dL 7.9* 8.6   BILIRUBIN mg/dL  --  0.5   ALK PHOS U/L  --  100   ALT (SGPT) U/L  --  17   AST (SGOT) U/L  --  16   GLUCOSE mg/dL 151* 235*         Lab Results   Lab Value Date/Time    LIPASE 79 (H) 04/07/2021 1208    LIPASE 45 02/22/2021 0934    LIPASE 34 07/06/2020 1504    LIPASE 13 12/23/2019 0959    LIPASE 59 12/05/2019  1552       Radiology:  CT Abdomen Pelvis With Contrast   Final Result   Tiny bilateral renal cysts. Probable bilateral perirenal   edema showing improvement since 03/04/2021. Otherwise unremarkable CT   scan of the abdomen and pelvis.       This report was finalized on 4/7/2021 2:12 PM by Dr. Brody Connell M.D.              Assessment/Plan   Patient Active Problem List   Diagnosis   • Elevated prostate specific antigen (PSA)   • Coronary artery disease involving native coronary artery of native heart without angina pectoris   • Bipolar affective disorder (CMS/HCC)   • Chronic obstructive pulmonary disease (CMS/HCC)   • Colon polyp   • Constipation   • Type 2 diabetes mellitus with microalbuminuria, without long-term current use of insulin (CMS/HCC)   • GERD (gastroesophageal reflux disease)   • Fatigue   • Hearing loss   • Hyperlipidemia   • Peripheral vascular disease (CMS/HCC)   • Proteinuria   • Muscle pain   • Low back pain   • Leukocytosis   • Seborrheic eczema   • Sleep apnea   • Tinea corporis   • Tremor   • Essential hypertension   • Anxiety   • Benign prostatic hyperplasia with lower urinary tract symptoms   • Tobacco abuse   • Normocytic anemia   • Thyroid nodule   • Iron deficiency anemia   • Adenomatous polyp of colon   • Family history of malignant neoplasm of colon   • Gastroesophageal reflux disease   • Influenza A   • SWAPNIL (acute kidney injury) (CMS/HCC)   • Essential tremor   • Magnesium deficiency   • Long-term current use of lithium   • Generalized abdominal pain   • Nausea and vomiting   • Acute gastritis without hemorrhage   • C. difficile colitis   • Dental caries   • Rice's esophagus with high grade dysplasia   • Abdominal pain       Assessment:  1. Left lower quadrant pain  2. Recent C. difficile in February 2020 with persistent intermittent diarrhea  3. Hyponatremia  4. Rice's esophagus with high-grade dysplasia  5. History of multiple adenomatous polyps of the colon  6. Tobacco  abuse  7. anemia    Plan:  · Given persistent symptoms that have been essentially unchanged since his C. difficile diagnosis in late February, will recheck C. difficile at this time.  If C. difficile is negative, he needs a colonoscopy for further evaluation at this point.  CT with no significant findings in the left lower quadrant to explain his persistent discomfort.  Also consider vascular evaluation given longstanding tobacco abuse, coronary artery disease  · Continue PPI given Rice's esophagus with high-grade dysplasia despite the fact that he had C. Difficile  · We will start an antispasmodic to see if this helps with his discomfort  · Further recommendations based upon his clinical course and the results of the above      I discussed the patients findings and my recommendations with patient.    Ivette Franco MD

## 2021-04-09 ENCOUNTER — APPOINTMENT (OUTPATIENT)
Dept: CARDIOLOGY | Facility: HOSPITAL | Age: 71
End: 2021-04-09

## 2021-04-09 LAB
ANION GAP SERPL CALCULATED.3IONS-SCNC: 11.4 MMOL/L (ref 5–15)
BASOPHILS # BLD AUTO: 0.03 10*3/MM3 (ref 0–0.2)
BASOPHILS NFR BLD AUTO: 0.3 % (ref 0–1.5)
BH CV VAS SMA 1ST PP TIME: 15 MIN
BH CV VAS SMA 2ND PP TIME: 30 MIN
BH CV VAS SMA 3RD PP TIME: 45 MIN
BH CV VAS SMA AORTA PSV: 55.3 CM/S
BH CV VAS SMA CELIAC DIST EDV: 43.8 CM/S
BH CV VAS SMA CELIAC DIST PSV: 170 CM/S
BH CV VAS SMA CELIAC ORIGIN EDV: 24.9 CM/S
BH CV VAS SMA CELIAC ORIGIN PSV: 123 CM/S
BH CV VAS SMA HEPATIC EDV: 24.8 CM/S
BH CV VAS SMA HEPATIC PSV: 135 CM/S
BH CV VAS SMA IMA PSV: 126 CM/S
BH CV VAS SMA ORIGIN EDV: 17.8 CM/S
BH CV VAS SMA ORIGIN PSV: 116 CM/S
BH CV VAS SMA SMA DIST PSV: 149 CM/S
BH CV VAS SMA SMA MID PSV: 134 CM/S
BH CV VAS SMA SMA PROX PSV: 162 CM/S
BH CV VAS SMA SPLENIC EDV: 18.3 CM/S
BH CV VAS SMA SPLENIC PSV: 86.3 CM/S
BUN SERPL-MCNC: 10 MG/DL (ref 8–23)
BUN/CREAT SERPL: 11.5 (ref 7–25)
C DIFF TOX GENS STL QL NAA+PROBE: POSITIVE
CALCIUM SPEC-SCNC: 8.9 MG/DL (ref 8.6–10.5)
CHLORIDE SERPL-SCNC: 105 MMOL/L (ref 98–107)
CO2 SERPL-SCNC: 21.6 MMOL/L (ref 22–29)
CREAT SERPL-MCNC: 0.87 MG/DL (ref 0.76–1.27)
DEPRECATED RDW RBC AUTO: 36.9 FL (ref 37–54)
EOSINOPHIL # BLD AUTO: 0.08 10*3/MM3 (ref 0–0.4)
EOSINOPHIL NFR BLD AUTO: 0.9 % (ref 0.3–6.2)
ERYTHROCYTE [DISTWIDTH] IN BLOOD BY AUTOMATED COUNT: 12.2 % (ref 12.3–15.4)
GFR SERPL CREATININE-BSD FRML MDRD: 87 ML/MIN/1.73
GLUCOSE BLDC GLUCOMTR-MCNC: 171 MG/DL (ref 70–130)
GLUCOSE BLDC GLUCOMTR-MCNC: 172 MG/DL (ref 70–130)
GLUCOSE BLDC GLUCOMTR-MCNC: 174 MG/DL (ref 70–130)
GLUCOSE BLDC GLUCOMTR-MCNC: 332 MG/DL (ref 70–130)
GLUCOSE SERPL-MCNC: 131 MG/DL (ref 65–99)
HCT VFR BLD AUTO: 32.9 % (ref 37.5–51)
HGB BLD-MCNC: 11.5 G/DL (ref 13–17.7)
IMM GRANULOCYTES # BLD AUTO: 0.05 10*3/MM3 (ref 0–0.05)
IMM GRANULOCYTES NFR BLD AUTO: 0.5 % (ref 0–0.5)
LYMPHOCYTES # BLD AUTO: 2.72 10*3/MM3 (ref 0.7–3.1)
LYMPHOCYTES NFR BLD AUTO: 29.3 % (ref 19.6–45.3)
MAGNESIUM SERPL-MCNC: 0.8 MG/DL (ref 1.6–2.4)
MCH RBC QN AUTO: 29.3 PG (ref 26.6–33)
MCHC RBC AUTO-ENTMCNC: 35 G/DL (ref 31.5–35.7)
MCV RBC AUTO: 83.7 FL (ref 79–97)
MONOCYTES # BLD AUTO: 0.86 10*3/MM3 (ref 0.1–0.9)
MONOCYTES NFR BLD AUTO: 9.3 % (ref 5–12)
NEUTROPHILS NFR BLD AUTO: 5.53 10*3/MM3 (ref 1.7–7)
NEUTROPHILS NFR BLD AUTO: 59.7 % (ref 42.7–76)
NRBC BLD AUTO-RTO: 0 /100 WBC (ref 0–0.2)
PLATELET # BLD AUTO: 194 10*3/MM3 (ref 140–450)
PMV BLD AUTO: 12 FL (ref 6–12)
POTASSIUM SERPL-SCNC: 3.6 MMOL/L (ref 3.5–5.2)
RBC # BLD AUTO: 3.93 10*6/MM3 (ref 4.14–5.8)
SODIUM SERPL-SCNC: 138 MMOL/L (ref 136–145)
WBC # BLD AUTO: 9.27 10*3/MM3 (ref 3.4–10.8)

## 2021-04-09 PROCEDURE — 82962 GLUCOSE BLOOD TEST: CPT

## 2021-04-09 PROCEDURE — 93975 VASCULAR STUDY: CPT

## 2021-04-09 PROCEDURE — 25010000002 ENOXAPARIN PER 10 MG: Performed by: INTERNAL MEDICINE

## 2021-04-09 PROCEDURE — 80048 BASIC METABOLIC PNL TOTAL CA: CPT | Performed by: HOSPITALIST

## 2021-04-09 PROCEDURE — 87493 C DIFF AMPLIFIED PROBE: CPT | Performed by: INTERNAL MEDICINE

## 2021-04-09 PROCEDURE — 83735 ASSAY OF MAGNESIUM: CPT | Performed by: HOSPITALIST

## 2021-04-09 PROCEDURE — 25010000002 MAGNESIUM SULFATE 2 GM/50ML SOLUTION: Performed by: NURSE PRACTITIONER

## 2021-04-09 PROCEDURE — 85025 COMPLETE CBC W/AUTO DIFF WBC: CPT | Performed by: INTERNAL MEDICINE

## 2021-04-09 PROCEDURE — 63710000001 INSULIN LISPRO (HUMAN) PER 5 UNITS: Performed by: INTERNAL MEDICINE

## 2021-04-09 PROCEDURE — 99225 PR SBSQ OBSERVATION CARE/DAY 25 MINUTES: CPT | Performed by: INTERNAL MEDICINE

## 2021-04-09 RX ORDER — MAGNESIUM SULFATE HEPTAHYDRATE 40 MG/ML
4 INJECTION, SOLUTION INTRAVENOUS AS NEEDED
Status: DISCONTINUED | OUTPATIENT
Start: 2021-04-09 | End: 2021-04-09

## 2021-04-09 RX ORDER — MAGNESIUM SULFATE HEPTAHYDRATE 40 MG/ML
2 INJECTION, SOLUTION INTRAVENOUS AS NEEDED
Status: DISCONTINUED | OUTPATIENT
Start: 2021-04-09 | End: 2021-04-09

## 2021-04-09 RX ADMIN — PANTOPRAZOLE SODIUM 40 MG: 40 TABLET, DELAYED RELEASE ORAL at 06:19

## 2021-04-09 RX ADMIN — LISINOPRIL 40 MG: 40 TABLET ORAL at 13:06

## 2021-04-09 RX ADMIN — PRIMIDONE 50 MG: 50 TABLET ORAL at 20:18

## 2021-04-09 RX ADMIN — QUETIAPINE FUMARATE 100 MG: 100 TABLET ORAL at 20:18

## 2021-04-09 RX ADMIN — Medication 1 CAPSULE: at 13:04

## 2021-04-09 RX ADMIN — ENOXAPARIN SODIUM 40 MG: 40 INJECTION SUBCUTANEOUS at 17:55

## 2021-04-09 RX ADMIN — PRIMIDONE 50 MG: 50 TABLET ORAL at 13:05

## 2021-04-09 RX ADMIN — Medication 3 MG: at 20:31

## 2021-04-09 RX ADMIN — AMLODIPINE BESYLATE 5 MG: 5 TABLET ORAL at 13:06

## 2021-04-09 RX ADMIN — VANCOMYCIN 125 MG: KIT at 17:55

## 2021-04-09 RX ADMIN — CARVEDILOL 25 MG: 25 TABLET, FILM COATED ORAL at 17:55

## 2021-04-09 RX ADMIN — Medication 1 CAPSULE: at 20:18

## 2021-04-09 RX ADMIN — Medication 1000 MCG: at 13:04

## 2021-04-09 RX ADMIN — INSULIN LISPRO 7 UNITS: 100 INJECTION, SOLUTION INTRAVENOUS; SUBCUTANEOUS at 17:54

## 2021-04-09 RX ADMIN — SODIUM CHLORIDE, PRESERVATIVE FREE 10 ML: 5 INJECTION INTRAVENOUS at 13:08

## 2021-04-09 RX ADMIN — NICOTINE 1 PATCH: 21 PATCH, EXTENDED RELEASE TRANSDERMAL at 13:07

## 2021-04-09 RX ADMIN — MAGNESIUM SULFATE 2 G: 2 INJECTION INTRAVENOUS at 06:13

## 2021-04-09 RX ADMIN — CARVEDILOL 25 MG: 25 TABLET, FILM COATED ORAL at 13:05

## 2021-04-09 RX ADMIN — SODIUM CHLORIDE, PRESERVATIVE FREE 10 ML: 5 INJECTION INTRAVENOUS at 20:18

## 2021-04-09 RX ADMIN — VANCOMYCIN 125 MG: KIT at 13:06

## 2021-04-09 RX ADMIN — TAMSULOSIN HYDROCHLORIDE 0.4 MG: 0.4 CAPSULE ORAL at 13:04

## 2021-04-09 RX ADMIN — DICYCLOMINE HYDROCHLORIDE 20 MG: 10 CAPSULE ORAL at 20:18

## 2021-04-09 RX ADMIN — ATORVASTATIN CALCIUM 80 MG: 80 TABLET, FILM COATED ORAL at 13:04

## 2021-04-09 RX ADMIN — ACETAMINOPHEN 650 MG: 325 TABLET, FILM COATED ORAL at 04:51

## 2021-04-09 RX ADMIN — Medication 1000 UNITS: at 13:04

## 2021-04-09 RX ADMIN — INSULIN LISPRO 2 UNITS: 100 INJECTION, SOLUTION INTRAVENOUS; SUBCUTANEOUS at 13:55

## 2021-04-09 RX ADMIN — MAGNESIUM SULFATE 2 G: 2 INJECTION INTRAVENOUS at 06:41

## 2021-04-09 RX ADMIN — ACETAMINOPHEN 650 MG: 325 TABLET, FILM COATED ORAL at 20:31

## 2021-04-09 NOTE — PLAN OF CARE
Goal Outcome Evaluation:  Plan of Care Reviewed With: patient     Outcome Summary: No pain or nausea today per pt stated to RN and MD.  Mesetaric Doppler today.  Restarted diet.  VSS.  Will cont to monitor.  Oral Vanc started for C-diff.

## 2021-04-09 NOTE — PLAN OF CARE
"Goal Outcome Evaluation:  Plan of Care Reviewed With: patient     Outcome Summary: Pt states still has pain left lower abd radiating to back/flank area. States has nausea. Refused offer of prn meds, \"they don't do any good\". Had liq/loose brown stool. Stool sent to lab, resulted C diff +. Mag replacement. NPO for US.  Telemetry SR w/ 1*AVB.  "

## 2021-04-09 NOTE — PROGRESS NOTES
[facility]  Inpatient Progress Note    Reason for Follow Up:  Diarrhea    Subjective     Interval History:   Stool positive for C diff.  Not yet started on treatment.     Current Facility-Administered Medications:   •  acetaminophen (TYLENOL) tablet 650 mg, 650 mg, Oral, Q4H PRN, May Patterson MD, 650 mg at 04/09/21 0451  •  albuterol (PROVENTIL) nebulizer solution 0.5% 2.5 mg/0.5mL, 2.5 mg, Nebulization, Q6H PRN, May Patterson MD  •  amLODIPine (NORVASC) tablet 5 mg, 5 mg, Oral, Q24H, May Patterson MD, 5 mg at 04/08/21 0902  •  atorvastatin (LIPITOR) tablet 80 mg, 80 mg, Oral, Daily, May Patterson MD, 80 mg at 04/08/21 2122  •  carvedilol (COREG) tablet 25 mg, 25 mg, Oral, BID With Meals, May Patterson MD, 25 mg at 04/08/21 1834  •  cholecalciferol (VITAMIN D3) tablet 1,000 Units, 1,000 Units, Oral, Daily, May Patterson MD, 1,000 Units at 04/08/21 0902  •  dextrose (D50W) 25 g/ 50mL Intravenous Solution 25 g, 25 g, Intravenous, Q15 Min PRN, May Patterson MD  •  dextrose (GLUTOSE) oral gel 15 g, 15 g, Oral, Q15 Min PRN, May Patterson MD  •  dicyclomine (BENTYL) capsule 20 mg, 20 mg, Oral, 4x Daily AC & at Bedtime PRN, Ivette Franco MD, 20 mg at 04/08/21 1429  •  enoxaparin (LOVENOX) syringe 40 mg, 40 mg, Subcutaneous, Q24H, May Patterson MD, 40 mg at 04/08/21 1834  •  glucagon (human recombinant) (GLUCAGEN DIAGNOSTIC) injection 1 mg, 1 mg, Subcutaneous, Q15 Min PRN, May Patterson MD  •  HYDROcodone-acetaminophen (NORCO) 5-325 MG per tablet 1 tablet, 1 tablet, Oral, Q6H PRN, Tigist Rodriguez, APRN, 1 tablet at 04/08/21 1429  •  insulin lispro (ADMELOG) injection 0-9 Units, 0-9 Units, Subcutaneous, TID With Meals, May Patterson MD, 2 Units at 04/08/21 8866  •  lactobacillus acidophilus (RISAQUAD) capsule 1 capsule, 1 capsule, Oral, BID, May Patterson MD, 1 capsule at 04/08/21  2121  •  lisinopril (PRINIVIL,ZESTRIL) tablet 40 mg, 40 mg, Oral, Daily, May Patterson MD, 40 mg at 04/08/21 0901  •  Magnesium Sulfate 2 gram Bolus, followed by 8 gram infusion (total Mg dose 10 grams)- Mg less than or equal to 1mg/dL, 2 g, Intravenous, PRN, 2 g at 04/09/21 0641 **OR** Magnesium Sulfate 2 gram / 50mL Infusion (GIVE X 3 BAGS TO EQUAL 6GM TOTAL DOSE) - Mg 1.1 - 1.5 mg/dl, 2 g, Intravenous, PRN **OR** Magnesium Sulfate 4 gram infusion- Mg 1.6-1.9 mg/dL, 4 g, Intravenous, PRN, Tigist Rodriguez, APRN  •  melatonin tablet 3 mg, 3 mg, Oral, Nightly PRN, May Patterson MD, 3 mg at 04/07/21 2119  •  nicotine (NICODERM CQ) 21 MG/24HR patch 1 patch, 1 patch, Transdermal, Q24H, May Patterson MD, 1 patch at 04/08/21 0901  •  nitroglycerin (NITROSTAT) SL tablet 0.4 mg, 0.4 mg, Sublingual, Q5 Min PRN, May Patterson MD  •  ondansetron (ZOFRAN) tablet 4 mg, 4 mg, Oral, Q6H PRN **OR** ondansetron (ZOFRAN) injection 4 mg, 4 mg, Intravenous, Q6H PRN, May Patterson MD, 4 mg at 04/08/21 0915  •  pantoprazole (PROTONIX) EC tablet 40 mg, 40 mg, Oral, QAM, May Patterson MD, 40 mg at 04/09/21 0619  •  potassium chloride (K-DUR,KLOR-CON) ER tablet 40 mEq, 40 mEq, Oral, TID With Meals, Salvatore Holder MD, 40 mEq at 04/08/21 1834  •  primidone (MYSOLINE) tablet 50 mg, 50 mg, Oral, Q12H, May Patterson MD, 50 mg at 04/08/21 2121  •  prochlorperazine (COMPAZINE) injection 10 mg, 10 mg, Intravenous, Q6H PRN, May Patterson MD, 10 mg at 04/08/21 1249  •  QUEtiapine (SEROquel) tablet 100 mg, 100 mg, Oral, Nightly, May Patterson MD, 100 mg at 04/08/21 2121  •  sodium chloride 0.9 % flush 10 mL, 10 mL, Intravenous, PRN, Micheal Esquivel MD, 10 mL at 04/08/21 2121  •  tamsulosin (FLOMAX) 24 hr capsule 0.4 mg, 0.4 mg, Oral, Daily, May Patterson MD, 0.4 mg at 04/08/21 2123  •  vitamin B-12 (CYANOCOBALAMIN) tablet 1,000 mcg, 1,000 mcg, Oral,  Daily, StingMay grubbs MD, 1,000 mcg at 04/08/21 0902  Review of Systems:    All systems were reviewed and negative except for:  Gastrointestinal: positive for  diarrhea and pain    Objective     Vital Signs  Temp:  [97.6 °F (36.4 °C)-98.4 °F (36.9 °C)] 97.7 °F (36.5 °C)  Heart Rate:  [76-78] 78  Resp:  [16-18] 16  BP: (139-163)/(67-85) 163/85  Body mass index is 23.32 kg/m².  No intake or output data in the 24 hours ending 04/09/21 1030  No intake/output data recorded.     Physical Exam:   General: patient awake, alert and cooperative   Abdomen: soft, nontender, nondistended; normal bowel sounds   Rectal: deferred   Extremities: no rash or edema   Psychiatric: Normal mood and behavior; memory intact     Results Review:     I reviewed the patient's new clinical results.    Results from last 7 days   Lab Units 04/09/21  0416 04/08/21  0611 04/07/21  1208   WBC 10*3/mm3 9.27 7.75 12.21*   HEMOGLOBIN g/dL 11.5* 11.4* 12.8*   HEMATOCRIT % 32.9* 32.7* 35.6*   PLATELETS 10*3/mm3 194 183 203     Results from last 7 days   Lab Units 04/09/21  0416 04/08/21  0611 04/07/21  1208   SODIUM mmol/L 138 133* 125*   POTASSIUM mmol/L 3.6 3.2* 3.1*   CHLORIDE mmol/L 105 97* 87*   CO2 mmol/L 21.6* 21.9* 21.0*   BUN mg/dL 10 12 14   CREATININE mg/dL 0.87 0.95 1.05   CALCIUM mg/dL 8.9 7.9* 8.6   BILIRUBIN mg/dL  --   --  0.5   ALK PHOS U/L  --   --  100   ALT (SGPT) U/L  --   --  17   AST (SGOT) U/L  --   --  16   GLUCOSE mg/dL 131* 151* 235*         Lab Results   Lab Value Date/Time    LIPASE 79 (H) 04/07/2021 1208    LIPASE 45 02/22/2021 0934    LIPASE 34 07/06/2020 1504    LIPASE 13 12/23/2019 0959    LIPASE 59 12/05/2019 1552       Radiology:  [unfilled]      Assessment/Plan   Assessment:   1. Lower abdominal pain  2. Diarrhea  3. C diff positive    Plan:   Start pulsed dose Vancomycin with prolonged taper given recurrence of C diff  PRN antispasmodic  Diet as tolerated    I discussed the patients findings and my  recommendations with patient.         Sam Mustafa M.D.  [facility]  [address] [place] [address]  Office: [phone]

## 2021-04-09 NOTE — H&P
Urology Consult  Patient Identification:  Name: Felicita Mike  Age: 70 y.o.  Sex: male  :  1950  MRN: [id number]                       Chief Complaint:  Low abd pain    History of Present Illness: 71yo WM I follow w [facility], admittted to hospitial w low abd pain and diarrhea, stool studies showed c diff today.  asked to see re nonspecific perinephric stranding. Pt deneis urinary complaints. No hydro, mass or stone seen on CT. UA neg on admission.       Problem List:  Active Hospital Problems    Diagnosis  POA   • **Left lower quadrant abdominal pain [R10.32]  Unknown   • Hyponatremia [E87.1]  Unknown   • Hypokalemia [E87.6]  Unknown   • Rice's esophagus with high grade dysplasia [K22.711]  Yes   • Iron deficiency anemia [D50.9]  Yes   • Tobacco abuse [Z72.0]  Yes   • Peripheral vascular disease (CMS/HCC) [I73.9]  Yes   • Hyperlipidemia [E78.5]  Yes     Description: 2014 simvastatin discontinued because of muscle pain     • GERD (gastroesophageal reflux disease) [K21.9]  Yes   • Essential hypertension [I10]  Yes   • Chronic obstructive pulmonary disease (CMS/HCC) [J44.9]  Yes     Description: Follows with Dr Lopez every 6 months       Past Medical History:  Past Medical History:   Diagnosis Date   • Abnormal PSA    • Acute myocardial infarction (CMS/HCC)     Stents   • Apnea, sleep    • Atherosclerotic heart disease    • Chronic pain     Chronic low back paoin, MRI ,  L3 compression deformity, L2 degenerative disc disease and L5-S1 degenerative disc disease.   • Colon polyp    • Constipation    • COPD (chronic obstructive pulmonary disease) (CMS/HCC)    • Coronary artery disease    • Depressed bipolar II disorder (CMS/HCC)    • Diabetes mellitus (CMS/HCC)    • Essential tremor    • Fatigue    • GERD (gastroesophageal reflux disease)    • H/O transfusion of packed red blood cells    • Health care maintenance    • Hearing loss    • History of back pain    • Hypercholesterolemia    •  Hyperlipidemia    • Hypertension    • Leukocytosis    • Osteoarthritis    • Proteinuria    • PVD (peripheral vascular disease) (CMS/HCC)    • Sebaceous cyst    • Seborrheic dermatitis    • Sleep apnea    • Tinea corporis    • Tobacco use    • Tremor      Past Surgical History:  Past Surgical History:   Procedure Laterality Date   • ANGIOPLASTY      Cath Stent Placement   • COLONOSCOPY  01/22/2014    ta polyps   • COLONOSCOPY N/A 11/29/2016    Procedure: COLONOSCOPY TO CECUM AND TERMINAL ILEUM WITH HOT POLYPECTOMIES;  Surgeon: Ivette Franco MD;  Location: [facility] ENDOSCOPY;  Service:    • COLONOSCOPY N/A 12/16/2019    Procedure: COLONOSCOPY to cecum with hot snare, cold biopsy polypectomies with clip placement x2;  Surgeon: Ivette Franco MD;  Location: [facility] ENDOSCOPY;  Service: Gastroenterology   • CORONARY STENT PLACEMENT  2004   • ENDOSCOPY N/A 12/16/2019    Procedure: ESOPHAGOGASTRODUODENOSCOPY with biopsies;  Surgeon: Ivette Franco MD;  Location: [facility] ENDOSCOPY;  Service: Gastroenterology   • ENDOSCOPY N/A 2/23/2021    Procedure: ESOPHAGOGASTRODUODENOSCOPY;  Surgeon: Collins Nolasco MD;  Location: [facility] ENDOSCOPY;  Service: Gastroenterology;  Laterality: N/A;  PRE- EPIGASTRIC PAIN, NAUSEA  POST- IRREGULAR Z-LINE   • EYE SURGERY      Cataract Surgery   • HAND SURGERY        Home Meds:  Medications Prior to Admission   Medication Sig Dispense Refill Last Dose   • acetaminophen (TYLENOL) 325 MG tablet Take 2 tablets by mouth Every 6 (Six) Hours As Needed for Mild Pain .      • albuterol (PROVENTIL) (5 MG/ML) 0.5% nebulizer solution Take 2.5 mg by nebulization Every 6 (Six) Hours As Needed for Wheezing.      • amLODIPine (NORVASC) 5 MG tablet TAKE 1 TABLET BY MOUTH DAILY FOR BLOOD PRESSURE 90 tablet 1    • atorvastatin (Lipitor) 80 MG tablet Take 1 tablet by mouth Daily. For cholesterol 90 tablet 3    • carvedilol (COREG) 25 MG tablet Take 1 tablet by mouth 2 (Two) Times a Day With Meals. For  heart and BP--- 180 tablet 3    • chlorthalidone (HYGROTON) 25 MG tablet Take 1 tablet by mouth Daily. 90 tablet 3    • Cholecalciferol (Vitamin D3) 50 MCG (2000 UT) tablet Take 1 tablet by mouth Daily.      • Cyanocobalamin (B-12) 1000 MCG capsule Take 1 tablet by mouth Daily.      • glipizide (GLUCOTROL XL) 2.5 MG 24 hr tablet Take 1 tablet by mouth Daily. For DMII while Metformin on hold and must take with food 30 tablet 5    • lisinopril (PRINIVIL,ZESTRIL) 40 MG tablet Take 1 tablet by mouth Daily. 90 tablet 3    • magnesium oxide (MAGnesium-Oxide) 400 (241.3 Mg) MG tablet tablet Take 1 tablet by mouth Daily. 90 tablet 1    • metFORMIN (GLUCOPHAGE) 1000 MG tablet Take 1 tablet by mouth 2 (Two) Times a Day. For DMII 180 tablet 1    • omeprazole (priLOSEC) 20 MG capsule Take 1 capsule by mouth 2 (Two) Times a Day. (Patient taking differently: Take 40 mg by mouth 2 (Two) Times a Day.) 60 capsule 11    • polyethylene glycol (MIRALAX) packet Take 17 g by mouth Daily As Needed.      • primidone (MYSOLINE) 50 MG tablet Take 1 tablet by mouth 2 (two) times a day. 180 tablet 3    • prochlorperazine (COMPAZINE) 10 MG tablet Take 1 tablet by mouth Every 6 (Six) Hours As Needed for Nausea or Vomiting. Stop Promethazine and generic Zofran 30 tablet 1    • QUEtiapine (SEROquel) 100 MG tablet Take 1 tablet by mouth Every Night. Sleep and mood 90 tablet 1    • tamsulosin (FLOMAX) 0.4 MG capsule 24 hr capsule Take 1 capsule by mouth Daily. For urine flow 90 capsule 3    • traMADol (Ultram) 50 MG tablet Take 1 tablet by mouth Every 8 (Eight) Hours As Needed for Moderate Pain . 90 tablet 1    • Vitamin E 180 MG capsule Take  by mouth.      • acetaminophen (TYLENOL) 325 MG tablet Take 2 tablets by mouth Every 6 (Six) Hours As Needed for Mild Pain .      • glucose blood (Glucose Meter Test) test strip Use as instructed once daily and PRN for DMII E11.9 50 each 11    • glucose monitor monitoring kit 1 each 2 (two) times a day. Please  fill brand covered by pt's insurance.  Dx: E11.9 1 each 0    • sucralfate (Carafate) 1 g tablet Take 1 tablet by mouth 4 (Four) Times a Day. For Gastritis and stop Aspirin 120 tablet 2      Current Meds:   Current Facility-Administered Medications   Medication Dose Route Frequency Provider Last Rate Last Admin   • acetaminophen (TYLENOL) tablet 650 mg  650 mg Oral Q4H PRN May Patterson MD   650 mg at 04/09/21 0451   • albuterol (PROVENTIL) nebulizer solution 0.5% 2.5 mg/0.5mL  2.5 mg Nebulization Q6H PRN May Patterson MD       • amLODIPine (NORVASC) tablet 5 mg  5 mg Oral Q24H May Patterson MD   5 mg at 04/09/21 1306   • atorvastatin (LIPITOR) tablet 80 mg  80 mg Oral Daily May Patterson MD   80 mg at 04/09/21 1304   • carvedilol (COREG) tablet 25 mg  25 mg Oral BID With Meals May Patterson MD   25 mg at 04/09/21 1305   • cholecalciferol (VITAMIN D3) tablet 1,000 Units  1,000 Units Oral Daily May Patterson MD   1,000 Units at 04/09/21 1304   • dextrose (D50W) 25 g/ 50mL Intravenous Solution 25 g  25 g Intravenous Q15 Min PRN May Patterson MD       • dextrose (GLUTOSE) oral gel 15 g  15 g Oral Q15 Min PRN May Patterson MD       • dicyclomine (BENTYL) capsule 20 mg  20 mg Oral 4x Daily AC & at Bedtime PRN Ivette Franco MD   20 mg at 04/08/21 1429   • enoxaparin (LOVENOX) syringe 40 mg  40 mg Subcutaneous Q24H May Patterson MD   40 mg at 04/08/21 1494   • glucagon (human recombinant) (GLUCAGEN DIAGNOSTIC) injection 1 mg  1 mg Subcutaneous Q15 Min PRN May Patterson MD       • HYDROcodone-acetaminophen (NORCO) 5-325 MG per tablet 1 tablet  1 tablet Oral Q6H PRN Tigist Rodriguez APRN   1 tablet at 04/08/21 1429   • insulin lispro (ADMELOG) injection 0-9 Units  0-9 Units Subcutaneous TID With Meals May Patterson MD   2 Units at 04/09/21 1355   • lactobacillus acidophilus (RISAQUAD) capsule 1 capsule  1 capsule Oral  BID May Patterson MD   1 capsule at 04/09/21 1304   • lisinopril (PRINIVIL,ZESTRIL) tablet 40 mg  40 mg Oral Daily May Patterson MD   40 mg at 04/09/21 1306   • melatonin tablet 3 mg  3 mg Oral Nightly PRN May Patterson MD   3 mg at 04/07/21 2119   • nicotine (NICODERM CQ) 21 MG/24HR patch 1 patch  1 patch Transdermal Q24H May Patterson MD   1 patch at 04/09/21 1307   • nitroglycerin (NITROSTAT) SL tablet 0.4 mg  0.4 mg Sublingual Q5 Min PRN May Patterson MD       • ondansetron (ZOFRAN) tablet 4 mg  4 mg Oral Q6H PRN May Patterson MD        Or   • ondansetron (ZOFRAN) injection 4 mg  4 mg Intravenous Q6H PRN May Patterson MD   4 mg at 04/08/21 0915   • pantoprazole (PROTONIX) EC tablet 40 mg  40 mg Oral QAM May Patterson MD   40 mg at 04/09/21 0619   • Pharmacy Consult   Does not apply Continuous PRN Sam Mustafa MD       • primidone (MYSOLINE) tablet 50 mg  50 mg Oral Q12H May Patterson MD   50 mg at 04/09/21 1305   • prochlorperazine (COMPAZINE) injection 10 mg  10 mg Intravenous Q6H PRN May Patterson MD   10 mg at 04/08/21 1249   • QUEtiapine (SEROquel) tablet 100 mg  100 mg Oral Nightly May Patterson MD   100 mg at 04/08/21 2121   • sodium chloride 0.9 % flush 10 mL  10 mL Intravenous PRN Micheal Esquivel MD   10 mL at 04/09/21 1308   • tamsulosin (FLOMAX) 24 hr capsule 0.4 mg  0.4 mg Oral Daily May Patterson MD   0.4 mg at 04/09/21 1304   • vancomycin oral solution reconstituted 125 mg  125 mg Oral Q6H Sam Mustafa MD   125 mg at 04/09/21 1306   • [START ON 4/19/2021] vancomycin oral solution reconstituted 125 mg  125 mg Oral Q12H Sam Mustafa MD       • [START ON 4/26/2021] vancomycin oral solution reconstituted 125 mg  125 mg Oral Q24H Sam Mustafa MD       • [START ON 5/3/2021] vancomycin oral solution reconstituted 125 mg  125 mg Oral Every Other Day Ramsey  "Sam Skinner MD       • vitamin B-12 (CYANOCOBALAMIN) tablet 1,000 mcg  1,000 mcg Oral Daily Stingl, May Jeff MD   1,000 mcg at 21 1304     Allergies:  Allergies   Allergen Reactions   • Amoxicillin Diarrhea and Nausea Only     Severe nausea, tolerated cephalosporins   • Clopidogrel Itching     Immunizations:  Immunization History   Administered Date(s) Administered   • Flu Vaccine Quad PF >18YRS 2016   • Fluad Quad 65+ 10/01/2020   • Influenza LAIV (Nasal) 2015   • Influenza TIV (IM) 2019   • Pneumococcal Conjugate 13-Valent (PCV13) 2018   • Pneumococcal Polysaccharide (PPSV23) 2019   • Pneumococcal, Unspecified 2013   • Zostavax 2016     Social History:   Social History     Tobacco Use   • Smoking status: Current Every Day Smoker     Packs/day: 0.50     Years: 47.00     Pack years: 23.50     Types: Cigarettes   • Smokeless tobacco: Never Used   • Tobacco comment: CAFFEINE USE: 4 CANS DIET [place] DAILY   Substance Use Topics   • Alcohol use: Yes     Comment: very seldom      Family History:  Family History   Problem Relation Age of Onset   • Cancer Mother    • Diabetes Mother    • Hypertension Mother    • Colon cancer Mother    • Heart disease Father    • Cancer Brother    • Stroke Brother         Review of Systems  negative 12 point system review except:no fever or dysuria.    Objective:  tMax 24 hrs: Temp (24hrs), Av.3 °F (36.8 °C), Min:97.7 °F (36.5 °C), Max:98.7 °F (37.1 °C)    Vitals Ranges:   Temp:  [97.7 °F (36.5 °C)-98.7 °F (37.1 °C)] 98.7 °F (37.1 °C)  Heart Rate:  [77-81] 81  Resp:  [16-18] 16  BP: (126-163)/(78-85) 126/78  Intake and Output Last 3 Shifts:   I/O last 3 completed shifts:  In: 1100 [I.V.:1100]  Out: -     Exam:  /78 (BP Location: Left arm, Patient Position: Lying)   Pulse 81   Temp 98.7 °F (37.1 °C) (Oral)   Resp 16   Ht 172.7 cm (68\")   Wt 69.6 kg (153 lb 6.4 oz)   SpO2 95%   BMI 23.32 kg/m²      GENERAL:    " Alert, cooperative, no distress, appears stated age   Head:    Normocephalic, without obvious abnormality, atraumatic   Ears:    Normal external inspection, Nl. hearing   Throat:   Lips, mucosa, and tongue normal   Back:     No CVA tenderness   Lungs:     Respirations unlabored;Normal palpation   CV;   Regular rate and rhythm, No edema   Abdomen:     Soft, nontender,  no masses   :       Musculoskeletal:   Extremities normal,Gait Normal   Neurologic/Psych:   Orientation Normal; Mood/Affect Normal       Data Review:   Admission on 04/07/2021   Component Date Value Ref Range Status   • Glucose 04/07/2021 235* 65 - 99 mg/dL Final   • BUN 04/07/2021 14  8 - 23 mg/dL Final   • Creatinine 04/07/2021 1.05  0.76 - 1.27 mg/dL Final   • Sodium 04/07/2021 125* 136 - 145 mmol/L Final   • Potassium 04/07/2021 3.1* 3.5 - 5.2 mmol/L Final   • Chloride 04/07/2021 87* 98 - 107 mmol/L Final   • CO2 04/07/2021 21.0* 22.0 - 29.0 mmol/L Final   • Calcium 04/07/2021 8.6  8.6 - 10.5 mg/dL Final   • Total Protein 04/07/2021 6.7  6.0 - 8.5 g/dL Final   • Albumin 04/07/2021 4.20  3.50 - 5.20 g/dL Final   • ALT (SGPT) 04/07/2021 17  1 - 41 U/L Final   • AST (SGOT) 04/07/2021 16  1 - 40 U/L Final   • Alkaline Phosphatase 04/07/2021 100  39 - 117 U/L Final   • Total Bilirubin 04/07/2021 0.5  0.0 - 1.2 mg/dL Final   • eGFR Non African Amer 04/07/2021 70  >60 mL/min/1.73 Final   • Globulin 04/07/2021 2.5  gm/dL Final   • A/G Ratio 04/07/2021 1.7  g/dL Final   • BUN/Creatinine Ratio 04/07/2021 13.3  7.0 - 25.0 Final   • Anion Gap 04/07/2021 17.0* 5.0 - 15.0 mmol/L Final   • Lipase 04/07/2021 79* 13 - 60 U/L Final   • Color, UA 04/07/2021 Yellow  Yellow, Straw Final   • Appearance, UA 04/07/2021 Clear  Clear Final   • pH, UA 04/07/2021 7.0  5.0 - 8.0 Final   • Specific Gravity, UA 04/07/2021 1.014  1.005 - 1.030 Final   • Glucose, UA 04/07/2021 500 mg/dL (2+)* Negative Final   • Ketones, UA 04/07/2021 Negative  Negative Final   • Bilirubin, UA  04/07/2021 Negative  Negative Final   • Blood, UA 04/07/2021 Negative  Negative Final   • Protein, UA 04/07/2021 Trace* Negative Final   • Leuk Esterase, UA 04/07/2021 Negative  Negative Final   • Nitrite, UA 04/07/2021 Negative  Negative Final   • Urobilinogen, UA 04/07/2021 0.2 E.U./dL  0.2 - 1.0 E.U./dL Final   • Extra Tube 04/07/2021 hold for add-on   Final    Auto resulted   • Extra Tube 04/07/2021 Hold for add-ons.   Final    Auto resulted.   • Extra Tube 04/07/2021 hold for add-on   Final    Auto resulted   • Extra Tube 04/07/2021 Hold for add-ons.   Final    Auto resulted.   • WBC 04/07/2021 12.21* 3.40 - 10.80 10*3/mm3 Final   • RBC 04/07/2021 4.19  4.14 - 5.80 10*6/mm3 Final   • Hemoglobin 04/07/2021 12.8* 13.0 - 17.7 g/dL Final   • Hematocrit 04/07/2021 35.6* 37.5 - 51.0 % Final   • MCV 04/07/2021 85.0  79.0 - 97.0 fL Final   • MCH 04/07/2021 30.5  26.6 - 33.0 pg Final   • MCHC 04/07/2021 36.0* 31.5 - 35.7 g/dL Final   • RDW 04/07/2021 12.1* 12.3 - 15.4 % Final   • RDW-SD 04/07/2021 36.9* 37.0 - 54.0 fl Final   • MPV 04/07/2021 12.2* 6.0 - 12.0 fL Final   • Platelets 04/07/2021 203  140 - 450 10*3/mm3 Final   • Neutrophil % 04/07/2021 74.5  42.7 - 76.0 % Final   • Lymphocyte % 04/07/2021 15.3* 19.6 - 45.3 % Final   • Monocyte % 04/07/2021 8.7  5.0 - 12.0 % Final   • Eosinophil % 04/07/2021 0.7  0.3 - 6.2 % Final   • Basophil % 04/07/2021 0.2  0.0 - 1.5 % Final   • Immature Grans % 04/07/2021 0.6* 0.0 - 0.5 % Final   • Neutrophils, Absolute 04/07/2021 9.10* 1.70 - 7.00 10*3/mm3 Final   • Lymphocytes, Absolute 04/07/2021 1.87  0.70 - 3.10 10*3/mm3 Final   • Monocytes, Absolute 04/07/2021 1.06* 0.10 - 0.90 10*3/mm3 Final   • Eosinophils, Absolute 04/07/2021 0.08  0.00 - 0.40 10*3/mm3 Final   • Basophils, Absolute 04/07/2021 0.03  0.00 - 0.20 10*3/mm3 Final   • Immature Grans, Absolute 04/07/2021 0.07* 0.00 - 0.05 10*3/mm3 Final   • nRBC 04/07/2021 0.0  0.0 - 0.2 /100 WBC Final   • Lactate 04/07/2021 1.3  0.5  - 2.0 mmol/L Final   • COVID19 04/07/2021 Not Detected  Not Detected - Ref. Range Final   • Urine Culture 04/07/2021 No growth   Final   • Glucose 04/07/2021 233* 70 - 130 mg/dL Final   • Glucose 04/08/2021 151* 65 - 99 mg/dL Final   • BUN 04/08/2021 12  8 - 23 mg/dL Final   • Creatinine 04/08/2021 0.95  0.76 - 1.27 mg/dL Final   • Sodium 04/08/2021 133* 136 - 145 mmol/L Final   • Potassium 04/08/2021 3.2* 3.5 - 5.2 mmol/L Final   • Chloride 04/08/2021 97* 98 - 107 mmol/L Final   • CO2 04/08/2021 21.9* 22.0 - 29.0 mmol/L Final   • Calcium 04/08/2021 7.9* 8.6 - 10.5 mg/dL Final   • eGFR Non African Amer 04/08/2021 79  >60 mL/min/1.73 Final   • BUN/Creatinine Ratio 04/08/2021 12.6  7.0 - 25.0 Final   • Anion Gap 04/08/2021 14.1  5.0 - 15.0 mmol/L Final   • WBC 04/08/2021 7.75  3.40 - 10.80 10*3/mm3 Final   • RBC 04/08/2021 3.80* 4.14 - 5.80 10*6/mm3 Final   • Hemoglobin 04/08/2021 11.4* 13.0 - 17.7 g/dL Final   • Hematocrit 04/08/2021 32.7* 37.5 - 51.0 % Final   • MCV 04/08/2021 86.1  79.0 - 97.0 fL Final   • MCH 04/08/2021 30.0  26.6 - 33.0 pg Final   • MCHC 04/08/2021 34.9  31.5 - 35.7 g/dL Final   • RDW 04/08/2021 12.4  12.3 - 15.4 % Final   • RDW-SD 04/08/2021 39.1  37.0 - 54.0 fl Final   • MPV 04/08/2021 12.4* 6.0 - 12.0 fL Final   • Platelets 04/08/2021 183  140 - 450 10*3/mm3 Final   • Hemoglobin A1C 04/08/2021 7.70* 4.80 - 5.60 % Final   • Glucose 04/08/2021 183* 70 - 130 mg/dL Final   • Troponin T 04/08/2021 <0.010  0.000 - 0.030 ng/mL Final   • C. Difficile Toxins by PCR 04/09/2021 Positive* Negative Final   • Glucose 04/08/2021 191* 70 - 130 mg/dL Final   • Glucose 04/08/2021 201* 70 - 130 mg/dL Final   • Glucose 04/08/2021 165* 70 - 130 mg/dL Final   • Magnesium 04/09/2021 0.8* 1.6 - 2.4 mg/dL Final   • Glucose 04/09/2021 131* 65 - 99 mg/dL Final   • BUN 04/09/2021 10  8 - 23 mg/dL Final   • Creatinine 04/09/2021 0.87  0.76 - 1.27 mg/dL Final   • Sodium 04/09/2021 138  136 - 145 mmol/L Final   • Potassium  04/09/2021 3.6  3.5 - 5.2 mmol/L Final   • Chloride 04/09/2021 105  98 - 107 mmol/L Final   • CO2 04/09/2021 21.6* 22.0 - 29.0 mmol/L Final   • Calcium 04/09/2021 8.9  8.6 - 10.5 mg/dL Final   • eGFR Non  Amer 04/09/2021 87  >60 mL/min/1.73 Final   • BUN/Creatinine Ratio 04/09/2021 11.5  7.0 - 25.0 Final   • Anion Gap 04/09/2021 11.4  5.0 - 15.0 mmol/L Final   • WBC 04/09/2021 9.27  3.40 - 10.80 10*3/mm3 Final   • RBC 04/09/2021 3.93* 4.14 - 5.80 10*6/mm3 Final   • Hemoglobin 04/09/2021 11.5* 13.0 - 17.7 g/dL Final   • Hematocrit 04/09/2021 32.9* 37.5 - 51.0 % Final   • MCV 04/09/2021 83.7  79.0 - 97.0 fL Final   • MCH 04/09/2021 29.3  26.6 - 33.0 pg Final   • MCHC 04/09/2021 35.0  31.5 - 35.7 g/dL Final   • RDW 04/09/2021 12.2* 12.3 - 15.4 % Final   • RDW-SD 04/09/2021 36.9* 37.0 - 54.0 fl Final   • MPV 04/09/2021 12.0  6.0 - 12.0 fL Final   • Platelets 04/09/2021 194  140 - 450 10*3/mm3 Final   • Neutrophil % 04/09/2021 59.7  42.7 - 76.0 % Final   • Lymphocyte % 04/09/2021 29.3  19.6 - 45.3 % Final   • Monocyte % 04/09/2021 9.3  5.0 - 12.0 % Final   • Eosinophil % 04/09/2021 0.9  0.3 - 6.2 % Final   • Basophil % 04/09/2021 0.3  0.0 - 1.5 % Final   • Immature Grans % 04/09/2021 0.5  0.0 - 0.5 % Final   • Neutrophils, Absolute 04/09/2021 5.53  1.70 - 7.00 10*3/mm3 Final   • Lymphocytes, Absolute 04/09/2021 2.72  0.70 - 3.10 10*3/mm3 Final   • Monocytes, Absolute 04/09/2021 0.86  0.10 - 0.90 10*3/mm3 Final   • Eosinophils, Absolute 04/09/2021 0.08  0.00 - 0.40 10*3/mm3 Final   • Basophils, Absolute 04/09/2021 0.03  0.00 - 0.20 10*3/mm3 Final   • Immature Grans, Absolute 04/09/2021 0.05  0.00 - 0.05 10*3/mm3 Final   • nRBC 04/09/2021 0.0  0.0 - 0.2 /100 WBC Final   • Glucose 04/09/2021 171* 70 - 130 mg/dL Final   • SMA origin EDV 04/09/2021 17.8  cm/s Final   • SMA origin PSV 04/09/2021 116  cm/s Final   • SMA Aorta PSV 04/09/2021 55.3  cm/s Final   • SMA Celiac Origin PSV 04/09/2021 123  cm/s Final   • SMA  Celiac Origin EDV 04/09/2021 24.9  cm/s Final   • SMA Celiac Dist PSV 04/09/2021 170  cm/s Final   • SMA Celiac Dist EDV 04/09/2021 43.8  cm/s Final   • SMA SMA Prox PSV 04/09/2021 162  cm/s Final   • SMA SMA Mid PSV 04/09/2021 134  cm/s Final   • SMA SMA Dist PSV 04/09/2021 149  cm/s Final   • SMA RADHA PSV 04/09/2021 126  cm/s Final   • SMA Hepatic PSV 04/09/2021 135  cm/s Final   • SMA Hepatic EDV 04/09/2021 24.8  cm/s Final   • SMA Splenic PSV 04/09/2021 86.3  cm/s Final   • SMA Splenic EDV 04/09/2021 18.3  cm/s Final   • SMA 1st PP time 04/09/2021 15  min Final   • SMA 2nd PP time 04/09/2021 30  min Final   • SMA 3rd PP time 04/09/2021 45  min Final   • Glucose 04/09/2021 174* 70 - 130 mg/dL Final   • Glucose 04/09/2021 332* 70 - 130 mg/dL Final       No results found.      Assessment:   Low abd pain suspect related to c diff. No gu source of CC. Pt has scheduled fu w me to monitor his bph.       Plan:   Call if add questions.       Brennan Martinez MD  4/9/2021

## 2021-04-09 NOTE — PROGRESS NOTES
"    DAILY PROGRESS NOTE  [facility]    Patient Identification:  Name: Felicita Mike  Age: 70 y.o.  Sex: male  :  1950  MRN: [id number]         Primary Care Physician: Alexandrea Jimenez PA-C    Subjective:  Interval History: Loose stool/diarrhea is returned.  His abdominal pain feels better today.  He is not nearly as nauseous and denies any emesis.  Denies any chest pain or troubles breathing as well.  Has appetite and wants to advance diet    Objective: No family at bedside.  Case discussed in multidisciplinary rounds.  Also discussed case with urology MD.  Patient nontoxic pleasant and conversational    Scheduled Meds:amLODIPine, 5 mg, Oral, Q24H  atorvastatin, 80 mg, Oral, Daily  carvedilol, 25 mg, Oral, BID With Meals  cholecalciferol, 1,000 Units, Oral, Daily  enoxaparin, 40 mg, Subcutaneous, Q24H  insulin lispro, 0-9 Units, Subcutaneous, TID With Meals  lactobacillus acidophilus, 1 capsule, Oral, BID  lisinopril, 40 mg, Oral, Daily  nicotine, 1 patch, Transdermal, Q24H  pantoprazole, 40 mg, Oral, QAM  primidone, 50 mg, Oral, Q12H  QUEtiapine, 100 mg, Oral, Nightly  tamsulosin, 0.4 mg, Oral, Daily  vancomycin, 125 mg, Oral, Q6H  [START ON 2021] vancomycin, 125 mg, Oral, Q12H  [START ON 2021] vancomycin, 125 mg, Oral, Q24H  [START ON 5/3/2021] vancomycin, 125 mg, Oral, Every Other Day  vitamin B-12, 1,000 mcg, Oral, Daily      Continuous Infusions:Pharmacy Consult,         Vital signs in last 24 hours:  Temp:  [97.6 °F (36.4 °C)-98.4 °F (36.9 °C)] 97.7 °F (36.5 °C)  Heart Rate:  [76-78] 78  Resp:  [16-18] 16  BP: (139-163)/(67-85) 163/85    Intake/Output:  No intake or output data in the 24 hours ending 21 1300    Exam:  /85 (BP Location: Right arm, Patient Position: Lying)   Pulse 78   Temp 97.7 °F (36.5 °C) (Oral)   Resp 16   Ht 172.7 cm (68\")   Wt 69.6 kg (153 lb 6.4 oz)   SpO2 (!) 87%   BMI 23.32 kg/m²     General Appearance:    Alert, cooperative, AAOx3       "                    Head:    Normocephalic, without obvious abnormality, atraumatic                           Eyes:  Conjunctiva normal without icterus                         throat:   Oral mucosa pink and moist                           Neck:   Supple, symmetrical, trachea midline, no JVD                         Lungs:    Clear to auscultation bilaterally, respirations unlabored                 Chest Wall:    No tenderness or deformity                          Heart:    Regular rate and rhythm, S1 and S2 normal                  Abdomen:     Soft, nontender, bowel sounds active, no masses, no rebound no guarding                 Extremities: Moving all, no cyanosis or edema                  Neurologic:   CNII-XII intact     Data Review:  Labs in chart were reviewed.    Assessment:  Active Hospital Problems    Diagnosis  POA   • **Left lower quadrant abdominal pain [R10.32]  Unknown   • Hyponatremia [E87.1]  Unknown   • Hypokalemia [E87.6]  Unknown   • Rice's esophagus with high grade dysplasia [K22.711]  Yes   • Iron deficiency anemia [D50.9]  Yes   • Tobacco abuse [Z72.0]  Yes   • Peripheral vascular disease (CMS/HCC) [I73.9]  Yes   • Hyperlipidemia [E78.5]  Yes   • GERD (gastroesophageal reflux disease) [K21.9]  Yes   • Essential hypertension [I10]  Yes   • Chronic obstructive pulmonary disease (CMS/HCC) [J44.9]  Yes      Resolved Hospital Problems   No resolved problems to display.       Plan:    Abdominal pain with nausea/leukocytosis   -Stool positive for C. difficile with p.o. vancomycin initiated              -GI consulted   -Discussed with  and no concerns for abnormalities noted on CT having a  type etiology and I appreciate that input              -Status post vascular mesenteric duplex given longstanding tobacco use              -PPI with past history of Rice's -caution given past history of C. Difficile as may need to consider H2 blocker but defer to GI              -Antispasmodic w/  prn Bentyl started per GI              -Normal LFTs              -DC chronic use of p.o. magnesium              -Saline lock IVF              -On probiotic     DM2 with an A1c of 7.7              -Hold glipizide for now              -Hold Metformin for now              -SSI   -Sugars are acceptable     Anemia -mild/stable/improved and no need to further monitor     Hyponatremia/hypokalemia/hypomagnesia               -Monitor/replace due GI losses   -4 g IV mag and monitor     HTN stable -chlorthalidone on hold     Lovenox for DVT prophylaxis        Observation additional day       Salvatore Holder MD  4/9/2021  13:00 EDT

## 2021-04-09 NOTE — PROGRESS NOTES
"Pharmacy Consult - PO Vancomycin  Dr. Mustafa requested Pharmacy to \"Change proton pump inhibitors (PPIs) to famotidine unless documented or history of GI bleed or to discontinue antiperistalic agents and stool softeners/laxatives.\"    Vancomycin PO taper ordered 4/9 4/9 C.diff toxin screen: positive    On 4/8 Dr. Franco noted \"Continue PPI given Rice's esophagus with high-grade dysplasia despite the fact that he had C. Difficile\"    There are no medications to adjust per consult at this time.      Pharmacy will continue to follow and adjust as needed.  Thanks, tSorm Hernández, PharmD, BCPS    "

## 2021-04-10 ENCOUNTER — PATIENT OUTREACH (OUTPATIENT)
Dept: FAMILY MEDICINE CLINIC | Facility: CLINIC | Age: 71
End: 2021-04-10

## 2021-04-10 ENCOUNTER — READMISSION MANAGEMENT (OUTPATIENT)
Dept: CALL CENTER | Facility: HOSPITAL | Age: 71
End: 2021-04-10

## 2021-04-10 VITALS
TEMPERATURE: 98.5 F | WEIGHT: 154.9 LBS | DIASTOLIC BLOOD PRESSURE: 92 MMHG | HEART RATE: 72 BPM | OXYGEN SATURATION: 97 % | HEIGHT: 68 IN | BODY MASS INDEX: 23.48 KG/M2 | SYSTOLIC BLOOD PRESSURE: 148 MMHG | RESPIRATION RATE: 16 BRPM

## 2021-04-10 PROBLEM — E87.1 HYPONATREMIA: Status: RESOLVED | Noted: 2021-04-08 | Resolved: 2021-04-10

## 2021-04-10 PROBLEM — E87.6 HYPOKALEMIA: Status: RESOLVED | Noted: 2021-04-08 | Resolved: 2021-04-10

## 2021-04-10 PROBLEM — R10.32 LEFT LOWER QUADRANT ABDOMINAL PAIN: Status: RESOLVED | Noted: 2021-04-08 | Resolved: 2021-04-10

## 2021-04-10 PROBLEM — A04.72 CLOSTRIDIUM DIFFICILE ENTEROCOLITIS: Status: ACTIVE | Noted: 2021-04-10

## 2021-04-10 LAB
ANION GAP SERPL CALCULATED.3IONS-SCNC: 12.2 MMOL/L (ref 5–15)
BUN SERPL-MCNC: 13 MG/DL (ref 8–23)
BUN/CREAT SERPL: 14.4 (ref 7–25)
CALCIUM SPEC-SCNC: 9.4 MG/DL (ref 8.6–10.5)
CHLORIDE SERPL-SCNC: 100 MMOL/L (ref 98–107)
CO2 SERPL-SCNC: 21.8 MMOL/L (ref 22–29)
CREAT SERPL-MCNC: 0.9 MG/DL (ref 0.76–1.27)
GFR SERPL CREATININE-BSD FRML MDRD: 84 ML/MIN/1.73
GLUCOSE BLDC GLUCOMTR-MCNC: 122 MG/DL (ref 70–130)
GLUCOSE BLDC GLUCOMTR-MCNC: 204 MG/DL (ref 70–130)
GLUCOSE SERPL-MCNC: 131 MG/DL (ref 65–99)
MAGNESIUM SERPL-MCNC: 1.5 MG/DL (ref 1.6–2.4)
POTASSIUM SERPL-SCNC: 3.4 MMOL/L (ref 3.5–5.2)
SODIUM SERPL-SCNC: 134 MMOL/L (ref 136–145)

## 2021-04-10 PROCEDURE — 99225 PR SBSQ OBSERVATION CARE/DAY 25 MINUTES: CPT | Performed by: INTERNAL MEDICINE

## 2021-04-10 PROCEDURE — 82962 GLUCOSE BLOOD TEST: CPT

## 2021-04-10 PROCEDURE — 80048 BASIC METABOLIC PNL TOTAL CA: CPT | Performed by: HOSPITALIST

## 2021-04-10 PROCEDURE — 83735 ASSAY OF MAGNESIUM: CPT | Performed by: HOSPITALIST

## 2021-04-10 PROCEDURE — 25010000002 MAGNESIUM SULFATE IN D5W 1G/100ML (PREMIX) 1-5 GM/100ML-% SOLUTION: Performed by: HOSPITALIST

## 2021-04-10 RX ORDER — MAGNESIUM SULFATE 1 G/100ML
4 INJECTION INTRAVENOUS ONCE
Status: COMPLETED | OUTPATIENT
Start: 2021-04-10 | End: 2021-04-10

## 2021-04-10 RX ORDER — POTASSIUM CHLORIDE 750 MG/1
40 TABLET, FILM COATED, EXTENDED RELEASE ORAL EVERY 4 HOURS
Status: COMPLETED | OUTPATIENT
Start: 2021-04-10 | End: 2021-04-10

## 2021-04-10 RX ORDER — FAMOTIDINE 20 MG/1
20 TABLET, FILM COATED ORAL 2 TIMES DAILY
Qty: 60 TABLET | Refills: 0 | Status: SHIPPED | OUTPATIENT
Start: 2021-04-10

## 2021-04-10 RX ADMIN — POTASSIUM CHLORIDE 40 MEQ: 750 TABLET, EXTENDED RELEASE ORAL at 16:05

## 2021-04-10 RX ADMIN — VANCOMYCIN 125 MG: KIT at 00:31

## 2021-04-10 RX ADMIN — Medication 1 CAPSULE: at 09:56

## 2021-04-10 RX ADMIN — CARVEDILOL 25 MG: 25 TABLET, FILM COATED ORAL at 09:56

## 2021-04-10 RX ADMIN — ATORVASTATIN CALCIUM 80 MG: 80 TABLET, FILM COATED ORAL at 09:56

## 2021-04-10 RX ADMIN — PANTOPRAZOLE SODIUM 40 MG: 40 TABLET, DELAYED RELEASE ORAL at 06:50

## 2021-04-10 RX ADMIN — VANCOMYCIN 125 MG: KIT at 11:58

## 2021-04-10 RX ADMIN — SODIUM CHLORIDE, PRESERVATIVE FREE 10 ML: 5 INJECTION INTRAVENOUS at 09:56

## 2021-04-10 RX ADMIN — TAMSULOSIN HYDROCHLORIDE 0.4 MG: 0.4 CAPSULE ORAL at 09:56

## 2021-04-10 RX ADMIN — PRIMIDONE 50 MG: 50 TABLET ORAL at 09:56

## 2021-04-10 RX ADMIN — POTASSIUM CHLORIDE 40 MEQ: 750 TABLET, EXTENDED RELEASE ORAL at 11:48

## 2021-04-10 RX ADMIN — Medication 1000 MCG: at 09:56

## 2021-04-10 RX ADMIN — MAGNESIUM SULFATE HEPTAHYDRATE 4 G: 1 INJECTION, SOLUTION INTRAVENOUS at 11:48

## 2021-04-10 RX ADMIN — Medication 1000 UNITS: at 09:56

## 2021-04-10 RX ADMIN — LISINOPRIL 40 MG: 40 TABLET ORAL at 09:56

## 2021-04-10 RX ADMIN — VANCOMYCIN 125 MG: KIT at 06:50

## 2021-04-10 RX ADMIN — NICOTINE 1 PATCH: 21 PATCH, EXTENDED RELEASE TRANSDERMAL at 09:57

## 2021-04-10 RX ADMIN — AMLODIPINE BESYLATE 5 MG: 5 TABLET ORAL at 09:56

## 2021-04-10 NOTE — PLAN OF CARE
Goal Outcome Evaluation:         Patient alert follows commands, no c/o pain  or nausea noted. Wants orajel, md ordered that orajel is okay. No nausea noted. No acute distress noted will continue to monitor

## 2021-04-10 NOTE — DISCHARGE PLACEMENT REQUEST
"Felicita Mike (70 y.o. Male)     Date of Birth Social Security Number Address Home Phone MRN    1950  [address] [facility] [place] [address] [phone] [id number]    Religious Marital Status          None        Admission Date Admission Type Admitting Provider Attending Provider Department, Room/Bed    4/7/21 Emergency Stingl, MD Glory Thayer Troy Andrew, MD [address], [address]/1    Discharge Date Discharge Disposition Discharge Destination         Home or Self Care              Attending Provider: Salvatore Holder MD    Allergies: Amoxicillin, Clopidogrel    Isolation: Spore   Infection: C.difficile (04/09/21)   Code Status: CPR    Ht: 172.7 cm (68\")   Wt: 70.3 kg (154 lb 14.4 oz)    Admission Cmt: None   Principal Problem: Clostridium difficile enterocolitis [A04.72]                 Active Insurance as of 4/7/2021     Primary Coverage     Payor Plan Insurance Group Employer/Plan Group    MEDICARE MEDICARE A & B      Payor Plan Address Payor Plan Phone Number Payor Plan Fax Number Effective Dates    [address] [phone]  2/1/2011 - None Entered    [place] [place] [facility] [address]       Subscriber Name Subscriber Birth Date Member ID       FELICITA MIKE 1950 [id number]           Secondary Coverage     Payor Plan Insurance Group Employer/Plan Group    [employer] [place] [employer] [place]      Payor Plan Address Payor Plan Phone Number Payor Plan Fax Number Effective Dates    [address] [place] [address] [phone]  11/1/2017 - None Entered    [place] [id number]       Subscriber Name Subscriber Birth Date Member ID       FELICITA MIKE 1950 [id number]                 Emergency Contacts      (Rel.) Home Phone Work Phone Mobile Phone    BIMAL LIN (Daughter) -- -- [phone]              "

## 2021-04-10 NOTE — PROGRESS NOTES
[facility], [facility]  Inpatient Progress Note    Reason for Follow Up: Diarrhea    Subjective     Interval History:   Feels better less diarrhea less abdominal pain taking vancomycin without issue    Current Facility-Administered Medications:   •  acetaminophen (TYLENOL) tablet 650 mg, 650 mg, Oral, Q4H PRN, May Patterson MD, 650 mg at 04/09/21 2031  •  albuterol (PROVENTIL) nebulizer solution 0.5% 2.5 mg/0.5mL, 2.5 mg, Nebulization, Q6H PRN, May Patterson MD  •  amLODIPine (NORVASC) tablet 5 mg, 5 mg, Oral, Q24H, May Patterson MD, 5 mg at 04/09/21 1306  •  atorvastatin (LIPITOR) tablet 80 mg, 80 mg, Oral, Daily, May Patterson MD, 80 mg at 04/09/21 1304  •  carvedilol (COREG) tablet 25 mg, 25 mg, Oral, BID With Meals, May Patterson MD, 25 mg at 04/09/21 1755  •  cholecalciferol (VITAMIN D3) tablet 1,000 Units, 1,000 Units, Oral, Daily, May Patterson MD, 1,000 Units at 04/09/21 1304  •  dextrose (D50W) 25 g/ 50mL Intravenous Solution 25 g, 25 g, Intravenous, Q15 Min PRN, May Patterson MD  •  dextrose (GLUTOSE) oral gel 15 g, 15 g, Oral, Q15 Min PRN, May Patterson MD  •  dicyclomine (BENTYL) capsule 20 mg, 20 mg, Oral, 4x Daily AC & at Bedtime PRN, Ivette Franco MD, 20 mg at 04/09/21 2018  •  enoxaparin (LOVENOX) syringe 40 mg, 40 mg, Subcutaneous, Q24H, May Patterson MD, 40 mg at 04/09/21 1755  •  glucagon (human recombinant) (GLUCAGEN DIAGNOSTIC) injection 1 mg, 1 mg, Subcutaneous, Q15 Min PRN, May Patterson MD  •  HYDROcodone-acetaminophen (NORCO) 5-325 MG per tablet 1 tablet, 1 tablet, Oral, Q6H PRN, Tigist Rodriguez, APRN, 1 tablet at 04/08/21 1429  •  insulin lispro (ADMELOG) injection 0-9 Units, 0-9 Units, Subcutaneous, TID With Meals, May Patterson MD, 7 Units at 04/09/21 1754  •  lactobacillus acidophilus (RISAQUAD) capsule 1 capsule, 1 capsule, Oral, BID, May Patterson MD, 1  capsule at 04/09/21 2018  •  lisinopril (PRINIVIL,ZESTRIL) tablet 40 mg, 40 mg, Oral, Daily, May Patterson MD, 40 mg at 04/09/21 1306  •  melatonin tablet 3 mg, 3 mg, Oral, Nightly PRN, May Patterson MD, 3 mg at 04/09/21 2031  •  nicotine (NICODERM CQ) 21 MG/24HR patch 1 patch, 1 patch, Transdermal, Q24H, May Patterson MD, 1 patch at 04/09/21 1307  •  nitroglycerin (NITROSTAT) SL tablet 0.4 mg, 0.4 mg, Sublingual, Q5 Min PRN, May Patterson MD  •  ondansetron (ZOFRAN) tablet 4 mg, 4 mg, Oral, Q6H PRN **OR** ondansetron (ZOFRAN) injection 4 mg, 4 mg, Intravenous, Q6H PRN, May Patterson MD, 4 mg at 04/08/21 0915  •  pantoprazole (PROTONIX) EC tablet 40 mg, 40 mg, Oral, QAM, May Patterson MD, 40 mg at 04/10/21 0650  •  Pharmacy Consult, , Does not apply, Continuous PRN, Sam Mustafa MD  •  primidone (MYSOLINE) tablet 50 mg, 50 mg, Oral, Q12H, May Patterson MD, 50 mg at 04/09/21 2018  •  prochlorperazine (COMPAZINE) injection 10 mg, 10 mg, Intravenous, Q6H PRN, May Patterson MD, 10 mg at 04/08/21 1249  •  QUEtiapine (SEROquel) tablet 100 mg, 100 mg, Oral, Nightly, May Patterson MD, 100 mg at 04/09/21 2018  •  sodium chloride 0.9 % flush 10 mL, 10 mL, Intravenous, PRN, Micheal Esquivel MD, 10 mL at 04/09/21 2018  •  tamsulosin (FLOMAX) 24 hr capsule 0.4 mg, 0.4 mg, Oral, Daily, aMy Patterson MD, 0.4 mg at 04/09/21 1304  •  vancomycin oral solution reconstituted 125 mg, 125 mg, Oral, Q6H, Sam Mustafa MD, 125 mg at 04/10/21 0650  •  [START ON 4/19/2021] vancomycin oral solution reconstituted 125 mg, 125 mg, Oral, Q12H, Sam Mustafa MD  •  [START ON 4/26/2021] vancomycin oral solution reconstituted 125 mg, 125 mg, Oral, Q24H, Sam Mustafa MD  •  [START ON 5/3/2021] vancomycin oral solution reconstituted 125 mg, 125 mg, Oral, Every Other Day, Sam Mustafa MD  •  vitamin B-12  (CYANOCOBALAMIN) tablet 1,000 mcg, 1,000 mcg, Oral, Daily, Stingl, May Jeff MD, 1,000 mcg at 04/09/21 1304  Review of Systems:    There is weakness and fatigue all other systems reviewed and negative    Objective     Vital Signs  Temp:  [98 °F (36.7 °C)-98.7 °F (37.1 °C)] 98.2 °F (36.8 °C)  Heart Rate:  [72-86] 72  Resp:  [16] 16  BP: (126-167)/(78-84) 167/82  Body mass index is 23.55 kg/m².    Intake/Output Summary (Last 24 hours) at 4/10/2021 0844  Last data filed at 4/9/2021 1600  Gross per 24 hour   Intake 240 ml   Output --   Net 240 ml     No intake/output data recorded.     Physical Exam:   General: patient awake, alert and cooperative   Eyes: Normal lids and lashes, no scleral icterus   Neck: supple, normal ROM   Skin: warm and dry, not jaundiced   Cardiovascular: regular rhythm and rate, no murmurs auscultated   Pulm: clear to auscultation bilaterally, regular and unlabored   Abdomen: soft, nontender, nondistended; normal bowel sounds   Extremities: no rash or edema   Psychiatric: Normal mood and behavior; memory intact     Results Review:     I reviewed the patient's new clinical results.    Results from last 7 days   Lab Units 04/09/21  0416 04/08/21  0611 04/07/21  1208   WBC 10*3/mm3 9.27 7.75 12.21*   HEMOGLOBIN g/dL 11.5* 11.4* 12.8*   HEMATOCRIT % 32.9* 32.7* 35.6*   PLATELETS 10*3/mm3 194 183 203     Results from last 7 days   Lab Units 04/09/21  0416 04/08/21  0611 04/07/21  1208   SODIUM mmol/L 138 133* 125*   POTASSIUM mmol/L 3.6 3.2* 3.1*   CHLORIDE mmol/L 105 97* 87*   CO2 mmol/L 21.6* 21.9* 21.0*   BUN mg/dL 10 12 14   CREATININE mg/dL 0.87 0.95 1.05   CALCIUM mg/dL 8.9 7.9* 8.6   BILIRUBIN mg/dL  --   --  0.5   ALK PHOS U/L  --   --  100   ALT (SGPT) U/L  --   --  17   AST (SGOT) U/L  --   --  16   GLUCOSE mg/dL 131* 151* 235*         Lab Results   Lab Value Date/Time    LIPASE 79 (H) 04/07/2021 1208    LIPASE 45 02/22/2021 0934    LIPASE 34 07/06/2020 1504    LIPASE 13 12/23/2019 0959     LIPASE 59 12/05/2019 1552       Radiology:  CT Abdomen Pelvis With Contrast   Final Result   Tiny bilateral renal cysts. Probable bilateral perirenal   edema showing improvement since 03/04/2021. Otherwise unremarkable CT   scan of the abdomen and pelvis.       This report was finalized on 4/7/2021 2:12 PM by Dr. Brody Connell M.D.              Assessment/Plan     Patient Active Problem List   Diagnosis   • Elevated prostate specific antigen (PSA)   • Coronary artery disease involving native coronary artery of native heart without angina pectoris   • Bipolar affective disorder (CMS/HCC)   • Chronic obstructive pulmonary disease (CMS/HCC)   • Colon polyp   • Constipation   • Type 2 diabetes mellitus with microalbuminuria, without long-term current use of insulin (CMS/HCC)   • GERD (gastroesophageal reflux disease)   • Fatigue   • Hearing loss   • Hyperlipidemia   • Peripheral vascular disease (CMS/HCC)   • Proteinuria   • Muscle pain   • Low back pain   • Leukocytosis   • Seborrheic eczema   • Sleep apnea   • Tinea corporis   • Tremor   • Essential hypertension   • Anxiety   • Benign prostatic hyperplasia with lower urinary tract symptoms   • Tobacco abuse   • Normocytic anemia   • Thyroid nodule   • Iron deficiency anemia   • Adenomatous polyp of colon   • Family history of malignant neoplasm of colon   • Gastroesophageal reflux disease   • Influenza A   • SWAPNIL (acute kidney injury) (CMS/HCC)   • Essential tremor   • Magnesium deficiency   • Long-term current use of lithium   • Generalized abdominal pain   • Nausea and vomiting   • Acute gastritis without hemorrhage   • C. difficile colitis   • Dental caries   • Rice's esophagus with high grade dysplasia   • Abdominal pain   • Left lower quadrant abdominal pain   • Hyponatremia   • Hypokalemia       Assessment:   1. Lower abdominal pain  2. Diarrhea  3. C diff positive     Plan:   Start pulsed dose Vancomycin with prolonged taper given recurrence of C  diff  PRN antispasmodic  Diet as tolerated  Slow improvement    I discussed the patients findings and my recommendations with patient and nursing staff.    Sam Adams MD

## 2021-04-10 NOTE — NURSING NOTE
Oral Vanc given to pt to take home.  DC orders verbalized and explained when to take it and that he would get some more delivered on Monday.

## 2021-04-10 NOTE — OUTREACH NOTE
Prep Survey      Responses   [facility] facility patient discharged from?  [facility]   Is LACE score < 7 ?  No   Emergency Room discharge w/ pulse ox?  No   Eligibility  [facility]   Date of Admission  04/07/21   Date of Discharge  04/10/21   Discharge Disposition  Home or Self Care   Discharge diagnosis  Clostridium difficile enterocolitis    Does the patient have one of the following disease processes/diagnoses(primary or secondary)?  Other   Does the patient have Home health ordered?  No   Is there a DME ordered?  No   Prep survey completed?  Yes          Digna Hernandez RN

## 2021-04-10 NOTE — PROGRESS NOTES
Continued Stay Note  [facility]     Patient Name: Felicita Mike  MRN: [id number]  Today's Date: 4/10/2021    Admit Date: 4/7/2021    Discharge Plan     Row Name 04/10/21 1500       Plan    Plan  DC home.    Plan Comments  At 1445 CCP picked up oral vancomycin 6 doses & delivered to Preeti CAVAZOS to hand off to patient at discharge. CCP to follow. Siri Bird RN.    Row Name 04/10/21 1342       Plan    Plan  DC home.    Plan Comments  At 1300 CCP spoke to patient, identified self & role, patient is agreeable for [facility] to fill oral vancomycin & states “They filled it last time & it only cost me $40.00, that’s something I can afford because I only get social security to live off of”. At 1315 CCP picked up prescription from MD & dropped off at inhouse pharmacy to fill. Await call from inhouse pharmacy to call for . At 1341 CCP spoke to patient with updates & patient did not voice any concerns or question. CCP to follow. Siri Bird RN.    Row Name 04/10/21 1252       Plan    Plan  DC home    Provided Post Acute Provider List?  N/A    Provided Post Acute Provider Quality & Resource List?  N/A    Plan Comments  Received call from patient’s nurse stating patient needs prescription oral vancomycin to be filled due to [facility] informed patient of out of pocket expense. At 1204 CCP paged [facility] at [phone] to obtain prescription. At 1224 CCP received inbound call from [email or web address] can fill prescription but cannot deliver until Monday (4/12/2021) with the GI recommendations taper however CCP will reach out to inhouse pharmacy to fill enough oral vancomycin to cover patient until [facility] can deliver Monday (4/12/2021).  At 1243 CCP spoke with Shauna can prepare 6 doses but need a prescription. At 1248 paged MD for prescription, await return call. CCP to follow. Siri Bird RN.        Discharge Codes    No documentation.       Expected Discharge Date  and Time     Expected Discharge Date Expected Discharge Time    Apr 10, 2021             Bernadette Bird RN

## 2021-04-10 NOTE — PROGRESS NOTES
Continued Stay Note  [facility]     Patient Name: Felicita Mike  MRN: [id number]  Today's Date: 4/10/2021    Admit Date: 4/7/2021    Discharge Plan     Row Name 04/10/21 1342       Plan    Plan  DC home.    Plan Comments  At 1300 CCP spoke to patient, identified self & role, patient is agreeable for [facility] to fill oral vancomycin & states “They filled it last time & it only cost me $40.00, that’s something I can afford because I only get social security to live off of”. At 1315 CCP picked up prescription from MD & dropped off at inhouse pharmacy to fill. Await call from inhouse pharmacy to call for . At 1341 CCP spoke to patient with updates & patient did not voice any concerns or question. CCP to follow. Siri Bird RN.    Row Name 04/10/21 1252       Plan    Plan  DC home    Provided Post Acute Provider List?  N/A    Provided Post Acute Provider Quality & Resource List?  N/A    Plan Comments  Received call from patient’s nurse stating patient needs prescription oral vancomycin to be filled due to [facility] informed patient of out of pocket expense. At 1204 CCP paged [facility] at [phone] to obtain prescription. At 1224 CCP received inbound call from [email or web address] can fill prescription but cannot deliver until Monday (4/12/2021) with the GI recommendations taper however CCP will reach out to inhouse pharmacy to fill enough oral vancomycin to cover patient until [facility] can deliver Monday (4/12/2021).  At 1243 CCP spoke with Shauna can prepare 6 doses but need a prescription. At 1248 paged MD for prescription, await return call. CCP to follow. Siri Bird RN.        Discharge Codes    No documentation.       Expected Discharge Date and Time     Expected Discharge Date Expected Discharge Time    Apr 10, 2021             Bernadette Bird RN

## 2021-04-10 NOTE — PLAN OF CARE
Goal Outcome Evaluation:  Plan of Care Reviewed With: patient     Pt DC'd home.  Oral Vanc made in the inpatient pharm for pt to take home.  Education on how to take was given.  K+ and Mag given before discharge home.

## 2021-04-10 NOTE — PROGRESS NOTES
Continued Stay Note  [facility]     Patient Name: Felicita Mike  MRN: [id number]  Today's Date: 4/10/2021    Admit Date: 4/7/2021    Discharge Plan     Row Name 04/10/21 1252       Plan    Plan  DC home    Provided Post Acute Provider List?  N/A    Provided Post Acute Provider Quality & Resource List?  N/A    Plan Comments  Received call from patient’s nurse stating patient needs prescription oral vancomycin to be filled due to [facility] informed patient of out of pocket expense. At 1204 CCP paged [facility] at [phone] to obtain prescription. At 1224 CCP received inbound call from [email or web address] can fill prescription but cannot deliver until Monday (4/12/2021) with the GI recommendations taper however CCP will reach out to inhouse pharmacy to fill enough oral vancomycin to cover patient until [facility] can deliver Monday (4/12/2021).  At 1243 CCP spoke with Shauna can prepare 6 doses but need a prescription. At 1248 paged MD for prescription, await return call. CCP to follow. Siri Bird RN.        Discharge Codes    No documentation.       Expected Discharge Date and Time     Expected Discharge Date Expected Discharge Time    Apr 10, 2021             Bernadette Bird, DIRK

## 2021-04-10 NOTE — PROGRESS NOTES
Patient Discharge Counseling/Transitions of Care:    Patient was counseled via telephone prior to discharge over PO vancomycin; instructed patient that his next dose is due at 1800 and to take it every 6 hours until gone. Remainder of vancomycin prescription will be filled through [facility] as noted by case management.     Common side effects were reviewed, and patient was told the importance and necessity of PO vancomycin for his C.diff infection. Patient was instructed to store oral vancomycin syringes in the refrigerator.      All patient questions answered appropriately, and patient was actively engaged in the counseling session.  No other needs identified at this time.    [facility] was notified that patient's partial quantity of six vancomycin 125 mg/2.5 mL oral syringes were ready for pick-up at the inpatient pharmacy.     Constance Campbell, PharmD  13:56 EDT

## 2021-04-10 NOTE — DISCHARGE SUMMARY
[facility]    Date of Discharge:  4/10/2021    PCP: Alexandrea Jimenez PA-C    Discharge Diagnosis:   Active Hospital Problems    Diagnosis  POA   • **Clostridium difficile enterocolitis [A04.72]  Unknown   • Rice's esophagus with high grade dysplasia [K22.711]  Yes   • Iron deficiency anemia [D50.9]  Yes   • Tobacco abuse [Z72.0]  Yes   • Peripheral vascular disease (CMS/HCC) [I73.9]  Yes   • Hyperlipidemia [E78.5]  Yes   • GERD (gastroesophageal reflux disease) [K21.9]  Yes   • Essential hypertension [I10]  Yes   • Chronic obstructive pulmonary disease (CMS/HCC) [J44.9]  Yes      Resolved Hospital Problems    Diagnosis Date Resolved POA   • Left lower quadrant abdominal pain [R10.32] 04/10/2021 Unknown   • Hyponatremia [E87.1] 04/10/2021 Unknown   • Hypokalemia [E87.6] 04/10/2021 Unknown          Consults     Date and Time Order Name Status Description    4/8/2021  1:12 PM Inpatient Urology Consult      4/7/2021  8:57 PM Inpatient Gastroenterology Consult Completed     4/7/2021  2:23 PM [facility] (on-call MD unless specified) Details Completed         Hospital Course  Please see history and physical for details. Patient is a 70 y.o. male initially admitted with left lower quadrant abdominal pain as well as nausea and malaise. He conveyed to me that the pain was in his flank and radiated to his groin. CT the abdomen pelvis showed is vague perirenal edema. He is followed by urology and I did ask him to see in consultation. They have otherwise reassured me per my discussion and they will follow-up in an outpatient setting but this was not the etiology of his pain. He has a past history of C. difficile colitis and now he has recurrence. GI has written for quite an extended vancomycin taper and CCP will need to coordinate coverage with outlying pharmacy prior to discharge. He came to us on a PPI and at this juncture I have discontinued that and switched him over to  Pepcid due to the fact of recurrence. I would encourage use of a probiotic once he completes his regimen. He has hypomagnesia is secondary to GI losses and he has received a total of 8 g of IV mag prior to discharge. He'll also receive a couple doses of p.o. potassium on day of discharge and after those are complete he can be discharged to home. He is proactive for disposition as well. His diabetes is controlled and his A1c is only 7.7. He can resume his normal regimen post discharge and I'll defer further management to PCP. I have concerns that the Metformin which was held while here could possibly make his GI symptoms worsen but again I'll defer further management to PCP as I maintained him on sliding scale while inpatient. His anemia is stable and if anything his hemoglobin improves so there is no need for further monitoring. His hyponatremia and hypokalemia have corrected as has his hypomagnesia before discharge. He can resume his chlorthalidone post discharge as he has good p.o. intake. He was kept on Lovenox for prophylaxis while here. I have answered all questions to this gentleman prior to discharge and he is very proactive to 1 to go home today and I think he is medically safe at this juncture. There is no family member present at bedside the patient did not asked for me to discuss his case with anyone additionally. CCP is help coordinate any discharge needs and the plan is home with no additional assistance.        Condition on Discharge: Improved.     Temp:  [98 °F (36.7 °C)-98.7 °F (37.1 °C)] 98.2 °F (36.8 °C)  Heart Rate:  [72-86] 72  Resp:  [16] 16  BP: (126-167)/(78-84) 167/82  Body mass index is 23.55 kg/m².    Physical Exam  HENT:      Head: Normocephalic.      Nose: Nose normal.      Mouth/Throat:      Mouth: Mucous membranes are moist.      Pharynx: Oropharynx is clear.   Eyes:      General: No scleral icterus.     Conjunctiva/sclera: Conjunctivae normal.   Cardiovascular:      Rate and Rhythm:  Normal rate and regular rhythm.   Pulmonary:      Effort: Pulmonary effort is normal.      Breath sounds: Normal breath sounds.   Abdominal:      General: Bowel sounds are normal. There is no distension.      Palpations: Abdomen is soft.      Tenderness: There is no abdominal tenderness. There is no guarding or rebound.   Musculoskeletal:         General: No swelling.   Skin:     Coloration: Skin is not jaundiced.   Neurological:      Mental Status: He is alert and oriented to person, place, and time. Mental status is at baseline.     [unfilled]    Disposition: Home or Self Care       Discharge Medications      New Medications      Instructions Start Date   famotidine 20 MG tablet  Commonly known as: Pepcid   20 mg, Oral, 2 Times Daily      vancomycin 50 MG/ML reconstituted solution oral solution reconstituted   125 mg, Oral, Every 6 Hours Scheduled      vancomycin 50 MG/ML reconstituted solution oral solution reconstituted   125 mg, Oral, Every 12 Hours   Start Date: April 19, 2021     vancomycin 50 MG/ML reconstituted solution oral solution reconstituted   125 mg, Oral, Every 24 Hours   Start Date: April 26, 2021     vancomycin 50 MG/ML reconstituted solution oral solution reconstituted   125 mg, Oral, Every Other Day   Start Date: May 3, 2021        Continue These Medications      Instructions Start Date   acetaminophen 325 MG tablet  Commonly known as: TYLENOL   650 mg, Oral, Every 6 Hours PRN      acetaminophen 325 MG tablet  Commonly known as: TYLENOL   650 mg, Oral, Every 6 Hours PRN      albuterol (5 MG/ML) 0.5% nebulizer solution  Commonly known as: PROVENTIL   2.5 mg, Nebulization, Every 6 Hours PRN      amLODIPine 5 MG tablet  Commonly known as: NORVASC   TAKE 1 TABLET BY MOUTH DAILY FOR BLOOD PRESSURE      atorvastatin 80 MG tablet  Commonly known as: Lipitor   80 mg, Oral, Daily, For cholesterol      B-12 1000 MCG capsule   1 tablet, Oral, Daily      carvedilol 25 MG tablet  Commonly known as: COREG   25  mg, Oral, 2 Times Daily With Meals, For heart and BP---      chlorthalidone 25 MG tablet  Commonly known as: HYGROTON   25 mg, Oral, Daily      glipizide 2.5 MG 24 hr tablet  Commonly known as: GLUCOTROL XL   2.5 mg, Oral, Daily, For DMII while Metformin on hold and must take with food      Glucose Meter Test test strip  Generic drug: glucose blood   Use as instructed once daily and PRN for DMII E11.9      glucose monitor monitoring kit   1 each, Does not apply, 2 times daily, Please fill brand covered by pt's insurance.  Dx: E11.9      lisinopril 40 MG tablet  Commonly known as: PRINIVIL,ZESTRIL   40 mg, Oral, Daily      magnesium oxide 400 (241.3 Mg) MG tablet tablet  Commonly known as: MAGnesium-Oxide   400 mg, Oral, Daily      metFORMIN 1000 MG tablet  Commonly known as: GLUCOPHAGE   1,000 mg, Oral, 2 Times Daily, For DMII      polyethylene glycol packet  Commonly known as: MIRALAX   17 g, Oral, Daily PRN      primidone 50 MG tablet  Commonly known as: MYSOLINE   50 mg, Oral, 2 times daily      prochlorperazine 10 MG tablet  Commonly known as: COMPAZINE   10 mg, Oral, Every 6 Hours PRN, Stop Promethazine and generic Zofran      QUEtiapine 100 MG tablet  Commonly known as: SEROquel   100 mg, Oral, Nightly, Sleep and mood      tamsulosin 0.4 MG capsule 24 hr capsule  Commonly known as: FLOMAX   0.4 mg, Oral, Daily, For urine flow      traMADol 50 MG tablet  Commonly known as: Ultram   50 mg, Oral, Every 8 Hours PRN      Vitamin D3 50 MCG (2000 UT) tablet   1 tablet, Oral, Daily      Vitamin E 180 MG capsule   Oral         Stop These Medications    omeprazole 20 MG capsule  Commonly known as: priLOSEC     sucralfate 1 g tablet  Commonly known as: Carafate             Additional Instructions for the Follow-ups that You Need to Schedule     Discharge Follow-up with PCP   As directed       Currently Documented PCP:    Alexandrea Jimenez PA-C    PCP Phone Number:    [phone]     Follow Up Details: PCP 1 to 2 weeks.  GI/urology per their recommendations.           Follow-up Information     Alexandrea Jimenez PA-C .    Specialty: Family Medicine  Why: PCP 1 to 2 weeks. GI/urology per their recommendations.  Contact information:  [address] [address] [address] [phone]                     Salvatore Holder MD  04/10/21  11:26 EDT    Discharge time spent greater than 30 minutes.

## 2021-04-11 RX ORDER — ATORVASTATIN CALCIUM 80 MG/1
80 TABLET, FILM COATED ORAL DAILY
Qty: 90 TABLET | Refills: 3 | Status: SHIPPED | OUTPATIENT
Start: 2021-04-11

## 2021-04-12 ENCOUNTER — TRANSITIONAL CARE MANAGEMENT TELEPHONE ENCOUNTER (OUTPATIENT)
Dept: CALL CENTER | Facility: HOSPITAL | Age: 71
End: 2021-04-12

## 2021-04-12 NOTE — PROGRESS NOTES
Case Management Discharge Note      Final Note: Pt discharged home on 4/10.  NANY Payton RN    Provided Post Acute Provider List?: N/A  Provided Post Acute Provider Quality & Resource List?: N/A    Selected Continued Care - Discharged on 4/10/2021 Admission date: 4/7/2021 - Discharge disposition: Home or Self Care    Destination    No services have been selected for the patient.              Durable Medical Equipment    No services have been selected for the patient.              Dialysis/Infusion    No services have been selected for the patient.              Home Medical Care    No services have been selected for the patient.              Therapy    No services have been selected for the patient.              Community Resources    No services have been selected for the patient.                Selected Continued Care - Prior Encounters Includes selections from prior encounters from 1/7/2021 to 4/10/2021    Discharged on 2/24/2021 Admission date: 2/22/2021 - Discharge disposition: Home or Self Care    Home Medical Care     Service Provider Selected Services Address Phone Fax Patient Preferred    [facility] [place]  [facility] [address] [address] [phone] --                    Transportation Services  Private: Car    Final Discharge Disposition Code: 01 - home or self-care

## 2021-04-12 NOTE — OUTREACH NOTE
Call Center TCM Note      Responses   [facility] patient discharged from?  [place]   Does the patient have one of the following disease processes/diagnoses(primary or secondary)?  Other   TCM attempt successful?  Yes   Call start time  1525   Call end time  1530   General alerts for this patient  Pt does not read or write well he stated   Discharge diagnosis  Clostridium difficile enterocolitis    Meds reviewed with patient/caregiver?  Yes   Is the patient having any side effects they believe may be caused by any medication additions or changes?  No   Does the patient have all medications ordered at discharge?  Yes   Is the patient taking all medications as directed (includes completed medication regime)?  Yes   Does the patient have a primary care provider?   Yes   Does the patient have an appointment with their PCP within 7 days of discharge?  Greater than 7 days   Comments regarding PCP  Scheduled hospital followup appt on 4/21/2021 with Alexandrea Jimenez   What is preventing the patient from scheduling follow up appointments within 7 days of discharge?  Earlier appointment not available   Nursing Interventions  Verified appointment date/time/provider   Has the patient kept scheduled appointments due by today?  N/A   Has home health visited the patient within 72 hours of discharge?  N/A   Psychosocial issues?  Yes   Psychosocial comments  Pt states he does not read or write well.    Did the patient receive a copy of their discharge instructions?  Yes   Nursing interventions  Reviewed instructions with patient, Educated on MyChart   What is the patient's perception of their health status since discharge?  Improving   Is the patient/caregiver able to teach back signs and symptoms related to disease process for when to call PCP?  Yes   Is the patient/caregiver able to teach back signs and symptoms related to disease process for when to call 911?  Yes   Is the patient/caregiver able to teach back the hierarchy of  who to call/visit for symptoms/problems? PCP, Specialist, Home health nurse, Urgent Care, ED, 911  Yes   If the patient is a current smoker, are they able to teach back resources for cessation?  Smoking cessation medications   TCM call completed?  Yes          Azar Bowling RN    4/12/2021, 15:31 EDT

## 2021-04-19 ENCOUNTER — READMISSION MANAGEMENT (OUTPATIENT)
Dept: CALL CENTER | Facility: HOSPITAL | Age: 71
End: 2021-04-19

## 2021-04-19 NOTE — OUTREACH NOTE
Medical Week 2 Survey      Responses   [facility] [place] patient discharged from?  [place]   Does the patient have one of the following disease processes/diagnoses(primary or secondary)?  Other   Week 2 attempt successful?  No   Unsuccessful attempts  Attempt 1          Amy Duron RN

## 2021-04-21 ENCOUNTER — READMISSION MANAGEMENT (OUTPATIENT)
Dept: CALL CENTER | Facility: HOSPITAL | Age: 71
End: 2021-04-21

## 2021-04-21 NOTE — OUTREACH NOTE
Medical Week 2 Survey      Responses   [facility] patient discharged from?  [place]   Does the patient have one of the following disease processes/diagnoses(primary or secondary)?  Other   Week 2 attempt successful?  No   Unsuccessful attempts  Attempt 2          Felicita Buck RN

## 2021-04-29 ENCOUNTER — READMISSION MANAGEMENT (OUTPATIENT)
Dept: CALL CENTER | Facility: HOSPITAL | Age: 71
End: 2021-04-29

## 2021-04-29 NOTE — OUTREACH NOTE
"Medical Week 3 Survey      Responses   [facility] patient discharged from?  [place]   Does the patient have one of the following disease processes/diagnoses(primary or secondary)?  Other   Week 3 attempt successful?  Yes   Call start time  1219   Revoke  Patient  [Per daughter, Naheed Horn, patient passed away last Wednesday or Thursday ]   Call end time  1221   Is patient permission given to speak with other caregiver?  Yes   List who call center can speak with  Naheed Horn Daughter    Person spoke with today (if not patient) and relationship  Naheed Horn Daughter    Week 3 Call Completed?  Yes   Revoked  No further contact(revokes)-requires comment [Patient has passed away according to daughter ]   Graduated/Revoked comments  Patient passed away last \"Wednesday or Thursday\" according to aNheed his daughter.    Wrap up additional comments  Patient passed away last \"Wednesday or Thursday\" according to daughter Naheed - will update in system and route message to PCP           Eboni Urbina RN  "

## 2021-05-27 RX ORDER — SUCRALFATE 1 G/1
TABLET ORAL
Qty: 120 TABLET | Refills: 2 | OUTPATIENT
Start: 2021-05-27

## 2021-08-11 NOTE — PLAN OF CARE
Problem: Patient Care Overview  Goal: Plan of Care Review  Outcome: Ongoing (interventions implemented as appropriate)  Flowsheets (Taken 7/13/2020 0204)  Progress: improving  Plan of Care Reviewed With: patient  Outcome Summary: Tremors much better than couple days ago.  Does not always use all light, so bed alarm in use. Up with stand by assist to BR.  O2 2L NC.  NP cough.      Initial (On Arrival)

## 2021-11-08 NOTE — PROGRESS NOTES
Subjective   Felicita Mike is a 70 y.o. male.     History of Present Illness   Felicita Mike 70 y.o. male presents today for hosptial follow up.  he was treated 2-22-21 to 2-24-21 for abd pain and C diff  .  I reviewed all of the labs and diagnostic testing and noted:     IMPRESSION:   1. Small amount of layering fluid in the pelvis favored to be reactive.  2. No appreciable colonic wall thickening. Mild prominence of a few mid  small-bowel loops with air-fluid levels that may represent an  ileus/enteritis.  3. CT findings of gastritis.  4. Abnormal appearance of the distal left ureter which may be secondary  to an internal small debris/mucosal hyperenhancement. Correlation with  urine exam is recommended.  The patient's medications were changed:  Current outpatient and discharge medications have been reconciled for the patient.  Reviewed by: Alexandrea Jimenez PA-C    he does have a follow up appointment with a specialist:         Lab Results   Component Value Date    HGBA1C 7.30 (H) 02/22/2021   saw Dr Stevens 2 days ago for f/u---aware of his pain and nausea; sent Phenergan and still nausea.  He is on Carafate and now back on Omeprazole   The patient has read and signed the [facility] Controlled Substance Contract.  I will continue to see patient for regular follow up appointments.  They are well controlled on their medication.  GIBSON has been reviewed by me and is updated every 3 months. The patient is aware of the potential for addiction and dependence.      NO QT issues on EKG--1-4-21     Note bp meds changed---Amlodipine 5mg, Coreg 25mg BId, Lisinopril 40mg, Chlorthalidone 25mg,    CT abd 2-22-21  IMPRESSION:   IMPRESSION:   1. Small amount of layering fluid in the pelvis favored to be reactive.  2. No appreciable colonic wall thickening. Mild prominence of a few mid  small-bowel loops with air-fluid levels that may represent an  ileus/enteritis.  3. CT findings of gastritis.  4. Abnormal appearance of the  distal left ureter which may be secondary  to an internal small debris/mucosal hyperenhancement. Correlation with  urine exam is recommended.    Urine was checked 2-22-21----no blood  The following portions of the patient's history were reviewed and updated as appropriate: allergies, current medications, past family history, past medical history, past social history, past surgical history and problem list.    Review of Systems   Constitutional: Positive for activity change, appetite change and fatigue.   Cardiovascular: Positive for chest pain.   Gastrointestinal: Positive for abdominal pain, diarrhea and nausea. Negative for vomiting.   Psychiatric/Behavioral: Positive for sleep disturbance.       Objective   Physical Exam  Vitals signs and nursing note reviewed.   Constitutional:       General: He is in acute distress.      Appearance: He is well-developed. He is ill-appearing. He is not diaphoretic.   HENT:      Head: Normocephalic.      Right Ear: External ear normal.      Left Ear: External ear normal.   Eyes:      General: No scleral icterus.     Conjunctiva/sclera: Conjunctivae normal.      Pupils: Pupils are equal, round, and reactive to light.   Neck:      Musculoskeletal: Normal range of motion and neck supple.   Cardiovascular:      Rate and Rhythm: Normal rate and regular rhythm.      Pulses: Normal pulses.      Heart sounds: Normal heart sounds. No murmur.   Pulmonary:      Effort: Pulmonary effort is normal.      Breath sounds: Wheezing present.   Abdominal:      Palpations: Abdomen is soft.      Tenderness: There is abdominal tenderness.      Comments: Tender LUQ to LLQ---guards----concern about how much pain he is in; will update CT abd/pelvis stat today.  Last check was 2-22-21.    Musculoskeletal: Normal range of motion.      Right lower leg: No edema.      Left lower leg: No edema.   Skin:     General: Skin is warm and dry.      Coloration: Skin is not jaundiced.      Findings: No rash.    Neurological:      Mental Status: He is alert and oriented to person, place, and time.   Psychiatric:         Mood and Affect: Affect is not inappropriate.         Behavior: Behavior normal.         Thought Content: Thought content normal.         Judgment: Judgment normal.         Assessment/Plan   Diagnoses and all orders for this visit:    1. Hospital discharge follow-up (Primary)  -     Urinalysis With Microscopic - Urine, Clean Catch  -     Urine Culture - Urine, Urine, Clean Catch  -     Comprehensive Metabolic Panel  -     CBC & Differential  -     CBC Auto Differential  -     Urinalysis without microscopic (no culture) - Urine, Clean Catch  -     Urinalysis, Microscopic Only - Urine, Clean Catch    2. Pulmonary emphysema, unspecified emphysema type (CMS/[facility])    3. Moderate episode of recurrent major depressive disorder (CMS/[facility])    4. Type 2 diabetes mellitus with microalbuminuria, without long-term current use of insulin (CMS/[facility])  -     Urinalysis With Microscopic - Urine, Clean Catch  -     Urine Culture - Urine, Urine, Clean Catch  -     Comprehensive Metabolic Panel  -     CBC & Differential  -     CBC Auto Differential  -     Urinalysis without microscopic (no culture) - Urine, Clean Catch  -     Urinalysis, Microscopic Only - Urine, Clean Catch    5. Peripheral vascular disease (CMS/[facility])    6. LLQ pain  -     CT Abdomen Pelvis With Contrast; Future  -     Urinalysis With Microscopic - Urine, Clean Catch  -     Urine Culture - Urine, Urine, Clean Catch  -     Comprehensive Metabolic Panel  -     CBC & Differential  -     CBC Auto Differential  -     Urinalysis without microscopic (no culture) - Urine, Clean Catch  -     Urinalysis, Microscopic Only - Urine, Clean Catch    7. C. difficile colitis  -     CT Abdomen Pelvis With Contrast; Future  -     Urinalysis With Microscopic - Urine, Clean Catch  -     Urine Culture - Urine, Urine, Clean Catch  -     Comprehensive Metabolic Panel  -     CBC &  Differential  -     CBC Auto Differential  -     Urinalysis without microscopic (no culture) - Urine, Clean Catch  -     Urinalysis, Microscopic Only - Urine, Clean Catch    Other orders  -     ondansetron (Zofran) 4 MG tablet; Take 1 tablet by mouth Every 8 (Eight) Hours As Needed for Nausea or Vomiting. Stop Promethazine  Dispense: 90 tablet; Refill: 3  -     traMADol (Ultram) 50 MG tablet; Take 1 tablet by mouth Every 8 (Eight) Hours As Needed for Moderate Pain .  Dispense: 90 tablet; Refill: 1  -     metFORMIN (GLUCOPHAGE) 1000 MG tablet; Take 1 tablet by mouth 2 (Two) Times a Day. For DMII  Dispense: 180 tablet; Refill: 1  -     QUEtiapine (SEROquel) 100 MG tablet; Take 1 tablet by mouth Every Night. Sleep and mood  Dispense: 90 tablet; Refill: 1  -     potassium chloride 10 MEQ CR tablet; 2 PO BID X 5 days  Dispense: 20 tablet; Refill: 0        Having abd pain ---can;'t sleep--no hx QT issues; will try Ultram  Sending for stat CT abdomen and pelvis with contrast due to the intensity of patient's pain and has continued since 2/22/2021.  Very concerned that he could have an abscess or concern about a renal stone or renal process.  Ok change Phenergan to Zofran for nausea  Hold Metformin while have diarrhea  Ok Prilosec 40mg----he is taking BID for now  F/u GI    Radiologist called; colitis better; no fluid this CT ----some stranding around kidneys.  Will send to lab now--get CMP, CBC, urine micro and cx.  Wait for results to Rx d/t C Diff  Did obtain labs today and note potassium was 3.2 and sent potassium chloride 20 mEq total dose twice a day for 5 days and note sodium was 129 in will call and asked patient to not over drink water and plan on getting a BMP on Saturday hopefully with home health      declines

## 2023-03-06 NOTE — TELEPHONE ENCOUNTER
"  Caller: Felicita Mike    Relationship: Self    Best call back number: [phone]     What medication are you requesting: SOMETHING TO HELP WITH NAUSEA. PATIENT STATES MEDICATION HE HAS BEEN TAKING IS NOT WORKING AND HE STATES \"HE CAN'T TAKE IT ANYMORE\".       How long have you been experiencing symptoms: A MONTH     Have you had these symptoms before:    [x] Yes  [] No    Have you been treated for these symptoms before:   [x] Yes  [] No    If a prescription is needed, what is your preferred pharmacy and phone number: [id number] [place], [place] - [address] [facility] [address] [facility] [phone] [facility] [phone] FX       " 0 (no pain/absence of nonverbal indicators of pain)

## 2024-01-09 NOTE — TELEPHONE ENCOUNTER
You sent over omeprazole 40 mg BID. Patient was on 20 mg BID. He is wanting to know if this was a increase and why.    Detail Level: Detailed Quality 431: Preventive Care And Screening: Unhealthy Alcohol Use - Screening: Patient screened for unhealthy alcohol use using a single question and scores less than 2 times per year Quality 226: Preventive Care And Screening: Tobacco Use: Screening And Cessation Intervention: Patient screened for tobacco use and is an ex/non-smoker Quality 130: Documentation Of Current Medications In The Medical Record: Current Medications Documented Quality 410: Psoriasis Clinical Response To Oral Systemic Or Biologic Medications: Psoriasis Assessment Tool Documented, Met Specified Benchmark Quality 176: Tuberculosis Screening Prior To First Course Of Biologic And/Or Immune Response Modifier Therapy: Patient receiving first-time biologic and/or immune response modifier therapy, TB Screening Performed and Results Interpreted within 12 months

## (undated) DEVICE — SENSR O2 OXIMAX FNGR A/ 18IN NONSTR

## (undated) DEVICE — THE SINGLE USE ETRAP – POLYP TRAP IS USED FOR SUCTION RETRIEVAL OF ENDOSCOPICALLY REMOVED POLYPS.: Brand: ETRAP

## (undated) DEVICE — ADAPT CLN BIOGUARD AIR/H2O DISP

## (undated) DEVICE — CANN NASL CO2 TRULINK W/O2 A/

## (undated) DEVICE — KT VLV BIOGUARD SXN BIOP AIR/H20 CONN 4PC DISP

## (undated) DEVICE — KT ORCA ORCAPOD DISP STRL

## (undated) DEVICE — BITEBLOCK OMNI BLOC

## (undated) DEVICE — FRCP BX RADJAW4 NDL 2.8 240CM LG OG BX40

## (undated) DEVICE — TUBING, SUCTION, 1/4" X 10', STRAIGHT: Brand: MEDLINE

## (undated) DEVICE — LN SMPL CO2 SHTRM SD STREAM W/M LUER

## (undated) DEVICE — SINGLE-USE BIOPSY FORCEPS: Brand: RADIAL JAW 4

## (undated) DEVICE — MSK ENDO PORT O2 POM ELITE CURAPLEX A/

## (undated) DEVICE — SNAR POLYP SENSATION STDOVL 27 240 BX40

## (undated) DEVICE — THE TORRENT IRRIGATION SCOPE CONNECTOR IS USED WITH THE TORRENT IRRIGATION TUBING TO PROVIDE IRRIGATION FLUIDS SUCH AS STERILE WATER DURING GASTROINTESTINAL ENDOSCOPIC PROCEDURES WHEN USED IN CONJUNCTION WITH AN IRRIGATION PUMP (OR ELECTROSURGICAL UNIT).: Brand: TORRENT